# Patient Record
Sex: MALE | Race: WHITE | NOT HISPANIC OR LATINO | Employment: OTHER | URBAN - METROPOLITAN AREA
[De-identification: names, ages, dates, MRNs, and addresses within clinical notes are randomized per-mention and may not be internally consistent; named-entity substitution may affect disease eponyms.]

---

## 2017-01-03 ENCOUNTER — THERAPY VISIT (OUTPATIENT)
Dept: PHYSICAL THERAPY | Facility: CLINIC | Age: 77
End: 2017-01-03
Payer: MEDICARE

## 2017-01-03 DIAGNOSIS — M54.6 CHRONIC LEFT-SIDED THORACIC BACK PAIN: Primary | ICD-10-CM

## 2017-01-03 DIAGNOSIS — G89.29 CHRONIC LEFT-SIDED THORACIC BACK PAIN: Primary | ICD-10-CM

## 2017-01-03 PROCEDURE — 97112 NEUROMUSCULAR REEDUCATION: CPT | Mod: GP | Performed by: PHYSICAL THERAPIST

## 2017-01-03 PROCEDURE — G8980 MOBILITY D/C STATUS: HCPCS | Mod: GP | Performed by: PHYSICAL THERAPIST

## 2017-01-03 PROCEDURE — 97110 THERAPEUTIC EXERCISES: CPT | Mod: GP | Performed by: PHYSICAL THERAPIST

## 2017-01-03 PROCEDURE — G8979 MOBILITY GOAL STATUS: HCPCS | Mod: GP | Performed by: PHYSICAL THERAPIST

## 2017-01-03 NOTE — PROGRESS NOTES
Subjective:    HPI  Oswestry Score: 0 %                 Objective:    System    Physical Exam    General     ROS    Assessment/Plan:      DISCHARGE REPORT    Progress reporting period is from 11-15-16 to 1-3-17.       SUBJECTIVE  Subjective changes noted by patient:  Patient is pain free and very happy.       Current pain level is 0/10  .     Previous pain level was  4/10 Initial Pain level: 4/10.   Changes in function:  Yes (See Goal flowsheet attached for changes in current functional level)  Adverse reaction to treatment or activity: None    OBJECTIVE  Changes noted in objective findings:  The objective findings below are from DOS 1-3-17.  Thoracic ROM: full, painfree; improved sitting posture but does require occasional vc.        ASSESSMENT/PLAN  Updated problem list and treatment plan: Diagnosis 1:  Thoracic spine pain  Pain -  self management, education, directional preference exercise and home program  Decreased ROM/flexibility - manual therapy and therapeutic exercise  Decreased strength - therapeutic exercise and therapeutic activities  Decreased function - therapeutic activities  Impaired posture - neuro re-education  STG/LTGs have been met or progress has been made towards goals:  Yes (See Goal flow sheet completed today.)  Assessment of Progress: The patient has met all of their long term goals.  Self Management Plans:  Patient is independent in a home treatment program.  Patient is independent in self management of symptoms.  I have re-evaluated this patient and find that the nature, scope, duration and intensity of the therapy is appropriate for the medical condition of the patient.  Bryant continues to require the following intervention to meet STG and LTG's:  PT intervention is no longer required to meet STG/LTG.    Recommendations:  This patient is ready to be discharged from therapy and continue their home treatment program.    Please refer to the daily flowsheet for treatment today, total  treatment time and time spent performing 1:1 timed codes.

## 2017-01-09 ENCOUNTER — ALLIED HEALTH/NURSE VISIT (OUTPATIENT)
Dept: NURSING | Facility: CLINIC | Age: 77
End: 2017-01-09
Payer: COMMERCIAL

## 2017-01-09 DIAGNOSIS — E53.8 VITAMIN B12 DEFICIENCY (NON ANEMIC): Primary | ICD-10-CM

## 2017-01-09 PROCEDURE — 96372 THER/PROPH/DIAG INJ SC/IM: CPT

## 2017-01-09 PROCEDURE — 90471 IMMUNIZATION ADMIN: CPT

## 2017-02-10 ENCOUNTER — ALLIED HEALTH/NURSE VISIT (OUTPATIENT)
Dept: NURSING | Facility: CLINIC | Age: 77
End: 2017-02-10
Payer: COMMERCIAL

## 2017-02-10 DIAGNOSIS — E53.8 VITAMIN B12 DEFICIENCY (NON ANEMIC): Primary | ICD-10-CM

## 2017-02-10 PROCEDURE — 99207 ZZC NO CHARGE NURSE ONLY: CPT | Mod: 25

## 2017-02-20 DIAGNOSIS — Z79.4 TYPE 2 DIABETES MELLITUS WITH COMPLICATION, WITH LONG-TERM CURRENT USE OF INSULIN (H): Primary | ICD-10-CM

## 2017-02-20 DIAGNOSIS — E11.8 TYPE 2 DIABETES MELLITUS WITH COMPLICATION, WITH LONG-TERM CURRENT USE OF INSULIN (H): Primary | ICD-10-CM

## 2017-02-20 NOTE — TELEPHONE ENCOUNTER
Pending Prescriptions:                       Disp   Refills    insulin glargine (LANTUS) 100 UNIT/ML inj*15 mL  3            Sig: Inject 20 Units Subcutaneous At Bedtime          Last Written Prescription Date:    Last Fill Quantity: ,   # refills:   Last Office Visit with [facility], P or [facility] prescribing provider: 12/05/16  Future Office visit:    Next 5 appointments (look out 90 days)     Mar 13, 2017 10:30 AM CDT   [facility]quis Caldwell [facility]    [address] [facility] [address]   [phone]                   Routing refill request to provider for review/approval because:  Medication is reported/historical

## 2017-03-13 ENCOUNTER — OFFICE VISIT (OUTPATIENT)
Dept: FAMILY MEDICINE | Facility: CLINIC | Age: 77
End: 2017-03-13
Payer: COMMERCIAL

## 2017-03-13 VITALS
OXYGEN SATURATION: 97 % | WEIGHT: 274.6 LBS | TEMPERATURE: 97.4 F | BODY MASS INDEX: 37.19 KG/M2 | SYSTOLIC BLOOD PRESSURE: 119 MMHG | DIASTOLIC BLOOD PRESSURE: 71 MMHG | HEART RATE: 87 BPM | HEIGHT: 72 IN

## 2017-03-13 DIAGNOSIS — E11.9 TYPE 2 DIABETES MELLITUS WITHOUT COMPLICATION, WITH LONG-TERM CURRENT USE OF INSULIN (H): Primary | ICD-10-CM

## 2017-03-13 DIAGNOSIS — E53.8 VITAMIN B12 DEFICIENCY (NON ANEMIC): ICD-10-CM

## 2017-03-13 DIAGNOSIS — Z79.4 TYPE 2 DIABETES MELLITUS WITHOUT COMPLICATION, WITH LONG-TERM CURRENT USE OF INSULIN (H): Primary | ICD-10-CM

## 2017-03-13 LAB — HBA1C MFR BLD: 8.1 % (ref 4.3–6)

## 2017-03-13 PROCEDURE — 99213 OFFICE O/P EST LOW 20 MIN: CPT | Performed by: INTERNAL MEDICINE

## 2017-03-13 PROCEDURE — 36415 COLL VENOUS BLD VENIPUNCTURE: CPT | Performed by: INTERNAL MEDICINE

## 2017-03-13 PROCEDURE — 83036 HEMOGLOBIN GLYCOSYLATED A1C: CPT | Performed by: INTERNAL MEDICINE

## 2017-03-13 RX ORDER — AMOXICILLIN 500 MG/1
CAPSULE ORAL
Refills: 0 | COMMUNITY
Start: 2017-01-08 | End: 2019-03-19

## 2017-03-13 RX ORDER — OXYCODONE AND ACETAMINOPHEN 5; 325 MG/1; MG/1
TABLET ORAL
Refills: 0 | COMMUNITY
Start: 2016-06-21 | End: 2019-03-19

## 2017-03-13 RX ORDER — SIMVASTATIN 5 MG
TABLET ORAL
Refills: 4 | Status: ON HOLD | COMMUNITY
Start: 2016-09-10 | End: 2019-06-04

## 2017-03-13 RX ORDER — GLIPIZIDE 10 MG/1
TABLET ORAL
Refills: 2 | COMMUNITY
Start: 2016-08-18 | End: 2017-06-19

## 2017-03-13 NOTE — PROGRESS NOTES
SUBJECTIVE:                                                    Bryant Mckenzie is a 77 year old male who presents to clinic today for the following health issues:      Chief Complaint   Patient presents with     Follow Up For     Diabetes     Imm/Inj     B-12 Shot         Pleasant 77-year-old man returns in follow-up for his diabetes and vitamin B12 deficiency. We reviewed his home blood sugar log indicating that most of his a.m. fasting blood sugars are greater than 160. There are no low blood sugars noted. He has not had any symptoms of hypoglycemia. He admits that sometimes he does not take 12 units of Lantus at bedtime based on blood sugar readings in the 120s at bedtime. He was educated that he should keep his Lantus insulin dose constant. He does give himself with meals insulin based on pre-prandial blood sugar readings.    Problem list and histories reviewed & adjusted, as indicated.  Additional history: as documented    Patient Active Problem List   Diagnosis     Type 2 diabetes mellitus without complication (H)     Hyperlipidemia LDL goal <100     Atrophic gastritis     Vitamin B12 deficiency (non anemic)     Obstructive sleep apnea syndrome     Benign non-nodular prostatic hyperplasia with lower urinary tract symptoms     Aortic stenosis     Chronic left-sided thoracic back pain     Past Surgical History   Procedure Laterality Date     Orthopedic surgery       both knees replaced     Esophagoscopy, gastroscopy, duodenoscopy (egd), combined N/A 1/20/2015     Procedure: COMBINED ESOPHAGOSCOPY, GASTROSCOPY, DUODENOSCOPY (EGD), BIOPSY SINGLE OR MULTIPLE;  Surgeon: Allan Marroquin MD;  Location:  GI     Hernia repair       Hydrocelectomy scrotal         Social History   Substance Use Topics     Smoking status: Never Smoker     Smokeless tobacco: Never Used     Alcohol use 0.0 oz/week     0 Standard drinks or equivalent per week      Comment: 2 beers a month     Family History   Problem Relation Age of  Onset     Coronary Artery Disease Father 53         Current Outpatient Prescriptions   Medication Sig Dispense Refill     amoxicillin (AMOXIL) 500 MG capsule   0     glipiZIDE (GLUCOTROL) 10 MG tablet   2     oxyCODONE-acetaminophen (PERCOCET) 5-325 MG per tablet   0     simvastatin (ZOCOR) 5 MG tablet   4     insulin glargine (LANTUS) 100 UNIT/ML injection Inject 20 Units Subcutaneous At Bedtime 15 mL 3     ONE TOUCH ULTRA test strip   12     ONETOUCH DELICA LANCETS 33G MISC   12     LANTUS SOLOSTAR 100 UNIT/ML soln   3     insulin lispro (HUMALOG KWIKPEN) 100 UNIT/ML injection 10 units with breakfast  10 units with lunch  And 11 units with dinner 1 mL 11     atorvastatin (LIPITOR) 20 MG tablet Take 1 tablet (20 mg) by mouth daily 90 tablet 3     insulin pen needle 31G X 8 MM Use 4 pen needles daily or as directed for humalog and lantus 300 each 11     tamsulosin (FLOMAX) 0.4 MG 24 hr capsule Take 1 capsule (0.4 mg) by mouth daily 90 capsule 3     cyanocobalamin (VITAMIN B12) 1000 MCG/ML injection Inject 1 mL (1,000 mcg) into the muscle every 30 days 1 mL 11     Multiple Vitamins-Minerals (MULTIVITAL PO) Take 1 tablet by mouth daily       METFORMIN HCL PO Take 1,000 mg by mouth 2 times daily        Allergies   Allergen Reactions     No Known Allergies        Reviewed and updated as needed this visit by clinical staff  Tobacco  Allergies  Meds  Med Hx  Surg Hx  Fam Hx  Soc Hx      Reviewed and updated as needed this visit by Provider         ROS:  C: NEGATIVE for fever, chills, change in weight  E/M: NEGATIVE for ear, mouth and throat problems  R: NEGATIVE for significant cough or SOB  CV: NEGATIVE for chest pain, palpitations or peripheral edema    OBJECTIVE:                                                    /71 (BP Location: Left arm, Patient Position: Chair, Cuff Size: Adult Large)  Pulse 87  Temp 97.4  F (36.3  C) (Oral)  Ht 6' (1.829 m)  Wt 274 lb 9.6 oz (124.6 kg)  SpO2 97%  BMI 37.24 kg/m2  Body  mass index is 37.24 kg/(m^2).  Gen.: This is a well-appearing man in no acute distress.    Diagnostic Test Results:  Results for orders placed or performed in visit on 03/13/17   Hemoglobin A1c   Result Value Ref Range    Hemoglobin A1C 8.1 (H) 4.3 - 6.0 %        ASSESSMENT/PLAN:                                                            1. Type 2 diabetes mellitus without complication, with long-term current use of insulin (H)    - Hemoglobin A1c    2. Vitamin B12 deficiency (non anemic)  B12 shot today      FUTURE APPOINTMENTS:       - 3 months, lab appointment prior to office visit with A1c checked  Patient Instructions   Increase your Lantus insulin from 12 units at night to 14 units at night  If after 1-2 weeks your AM fasting sugars are still greater than 150, then further increase your Lantus insulin to 16 units at night  If after a further 1-2 weeks your AM fasting sugars are still greater than 150, then further increase your Lantus insulin to 18 units at night  Inform me if your sugars are measured less than 90 or you have symptoms of low sugar  I will notify you of the result of your A1c test and inform you when you need to follow up with me based on that result    Total time greater than 20 minutes majority spent counseling on insulin management and coordinating care  Marquis Caldwell MD  [facility]

## 2017-03-13 NOTE — LETTER
[address] [facility]  [address]  [place]  [address]  Tel: [phone]    March 14, 2017    Bryant Mckenzie  [address] [address]        Dear Mr. Mckenzie,    The following letter pertains to your most recent diagnostic tests:    -Your hemoglobin A1c which is a diabetes blood test that represents an average of you blood sugars over the last 3 months returned at 8.1.  This is ABOVE your goal of hemoglobin A1c less than 8.       Bottom line:  I think you should implement the changes in your insulin that we discussed last visit.  You should record your insulin doses and blood sugars in a log book like you have been doing and return to see me in 4-6 weeks to discuss your insulin doses.  We will need to recheck your hemoglobin A1c level in a 3 month interval.      If you have any further questions or problems, please contact our office.      Sincerely,    Marquis Caldwell MD/[facility]    Results for orders placed or performed in visit on 03/13/17   Hemoglobin A1c   Result Value Ref Range    Hemoglobin A1C 8.1 (H) 4.3 - 6.0 %           Enclosure: Lab Results

## 2017-03-13 NOTE — PATIENT INSTRUCTIONS
Increase your Lantus insulin from 12 units at night to 14 units at night  If after 1-2 weeks your AM fasting sugars are still greater than 150, then further increase your Lantus insulin to 16 units at night  If after a further 1-2 weeks your AM fasting sugars are still greater than 150, then further increase your Lantus insulin to 18 units at night  Inform me if your sugars are measured less than 90 or you have symptoms of low sugar  I will notify you of the result of your A1c test and inform you when you need to follow up with me based on that result

## 2017-03-13 NOTE — NURSING NOTE
Chief Complaint   Patient presents with     Follow Up For     Diabetes     Imm/Inj     B-12 Shot       Initial /71 (BP Location: Left arm, Patient Position: Chair, Cuff Size: Adult Large)  Pulse 87  Temp 97.4  F (36.3  C) (Oral)  Ht 6' (1.829 m)  Wt 274 lb 9.6 oz (124.6 kg)  SpO2 97%  BMI 37.24 kg/m2 Estimated body mass index is 37.24 kg/(m^2) as calculated from the following:    Height as of this encounter: 6' (1.829 m).    Weight as of this encounter: 274 lb 9.6 oz (124.6 kg).  Medication Reconciliation: complete     DARCY Dee MA March 13, 2017 10:46 AM

## 2017-03-13 NOTE — MR AVS SNAPSHOT
After Visit Summary   3/13/2017    Bryant Mckenzie    MRN: [id number]           Patient Information     Date Of Birth          1940        Visit Information        Provider Department      3/13/2017 10:30 AM Marquis Caldwell MD [facility]        Today's Diagnoses     Type 2 diabetes mellitus without complication, with long-term current use of insulin (H)    -  1    Vitamin B12 deficiency (non anemic)          Care Instructions    Increase your Lantus insulin from 12 units at night to 14 units at night  If after 1-2 weeks your AM fasting sugars are still greater than 150, then further increase your Lantus insulin to 16 units at night  If after a further 1-2 weeks your AM fasting sugars are still greater than 150, then further increase your Lantus insulin to 18 units at night  Inform me if your sugars are measured less than 90 or you have symptoms of low sugar  I will notify you of the result of your A1c test and inform you when you need to follow up with me based on that result        Follow-ups after your visit        Follow-up notes from your care team     Return in about 3 months (around 6/13/2017) for Routine Visit.      Who to contact     If you have questions or need follow up information about today's clinic visit or your schedule please contact [facility] directly at [phone].  Normal or non-critical lab and imaging results will be communicated to you by MyChart, letter or phone within 4 business days after the clinic has received the results. If you do not hear from us within 7 days, please contact the clinic through MyChart or phone. If you have a critical or abnormal lab result, we will notify you by phone as soon as possible.  Submit refill requests through [employer] or call your pharmacy and they will forward the refill request to us. Please allow 3 business days for your refill to be completed.          Additional Information About Your Visit       "  MyChart Information     [email or web address] lets you send messages to your doctor, view your test results, renew your prescriptions, schedule appointments and more. To sign up, go to [email or web address] . Click on \"Log in\" on the left side of the screen, which will take you to the Welcome page. Then click on \"Sign up Now\" on the right side of the page.     You will be asked to enter the access code listed below, as well as some personal information. Please follow the directions to create your username and password.     Your access code is: [email or web address]  Expires: 2017 11:02 AM     Your access code will  in 90 days. If you need help or a new code, please call your [facility] clinic or [phone].        Care EveryWhere ID     This is your [facility] EveryWhere ID. This could be used by other organizations to access your [facility] medical records  [id number]        Your Vitals Were     Pulse Temperature Height Pulse Oximetry BMI (Body Mass Index)       87 97.4  F (36.3  C) (Oral) 6' (1.829 m) 97% 37.24 kg/m2        Blood Pressure from Last 3 Encounters:   17 119/71   16 117/67   16 119/70    Weight from Last 3 Encounters:   17 274 lb 9.6 oz (124.6 kg)   16 269 lb (122 kg)   16 250 lb (113.4 kg)              We Performed the Following     Hemoglobin A1c        Primary Care Provider Office Phone # Fax #    Marquis Caldwell [phone]       [facility] [address] [place] [address]        Thank you!     Thank you for choosing [facility]  for your care. Our goal is always to provide you with excellent care. Hearing back from our patients is one way we can continue to improve our services. Please take a few minutes to complete the written survey that you may receive in the mail after your visit with us. Thank you!             Your Updated Medication List - Protect others around you: Learn how to safely use, store and throw away your medicines at " www.disposemymeds.org.          This list is accurate as of: 3/13/17 11:02 AM.  Always use your most recent med list.                   Brand Name Dispense Instructions for use    amoxicillin 500 MG capsule    AMOXIL         atorvastatin 20 MG tablet    LIPITOR    90 tablet    Take 1 tablet (20 mg) by mouth daily       cyanocobalamin 1000 MCG/ML injection    VITAMIN B12    1 mL    Inject 1 mL (1,000 mcg) into the muscle every 30 days       FLOMAX 0.4 MG capsule   Generic drug:  tamsulosin     90 capsule    Take 1 capsule (0.4 mg) by mouth daily       glipiZIDE 10 MG tablet    GLUCOTROL         HumaLOG KWIKpen 100 UNIT/ML injection   Generic drug:  insulin lispro     1 mL    10 units with breakfast 10 units with lunch And 11 units with dinner       insulin pen needle 31G X 8 MM     300 each    Use 4 pen needles daily or as directed for humalog and lantus       * LANTUS SOLOSTAR 100 UNIT/ML injection   Generic drug:  insulin glargine          * insulin glargine 100 UNIT/ML injection    LANTUS    15 mL    Inject 20 Units Subcutaneous At Bedtime       METFORMIN HCL PO      Take 1,000 mg by mouth 2 times daily       MULTIVITAL PO      Take 1 tablet by mouth daily       ONE TOUCH ULTRA test strip   Generic drug:  blood glucose monitoring          ONETOUCH DELICA LANCETS 33G Misc          oxyCODONE-acetaminophen 5-325 MG per tablet    PERCOCET         simvastatin 5 MG tablet    ZOCOR         * Notice:  This list has 2 medication(s) that are the same as other medications prescribed for you. Read the directions carefully, and ask your doctor or other care provider to review them with you.

## 2017-03-14 NOTE — PROGRESS NOTES
The following letter pertains to your most recent diagnostic tests:    -Your hemoglobin A1c which is a diabetes blood test that represents an average of you blood sugars over the last 3 months returned at 8.1.  This is ABOVE your goal of hemoglobin A1c less than 8.       Bottom line:  I think you should implement the changes in your insulin that we discussed last visit.  You should record your insulin doses and blood sugars in a log book like you have been doing and return to see me in 4-6 weeks to discuss your insulin doses.  We will need to recheck your hemoglobin A1c level in a 3 month interval.          Sincerely,    Dr. Caldwell

## 2017-04-05 DIAGNOSIS — N40.0 BENIGN NON-NODULAR PROSTATIC HYPERPLASIA WITHOUT LOWER URINARY TRACT SYMPTOMS: ICD-10-CM

## 2017-04-05 RX ORDER — TAMSULOSIN HYDROCHLORIDE 0.4 MG/1
0.4 CAPSULE ORAL DAILY
Qty: 90 CAPSULE | Refills: 3 | Status: SHIPPED | OUTPATIENT
Start: 2017-04-05 | End: 2018-03-31

## 2017-04-05 NOTE — TELEPHONE ENCOUNTER
Pending Prescriptions:                       Disp   Refills    tamsulosin (FLOMAX) 0.4 MG capsule        90 cap*3            Sig: Take 1 capsule (0.4 mg) by mouth daily          Last Written Prescription Date: historical  Last Fill Quantity: -, # refills: -    Last Office Visit with G, P or [facility] prescribing provider:  3-13-17 Paulette   Future Office Visit:    Next 5 appointments (look out 90 days)     Apr 10, 2017 11:00 AM CDT   Nurse Only with  NURSE   [facility]    [address]  [facility] [address]   [phone]            May 01, 2017  1:00 PM CDT   Office Visit with Marquis Caldwell MD   [facility]    [address] [facility] [address]   [phone]                    BP Readings from Last 3 Encounters:   03/13/17 119/71   12/05/16 117/67   11/12/16 119/70     RT Brittney (R)

## 2017-04-10 ENCOUNTER — ALLIED HEALTH/NURSE VISIT (OUTPATIENT)
Dept: NURSING | Facility: CLINIC | Age: 77
End: 2017-04-10
Payer: COMMERCIAL

## 2017-04-10 DIAGNOSIS — E53.8 VITAMIN B12 DEFICIENCY (NON ANEMIC): Primary | ICD-10-CM

## 2017-04-10 PROCEDURE — 99207 ZZC NO CHARGE NURSE ONLY: CPT | Mod: 25

## 2017-05-01 ENCOUNTER — OFFICE VISIT (OUTPATIENT)
Dept: FAMILY MEDICINE | Facility: CLINIC | Age: 77
End: 2017-05-01
Payer: COMMERCIAL

## 2017-05-01 VITALS
HEIGHT: 72 IN | OXYGEN SATURATION: 98 % | WEIGHT: 270 LBS | TEMPERATURE: 95.2 F | SYSTOLIC BLOOD PRESSURE: 110 MMHG | HEART RATE: 78 BPM | BODY MASS INDEX: 36.57 KG/M2 | DIASTOLIC BLOOD PRESSURE: 65 MMHG

## 2017-05-01 DIAGNOSIS — Z79.4 TYPE 2 DIABETES MELLITUS WITHOUT COMPLICATION, WITH LONG-TERM CURRENT USE OF INSULIN (H): Primary | ICD-10-CM

## 2017-05-01 DIAGNOSIS — E11.9 TYPE 2 DIABETES MELLITUS WITHOUT COMPLICATION, WITH LONG-TERM CURRENT USE OF INSULIN (H): Primary | ICD-10-CM

## 2017-05-01 PROCEDURE — 99213 OFFICE O/P EST LOW 20 MIN: CPT | Performed by: INTERNAL MEDICINE

## 2017-05-01 NOTE — PROGRESS NOTES
SUBJECTIVE:                                                    Bryant Mckenzie is a 77 year old male who presents to clinic today for the following health issues:      Diabetes Follow-up    Patient is checking blood sugars: three times daily.   Blood sugar testing frequency justification: Adjustment of medication(s)  Results are as follows:         am - 152         lunchtime - 393         bedtime - 266    Diabetic concerns: None and blood sugar frequently over 200     Symptoms of hypoglycemia (low blood sugar): none     Paresthesias (numbness or burning in feet) or sores: No     Date of last diabetic eye exam: November, 2016       He had no low blood sugar readings or symptoms of hypoglycemia  His AM fasting sugars are generally > 130, but he only increased his lantus to a maximum of 14 units   He does not check his sugars prior to lunch regularly, but when he does they are in the high 200's  His pre dinner sugars are often in 300-400's and he has been adjusting his with dinner insulin dose based on those readings as high as 16 units     Problem list and histories reviewed & adjusted, as indicated.  Additional history: as documented    Patient Active Problem List   Diagnosis     Type 2 diabetes mellitus without complication (H)     Hyperlipidemia LDL goal <100     Atrophic gastritis     Vitamin B12 deficiency (non anemic)     Obstructive sleep apnea syndrome     Benign non-nodular prostatic hyperplasia with lower urinary tract symptoms     Aortic stenosis     Chronic left-sided thoracic back pain     Past Surgical History:   Procedure Laterality Date     ESOPHAGOSCOPY, GASTROSCOPY, DUODENOSCOPY (EGD), COMBINED N/A 1/20/2015    Procedure: COMBINED ESOPHAGOSCOPY, GASTROSCOPY, DUODENOSCOPY (EGD), BIOPSY SINGLE OR MULTIPLE;  Surgeon: Allan Marroquin MD;  Location:  GI     HERNIA REPAIR       HYDROCELECTOMY SCROTAL       ORTHOPEDIC SURGERY      both knees replaced       Social History   Substance Use Topics      Smoking status: Never Smoker     Smokeless tobacco: Never Used     Alcohol use 0.0 oz/week     0 Standard drinks or equivalent per week      Comment: 2 beers a month     Family History   Problem Relation Age of Onset     Coronary Artery Disease Father 53         Current Outpatient Prescriptions   Medication Sig Dispense Refill     tamsulosin (FLOMAX) 0.4 MG capsule Take 1 capsule (0.4 mg) by mouth daily 90 capsule 3     metFORMIN (GLUCOPHAGE) 500 MG tablet Take 2 tablets (1,000 mg) by mouth 2 times daily (with meals) 360 tablet 3     amoxicillin (AMOXIL) 500 MG capsule   0     glipiZIDE (GLUCOTROL) 10 MG tablet   2     oxyCODONE-acetaminophen (PERCOCET) 5-325 MG per tablet   0     simvastatin (ZOCOR) 5 MG tablet   4     insulin glargine (LANTUS) 100 UNIT/ML injection Inject 20 Units Subcutaneous At Bedtime (Patient taking differently: Inject 14 Units Subcutaneous At Bedtime ) 15 mL 3     ONE TOUCH ULTRA test strip   12     ONETOUCH DELICA LANCETS 33G MISC   12     LANTUS SOLOSTAR 100 UNIT/ML soln   3     insulin lispro (HUMALOG KWIKPEN) 100 UNIT/ML injection 10 units with breakfast  10 units with lunch  And 11 units with dinner 1 mL 11     atorvastatin (LIPITOR) 20 MG tablet Take 1 tablet (20 mg) by mouth daily 90 tablet 3     insulin pen needle 31G X 8 MM Use 4 pen needles daily or as directed for humalog and lantus 300 each 11     cyanocobalamin (VITAMIN B12) 1000 MCG/ML injection Inject 1 mL (1,000 mcg) into the muscle every 30 days 1 mL 11     Multiple Vitamins-Minerals (MULTIVITAL PO) Take 1 tablet by mouth daily       Allergies   Allergen Reactions     No Known Allergies        ROS:      OBJECTIVE:                                                    /65 (BP Location: Right arm, Cuff Size: Adult Large)  Pulse 78  Temp 95.2  F (35.1  C) (Tympanic)  Ht 6' (1.829 m)  Wt 270 lb (122.5 kg)  SpO2 98%  BMI 36.62 kg/m2  Body mass index is 36.62 kg/(m^2).  GENERAL: healthy, alert and no distress  PSYCH:  mentation appears normal, affect normal/bright    Diagnostic Test Results:  none      ASSESSMENT/PLAN:                                                              ICD-10-CM    1. Type 2 diabetes mellitus without complication, with long-term current use of insulin (H) E11.9     Z79.4      We spent about 15 minutes in face to face contact discussing his insulin dosing and diet    Patient Instructions   Increase bedtime Lantus insulin to 16 units each night    Increase breakfast Humalog to 11 units with breakfast    Increase lunchtime Humalog to 15 units with lunch    Increase dinner time Humalog to 14 units with dinner     If you have sugar readings less than 90 then inform me by leaving a message with my nurse    Return after June 13th for an office visit with a hemoglobin A1c test    Keep up the good work of recording your blood sugars and insulin doses as you have been (good job!)        Marquis Caldwell MD  [facility]

## 2017-05-01 NOTE — NURSING NOTE
Chief Complaint   Patient presents with     Diabetes       Initial /65 (BP Location: Right arm, Cuff Size: Adult Large)  Pulse 78  Temp 95.2  F (35.1  C) (Tympanic)  Ht 6' (1.829 m)  Wt 270 lb (122.5 kg)  SpO2 98%  BMI 36.62 kg/m2 Estimated body mass index is 36.62 kg/(m^2) as calculated from the following:    Height as of this encounter: 6' (1.829 m).    Weight as of this encounter: 270 lb (122.5 kg).  Medication Reconciliation: complete     Kourtney Howell MA

## 2017-05-01 NOTE — PATIENT INSTRUCTIONS
Increase bedtime Lantus insulin to 16 units each night    Increase breakfast Humalog to 11 units with breakfast    Increase lunchtime Humalog to 15 units with lunch    Increase dinner time Humalog to 14 units with dinner     If you have sugar readings less than 90 then inform me by leaving a message with my nurse    Return after June 13th for an office visit with a hemoglobin A1c test    Keep up the good work of recording your blood sugars and insulin doses as you have been (good job!)

## 2017-05-01 NOTE — MR AVS SNAPSHOT
After Visit Summary   5/1/2017    Bryant Mckenzie    MRN: [id number]           Patient Information     Date Of Birth          1940        Visit Information        Provider Department      5/1/2017 1:00 PM Marquis Caldwell MD [facility]        Today's Diagnoses     Type 2 diabetes mellitus without complication, with long-term current use of insulin (H)    -  1      Care Instructions    Increase bedtime Lantus insulin to 16 units each night    Increase breakfast Humalog to 11 units with breakfast    Increase lunchtime Humalog to 15 units with lunch    Increase dinner time Humalog to 14 units with dinner     If you have sugar readings less than 90 then inform me by leaving a message with my nurse    Return after June 13th for an office visit with a hemoglobin A1c test    Keep up the good work of recording your blood sugars and insulin doses as you have been (good job!)          Follow-ups after your visit        Follow-up notes from your care team     Return in about 6 weeks (around 6/12/2017) for Routine Visit.      Your next 10 appointments already scheduled     May 15, 2017 11:00 AM CDT   Nurse Only with CS NURSE   [facility] ([facility]    [address]  [place] [address] [phone]              Who to contact     If you have questions or need follow up information about today's clinic visit or your schedule please contact [facility] directly at [phone].  Normal or non-critical lab and imaging results will be communicated to you by MyChart, letter or phone within 4 business days after the clinic has received the results. If you do not hear from us within 7 days, please contact the clinic through MyChart or phone. If you have a critical or abnormal lab result, we will notify you by phone as soon as possible.  Submit refill requests through [employer] or call your pharmacy and they will forward the refill request to us. Please allow 3  business days for your refill to be completed.          Additional Information About Your Visit        [employer] Information     [employer] gives you secure access to your electronic health record. If you see a primary care provider, you can also send messages to your care team and make appointments. If you have questions, please call your primary care clinic.  If you do not have a primary care provider, please call [phone] and they will assist you.        Care EveryWhere ID     This is your Care EveryWhere ID. This could be used by other organizations to access your [place] medical records  [id number]        Your Vitals Were     Pulse Temperature Height Pulse Oximetry BMI (Body Mass Index)       78 95.2  F (35.1  C) (Tympanic) 6' (1.829 m) 98% 36.62 kg/m2        Blood Pressure from Last 3 Encounters:   05/01/17 110/65   03/13/17 119/71   12/05/16 117/67    Weight from Last 3 Encounters:   05/01/17 270 lb (122.5 kg)   03/13/17 274 lb 9.6 oz (124.6 kg)   12/05/16 269 lb (122 kg)              Today, you had the following     No orders found for display         Today's Medication Changes          These changes are accurate as of: 5/1/17  1:38 PM.  If you have any questions, ask your nurse or doctor.               These medicines have changed or have updated prescriptions.        Dose/Directions    * LANTUS SOLOSTAR 100 UNIT/ML injection   This may have changed:  Another medication with the same name was changed. Make sure you understand how and when to take each.   Generic drug:  insulin glargine   Changed by:  Marquis Caldwell MD        Refills:  3       * insulin glargine 100 UNIT/ML injection   Commonly known as:  LANTUS   This may have changed:  how much to take   Used for:  Type 2 diabetes mellitus with complication, with long-term current use of insulin (H)   Changed by:  Marquis Caldwell MD        Dose:  20 Units   Inject 20 Units Subcutaneous At Bedtime   Quantity:  15 mL   Refills:  3       * Notice:  This  list has 2 medication(s) that are the same as other medications prescribed for you. Read the directions carefully, and ask your doctor or other care provider to review them with you.             Primary Care Provider Office Phone # Fax #    Marquis Caldwell [phone]       [facility] [address] [facility] [address]        Thank you!     Thank you for choosing [facility]  for your care. Our goal is always to provide you with excellent care. Hearing back from our patients is one way we can continue to improve our services. Please take a few minutes to complete the written survey that you may receive in the mail after your visit with us. Thank you!             Your Updated Medication List - Protect others around you: Learn how to safely use, store and throw away your medicines at www.disposemymeds.org.          This list is accurate as of: 5/1/17  1:38 PM.  Always use your most recent med list.                   Brand Name Dispense Instructions for use    amoxicillin 500 MG capsule    AMOXIL         atorvastatin 20 MG tablet    LIPITOR    90 tablet    Take 1 tablet (20 mg) by mouth daily       cyanocobalamin 1000 MCG/ML injection    VITAMIN B12    1 mL    Inject 1 mL (1,000 mcg) into the muscle every 30 days       glipiZIDE 10 MG tablet    GLUCOTROL         HumaLOG KWIKpen 100 UNIT/ML injection   Generic drug:  insulin lispro     1 mL    10 units with breakfast 10 units with lunch And 11 units with dinner       insulin pen needle 31G X 8 MM     300 each    Use 4 pen needles daily or as directed for humalog and lantus       * LANTUS SOLOSTAR 100 UNIT/ML injection   Generic drug:  insulin glargine          * insulin glargine 100 UNIT/ML injection    LANTUS    15 mL    Inject 20 Units Subcutaneous At Bedtime       metFORMIN 500 MG tablet    GLUCOPHAGE    360 tablet    Take 2 tablets (1,000 mg) by mouth 2 times daily (with meals)       MULTIVITAL PO      Take 1 tablet by mouth  daily       ONE TOUCH ULTRA test strip   Generic drug:  blood glucose monitoring          ONETOUCH DELICA LANCETS 33G Misc          oxyCODONE-acetaminophen 5-325 MG per tablet    PERCOCET         simvastatin 5 MG tablet    ZOCOR         tamsulosin 0.4 MG capsule    FLOMAX    90 capsule    Take 1 capsule (0.4 mg) by mouth daily       * Notice:  This list has 2 medication(s) that are the same as other medications prescribed for you. Read the directions carefully, and ask your doctor or other care provider to review them with you.

## 2017-05-13 DIAGNOSIS — E11.8 TYPE 2 DIABETES MELLITUS WITH COMPLICATION, WITH LONG-TERM CURRENT USE OF INSULIN (H): ICD-10-CM

## 2017-05-13 DIAGNOSIS — Z79.4 TYPE 2 DIABETES MELLITUS WITHOUT COMPLICATION, WITH LONG-TERM CURRENT USE OF INSULIN (H): ICD-10-CM

## 2017-05-13 DIAGNOSIS — Z79.4 TYPE 2 DIABETES MELLITUS WITH COMPLICATION, WITH LONG-TERM CURRENT USE OF INSULIN (H): ICD-10-CM

## 2017-05-13 DIAGNOSIS — E11.9 TYPE 2 DIABETES MELLITUS WITHOUT COMPLICATION, WITH LONG-TERM CURRENT USE OF INSULIN (H): ICD-10-CM

## 2017-05-15 ENCOUNTER — ALLIED HEALTH/NURSE VISIT (OUTPATIENT)
Dept: NURSING | Facility: CLINIC | Age: 77
End: 2017-05-15
Payer: COMMERCIAL

## 2017-05-15 DIAGNOSIS — E53.8 VITAMIN B12 DEFICIENCY (NON ANEMIC): Primary | ICD-10-CM

## 2017-05-15 PROCEDURE — 99207 ZZC NO CHARGE NURSE ONLY: CPT

## 2017-05-15 PROCEDURE — 96372 THER/PROPH/DIAG INJ SC/IM: CPT

## 2017-05-15 NOTE — TELEPHONE ENCOUNTER
HUMALOG KWIKPEN 100 UNIT/ML soln           Last Written Prescription Date: 12/5/2016  Last Fill Quantity: 1mL, # refills: 11  Last Office Visit with [facility], P or [facility] prescribing provider:  5/1/2017   Next 5 appointments (look out 90 days)     May 15, 2017 11:00 AM CDT   Nurse Only with CS NURSE   [facility]    [address]  [facility] [address]   [phone]            Jun 19, 2017 11:30 AM CDT   Office Visit with Marquis Caldwell MD   [facility]    [address] [facility] [address]   [phone]                   BP Readings from Last 3 Encounters:   05/01/17 110/65   03/13/17 119/71   12/05/16 117/67     Lab Results   Component Value Date    MICROL <5 12/05/2016     Lab Results   Component Value Date    UMALCR Unable to calculate due to low value 12/05/2016     Creatinine   Date Value Ref Range Status   12/05/2016 0.87 0.66 - 1.25 mg/dL Final   ]  GFR Estimate   Date Value Ref Range Status   12/05/2016 85 >60 mL/min/1.7m2 Final     Comment:     Non  GFR Calc   08/21/2006 76 >60 mL/min/1.7m2 Final   01/23/2006 >80 >60 mL/min/1.7m2 Final     GFR Estimate If Black   Date Value Ref Range Status   12/05/2016 >90   GFR Calc   >60 mL/min/1.7m2 Final   08/21/2006 >90 >60 mL/min/1.7m2 Final   01/23/2006 >80 >60 mL/min/1.7m2 Final     Lab Results   Component Value Date    CHOL 123 12/05/2016     Lab Results   Component Value Date    HDL 50 12/05/2016     Lab Results   Component Value Date    LDL 51 12/05/2016     Lab Results   Component Value Date    TRIG 108 12/05/2016     No results found for: CHOLHDLRATIO  No results found for: AST  No results found for: ALT  Lab Results   Component Value Date    A1C 8.1 03/13/2017    A1C 8.0 12/05/2016    A1C 7.2 06/27/2016    A1C 6.9 03/02/2016    A1C 6.6 08/28/2015     Potassium   Date Value Ref Range Status   08/22/2006 4.0 3.4 - 5.3 mmol/L Final

## 2017-05-15 NOTE — PROGRESS NOTES
The following medication was given:     MEDICATION: Vitamin B12  1000mcg  ROUTE: IM  SITE: Deltoid - Right  DOSE: 1mL  LOT #: [id number]  :  s0cket  EXPIRATION DATE:  9/2018  NDC#: 2392-7366-04       Per orders of Dr. Caldwell, injection of Vitamin B12 given by Vivian Wood. Patient instructed to remain in clinic for 20 minutes afterwards, and to report any adverse reaction to me immediately.

## 2017-05-15 NOTE — MR AVS SNAPSHOT
After Visit Summary   5/15/2017    Bryant Mckenzie    MRN: [id number]           Patient Information     Date Of Birth          1940        Visit Information        Provider Department      5/15/2017 11:00 AM CS NURSE [facility]        Today's Diagnoses     Vitamin B12 deficiency (non anemic)    -  1       Follow-ups after your visit        Your next 10 appointments already scheduled     Jun 19, 2017 11:30 AM CDT   Office Visit with Marquis Caldwell MD   [facility] ([facility]    [address] [facility] [address] [phone]           Bring a current list of meds and any records pertaining to this visit.  For Physicals, please bring immunization records and any forms needing to be filled out.  Please arrive 10 minutes early to complete paperwork.              Who to contact     If you have questions or need follow up information about today's clinic visit or your schedule please contact [facility] directly at [phone].  Normal or non-critical lab and imaging results will be communicated to you by [employer], letter or phone within 4 business days after the clinic has received the results. If you do not hear from us within 7 days, please contact the clinic through [employer] or phone. If you have a critical or abnormal lab result, we will notify you by phone as soon as possible.  Submit refill requests through [employer] or call your pharmacy and they will forward the refill request to us. Please allow 3 business days for your refill to be completed.          Additional Information About Your Visit        MyChart Information     [employer] gives you secure access to your electronic health record. If you see a primary care provider, you can also send messages to your care team and make appointments. If you have questions, please call your primary care clinic.  If you do not have a primary care provider, please call [phone] and they will  assist you.        Care EveryWhere ID     This is your Care EveryWhere ID. This could be used by other organizations to access your [place] medical records  [id number]         Blood Pressure from Last 3 Encounters:   05/01/17 110/65   03/13/17 119/71   12/05/16 117/67    Weight from Last 3 Encounters:   05/01/17 270 lb (122.5 kg)   03/13/17 274 lb 9.6 oz (124.6 kg)   12/05/16 269 lb (122 kg)              We Performed the Following     B12 - 1000 MCG          Today's Medication Changes          These changes are accurate as of: 5/15/17 11:07 AM.  If you have any questions, ask your nurse or doctor.               These medicines have changed or have updated prescriptions.        Dose/Directions    * LANTUS SOLOSTAR 100 UNIT/ML injection   This may have changed:  Another medication with the same name was changed. Make sure you understand how and when to take each.   Generic drug:  insulin glargine        Refills:  3       * insulin glargine 100 UNIT/ML injection   Commonly known as:  LANTUS   This may have changed:  how much to take   Used for:  Type 2 diabetes mellitus with complication, with long-term current use of insulin (H)        Dose:  20 Units   Inject 20 Units Subcutaneous At Bedtime   Quantity:  15 mL   Refills:  3       * Notice:  This list has 2 medication(s) that are the same as other medications prescribed for you. Read the directions carefully, and ask your doctor or other care provider to review them with you.             Primary Care Provider Office Phone # Fax #    Marquis Caldwell [phone]       [facility] [address] [facility] [address]        Thank you!     Thank you for choosing [facility]  for your care. Our goal is always to provide you with excellent care. Hearing back from our patients is one way we can continue to improve our services. Please take a few minutes to complete the written survey that you may receive in the mail after your visit  with us. Thank you!             Your Updated Medication List - Protect others around you: Learn how to safely use, store and throw away your medicines at www.disposemymeds.org.          This list is accurate as of: 5/15/17 11:07 AM.  Always use your most recent med list.                   Brand Name Dispense Instructions for use    amoxicillin 500 MG capsule    AMOXIL         atorvastatin 20 MG tablet    LIPITOR    90 tablet    Take 1 tablet (20 mg) by mouth daily       cyanocobalamin 1000 MCG/ML injection    VITAMIN B12    1 mL    Inject 1 mL (1,000 mcg) into the muscle every 30 days       glipiZIDE 10 MG tablet    GLUCOTROL         HumaLOG KWIKpen 100 UNIT/ML injection   Generic drug:  insulin lispro     1 mL    10 units with breakfast 10 units with lunch And 11 units with dinner       insulin pen needle 31G X 8 MM     300 each    Use 4 pen needles daily or as directed for humalog and lantus       * LANTUS SOLOSTAR 100 UNIT/ML injection   Generic drug:  insulin glargine          * insulin glargine 100 UNIT/ML injection    LANTUS    15 mL    Inject 20 Units Subcutaneous At Bedtime       metFORMIN 500 MG tablet    GLUCOPHAGE    360 tablet    Take 2 tablets (1,000 mg) by mouth 2 times daily (with meals)       MULTIVITAL PO      Take 1 tablet by mouth daily       ONE TOUCH ULTRA test strip   Generic drug:  blood glucose monitoring          ONETOUCH DELICA LANCETS 33G Misc          oxyCODONE-acetaminophen 5-325 MG per tablet    PERCOCET         simvastatin 5 MG tablet    ZOCOR         tamsulosin 0.4 MG capsule    FLOMAX    90 capsule    Take 1 capsule (0.4 mg) by mouth daily       * Notice:  This list has 2 medication(s) that are the same as other medications prescribed for you. Read the directions carefully, and ask your doctor or other care provider to review them with you.

## 2017-05-15 NOTE — TELEPHONE ENCOUNTER
5-1-17 OV notes:   Patient Instructions   Increase bedtime Lantus insulin to 16 units each night     Increase breakfast Humalog to 11 units with breakfast     Increase lunchtime Humalog to 15 units with lunch     Increase dinner time Humalog to 14 units with dinner      If you have sugar readings less than 90 then inform me by leaving a message with my nurse     Return after June 13th for an office visit with a hemoglobin A1c test         Updated patient's epic med list to reflect new dosing for Lantus and Humalog.    Prescription approved per [facility] Refill Protocol.    Jaz Anthony RN, BSN

## 2017-06-02 ENCOUNTER — TELEPHONE (OUTPATIENT)
Dept: FAMILY MEDICINE | Facility: CLINIC | Age: 77
End: 2017-06-02

## 2017-06-02 DIAGNOSIS — E11.8 TYPE 2 DIABETES MELLITUS WITH COMPLICATION, WITH LONG-TERM CURRENT USE OF INSULIN (H): ICD-10-CM

## 2017-06-02 DIAGNOSIS — Z79.4 TYPE 2 DIABETES MELLITUS WITH COMPLICATION, WITH LONG-TERM CURRENT USE OF INSULIN (H): ICD-10-CM

## 2017-06-02 DIAGNOSIS — Z79.4 TYPE 2 DIABETES MELLITUS WITH COMPLICATION, WITH LONG-TERM CURRENT USE OF INSULIN (H): Primary | ICD-10-CM

## 2017-06-02 DIAGNOSIS — E11.8 TYPE 2 DIABETES MELLITUS WITH COMPLICATION, WITH LONG-TERM CURRENT USE OF INSULIN (H): Primary | ICD-10-CM

## 2017-06-02 RX ORDER — LANCETS 33 GAUGE
1 EACH MISCELLANEOUS 3 TIMES DAILY
Qty: 300 EACH | Refills: 1 | Status: ON HOLD | OUTPATIENT
Start: 2017-06-02 | End: 2019-06-04

## 2017-06-02 RX ORDER — LANCETS 33 GAUGE
1 EACH MISCELLANEOUS 3 TIMES DAILY
Qty: 300 EACH | Refills: 1 | Status: SHIPPED | OUTPATIENT
Start: 2017-06-02 | End: 2017-06-02

## 2017-06-02 NOTE — TELEPHONE ENCOUNTER
"I talked with [facility] pharmacist, apparently the diagnosis code associated with the script did not appear at the [facility] pharmacy when the script was sent. I have scripts pending again for the testing supplies but this time they will specify the diagnosis and codes in the \"pharmacy notes\" as well.     Please sign these to the pharmacy again, thanks.    Lc Costa, [facility]   "

## 2017-06-02 NOTE — TELEPHONE ENCOUNTER
PCP: Please see pended lancets and test strips under .   Just like signing for a medication; just instead it is for diabetes supplies.     January House RN

## 2017-06-02 NOTE — TELEPHONE ENCOUNTER
Pending Prescriptions:                       Disp   Refills    ONETOUCH DELICA LANCETS 33G MISC          100 ea*11           Si lancet    ONE TOUCH ULTRA test strip                [address]11           Sig: Use to test three times daily          Last Written Prescription Date:  historical  Last Fill Quantity: -,   # refills: -  Last Office Visit with [facility], [facility] or [facility] prescribing provider: [address]  Future Office visit:    Next 5 appointments (look out 90 days)     2017 11:30 AM CDT   Office Visit with Marquis Caldwell MD   [facility]    [address] [facility] [address]   [phone]                   Routing refill request to provider for review/approval because:  Medication is reported/historical      BP Readings from Last 3 Encounters:   17 110/65   17 119/71   16 117/67     Lab Results   Component Value Date    MICROL <5 2016     Lab Results   Component Value Date    UMALCR Unable to calculate due to low value 2016     Creatinine   Date Value Ref Range Status   2016 0.87 0.66 - 1.25 mg/dL Final   ]  GFR Estimate   Date Value Ref Range Status   2016 85 >60 mL/min/1.7m2 Final     Comment:     Non  GFR Calc   2006 76 >60 mL/min/1.7m2 Final   2006 >80 >60 mL/min/1.7m2 Final     GFR Estimate If Black   Date Value Ref Range Status   2016 >90   GFR Calc   >60 mL/min/1.7m2 Final   2006 >90 >60 mL/min/1.7m2 Final   2006 >80 >60 mL/min/1.7m2 Final     Lab Results   Component Value Date    CHOL 123 2016     Lab Results   Component Value Date    HDL 50 2016     Lab Results   Component Value Date    LDL 51 2016     Lab Results   Component Value Date    TRIG 108 2016     No results found for: CHOLHDLRATIO  No results found for: AST  No results found for: ALT  Lab Results   Component Value Date    A1C 8.1 2017    A1C 8.0 2016     A1C 7.2 06/27/2016    A1C 6.9 03/02/2016    A1C 6.6 08/28/2015     Potassium   Date Value Ref Range Status   08/22/2006 4.0 3.4 - 5.3 mmol/L Final     Mishel Howe RT (R)

## 2017-06-12 DIAGNOSIS — Z79.4 TYPE 2 DIABETES MELLITUS WITHOUT COMPLICATION, WITH LONG-TERM CURRENT USE OF INSULIN (H): ICD-10-CM

## 2017-06-12 DIAGNOSIS — E11.9 TYPE 2 DIABETES MELLITUS WITHOUT COMPLICATION, WITH LONG-TERM CURRENT USE OF INSULIN (H): ICD-10-CM

## 2017-06-12 LAB — HBA1C MFR BLD: 6.7 % (ref 4.3–6)

## 2017-06-12 PROCEDURE — 36415 COLL VENOUS BLD VENIPUNCTURE: CPT | Performed by: INTERNAL MEDICINE

## 2017-06-12 PROCEDURE — 83036 HEMOGLOBIN GLYCOSYLATED A1C: CPT | Performed by: INTERNAL MEDICINE

## 2017-06-12 NOTE — LETTER
[address] [facility] [address]  [address]  [place]  [address]  Tel: [phone]    June 13, 2017    Bryant Spicer Mckenzie  [address] [address]        Dear Mr. Mckenzie,    The following letter pertains to your most recent diagnostic tests:    Congratulations!  Your hemoglobin A1c improved 8.1 to 6.7.  Excellent work!      Bottom Line:  Keep doing what you are doing.        Follow up:  We should recheck in 6 months time. Please schedule an office visit appointment for that purpose.      If you have any further questions or problems, please contact our office.      Sincerely,    Marquis Caldwell MD/lida     Results for orders placed or performed in visit on 06/12/17   **A1C FUTURE 3mo   Result Value Ref Range    Hemoglobin A1C 6.7 (H) 4.3 - 6.0 %               Enclosure: Lab Results

## 2017-06-13 NOTE — PROGRESS NOTES
The following letter pertains to your most recent diagnostic tests:    Congratulations!  Your hemoglobin A1c improved 8.1 to 6.7.  Excellent work!      Bottom Line:  Keep doing what you are doing.        Follow up:  We should recheck in 6 months time. Please schedule an office visit appointment for that purpose.        Sincerely,    Dr. Caldwell

## 2017-06-19 ENCOUNTER — OFFICE VISIT (OUTPATIENT)
Dept: FAMILY MEDICINE | Facility: CLINIC | Age: 77
End: 2017-06-19
Payer: COMMERCIAL

## 2017-06-19 VITALS
TEMPERATURE: 95.3 F | HEART RATE: 72 BPM | OXYGEN SATURATION: 98 % | SYSTOLIC BLOOD PRESSURE: 118 MMHG | WEIGHT: 267 LBS | BODY MASS INDEX: 36.16 KG/M2 | HEIGHT: 72 IN | DIASTOLIC BLOOD PRESSURE: 68 MMHG

## 2017-06-19 DIAGNOSIS — E78.5 HYPERLIPIDEMIA LDL GOAL <100: ICD-10-CM

## 2017-06-19 DIAGNOSIS — Z79.4 TYPE 2 DIABETES MELLITUS WITHOUT COMPLICATION, WITH LONG-TERM CURRENT USE OF INSULIN (H): ICD-10-CM

## 2017-06-19 DIAGNOSIS — E53.8 VITAMIN B12 DEFICIENCY (NON ANEMIC): Primary | ICD-10-CM

## 2017-06-19 DIAGNOSIS — E11.9 TYPE 2 DIABETES MELLITUS WITHOUT COMPLICATION, WITH LONG-TERM CURRENT USE OF INSULIN (H): ICD-10-CM

## 2017-06-19 PROCEDURE — 99207 C FOOT EXAM  NO CHARGE: CPT | Performed by: INTERNAL MEDICINE

## 2017-06-19 PROCEDURE — 99213 OFFICE O/P EST LOW 20 MIN: CPT | Performed by: INTERNAL MEDICINE

## 2017-06-19 ASSESSMENT — ANXIETY QUESTIONNAIRES
7. FEELING AFRAID AS IF SOMETHING AWFUL MIGHT HAPPEN: NOT AT ALL
3. WORRYING TOO MUCH ABOUT DIFFERENT THINGS: NOT AT ALL
2. NOT BEING ABLE TO STOP OR CONTROL WORRYING: NOT AT ALL
6. BECOMING EASILY ANNOYED OR IRRITABLE: NEARLY EVERY DAY
GAD7 TOTAL SCORE: 6
5. BEING SO RESTLESS THAT IT IS HARD TO SIT STILL: MORE THAN HALF THE DAYS
1. FEELING NERVOUS, ANXIOUS, OR ON EDGE: SEVERAL DAYS
IF YOU CHECKED OFF ANY PROBLEMS ON THIS QUESTIONNAIRE, HOW DIFFICULT HAVE THESE PROBLEMS MADE IT FOR YOU TO DO YOUR WORK, TAKE CARE OF THINGS AT HOME, OR GET ALONG WITH OTHER PEOPLE: SOMEWHAT DIFFICULT

## 2017-06-19 ASSESSMENT — PATIENT HEALTH QUESTIONNAIRE - PHQ9: 5. POOR APPETITE OR OVEREATING: NOT AT ALL

## 2017-06-19 NOTE — NURSING NOTE
The following medication was given:     MEDICATION: Vitamin B12  1,000 mcg/mL  ROUTE: IM  SITE: Deltoid - Left  DOSE: 1 mL - 1,000 mcg  LOT #: [id number]  :  [employer]  EXPIRATION DATE:  12/31/2018  NDC#: 3964-8130-71

## 2017-06-19 NOTE — PROGRESS NOTES
SUBJECTIVE:                                                    Bryant Mckenzie is a 77 year old male who presents to clinic today for the following health issues:    Diabetes Follow-up    Patient is checking blood sugars: three times daily.   Blood sugar testing frequency justification: Adjustment of medication(s)  Results are as follows:         80's - 349    Diabetic concerns: None and blood sugar frequently over 200     Symptoms of hypoglycemia (low blood sugar): shaky, dizzy, Symptoms occur if sugars < 80.     Paresthesias (numbness or burning in feet) or sores: No     Date of last diabetic eye exam: November, 2016       Amount of exercise or physical activity: 6-7 days/week for an average of greater than 60 minutes    Problems taking medications regularly: No    Medication side effects: none    Diet: regular (no restrictions)      This is a pleasant 77-year-old man is here for routine follow-up of his type 2 diabetes. His sugar control has improved significantly with the previously made insulin changes. He has had 2 episodes of low blood sugars in the 80s with mild symptoms that improved quickly with drinking juice. He admits that sometimes his meals are more irregular on weekends and this has led to the 2 low blood sugar readings.    Problem list and histories reviewed & adjusted, as indicated.  Additional history: as documented    Patient Active Problem List   Diagnosis     Type 2 diabetes mellitus without complication (H)     Hyperlipidemia LDL goal <100     Atrophic gastritis     Vitamin B12 deficiency (non anemic)     Obstructive sleep apnea syndrome     Benign non-nodular prostatic hyperplasia with lower urinary tract symptoms     Aortic stenosis     Chronic left-sided thoracic back pain     Past Surgical History:   Procedure Laterality Date     ESOPHAGOSCOPY, GASTROSCOPY, DUODENOSCOPY (EGD), COMBINED N/A 1/20/2015    Procedure: COMBINED ESOPHAGOSCOPY, GASTROSCOPY, DUODENOSCOPY (EGD), BIOPSY  SINGLE OR MULTIPLE;  Surgeon: Allan Marroquin MD;  Location:  GI     HERNIA REPAIR       HYDROCELECTOMY SCROTAL       ORTHOPEDIC SURGERY      both knees replaced       Social History   Substance Use Topics     Smoking status: Never Smoker     Smokeless tobacco: Never Used     Alcohol use 0.0 oz/week     0 Standard drinks or equivalent per week      Comment: 2 beers a month     Family History   Problem Relation Age of Onset     Coronary Artery Disease Father 53         Current Outpatient Prescriptions   Medication Sig Dispense Refill     insulin lispro (HUMALOG KWIKPEN) 100 UNIT/ML injection 11 units with breakfast,15 units at noon, and 14 units before evening meal 36 mL 3     ONETOUCH DELICA LANCETS 33G MISC 1 lancet by In Vitro route 3 times daily 300 each 1     ONE TOUCH ULTRA test strip Use to test three times daily 300 strip 1     [DISCONTINUED] insulin lispro (HUMALOG KWIKPEN) 100 UNIT/ML injection Inject 11 Units Subcutaneous every morning ,15 units at noon, and 14 units before evening meal 15 mL 0     insulin glargine (LANTUS) 100 UNIT/ML injection Inject 16 Units Subcutaneous At Bedtime 15 mL 3     tamsulosin (FLOMAX) 0.4 MG capsule Take 1 capsule (0.4 mg) by mouth daily 90 capsule 3     metFORMIN (GLUCOPHAGE) 500 MG tablet Take 2 tablets (1,000 mg) by mouth 2 times daily (with meals) 360 tablet 3     amoxicillin (AMOXIL) 500 MG capsule   0     oxyCODONE-acetaminophen (PERCOCET) 5-325 MG per tablet   0     simvastatin (ZOCOR) 5 MG tablet   4     atorvastatin (LIPITOR) 20 MG tablet Take 1 tablet (20 mg) by mouth daily 90 tablet 3     [DISCONTINUED] LANTUS SOLOSTAR 100 UNIT/ML soln   3     insulin pen needle 31G X 8 MM Use 4 pen needles daily or as directed for humalog and lantus 300 each 11     cyanocobalamin (VITAMIN B12) 1000 MCG/ML injection Inject 1 mL (1,000 mcg) into the muscle every 30 days 1 mL 11     Multiple Vitamins-Minerals (MULTIVITAL PO) Take 1 tablet by mouth daily       Allergies   Allergen  Reactions     No Known Allergies        Reviewed and updated as needed this visit by clinical staff       Reviewed and updated as needed this visit by Provider         ROS:  Constitutional, HEENT, cardiovascular, pulmonary, gi and gu systems are negative, except as otherwise noted.    OBJECTIVE:                                                    /68 (BP Location: Right arm, Cuff Size: Adult Large)  Pulse 72  Temp 95.3  F (35.2  C) (Tympanic)  Ht 6' (1.829 m)  Wt 267 lb (121.1 kg)  SpO2 98%  BMI 36.21 kg/m2  Body mass index is 36.21 kg/(m^2).  GENERAL: healthy, alert and no distress  EYES: Eyes grossly normal to inspection, PERRL and conjunctivae and sclerae normal  HENT: ear canals and TM's normal, nose and mouth without ulcers or lesions  NECK: no adenopathy, no asymmetry, masses, or scars and thyroid normal to palpation  RESP: lungs clear to auscultation - no rales, rhonchi or wheezes  CV: regular rate and rhythm, normal S1 S2, no S3 or S4, no murmur, click or rub, no peripheral edema and peripheral pulses strong  ABDOMEN: soft, nontender, no hepatosplenomegaly, no masses and bowel sounds normal  MS: no gross musculoskeletal defects noted, no edema  NEURO: Normal strength and tone, mentation intact and speech normal  PSYCH: mentation appears normal, affect normal/bright  Diabetic foot exam: normal DP and PT pulses, no trophic changes or ulcerative lesions and normal sensory exam    Diagnostic Test Results:  Results for orders placed or performed in visit on 06/12/17   **A1C FUTURE 3mo   Result Value Ref Range    Hemoglobin A1C 6.7 (H) 4.3 - 6.0 %        ASSESSMENT/PLAN:                                                            1. Type 2 diabetes mellitus without complication, with long-term current use of insulin (H)  Continue current insulin regimen, reduce insulin with meals when a smaller meal is planned for example, on weekends; recheck A1c in 6 months  - insulin lispro (HUMALOG KWIKPEN) 100  UNIT/ML injection; 11 units with breakfast,15 units at noon, and 14 units before evening meal  Dispense: 36 mL; Refill: 3  - **Basic metabolic panel FUTURE 6mo; Future  - **A1C FUTURE 6mo; Future  - **Albumin Random Urine Quant FUTURE 6mo; Future  - FOOT EXAM    2. Vitamin B12 deficiency (non anemic)  B12 shot today    3. Hyperlipidemia LDL goal <100    - **Lipid panel reflex to direct LDL FUTURE 6mo; Future    FUTURE APPOINTMENTS:       - Follow-up visit in 6 months with labs prior to visit    Marquis Caldwell MD  [facility]

## 2017-06-19 NOTE — MR AVS SNAPSHOT
After Visit Summary   6/19/2017    Bryant Mckenzie    MRN: [id number]           Patient Information     Date Of Birth          1940        Visit Information        Provider Department      6/19/2017 11:30 AM Marquis Caldwell MD [facility] [place]        Today's Diagnoses     Vitamin B12 deficiency (non anemic)    -  1    Type 2 diabetes mellitus without complication, with long-term current use of insulin (H)        Hyperlipidemia LDL goal <100           Follow-ups after your visit        Your next 10 appointments already scheduled     Jul 17, 2017 11:00 AM CDT   Nurse Only with CS NURSE   [place] [facility] ([facility])    [address]  [place] [address]   [phone]              Future tests that were ordered for you today     Open Future Orders        Priority Expected Expires Ordered    **Basic metabolic panel FUTURE 6mo Routine 12/16/2017 1/15/2018 6/19/2017    **A1C FUTURE 6mo Routine 12/16/2017 1/15/2018 6/19/2017    **Albumin Random Urine Quant FUTURE 6mo Routine 12/16/2017 1/15/2018 6/19/2017    **Lipid panel reflex to direct LDL FUTURE 6mo Routine 12/16/2017 1/15/2018 6/19/2017            Who to contact     If you have questions or need follow up information about today's clinic visit or your schedule please contact [facility] [place] directly at [phone].  Normal or non-critical lab and imaging results will be communicated to you by MyChart, letter or phone within 4 business days after the clinic has received the results. If you do not hear from us within 7 days, please contact the clinic through [employer] or phone. If you have a critical or abnormal lab result, we will notify you by phone as soon as possible.  Submit refill requests through [employer] or call your pharmacy and they will forward the refill request to us. Please allow 3 business days for your refill to be completed.          Additional Information About Your Visit        [employer]  Information     [employer] gives you secure access to your electronic health record. If you see a primary care provider, you can also send messages to your care team and make appointments. If you have questions, please call your primary care clinic.  If you do not have a primary care provider, please call [phone] and they will assist you.        Care EveryWhere ID     This is your Care EveryWhere ID. This could be used by other organizations to access your [facility] medical records  [id number]        Your Vitals Were     Pulse Temperature Height Pulse Oximetry BMI (Body Mass Index)       72 95.3  F (35.2  C) (Tympanic) 6' (1.829 m) 98% 36.21 kg/m2        Blood Pressure from Last 3 Encounters:   06/19/17 118/68   05/01/17 110/65   03/13/17 119/71    Weight from Last 3 Encounters:   06/19/17 267 lb (121.1 kg)   05/01/17 270 lb (122.5 kg)   03/13/17 274 lb 9.6 oz (124.6 kg)              We Performed the Following     FOOT EXAM          Today's Medication Changes          These changes are accurate as of: 6/19/17  1:46 PM.  If you have any questions, ask your nurse or doctor.               These medicines have changed or have updated prescriptions.        Dose/Directions    insulin glargine 100 UNIT/ML injection   Commonly known as:  LANTUS   This may have changed:  Another medication with the same name was removed. Continue taking this medication, and follow the directions you see here.   Used for:  Type 2 diabetes mellitus with complication, with long-term current use of insulin (H)   Changed by:  Marquis Caldwell MD        Dose:  16 Units   Inject 16 Units Subcutaneous At Bedtime   Quantity:  15 mL   Refills:  3       insulin lispro 100 UNIT/ML injection   Commonly known as:  HumaLOG KWIKpen   This may have changed:    - how much to take  - how to take this  - when to take this  - additional instructions   Used for:  Type 2 diabetes mellitus without complication, with long-term current use of insulin (H)   Changed  by:  Marquis Caldwell MD        11 units with breakfast,15 units at noon, and 14 units before evening meal   Quantity:  36 mL   Refills:  3         Stop taking these medicines if you haven't already. Please contact your care team if you have questions.     glipiZIDE 10 MG tablet   Commonly known as:  GLUCOTROL   Stopped by:  Marquis Caldwell MD                Where to get your medicines      These medications were sent to [id number] - [place], [place] - [address]  [address] [facility] [address]    Hours:  9Am-9Pm (M-F) 9Am-6Pm (S&S) Phone:  [phone]     insulin lispro 100 UNIT/ML injection                Primary Care Provider Office Phone # Fax #    Marquis Caldwell [phone]       [facility] [address] [facility] [address]        Thank you!     Thank you for choosing [facility]  for your care. Our goal is always to provide you with excellent care. Hearing back from our patients is one way we can continue to improve our services. Please take a few minutes to complete the written survey that you may receive in the mail after your visit with us. Thank you!             Your Updated Medication List - Protect others around you: Learn how to safely use, store and throw away your medicines at www.disposemymeds.org.          This list is accurate as of: 6/19/17  1:46 PM.  Always use your most recent med list.                   Brand Name Dispense Instructions for use    amoxicillin 500 MG capsule    AMOXIL         atorvastatin 20 MG tablet    LIPITOR    90 tablet    Take 1 tablet (20 mg) by mouth daily       cyanocobalamin 1000 MCG/ML injection    VITAMIN B12    1 mL    Inject 1 mL (1,000 mcg) into the muscle every 30 days       insulin glargine 100 UNIT/ML injection    LANTUS    15 mL    Inject 16 Units Subcutaneous At Bedtime       insulin lispro 100 UNIT/ML injection    HumaLOG KWIKpen    36 mL    11 units with breakfast,15 units at noon, and 14 units  before evening meal       insulin pen needle 31G X 8 MM     300 each    Use 4 pen needles daily or as directed for humalog and lantus       metFORMIN 500 MG tablet    GLUCOPHAGE    360 tablet    Take 2 tablets (1,000 mg) by mouth 2 times daily (with meals)       MULTIVITAL PO      Take 1 tablet by mouth daily       ONE TOUCH ULTRA test strip   Generic drug:  blood glucose monitoring     300 strip    Use to test three times daily       ONETOUCH DELICA LANCETS 33G Misc     300 each    1 lancet by In Vitro route 3 times daily       oxyCODONE-acetaminophen 5-325 MG per tablet    PERCOCET         simvastatin 5 MG tablet    ZOCOR         tamsulosin 0.4 MG capsule    FLOMAX    90 capsule    Take 1 capsule (0.4 mg) by mouth daily

## 2017-06-20 ASSESSMENT — ANXIETY QUESTIONNAIRES: GAD7 TOTAL SCORE: 6

## 2017-06-20 ASSESSMENT — PATIENT HEALTH QUESTIONNAIRE - PHQ9: SUM OF ALL RESPONSES TO PHQ QUESTIONS 1-9: 8

## 2017-07-17 ENCOUNTER — ALLIED HEALTH/NURSE VISIT (OUTPATIENT)
Dept: NURSING | Facility: CLINIC | Age: 77
End: 2017-07-17
Payer: COMMERCIAL

## 2017-07-17 DIAGNOSIS — E53.8 VITAMIN B12 DEFICIENCY (NON ANEMIC): Primary | ICD-10-CM

## 2017-07-17 PROCEDURE — 96372 THER/PROPH/DIAG INJ SC/IM: CPT

## 2017-07-17 NOTE — PROGRESS NOTES
No chief complaint on file.      Initial There were no vitals taken for this visit. Estimated body mass index is 36.21 kg/(m^2) as calculated from the following:    Height as of 6/19/17: 6' (1.829 m).    Weight as of 6/19/17: 267 lb (121.1 kg).  Medication Reconciliation: unable or not appropriate to perform   Prior to injection verified patient identity using patient's name and date of birth.    Per orders of Dr. Caldwell, injection of B12 given by Shu Thompson. Patient instructed to remain in clinic for 15 minutes afterwards, and to report any adverse reaction to me immediately.    Shu Thompson MA

## 2017-07-17 NOTE — MR AVS SNAPSHOT
After Visit Summary   7/17/2017    Bryant Mckenzie    MRN: [id number]           Patient Information     Date Of Birth          1940        Visit Information        Provider Department      7/17/2017 11:00 AM CS NURSE [facility]        Today's Diagnoses     Vitamin B12 deficiency (non anemic)    -  1       Follow-ups after your visit        Your next 10 appointments already scheduled     Aug 21, 2017 11:00 AM CDT   Nurse Only with CS NURSE   [facility] [place] ([facility])    [address]  [place] [address] [phone]              Who to contact     If you have questions or need follow up information about today's clinic visit or your schedule please contact [facility] directly at [phone].  Normal or non-critical lab and imaging results will be communicated to you by [employer], letter or phone within 4 business days after the clinic has received the results. If you do not hear from us within 7 days, please contact the clinic through [employer] or phone. If you have a critical or abnormal lab result, we will notify you by phone as soon as possible.  Submit refill requests through [employer] or call your pharmacy and they will forward the refill request to us. Please allow 3 business days for your refill to be completed.          Additional Information About Your Visit        MyChart Information     [employer] gives you secure access to your electronic health record. If you see a primary care provider, you can also send messages to your care team and make appointments. If you have questions, please call your primary care clinic.  If you do not have a primary care provider, please call [phone] and they will assist you.        Care EveryWhere ID     This is your Care EveryWhere ID. This could be used by other organizations to access your [facility] medical records  [id number]         Blood Pressure from Last 3 Encounters:   06/19/17 118/68   05/01/17  110/65   03/13/17 119/71    Weight from Last 3 Encounters:   06/19/17 267 lb (121.1 kg)   05/01/17 270 lb (122.5 kg)   03/13/17 274 lb 9.6 oz (124.6 kg)              We Performed the Following     B12 - 1000 MCG        Primary Care Provider Office Phone # Fax #    Marquis Caldwell [phone]       [facility] [address] [facility] [address]        Equal Access to Services     NELLY RASHID : Hadii aad ku hadasho Soomaali, waaxda luqadaha, qaybta kaalmada adeegyada, waxay idiin hayaan adeeg khsathya spears . So [facility] [phone].    ATENCIÓN: Si habla español, tiene a gonzalez disposición servicios gratuitos de asistencia lingüística. [facility] [phone].    We comply with applicable federal civil rights laws and [place] laws. We do not discriminate on the basis of race, color, national origin, age, disability sex, sexual orientation or gender identity.            Thank you!     Thank you for choosing [facility]  for your care. Our goal is always to provide you with excellent care. Hearing back from our patients is one way we can continue to improve our services. Please take a few minutes to complete the written survey that you may receive in the mail after your visit with us. Thank you!             Your Updated Medication List - Protect others around you: Learn how to safely use, store and throw away your medicines at www.disposemymeds.org.          This list is accurate as of: 7/17/17 11:03 AM.  Always use your most recent med list.                   Brand Name Dispense Instructions for use Diagnosis    amoxicillin 500 MG capsule    AMOXIL          atorvastatin 20 MG tablet    LIPITOR    90 tablet    Take 1 tablet (20 mg) by mouth daily    Hyperlipidemia LDL goal <100       cyanocobalamin 1000 MCG/ML injection    VITAMIN B12    1 mL    Inject 1 mL (1,000 mcg) into the muscle every 30 days    Vitamin B12 deficiency (non anemic)       insulin glargine 100 UNIT/ML injection     LANTUS    15 mL    Inject 16 Units Subcutaneous At Bedtime    Type 2 diabetes mellitus with complication, with long-term current use of insulin (H)       insulin lispro 100 UNIT/ML injection    HumaLOG KWIKpen    36 mL    11 units with breakfast,15 units at noon, and 14 units before evening meal    Type 2 diabetes mellitus without complication, with long-term current use of insulin (H)       insulin pen needle 31G X 8 MM     300 each    Use 4 pen needles daily or as directed for humalog and lantus    Type 2 diabetes mellitus without complication, with long-term current use of insulin (H)       metFORMIN 500 MG tablet    GLUCOPHAGE    360 tablet    Take 2 tablets (1,000 mg) by mouth 2 times daily (with meals)    Type 2 diabetes mellitus without complication, with long-term current use of insulin (H)       MULTIVITAL PO      Take 1 tablet by mouth daily        ONE TOUCH ULTRA test strip   Generic drug:  blood glucose monitoring     300 strip    Use to test three times daily    Type 2 diabetes mellitus with complication, with long-term current use of insulin (H)       ONETOUCH DELICA LANCETS 33G Misc     300 each    1 lancet by In Vitro route 3 times daily    Type 2 diabetes mellitus with complication, with long-term current use of insulin (H)       oxyCODONE-acetaminophen 5-325 MG per tablet    PERCOCET          simvastatin 5 MG tablet    ZOCOR          tamsulosin 0.4 MG capsule    FLOMAX    90 capsule    Take 1 capsule (0.4 mg) by mouth daily    Benign non-nodular prostatic hyperplasia without lower urinary tract symptoms

## 2017-08-08 ENCOUNTER — THERAPY VISIT (OUTPATIENT)
Dept: PHYSICAL THERAPY | Facility: CLINIC | Age: 77
End: 2017-08-08
Payer: MEDICARE

## 2017-08-08 DIAGNOSIS — M54.6 CHRONIC LEFT-SIDED THORACIC BACK PAIN: Primary | ICD-10-CM

## 2017-08-08 DIAGNOSIS — G89.29 CHRONIC LEFT-SIDED THORACIC BACK PAIN: Primary | ICD-10-CM

## 2017-08-08 PROCEDURE — G8979 MOBILITY GOAL STATUS: HCPCS | Mod: GP | Performed by: PHYSICAL THERAPIST

## 2017-08-08 PROCEDURE — 97530 THERAPEUTIC ACTIVITIES: CPT | Mod: GP | Performed by: PHYSICAL THERAPIST

## 2017-08-08 PROCEDURE — 97161 PT EVAL LOW COMPLEX 20 MIN: CPT | Mod: GP | Performed by: PHYSICAL THERAPIST

## 2017-08-08 PROCEDURE — G8978 MOBILITY CURRENT STATUS: HCPCS | Mod: GP | Performed by: PHYSICAL THERAPIST

## 2017-08-08 PROCEDURE — 97110 THERAPEUTIC EXERCISES: CPT | Mod: GP | Performed by: PHYSICAL THERAPIST

## 2017-08-08 NOTE — MR AVS SNAPSHOT
After Visit Summary   8/8/2017    Bryant Mckenzie    MRN: [id number]           Patient Information     Date Of Birth          1940        Visit Information        Provider Department      8/8/2017 2:10 PM Ifeoma Mast, PT [facility]        Today's Diagnoses     Chronic left-sided thoracic back pain    -  1       Follow-ups after your visit        Your next 10 appointments already scheduled     Aug 21, 2017 11:00 AM CDT   Nurse Only with CS NURSE   [facility]    [address] [place] [address] [phone]            Aug 24, 2017  1:30 PM CDT   [facility] with VALERIE Daniel PT ([facility]  )    [address] [facility]  [address]  [facility] [address]   [phone]              Who to contact     If you have questions or need follow up information about today's clinic visit or your schedule please contact [facility] directly at [phone].  Normal or non-critical lab and imaging results will be communicated to you by [employer], letter or phone within 4 business days after the clinic has received the results. If you do not hear from us within 7 days, please contact the clinic through [employer] or phone. If you have a critical or abnormal lab result, we will notify you by phone as soon as possible.  Submit refill requests through [employer] or call your pharmacy and they will forward the refill request to us. Please allow 3 business days for your refill to be completed.          Additional Information About Your Visit        [employer] Information     [employer] gives you secure access to your electronic health record. If you see a primary care provider, you can also send messages to your care team and make appointments. If you have questions, please call your primary care clinic.  If you do not have a primary care provider, please call [phone] and they will assist you.        Care EveryWhere ID     This is your Care  EveryWhere ID. This could be used by other organizations to access your [place] medical records  [id number]         Blood Pressure from Last 3 Encounters:   06/19/17 118/68   05/01/17 110/65   03/13/17 119/71    Weight from Last 3 Encounters:   06/19/17 121.1 kg (267 lb)   05/01/17 122.5 kg (270 lb)   03/13/17 124.6 kg (274 lb 9.6 oz)              We Performed the Following     HC PT EVAL, LOW COMPLEXITY     [facility] CERT REPORT     [facility] INITIAL EVAL REPORT     THERAPEUTIC ACTIVITIES     THERAPEUTIC EXERCISES        Primary Care Provider Office Phone # Fax #    Marquis Caldwell [phone]       [facility] [address] [place] [address]        Equal Access to Services     BRAEDEN RASHID : Hadii aad ku hadasho Soomaali, waaxda luqadaha, qaybta kaalmada adeegyada, waxay ciarain hayaan justin spears . So [facility] [phone].    ATENCIÓN: Si habla español, tiene a gonzalez disposición servicios gratuitos de asistencia lingüística. Llame al [phone].    We comply with applicable federal civil rights laws and [place] laws. We do not discriminate on the basis of race, color, national origin, age, disability sex, sexual orientation or gender identity.            Thank you!     [facility] you for [facility]  [facility]. Our goal is always to provide you with excellent care. Hearing back from our patients is one way we can continue to improve our services. Please take a few minutes to complete the written survey that you may receive in the mail after your visit with us. Thank you!             Your Updated Medication List - Protect others around you: Learn how to safely use, store and throw away your medicines at www.disposemymeds.org.          This list is accurate as of: 8/8/17  3:15 PM.  Always use your most recent med list.                   Brand Name Dispense Instructions for use Diagnosis    amoxicillin 500 MG capsule    AMOXIL          atorvastatin 20 MG tablet    LIPITOR     90 tablet    Take 1 tablet (20 mg) by mouth daily    Hyperlipidemia LDL goal <100       cyanocobalamin 1000 MCG/ML injection    VITAMIN B12    1 mL    Inject 1 mL (1,000 mcg) into the muscle every 30 days    Vitamin B12 deficiency (non anemic)       insulin glargine 100 UNIT/ML injection    LANTUS    15 mL    Inject 16 Units Subcutaneous At Bedtime    Type 2 diabetes mellitus with complication, with long-term current use of insulin (H)       insulin lispro 100 UNIT/ML injection    HumaLOG KWIKpen    36 mL    11 units with breakfast,15 units at noon, and 14 units before evening meal    Type 2 diabetes mellitus without complication, with long-term current use of insulin (H)       insulin pen needle 31G X 8 MM     300 each    Use 4 pen needles daily or as directed for humalog and lantus    Type 2 diabetes mellitus without complication, with long-term current use of insulin (H)       metFORMIN 500 MG tablet    GLUCOPHAGE    360 tablet    Take 2 tablets (1,000 mg) by mouth 2 times daily (with meals)    Type 2 diabetes mellitus without complication, with long-term current use of insulin (H)       MULTIVITAL PO      Take 1 tablet by mouth daily        ONE TOUCH ULTRA test strip   Generic drug:  blood glucose monitoring     300 strip    Use to test three times daily    Type 2 diabetes mellitus with complication, with long-term current use of insulin (H)       ONETOUCH DELICA LANCETS 33G Misc     300 each    1 lancet by In Vitro route 3 times daily    Type 2 diabetes mellitus with complication, with long-term current use of insulin (H)       oxyCODONE-acetaminophen 5-325 MG per tablet    PERCOCET          simvastatin 5 MG tablet    ZOCOR          tamsulosin 0.4 MG capsule    FLOMAX    90 capsule    Take 1 capsule (0.4 mg) by mouth daily    Benign non-nodular prostatic hyperplasia without lower urinary tract symptoms

## 2017-08-08 NOTE — LETTER
"DEPARTMENT OF HEALTH AND HUMAN SERVICES  CENTERS FOR MEDICARE & MEDICAID SERVICES    PLAN/UPDATED PLAN OF PROGRESS FOR OUTPATIENT REHABILITATION  PATIENTS NAME:  Bryant Mckenzie   : 1940  PROVIDER NUMBER:    [id number]  [id number]  PROVIDER NAME: [facility]  MEDICAL RECORD NUMBER: [id number]   START OF CARE DATE:  SOC Date: 17   TYPE:  PT  PRIMARY/TREATMENT DIAGNOSIS: (Pertinent Medical Diagnosis)  Chronic left-sided thoracic back pain  VISITS FROM START OF CARE:  Rxs Used: 1     [facility] Initial Evaluation -- Thoracic  Evaluation Date: 2017  Bryant Mckenzie is a 77 year old male with a thoracic condition.   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 Symptoms (improving/unchanging/worsening):  worsening.  Symptoms commenced as a result of: no apparent reason   Condition occurred in the following environment: n/a   Symptoms at onset: same pain as previous, left sided/thoracic, below L shoulder blade  Constant symptoms: L shoulder blade  Intermittent symptoms: none  Symptoms are made worse with the following: Always Lying and Always When still   Symptoms are made better with the following: Time of day - Always as the day progresses and Always On the move  Disturbed sleep (yes/no):  yes    Pillows:  1  Sleeping postures(prone/sup/side R/L): back/sides  Previous episodes (0/1-5/6-10/11+):  1  Year of first episode:   Previous history: episodic similar pain   Previous treatments: PT with good results  Specific Questions: "   Cough/Sneeze/Deep Breath (pos/neg):  neg  Gait (normal/abnormal):  normal  Medications (nil/NSAIDS/analg/steroids/anticoag/other):  Other - diabetic meds  Medical allergies:  none  General health (excellent/good/fair/poor):  good  Pertinent medical history:  Diabetes and Overweight  Imaging (NA/Xray/MRI):  None recent  Recent or major surgery (yes/no):  no  Night pain (yes/no):  Yes, has to get out of bed due to pain, lies on floor which is helpful  Accidents (yes/no):  no  Unexplained weight loss (yes/no):  no  Barriers at home:  no  Other red flags:  no  PATIENTS NAME:  Bryant Mckenzie   : 1940    EXAMINATION  Posture:   Sitting (good/fair/poor): fair/poor   Standing (good/fair/poor): fair  Protruded head (yes/no): yes  Kyphosis (red/acc/normal): acc  Correction of posture (better/worse/no effect): no effect  Other observations:  none    Neurological:  Motor deficit:  n/a    Reflexes:  n/a  Sensory deficit:  n/a    Dural signs:  n/a    Movement Loss:   Kong Mod Min Nil Pain   Flexion    x    Extension  x      Rotation R   x     Rotation L  x x  P concordant pain   Other          Cervical Differential Testing:not tested  Rep Pro    Rep Ret    Rep Ret Ext    Rep SB - R    Rep SB - L    Rep Rot - R    Rep Rot - L    Rep Flex      Test Movements:   During: produces, abolishes, increases, decreases, no effect, centralizing, peripheralizing  After: better, worse, no better, no worse, no effect, centralized, peripheralized  Pretest symptoms sitting: L thoracic   Symptoms During Symptoms After ROM increased ROM decreased No Effect   FLEX        Rep FLEX        EXT No Effect    No Effect         Rep EXT No Effect    Better      x   Pretest symptoms lying:     Symptoms During Symptoms After ROM increased ROM decreased No Effect   EIL (prone)        Rep EIL (prone)        EIL (supine)        Rep EIL (supine)                    PATIENTS NAME:  Mckenzie Bryant   : 1940    Pretest symptoms sitting:  L  thoracic   Symptoms During Symptoms After ROM increased ROM decreased No Effect   ROT - R        Rep ROT - R        ROT - L Increases    No Worse         Rep ROT - L Increases    Better    x     Other          Static Tests:  Flexion:       Rotation R:    Extension (prone/supine):   Rotation L:       Other Tests:     Provisional Classification: Derangement    Principle of Management:  Education:  Posture, need to produce pain during HEP/dosing of exercise    Equipment provided:  Has lumbar roll at home  Mechanical therapy (Y/N):  Y    Extension principle:     Flexion principle:    Lateral principle:  Rep L Rot with over pressure    Other:     ASSESSMENT/PLAN:  Patient is a 77 year old male with thoracic complaints.    Patient has the following significant findings with corresponding treatment plan.                Diagnosis 1:  Left sided thoracic pain  Pain -  manual therapy, self management, education, directional preference exercise and home program  Decreased ROM/flexibility - manual therapy and therapeutic exercise  Decreased function - therapeutic activities  Impaired posture - neuro re-education    Therapy Evaluation Codes:   1) History comprised of:   Personal factors that impact the plan of care:      None.    Comorbidity factors that impact the plan of care are:      Diabetes and Overweight.     Medications impacting care: diabetic meds.  2) Examination of Body Systems comprised of:   Body structures and functions that impact the plan of care:      Thoracic Spine.   Activity limitations that impact the plan of care are:      Sitting and Sleeping.  3) Clinical presentation characteristics are:   Evolving/Changing.  4) Decision-Making    Low complexity using standardized patient assessment instrument and/or measureable assessment of functional outcome.      PATIENTS NAME:  Bryant Mckenzie   : 1940    Cumulative Therapy Evaluation is: Low complexity.  Previous and current functional limitations:  (See  "Goal Flow Sheet for this information)    Short term and Long term goals: (See Goal Flow Sheet for this information)   Communication ability:  Patient appears to be able to clearly communicate and understand verbal and written communication and follow directions correctly.  Treatment Explanation - The following has been discussed with the patient:   RX ordered/plan of care  Anticipated outcomes  Possible risks and side effects  This patient would benefit from PT intervention to resume normal activities.   Rehab potential is good.  Frequency:  2 X a month, once daily  Duration:  for 2 months  Discharge Plan:  Achieve all LTG.  Independent in home treatment program.  Reach maximal therapeutic benefit.          Caregiver Signature/Credentials _____________________________ Date ________       Treating Provider: Ifeoma Mast PT   I have reviewed and certified the need for these services and plan of treatment while under my care.        PHYSICIAN'S SIGNATURE:   _____________________________________  Date___________                               Poly Aragon    Certification period:  Beginning of Cert date period: 08/08/17 to  End of Cert period date: 11/06/17     Functional Level Progress Report: Please see attached \"Goal Flow sheet for Functional level.\"    ____X____ Continue Services or       ________ DC Services                Service dates: From  SOC Date: 08/08/17 date to present                         "

## 2017-08-08 NOTE — PROGRESS NOTES
"[facility] Initial Evaluation -- Thoracic    Evaluation Date: August 8, 2017  Bryant Mckenzie is a 77 year old male with a thoracic condition.   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 Symptoms (improving/unchanging/worsening):  worsening.  Symptoms commenced as a result of: no apparent reason   Condition occurred in the following environment: n/a     Symptoms at onset: same pain as previous, left sided/thoracic, below L shoulder blade  Constant symptoms: L shoulder blade  Intermittent symptoms: none    Symptoms are made worse with the following: Always Lying and Always When still   Symptoms are made better with the following: Time of day - Always as the day progresses and Always On the move    Disturbed sleep (yes/no):  yes    Pillows:  1  Sleeping postures(prone/sup/side R/L): back/sides    Previous episodes (0/1-5/6-10/11+):  1  Year of first episode: 2016    Previous history: episodic similar pain   Previous treatments: PT with good results    Specific Questions:   Cough/Sneeze/Deep Breath (pos/neg):  neg  Gait (normal/abnormal):  normal  Medications (nil/NSAIDS/analg/steroids/anticoag/other):  Other - diabetic meds  Medical allergies:  none  General health (excellent/good/fair/poor):  good  Pertinent medical history:  Diabetes and Overweight  Imaging (NA/Xray/MRI):  None recent  Recent or major surgery (yes/no):  no  Night pain (yes/no):  Yes, has to get out of bed due to pain, lies on floor which is helpful  Accidents (yes/no):  no  Unexplained " weight loss (yes/no):  no  Barriers at home:  no  Other red flags:  no    EXAMINATION    Posture:   Sitting (good/fair/poor): fair/poor   Standing (good/fair/poor): fair  Protruded head (yes/no): yes  Kyphosis (red/acc/normal): acc    Correction of posture (better/worse/no effect): no effect  Other observations:  none    Neurological:    Motor deficit:  n/a    Reflexes:  n/a  Sensory deficit:  n/a    Dural signs:  n/a    Movement Loss:   Kong Mod Min Nil Pain   Flexion    x    Extension  x      Rotation R   x     Rotation L  x x  P concordant pain   Other          Cervical Differential Testing:not tested  Rep Pro    Rep Ret    Rep Ret Ext    Rep SB - R    Rep SB - L    Rep Rot - R    Rep Rot - L    Rep Flex      Test Movements:   During: produces, abolishes, increases, decreases, no effect, centralizing, peripheralizing  After: better, worse, no better, no worse, no effect, centralized, peripheralized    Pretest symptoms sitting: L thoracic   Symptoms During Symptoms After ROM increased ROM decreased No Effect   FLEX        Rep FLEX        EXT No Effect    No Effect         Rep EXT No Effect    Better      x   Pretest symptoms lying:     Symptoms During Symptoms After ROM increased ROM decreased No Effect   EIL (prone)        Rep EIL (prone)        EIL (supine)        Rep EIL (supine)        Pretest symptoms sitting:  L thoracic   Symptoms During Symptoms After ROM increased ROM decreased No Effect   ROT - R        Rep ROT - R        ROT - L Increases    No Worse         Rep ROT - L Increases    Better    x     Other          Static Tests:  Flexion:       Rotation R:    Extension (prone/supine):   Rotation L:       Other Tests:     Provisional Classification: Derangement    Principle of Management:  Education:  Posture, need to produce pain during HEP/dosing of exercise    Equipment provided:  Has lumbar roll at home  Mechanical therapy (Y/N):  Y    Extension principle:     Flexion principle:    Lateral principle:   Rep L Rot with over pressure    Other:     ASSESSMENT/PLAN:    Patient is a 77 year old male with thoracic complaints.    Patient has the following significant findings with corresponding treatment plan.                Diagnosis 1:  Left sided thoracic pain  Pain -  manual therapy, self management, education, directional preference exercise and home program  Decreased ROM/flexibility - manual therapy and therapeutic exercise  Decreased function - therapeutic activities  Impaired posture - neuro re-education    Therapy Evaluation Codes:   1) History comprised of:   Personal factors that impact the plan of care:      None.    Comorbidity factors that impact the plan of care are:      Diabetes and Overweight.     Medications impacting care: diabetic meds.  2) Examination of Body Systems comprised of:   Body structures and functions that impact the plan of care:      Thoracic Spine.   Activity limitations that impact the plan of care are:      Sitting and Sleeping.  3) Clinical presentation characteristics are:   Evolving/Changing.  4) Decision-Making    Low complexity using standardized patient assessment instrument and/or measureable assessment of functional outcome.  Cumulative Therapy Evaluation is: Low complexity.    Previous and current functional limitations:  (See Goal Flow Sheet for this information)    Short term and Long term goals: (See Goal Flow Sheet for this information)     Communication ability:  Patient appears to be able to clearly communicate and understand verbal and written communication and follow directions correctly.  Treatment Explanation - The following has been discussed with the patient:   RX ordered/plan of care  Anticipated outcomes  Possible risks and side effects  This patient would benefit from PT intervention to resume normal activities.   Rehab potential is good.    Frequency:  2 X a month, once daily  Duration:  for 2 months  Discharge Plan:  Achieve all LTG.  Independent in home  treatment program.  Reach maximal therapeutic benefit.    Please refer to the daily flowsheet for treatment today, total treatment time and time spent performing 1:1 timed codes.

## 2017-08-21 ENCOUNTER — ALLIED HEALTH/NURSE VISIT (OUTPATIENT)
Dept: NURSING | Facility: CLINIC | Age: 77
End: 2017-08-21
Payer: COMMERCIAL

## 2017-08-21 DIAGNOSIS — E53.8 VITAMIN B12 DEFICIENCY (NON ANEMIC): Primary | ICD-10-CM

## 2017-08-21 PROCEDURE — 96372 THER/PROPH/DIAG INJ SC/IM: CPT

## 2017-08-21 PROCEDURE — 99207 ZZC NO CHARGE NURSE ONLY: CPT

## 2017-08-24 ENCOUNTER — THERAPY VISIT (OUTPATIENT)
Dept: PHYSICAL THERAPY | Facility: CLINIC | Age: 77
End: 2017-08-24
Payer: MEDICARE

## 2017-08-24 DIAGNOSIS — G89.29 CHRONIC LEFT-SIDED THORACIC BACK PAIN: Primary | ICD-10-CM

## 2017-08-24 DIAGNOSIS — M54.6 CHRONIC LEFT-SIDED THORACIC BACK PAIN: Primary | ICD-10-CM

## 2017-08-24 PROCEDURE — 97112 NEUROMUSCULAR REEDUCATION: CPT | Mod: GP | Performed by: PHYSICAL THERAPIST

## 2017-08-24 PROCEDURE — 97110 THERAPEUTIC EXERCISES: CPT | Mod: GP | Performed by: PHYSICAL THERAPIST

## 2017-08-24 PROCEDURE — 97530 THERAPEUTIC ACTIVITIES: CPT | Mod: GP | Performed by: PHYSICAL THERAPIST

## 2017-08-24 NOTE — MR AVS SNAPSHOT
After Visit Summary   8/24/2017    Bryant Mckenzie    MRN: [id number]           Patient Information     Date Of Birth          1940        Visit Information        Provider Department      8/24/2017 1:30 PM Ifeoma Mast, PT [facility]        Today's Diagnoses     Chronic left-sided thoracic back pain    -  1       Follow-ups after your visit        Your next 10 appointments already scheduled     Sep 18, 2017 11:00 AM CDT   Nurse Only with CS NURSE   [facility]    [address]  [place] [address] [phone]              Who to contact     If you have questions or need follow up information about today's clinic visit or your schedule please contact [facility] directly at [phone].  Normal or non-critical lab and imaging results will be communicated to you by Siving [employer] Kvaleberghart, letter or phone within 4 business days after the clinic has received the results. If you do not hear from us within 7 days, please contact the clinic through [employer] or phone. If you have a critical or abnormal lab result, we will notify you by phone as soon as possible.  Submit refill requests through [employer]X or call your pharmacy and they will forward the refill request to us. Please allow 3 business days for your refill to be completed.          Additional Information About Your Visit        MyChart Information     [employer] gives you secure access to your electronic health record. If you see a primary care provider, you can also send messages to your care team and make appointments. If you have questions, please call your primary care clinic.  If you do not have a primary care provider, please call [phone] and they will assist you.        Care EveryWhere ID     This is your Care EveryWhere ID. This could be used by other organizations to access your [facility] medical records  [id number]         Blood Pressure from Last 3 Encounters:   06/19/17 118/68    05/01/17 110/65   03/13/17 119/71    Weight from Last 3 Encounters:   06/19/17 121.1 kg (267 lb)   05/01/17 122.5 kg (270 lb)   03/13/17 124.6 kg (274 lb 9.6 oz)              We Performed the Following     NEUROMUSCULAR RE-EDUCATION     THERAPEUTIC ACTIVITIES     THERAPEUTIC EXERCISES        Primary Care Provider Office Phone # Fax #    Marquis BEY MD Paulette [phone]       [place] [address] [facility] [address]  [facility] [address]        Equal Access to Services     [facility]: Hadii aad ku hadasho Soomaali, waaxda luqadaha, qaybta kaalmada adeegyada, waxay linda hayrody spears . So [facility] [phone].    ATENCIÓN: Si habla español, tiene a gonzalez disposición servicios gratuitos de asistencia lingüística. [facility] [phone].    We comply with applicable federal civil rights laws and [place] laws. We do not discriminate on the basis of race, color, national origin, age, disability sex, sexual orientation or gender identity.            Thank you!     Thank you for choosing [facility]  for your care. Our goal is always to provide you with excellent care. Hearing back from our patients is one way we can continue to improve our services. Please take a few minutes to complete the written survey that you may receive in the mail after your visit with us. Thank you!             Your Updated Medication List - Protect others around you: Learn how to safely use, store and throw away your medicines at www.disposemymeds.org.          This list is accurate as of: 8/24/17  1:52 PM.  Always use your most recent med list.                   Brand Name Dispense Instructions for use Diagnosis    amoxicillin 500 MG capsule    AMOXIL          atorvastatin 20 MG tablet    LIPITOR    90 tablet    Take 1 tablet (20 mg) by mouth daily    Hyperlipidemia LDL goal <100       cyanocobalamin 1000 MCG/ML injection    VITAMIN B12    1 mL    Inject 1 mL (1,000 mcg) into the muscle every 30 days     Vitamin B12 deficiency (non anemic)       insulin glargine 100 UNIT/ML injection    LANTUS    15 mL    Inject 16 Units Subcutaneous At Bedtime    Type 2 diabetes mellitus with complication, with long-term current use of insulin (H)       insulin lispro 100 UNIT/ML injection    HumaLOG KWIKpen    36 mL    11 units with breakfast,15 units at noon, and 14 units before evening meal    Type 2 diabetes mellitus without complication, with long-term current use of insulin (H)       insulin pen needle 31G X 8 MM     300 each    Use 4 pen needles daily or as directed for humalog and lantus    Type 2 diabetes mellitus without complication, with long-term current use of insulin (H)       metFORMIN 500 MG tablet    GLUCOPHAGE    360 tablet    Take 2 tablets (1,000 mg) by mouth 2 times daily (with meals)    Type 2 diabetes mellitus without complication, with long-term current use of insulin (H)       MULTIVITAL PO      Take 1 tablet by mouth daily        ONE TOUCH ULTRA test strip   Generic drug:  blood glucose monitoring     300 strip    Use to test three times daily    Type 2 diabetes mellitus with complication, with long-term current use of insulin (H)       ONETOUCH DELICA LANCETS 33G Misc     300 each    1 lancet by In Vitro route 3 times daily    Type 2 diabetes mellitus with complication, with long-term current use of insulin (H)       oxyCODONE-acetaminophen 5-325 MG per tablet    PERCOCET          simvastatin 5 MG tablet    ZOCOR          tamsulosin 0.4 MG capsule    FLOMAX    90 capsule    Take 1 capsule (0.4 mg) by mouth daily    Benign non-nodular prostatic hyperplasia without lower urinary tract symptoms

## 2017-08-24 NOTE — PROGRESS NOTES
Subjective:    HPI                    Objective:    System    Physical Exam    General     ROS    Assessment/Plan:      DISCHARGE REPORT    Progress reporting period is from 8-8-17 to 8-24-17.       SUBJECTIVE  Subjective changes noted by patient:  .  Subjective: Overall feels he is 87.5 % better.  Perforing HEP regularly.  Was painfree     Current pain level is 2/10 Current Pain level: 2/10.     Previous pain level was  6/10 Initial Pain level: 6/10.   Changes in function:  Yes (See Goal flowsheet attached for changes in current functional level)  Adverse reaction to treatment or activity: None    OBJECTIVE  Changes noted in objective findings:  The objective findings below are from DOS 8-24-17.  Objective: Thx ROM: L Rot=min loss, erp, R rot=min loss, erp, Ext=mod loss, erp     ASSESSMENT/PLAN  Updated problem list and treatment plan: Diagnosis 1:  Thoracic spine pain  Pain -  manual therapy, self management, education, directional preference exercise and home program  Decreased ROM/flexibility - manual therapy and therapeutic exercise  Decreased function - therapeutic activities  Impaired posture - neuro re-education  STG/LTGs have been met or progress has been made towards goals:  Yes (See Goal flow sheet completed today.)  Assessment of Progress: Patient is meeting short term goals and is progressing towards long term goals.  Self Management Plans:  Patient is independent in a home treatment program.  Patient is independent in self management of symptoms.  I have re-evaluated this patient and find that the nature, scope, duration and intensity of the therapy is appropriate for the medical condition of the patient.  Bryant continues to require the following intervention to meet STG and LTG's:  PT intervention is no longer required to meet STG/LTG.    Recommendations:  This patient is ready to be discharged from therapy and continue their home treatment program.    Please refer to the daily flowsheet for  treatment today, total treatment time and time spent performing 1:1 timed codes.

## 2017-09-18 ENCOUNTER — TELEPHONE (OUTPATIENT)
Dept: FAMILY MEDICINE | Facility: CLINIC | Age: 77
End: 2017-09-18

## 2017-09-18 ENCOUNTER — ALLIED HEALTH/NURSE VISIT (OUTPATIENT)
Dept: NURSING | Facility: CLINIC | Age: 77
End: 2017-09-18
Payer: COMMERCIAL

## 2017-09-18 DIAGNOSIS — E53.8 VITAMIN B12 DEFICIENCY (NON ANEMIC): Primary | ICD-10-CM

## 2017-09-18 DIAGNOSIS — Z23 NEED FOR PROPHYLACTIC VACCINATION AND INOCULATION AGAINST INFLUENZA: ICD-10-CM

## 2017-09-18 PROCEDURE — 90662 IIV NO PRSV INCREASED AG IM: CPT

## 2017-09-18 PROCEDURE — 96372 THER/PROPH/DIAG INJ SC/IM: CPT

## 2017-09-18 PROCEDURE — 99207 ZZC NO CHARGE NURSE ONLY: CPT

## 2017-09-18 PROCEDURE — G0008 ADMIN INFLUENZA VIRUS VAC: HCPCS

## 2017-09-18 RX ORDER — CYANOCOBALAMIN 1000 UG/ML
1 INJECTION, SOLUTION INTRAMUSCULAR; SUBCUTANEOUS
Qty: 1 ML | Refills: 11 | OUTPATIENT
Start: 2017-09-18 | End: 2018-09-18

## 2017-09-18 NOTE — TELEPHONE ENCOUNTER
Patient is coming in for a B12 injection if you would like to continue please place a new order so we can continue to give the patient his injections.    DUNCAN Howell

## 2017-09-18 NOTE — MR AVS SNAPSHOT
After Visit Summary   9/18/2017    Bryant Mckenzie    MRN: [id number]           Patient Information     Date Of Birth          1940        Visit Information        Provider Department      9/18/2017 11:00 AM CS NURSE [facility] [place]        Today's Diagnoses     Vitamin B12 deficiency (non anemic)    -  1    Need for prophylactic vaccination and inoculation against influenza           Follow-ups after your visit        Your next 10 appointments already scheduled     Oct 16, 2017 11:00 AM CDT   Nurse Only with CS NURSE   [facility] [place] ([facility])    [address]  [place] [address] [phone]              Who to contact     If you have questions or need follow up information about today's clinic visit or your schedule please contact [facility] directly at [phone].  Normal or non-critical lab and imaging results will be communicated to you by GradeBeamhart, letter or phone within 4 business days after the clinic has received the results. If you do not hear from us within 7 days, please contact the clinic through GradeBeamhart or phone. If you have a critical or abnormal lab result, we will notify you by phone as soon as possible.  Submit refill requests through IntelliGeneScan or call your pharmacy and they will forward the refill request to us. Please allow 3 business days for your refill to be completed.          Additional Information About Your Visit        MyChart Information     [employer] gives you secure access to your electronic health record. If you see a primary care provider, you can also send messages to your care team and make appointments. If you have questions, please call your primary care clinic.  If you do not have a primary care provider, please call [phone] and they will assist you.        Care EveryWhere ID     This is your Care EveryWhere ID. This could be used by other organizations to access your [facility]  records  [id number]         Blood Pressure from Last 3 Encounters:   06/19/17 118/68   05/01/17 110/65   03/13/17 119/71    Weight from Last 3 Encounters:   06/19/17 267 lb (121.1 kg)   05/01/17 270 lb (122.5 kg)   03/13/17 274 lb 9.6 oz (124.6 kg)              We Performed the Following     ADMIN INFLUENZA (For MEDICARE Patients ONLY) []     B12 - 1000 MCG     FLU VACCINE, INCREASED ANTIGEN, PRESV FREE, AGE 65+ [[address]]          Today's Medication Changes          These changes are accurate as of: 9/18/17 11:24 AM.  If you have any questions, ask your nurse or doctor.               Start taking these medicines.        Dose/Directions    cyanocobalamin 1000 MCG/ML injection   Commonly known as:  VITAMIN B12   Used for:  Vitamin B12 deficiency (non anemic)        Dose:  1 mL   Inject 1 mL (1,000 mcg) into the muscle every 30 days   Quantity:  1 mL   Refills:  11            Where to get your medicines      Some of these will need a paper prescription and others can be bought over the counter.  Ask your nurse if you have questions.     You don't need a prescription for these medications     cyanocobalamin 1000 MCG/ML injection                Primary Care Provider Office Phone # Fax #    Marquis [place]dwell [phone]       [address] [address]        Equal Access to Services     [facility]: Hadii lorena styleso Soarian, waaxda luqadaha, qaybta kaalmada joshua, waxmeenakshi linda spears . So [facility] [phone].    ATENCIÓN: Si habla español, tiene a gonzalez disposición servicios gratuitos de asistencia lingüística. Deanna al [phone].    We comply with applicable federal civil rights laws and [place] laws. We do not discriminate on the basis of race, color, national origin, age, disability sex, sexual orientation or gender identity.            Thank you!     Thank you for choosing [facility]  for your care. Our goal is  always to provide you with excellent care. Hearing back from our patients is one way we can continue to improve our services. Please take a few minutes to complete the written survey that you may receive in the mail after your visit with us. Thank you!             Your Updated Medication List - Protect others around you: Learn how to safely use, store and throw away your medicines at www.disposemymeds.org.          This list is accurate as of: 9/18/17 11:24 AM.  Always use your most recent med list.                   Brand Name Dispense Instructions for use Diagnosis    amoxicillin 500 MG capsule    AMOXIL          atorvastatin 20 MG tablet    LIPITOR    90 tablet    Take 1 tablet (20 mg) by mouth daily    Hyperlipidemia LDL goal <100       cyanocobalamin 1000 MCG/ML injection    VITAMIN B12    1 mL    Inject 1 mL (1,000 mcg) into the muscle every 30 days    Vitamin B12 deficiency (non anemic)       insulin glargine 100 UNIT/ML injection    LANTUS    15 mL    Inject 16 Units Subcutaneous At Bedtime    Type 2 diabetes mellitus with complication, with long-term current use of insulin (H)       insulin lispro 100 UNIT/ML injection    HumaLOG KWIKpen    36 mL    11 units with breakfast,15 units at noon, and 14 units before evening meal    Type 2 diabetes mellitus without complication, with long-term current use of insulin (H)       insulin pen needle 31G X 8 MM     300 each    Use 4 pen needles daily or as directed for humalog and lantus    Type 2 diabetes mellitus without complication, with long-term current use of insulin (H)       metFORMIN 500 MG tablet    GLUCOPHAGE    360 tablet    Take 2 tablets (1,000 mg) by mouth 2 times daily (with meals)    Type 2 diabetes mellitus without complication, with long-term current use of insulin (H)       MULTIVITAL PO      Take 1 tablet by mouth daily        ONE TOUCH ULTRA test strip   Generic drug:  blood glucose monitoring     300 strip    Use to test three times daily    Type  2 diabetes mellitus with complication, with long-term current use of insulin (H)       ONETOUCH DELICA LANCETS 33G Misc     300 each    1 lancet by In Vitro route 3 times daily    Type 2 diabetes mellitus with complication, with long-term current use of insulin (H)       oxyCODONE-acetaminophen 5-325 MG per tablet    PERCOCET          simvastatin 5 MG tablet    ZOCOR          tamsulosin 0.4 MG capsule    FLOMAX    90 capsule    Take 1 capsule (0.4 mg) by mouth daily    Benign non-nodular prostatic hyperplasia without lower urinary tract symptoms

## 2017-09-18 NOTE — PROGRESS NOTES
Injectable Influenza Immunization Documentation    1.  Is the person to be vaccinated sick today? no    2. Does the person to be vaccinated have an allergy to a component   of the vaccine? no    3. Has the person to be vaccinated ever had a serious reaction   to influenza vaccine in the past? no    4. Has the person to be vaccinated ever had Guillain-Barré syndrome? no    Form completed by Patient       The following medication was given:     MEDICATION: Vitamin B12  1000mcg  ROUTE: IM  SITE: Deltoid - Right  DOSE: 1mL  LOT #: [id number]  :  [email or web address]  EXPIRATION DATE:  2/2019  NDC#: 3571-0587-59

## 2018-03-03 DIAGNOSIS — Z79.4 TYPE 2 DIABETES MELLITUS WITHOUT COMPLICATION, WITH LONG-TERM CURRENT USE OF INSULIN (H): ICD-10-CM

## 2018-03-03 DIAGNOSIS — E11.9 TYPE 2 DIABETES MELLITUS WITHOUT COMPLICATION, WITH LONG-TERM CURRENT USE OF INSULIN (H): ICD-10-CM

## 2018-03-03 NOTE — TELEPHONE ENCOUNTER
"Last Written Prescription Date:  3/15/17  Last Fill Quantity: 360 tablet,  # refills: 3   Last office visit: 6/19/2017 with prescribing provider:  Paulette   Future Office Visit:      Requested Prescriptions   Pending Prescriptions Disp Refills     metFORMIN (GLUCOPHAGE) 500 MG tablet [Pharmacy Med Name: MetFORMIN HCl Oral Tablet 500 MG] 360 tablet 2     Sig: TAKE 2 TABLETS (1,000 MG) BY MOUTH TWICE A DAY WITH MEALS    Biguanide Agents Failed    3/3/2018 10:02 AM       Failed - Blood pressure less than 140/90 in past 6 months    BP Readings from Last 3 Encounters:   06/19/17 118/68   05/01/17 110/65   03/13/17 119/71                Failed - Patient has documented LDL within the past 12 mos.    Recent Labs   Lab Test  12/05/16   1200   LDL  51            Failed - Patient has had a Microalbumin in the past 12 mos.    Recent Labs   Lab Test  12/05/16   1201   MICROL  <5   UMALCR  Unable to calculate due to low value            Failed - Patient has documented A1c within the specified period of time.    Recent Labs   Lab Test  06/12/17   1541   A1C  6.7*            Failed - Recent (6 mo) or future (30 days) visit within the authorizing provider's specialty    Patient had office visit in the last 6 months or has a visit in the next 30 days with authorizing provider.  See \"Patient Info\" tab in inbasket, or \"Choose Columns\" in Meds & Orders section of the refill encounter.           Passed - Patient is age 10 or older       Passed - Patient's CR is NOT>1.4 OR Patient's EGFR is NOT<45 within past 12 mos.    Recent Labs   Lab Test  12/05/16   1200   GFRESTIMATED  85   GFRESTBLACK  >90   GFR Calc         Recent Labs   Lab Test  12/05/16   1200   CR  0.87            Passed - Patient does NOT have a diagnosis of CHF.          "

## 2018-03-05 NOTE — TELEPHONE ENCOUNTER
Prescription approved per [facility] Refill Protocol.  Sujey Lacey RN

## 2018-03-09 ENCOUNTER — THERAPY VISIT (OUTPATIENT)
Dept: PHYSICAL THERAPY | Facility: CLINIC | Age: 78
End: 2018-03-09
Payer: MEDICARE

## 2018-03-09 DIAGNOSIS — M54.6 PAIN IN THORACIC SPINE: Primary | ICD-10-CM

## 2018-03-09 PROCEDURE — 97110 THERAPEUTIC EXERCISES: CPT | Mod: GP | Performed by: PHYSICAL THERAPIST

## 2018-03-09 PROCEDURE — G8981 BODY POS CURRENT STATUS: HCPCS | Mod: GP | Performed by: PHYSICAL THERAPIST

## 2018-03-09 PROCEDURE — 97161 PT EVAL LOW COMPLEX 20 MIN: CPT | Mod: GP | Performed by: PHYSICAL THERAPIST

## 2018-03-09 PROCEDURE — 97112 NEUROMUSCULAR REEDUCATION: CPT | Mod: GP | Performed by: PHYSICAL THERAPIST

## 2018-03-09 PROCEDURE — G8982 BODY POS GOAL STATUS: HCPCS | Mod: GP | Performed by: PHYSICAL THERAPIST

## 2018-03-09 NOTE — MR AVS SNAPSHOT
After Visit Summary   3/9/2018    Bryant Mckenzie    MRN: [id number]           Patient Information     Date Of Birth          1940        Visit Information        Provider Department      3/9/2018 7:40 AM Ifeoma Mast, PT [facility]        Today's Diagnoses     Pain in thoracic spine    -  1       Follow-ups after your visit        Your next 10 appointments already scheduled     Mar 15, 2018 12:50 PM CDT   [facility] ([facility]  )    [address] [address] [address]   [phone]              Who to contact     If you have questions or need follow up information about today's clinic visit or your schedule please contact [facility] directly at [phone].  Normal or non-critical lab and imaging results will be communicated to you by Em[employer] Ver[employer] Engineeringhart, letter or phone within 4 business days after the clinic has received the results. If you do not hear from us within 7 days, please contact the clinic through [employer] or phone. If you have a critical or abnormal lab result, we will notify you by phone as soon as possible.  Submit refill requests through [employer] or call your pharmacy and they will forward the refill request to us. Please allow 3 business days for your refill to be completed.          Additional Information About Your Visit        MyChart Information     [employer] gives you secure access to your electronic health record. If you see a primary care provider, you can also send messages to your care team and make appointments. If you have questions, please call your primary care clinic.  If you do not have a primary care provider, please call [phone] and they will assist you.        Care EveryWhere ID     This is your Care EveryWhere ID. This could be used by other organizations to access your [facility] medical records  [id number]         Blood Pressure from Last 3 Encounters:   06/19/17 118/68    05/01/17 110/65   03/13/17 119/71    Weight from Last 3 Encounters:   06/19/17 121.1 kg (267 lb)   05/01/17 122.5 kg (270 lb)   03/13/17 124.6 kg (274 lb 9.6 oz)              We Performed the Following     HC PT EVAL, LOW COMPLEXITY     [facility] CERT REPORT     [facility] INITIAL EVAL REPORT     NEUROMUSCULAR RE-EDUCATION     THERAPEUTIC EXERCISES        Primary Care Provider Office Phone # Fax #    Marquis Caldwell [phone]       [facility] [address] [facility] [address]  [place] [address]        Equal Access to Services     [facility]: Hadii aad ku hadasho Soomaali, waaxda luqadaha, qaybta kaalmada adeegyada, waxay ciarain hayaan adelennie spears . So [facility] [phone].    ATENCIÓN: Si habla español, tiene a gonzalez disposición servicios gratuitos de asistencia lingüística. Llame al [phone].    We comply with applicable federal civil rights laws and [place] laws. We do not discriminate on the basis of race, color, national origin, age, disability, sex, sexual orientation, or gender identity.            Thank you!     Thank you for choosing [facility]  for your care. Our goal is always to provide you with excellent care. Hearing back from our patients is one way we can continue to improve our services. Please take a few minutes to complete the written survey that you may receive in the mail after your visit with us. Thank you!             Your Updated Medication List - Protect others around you: Learn how to safely use, store and throw away your medicines at www.disposemymeds.org.          This list is accurate as of 3/9/18 10:19 AM.  Always use your most recent med list.                   Brand Name Dispense Instructions for use Diagnosis    amoxicillin 500 MG capsule    AMOXIL          atorvastatin 20 MG tablet    LIPITOR    90 tablet    Take 1 tablet (20 mg) by mouth daily    Hyperlipidemia LDL goal <100       cyanocobalamin 1000 MCG/ML injection    VITAMIN B12    1 mL    Inject 1  mL (1,000 mcg) into the muscle every 30 days    Vitamin B12 deficiency (non anemic)       insulin glargine 100 UNIT/ML injection    LANTUS    15 mL    Inject 16 Units Subcutaneous At Bedtime    Type 2 diabetes mellitus with complication, with long-term current use of insulin (H)       insulin lispro 100 UNIT/ML injection    HumaLOG KWIKpen    36 mL    11 units with breakfast,15 units at noon, and 14 units before evening meal    Type 2 diabetes mellitus without complication, with long-term current use of insulin (H)       insulin pen needle 31G X 8 MM     300 each    Use 4 pen needles daily or as directed for humalog and lantus    Type 2 diabetes mellitus without complication, with long-term current use of insulin (H)       metFORMIN 500 MG tablet    GLUCOPHAGE    360 tablet    TAKE 2 TABLETS (1,000 MG) BY MOUTH TWICE A DAY WITH MEALS    Type 2 diabetes mellitus without complication, with long-term current use of insulin (H)       MULTIVITAL PO      Take 1 tablet by mouth daily        ONETOUCH DELICA LANCETS 33G Misc     300 each    1 lancet by In Vitro route 3 times daily    Type 2 diabetes mellitus with complication, with long-term current use of insulin (H)       ONETOUCH ULTRA test strip   Generic drug:  blood glucose monitoring     300 strip    Use to test three times daily    Type 2 diabetes mellitus with complication, with long-term current use of insulin (H)       oxyCODONE-acetaminophen 5-325 MG per tablet    PERCOCET          simvastatin 5 MG tablet    ZOCOR          tamsulosin 0.4 MG capsule    FLOMAX    90 capsule    Take 1 capsule (0.4 mg) by mouth daily    Benign non-nodular prostatic hyperplasia without lower urinary tract symptoms

## 2018-03-09 NOTE — PROGRESS NOTES
"[employer] [facility] [employer] [facility] Initial Evaluation -- Thoracic    Evaluation Date: March 9, 2018  Bryant Mckenzie is a 78 year old male with a thoracic condition.   Referral: Dr. Merlin Brown  Work mechanical stresses:   Employment status:  Part time  Leisure mechanical stresses: general exercise  Functional disability from present episode: see flow sheet  VAS score (0-10): 7/10  Patient goals/expectations:  \"to get rid of the pain\"    HISTORY:    Present symptoms: L lower thoracic  Pain quality (sharp/shooting/stabbing/aching/burning/cramping):  Sharp, stabbing, aching  Paresthesia (yes/no):  no    Present since (onset date): March 2, 2018. Symptoms (improving/unchanging/worsening):  unchanging.  Symptoms commenced as a result of: shoveling   Condition occurred in the following environment: hoe     Symptoms at onset: L thoracic  Constant symptoms:   Intermittent symptoms: L thoracic    Symptoms are made worse with the following: Sometimes Sitting, Sometimes Lying, Always When still and lifting   Symptoms are made better with the following: Sometimes Lying and Sometimes On the move    Disturbed sleep (yes/no):  yes    Pillows:  1  Sleeping postures(prone/sup/side R/L): sides, back    Previous episodes (0/1-5/6-10/11+):  2  Year of first episode:     Previous history: episodic L thoracic  Previous treatments: PT good results    Specific Questions:   Cough/Sneeze/Deep Breath (pos/neg):  pos  Gait (normal/abnormal):  normal  Medications (nil/NSAIDS/analg/steroids/anticoag/other):  Other - Metformin  Medical allergies:  none  General health (excellent/good/fair/poor):  good  Pertinent medical history:  Diabetes, Overweight and Sleep disorder/Apnea  Imaging (NA/Xray/MRI):  none  Recent or major surgery (yes/no):  no  Night pain (yes/no):  Yes, mechanical  Accidents (yes/no):  no  Unexplained weight loss (yes/no):  no  Barriers at home:  none  Other red flags:  none    EXAMINATION    Posture:   Sitting " (good/fair/poor): fair to poor   Standing (good/fair/poor): fair  Protruded head (yes/no): yes  Kyphosis (red/acc/normal): acc    Correction of posture (better/worse/no effect): better  Other observations:      Neurological:    Motor deficit:      Reflexes:    Sensory deficit:      Dural signs:      Movement Loss:   Kong Mod Min Nil Pain   Flexion    x    Extension  x   decr pain   Rotation R  x   pdm   Rotation L  x   pdm   Other          Cervical Differential Testing:  Rep Pro    Rep Ret    Rep Ret Ext    Rep SB - R    Rep SB - L    Rep Rot - R    Rep Rot - L    Rep Flex      Test Movements:   During: produces, abolishes, increases, decreases, no effect, centralizing, peripheralizing  After: better, worse, no better, no worse, no effect, centralized, peripheralized    Pretest symptoms sitting: L thoracic   Symptoms During Symptoms After ROM increased ROM decreased No Effect   FLEX        Rep FLEX        EXT Decreases    No Better         Rep EXT Decreases    No Better      x   Pretest symptoms lying:     Symptoms During Symptoms After ROM increased ROM decreased No Effect   EIL (prone)        Rep EIL (prone)        EIL (supine)        Rep EIL (supine)        Pretest symptoms sitting:  L thoracic   Symptoms During Symptoms After ROM increased ROM decreased No Effect   ROT - R        Rep ROT - R        ROT - L Increases    No Worse         Rep ROT - L Increases    No Worse    x     Other          Static Tests:  Flexion:       Rotation R:    Extension (prone/supine):    Rotation L:       Other Tests:     Provisional Classification: Derangement    Principle of Management:  Education:  Posture, DP, therapeutic dose    Equipment provided:  Has lumbar roll  Mechanical therapy (Y/N):  Y    Extension principle:     Flexion principle:    Lateral principle:  Rep L Rot x 10/2 hrs    Other:     ASSESSMENT/PLAN:    Patient is a 78 year old male with thoracic complaints.    Patient has the following significant findings with  corresponding treatment plan.                Diagnosis 1:  Thoracic spine pain  Pain -  manual therapy, self management, education, directional preference exercise and home program  Decreased ROM/flexibility - manual therapy and therapeutic exercise  Decreased joint mobility - manual therapy and therapeutic exercise  Decreased function - therapeutic activities  Impaired posture - neuro re-education    Therapy Evaluation Codes:   1) History comprised of:   Personal factors that impact the plan of care:      None.    Comorbidity factors that impact the plan of care are:      Diabetes, Overweight and Sleep disorder/apnea.     Medications impacting care: None.  2) Examination of Body Systems comprised of:   Body structures and functions that impact the plan of care:      Thoracic Spine.   Activity limitations that impact the plan of care are:      Driving, Lifting, Sitting, Working and Sleeping.  3) Clinical presentation characteristics are:   Stable/Uncomplicated.  4) Decision-Making    Low complexity using standardized patient assessment instrument and/or measureable assessment of functional outcome.  Cumulative Therapy Evaluation is: Low complexity.    Previous and current functional limitations:  (See Goal Flow Sheet for this information)    Short term and Long term goals: (See Goal Flow Sheet for this information)     Communication ability:  Patient appears to be able to clearly communicate and understand verbal and written communication and follow directions correctly.  Treatment Explanation - The following has been discussed with the patient:   RX ordered/plan of care  Anticipated outcomes  Possible risks and side effects  This patient would benefit from PT intervention to resume normal activities.   Rehab potential is good.    Frequency:  1 X week, once daily  Duration:  for 4 weeks  Discharge Plan:  Achieve all LTG.  Independent in home treatment program.  Reach maximal therapeutic benefit.    Please refer to  the daily flowsheet for treatment today, total treatment time and time spent performing 1:1 timed codes.

## 2018-03-09 NOTE — LETTER
"DEPARTMENT OF HEALTH AND HUMAN SERVICES  CENTERS FOR MEDICARE & MEDICAID SERVICES    PLAN/UPDATED PLAN OF PROGRESS FOR OUTPATIENT REHABILITATION    PATIENTS NAME:  Bryant Mckenzie   : 1940  PROVIDER NUMBER:    [id number]  [id number]  PROVIDER NAME: [facility]  MEDICAL RECORD NUMBER: [id number]   START OF CARE DATE:  SOC Date: 18   TYPE:  PT    PRIMARY/TREATMENT DIAGNOSIS: (Pertinent Medical Diagnosis)  Pain in thoracic spine    VISITS FROM START OF CARE:  Rxs Used: 1     [facility] Initial Evaluation -- Thoracic    Evaluation Date: 2018  Bryant Mckenzie is a 78 year old male with a thoracic condition.   Referral: Dr. Merlin Brown  Work mechanical stresses:   Employment status:  Part time  Leisure mechanical stresses: general exercise  Functional disability from present episode: see flow sheet  VAS score (0-10): 7/10  Patient goals/expectations:  \"to get rid of the pain\"  HISTORY:  Present symptoms: L lower thoracic  Pain quality (sharp/shooting/stabbing/aching/burning/cramping):  Sharp, stabbing, aching  Paresthesia (yes/no):  no  Present since (onset date): 2018. Symptoms (improving/unchanging/worsening):  unchanging.  Symptoms commenced as a result of: shoveling   Condition occurred in the following environment: hoe   Symptoms at onset: L thoracic  Constant symptoms:   Intermittent symptoms: L thoracic  Symptoms are made worse with the following: Sometimes Sitting, Sometimes Lying, Always When still and lifting   Symptoms are made better with the following: Sometimes Lying and Sometimes On the move  Disturbed sleep (yes/no):  yes    Pillows:  1  Sleeping postures(prone/sup/side R/L): sides, back  Previous episodes (0/1-5/6-10/11+):  2  Year of first episode:   Previous history: episodic L thoracic  Previous treatments: PT good results  Specific Questions:   Cough/Sneeze/Deep Breath (pos/neg):  pos  Gait (normal/abnormal):  " normal  Medications (nil/NSAIDS/analg/steroids/anticoag/other):  Other - Metformin  Medical allergies:  none  General health (excellent/good/fair/poor):  good  Pertinent medical history:  Diabetes, Overweight and Sleep disorder/Apnea  Imaging (NA/Xray/MRI):  none  Recent or major surgery (yes/no):  no  PATIENTS NAME:  Bryant Mckenzie   : 1940    Night pain (yes/no):  Yes, mechanical  Accidents (yes/no):  no  Unexplained weight loss (yes/no):  no  Barriers at home:  none  Other red flags:  none  EXAMINATION  Posture:   Sitting (good/fair/poor): fair to poor   Standing (good/fair/poor): fair  Protruded head (yes/no): yes  Kyphosis (red/acc/normal): acc  Correction of posture (better/worse/no effect): better  Other observations:    Neurological:  Motor deficit:      Reflexes:    Sensory deficit:      Dural signs:    Movement Loss:   Kong Mod Min Nil Pain   Flexion    x    Extension  x   decr pain   Rotation R  x   pdm   Rotation L  x   pdm   Other        Cervical Differential Testing:  Rep Pro    Rep Ret    Rep Ret Ext    Rep SB - R    Rep SB - L    Rep Rot - R    Rep Rot - L    Rep Flex    Test Movements:   During: produces, abolishes, increases, decreases, no effect, centralizing, peripheralizing  After: better, worse, no better, no worse, no effect, centralized, peripheralized  Pretest symptoms sitting: L thoracic   Symptoms During Symptoms After ROM increased ROM decreased No Effect   FLEX        Rep FLEX        EXT Decreases    No Better         Rep EXT Decreases    No Better      x   Pretest symptoms lying:     Symptoms During Symptoms After ROM increased ROM decreased No Effect   EIL (prone)        Rep EIL (prone)        EIL (supine)        Rep EIL (supine)                      PATIENTS NAME:  Bryant Mckenzie   : 1940    Pretest symptoms sitting:  L thoracic   Symptoms During Symptoms After ROM increased ROM decreased No Effect   ROT - R        Rep ROT - R        ROT - L Increases    No Worse          Rep ROT - L Increases    No Worse    x     Other          Static Tests:  Flexion:       Rotation R:    Extension (prone/supine):    Rotation L:       Other Tests:     Provisional Classification: Derangement    Principle of Management:  Education:  Posture, DP, therapeutic dose    Equipment provided:  Has lumbar roll  Mechanical therapy (Y/N):  Y    Extension principle:     Flexion principle:    Lateral principle:  Rep L Rot x 10/2 hrs    Other:     ASSESSMENT/PLAN:    Patient is a 78 year old male with thoracic complaints.    Patient has the following significant findings with corresponding treatment plan.                Diagnosis 1:  Thoracic spine pain  Pain -  manual therapy, self management, education, directional preference exercise and home program  Decreased ROM/flexibility - manual therapy and therapeutic exercise  Decreased joint mobility - manual therapy and therapeutic exercise  Decreased function - therapeutic activities  Impaired posture - neuro re-education    Therapy Evaluation Codes:   1) History comprised of:   Personal factors that impact the plan of care:      None.    Comorbidity factors that impact the plan of care are:      Diabetes, Overweight and Sleep disorder/apnea.     Medications impacting care: None.  2) Examination of Body Systems comprised of:   Body structures and functions that impact the plan of care:      Thoracic Spine.   Activity limitations that impact the plan of care are:      Driving, Lifting, Sitting, Working and Sleeping.  3) Clinical presentation characteristics are:   Stable/Uncomplicated.  4) Decision-Making    Low complexity using standardized patient assessment instrument and/or measureable assessment of functional outcome.  Cumulative Therapy Evaluation is: Low complexity.  PATIENTS NAME:  Bryant Mckenzie   : 1940    Previous and current functional limitations:  (See Goal Flow Sheet for this information)    Short term and Long term goals: (See Goal Flow  "Sheet for this information)     Communication ability:  Patient appears to be able to clearly communicate and understand verbal and written communication and follow directions correctly.  Treatment Explanation - The following has been discussed with the patient:   RX ordered/plan of care  Anticipated outcomes  Possible risks and side effects  This patient would benefit from PT intervention to resume normal activities.   Rehab potential is good.    Frequency:  1 X week, once daily  Duration:  for 4 weeks  Discharge Plan:  Achieve all LTG.  Independent in home treatment program.  Reach maximal therapeutic benefit.       Caregiver Signature/Credentials _____________________________ Date ________      Treating Provider: Ifeoma Mast, PT, Cert, MDT    I have reviewed and certified the need for these services and plan of treatment while under my care.        PHYSICIAN'S SIGNATURE:   _____________________________________  Date___________     Poly Aragon DO    Certification period:  Beginning of Cert date period: 03/09/18 to  End of Cert period date: 06/07/18     Functional Level Progress Report: Please see attached \"Goal Flow sheet for Functional level.\"    ____X____ Continue Services or       ________ DC Services                Service dates: From  SOC Date: 03/09/18 date to present                         "

## 2018-03-12 ENCOUNTER — THERAPY VISIT (OUTPATIENT)
Dept: PHYSICAL THERAPY | Facility: CLINIC | Age: 78
End: 2018-03-12
Payer: MEDICARE

## 2018-03-12 DIAGNOSIS — M54.6 PAIN IN THORACIC SPINE: Primary | ICD-10-CM

## 2018-03-12 PROCEDURE — 97140 MANUAL THERAPY 1/> REGIONS: CPT | Mod: GP | Performed by: PHYSICAL THERAPIST

## 2018-03-12 PROCEDURE — 97110 THERAPEUTIC EXERCISES: CPT | Mod: GP | Performed by: PHYSICAL THERAPIST

## 2018-03-12 PROCEDURE — 97530 THERAPEUTIC ACTIVITIES: CPT | Mod: GP | Performed by: PHYSICAL THERAPIST

## 2018-03-15 ENCOUNTER — THERAPY VISIT (OUTPATIENT)
Dept: PHYSICAL THERAPY | Facility: CLINIC | Age: 78
End: 2018-03-15
Payer: MEDICARE

## 2018-03-15 DIAGNOSIS — M54.6 PAIN IN THORACIC SPINE: Primary | ICD-10-CM

## 2018-03-15 PROCEDURE — 97140 MANUAL THERAPY 1/> REGIONS: CPT | Mod: GP | Performed by: PHYSICAL THERAPIST

## 2018-03-15 PROCEDURE — 97110 THERAPEUTIC EXERCISES: CPT | Mod: GP | Performed by: PHYSICAL THERAPIST

## 2018-03-26 ENCOUNTER — THERAPY VISIT (OUTPATIENT)
Dept: PHYSICAL THERAPY | Facility: CLINIC | Age: 78
End: 2018-03-26
Payer: MEDICARE

## 2018-03-26 DIAGNOSIS — M54.6 PAIN IN THORACIC SPINE: ICD-10-CM

## 2018-03-26 PROCEDURE — 97530 THERAPEUTIC ACTIVITIES: CPT | Mod: GP | Performed by: PHYSICAL THERAPIST

## 2018-03-26 PROCEDURE — 97140 MANUAL THERAPY 1/> REGIONS: CPT | Mod: GP | Performed by: PHYSICAL THERAPIST

## 2018-03-26 PROCEDURE — 97110 THERAPEUTIC EXERCISES: CPT | Mod: GP | Performed by: PHYSICAL THERAPIST

## 2018-03-26 NOTE — PROGRESS NOTES
Subjective:  HPI                    Objective:  System    Physical Exam    General     ROS    Assessment/Plan:    PROGRESS  REPORT    Progress reporting period is from 3-9-18 to 3-26-18.       SUBJECTIVE  Subjective changes noted by patient:  .  Subjective: Reports he has had a return of pain Thursday without provocation.  Tried exercises without relief; notes he has been able to sleep well last pm, pain returns upon movement/activity.    Current pain level is 6/10 Current Pain level: 6/10.     Previous pain level was  7/10 Initial Pain level: 7/10.   Changes in function:  None  Adverse reaction to treatment or activity: None    OBJECTIVE  Changes noted in objective findings:  The objective findings below are from DOS 3-26-18.  Objective: L Rot=min to mod loss pdm, Ext=min to mod loss, pdm, poor sitting posture.  Tenderness to palpation L side thoracic.      ASSESSMENT/PLAN    Initially Marshall responded to PT as he had previously (a year ago for same condition); however now his pain is unchanged with exercises and returns to moderate levels with activity; not responsive to mechanical treatment.       Updated problem list and treatment plan: Diagnosis 1:  Thoracic spine pain  Pain -  manual therapy, self management, education, directional preference exercise and home program  Decreased ROM/flexibility - manual therapy and therapeutic exercise  Decreased function - therapeutic activities  Impaired posture - neuro re-education  STG/LTGs have been met or progress has been made towards goals:  None  Assessment of Progress: The patient's condition is unchanged.  Self Management Plans:  Patient is independent in a home treatment program.  Patient is independent in self management of symptoms.  I have re-evaluated this patient and find that the nature, scope, duration and intensity of the therapy is appropriate for the medical condition of the patient.  Bryant continues to require the following intervention to meet STG and  LTG's:  PT intervention is no longer required to meet STG/LTG.    Recommendations:  This patient would benefit from further evaluation.    Please refer to the daily flowsheet for treatment today, total treatment time and time spent performing 1:1 timed codes.

## 2018-03-26 NOTE — MR AVS SNAPSHOT
After Visit Summary   3/26/2018    Bryant Mckenzie    MRN: [id number]           Patient Information     Date Of Birth          1940        Visit Information        Provider Department      3/26/2018 8:35 AM Ifeoma Mast, PT [facility]        Today's Diagnoses     Pain in thoracic spine           Follow-ups after your visit        Who to contact     If you have questions or need follow up information about today's clinic visit or your schedule please contact [facility] directly at [phone].  Normal or non-critical lab and imaging results will be communicated to you by Meituhart, letter or phone within 4 business days after the clinic has received the results. If you do not hear from us within 7 days, please contact the clinic through Jibet or phone. If you have a critical or abnormal lab result, we will notify you by phone as soon as possible.  Submit refill requests through [employer] or call your pharmacy and they will forward the refill request to us. Please allow 3 business days for your refill to be completed.          Additional Information About Your Visit        MyChart Information     [employer] gives you secure access to your electronic health record. If you see a primary care provider, you can also send messages to your care team and make appointments. If you have questions, please call your primary care clinic.  If you do not have a primary care provider, please call [phone] and they will assist you.        Care EveryWhere ID     This is your Care EveryWhere ID. This could be used by other organizations to access your [facility] medical records  [id number]         Blood Pressure from Last 3 Encounters:   06/19/17 118/68   05/01/17 110/65   03/13/17 119/71    Weight from Last 3 Encounters:   06/19/17 121.1 kg (267 lb)   05/01/17 122.5 kg (270 lb)   03/13/17 124.6 kg (274 lb 9.6 oz)              We Performed the Following     [facility] PROGRESS NOTES REPORT      MANUAL THER TECH,1+REGIONS,EA 15 MIN     THERAPEUTIC ACTIVITIES     THERAPEUTIC EXERCISES        Primary Care Provider Office Phone # Fax #    Marquis Caldwell [phone]       [facility] [address] [facility] [address]  [facility] [address]        Equal Access to Services     [facility] GAY : Hadii aad ku hadasho Soomaali, waaxda luqadaha, qaybta kaalmada adeegyada, waxay idiin hayaan adeeg kharash lajuhin . So [facility] [phone].    ATENCIÓN: Si habla español, tiene a gonzalez disposición servicios gratuitos de asistencia lingüística. [facility] [phone].    We comply with applicable federal civil rights laws and [place] laws. We do not discriminate on the basis of race, color, national origin, age, disability, sex, sexual orientation, or gender identity.            Thank you!     Thank you for choosing [facility]  for your care. Our goal is always to provide you with excellent care. Hearing back from our patients is one way we can continue to improve our services. Please take a few minutes to complete the written survey that you may receive in the mail after your visit with us. Thank you!             Your Updated Medication List - Protect others around you: Learn how to safely use, store and throw away your medicines at www.disposemymeds.org.          This list is accurate as of 3/26/18  9:05 AM.  Always use your most recent med list.                   Brand Name Dispense Instructions for use Diagnosis    amoxicillin 500 MG capsule    AMOXIL          atorvastatin 20 MG tablet    LIPITOR    90 tablet    Take 1 tablet (20 mg) by mouth daily    Hyperlipidemia LDL goal <100       cyanocobalamin 1000 MCG/ML injection    VITAMIN B12    1 mL    Inject 1 mL (1,000 mcg) into the muscle every 30 days    Vitamin B12 deficiency (non anemic)       insulin glargine 100 UNIT/ML injection    LANTUS    15 mL    Inject 16 Units Subcutaneous At Bedtime    Type 2 diabetes mellitus with complication, with  long-term current use of insulin (H)       insulin lispro 100 UNIT/ML injection    HumaLOG KWIKpen    36 mL    11 units with breakfast,15 units at noon, and 14 units before evening meal    Type 2 diabetes mellitus without complication, with long-term current use of insulin (H)       insulin pen needle 31G X 8 MM     300 each    Use 4 pen needles daily or as directed for humalog and lantus    Type 2 diabetes mellitus without complication, with long-term current use of insulin (H)       metFORMIN 500 MG tablet    GLUCOPHAGE    360 tablet    TAKE 2 TABLETS (1,000 MG) BY MOUTH TWICE A DAY WITH MEALS    Type 2 diabetes mellitus without complication, with long-term current use of insulin (H)       MULTIVITAL PO      Take 1 tablet by mouth daily        ONETOUCH DELICA LANCETS 33G Misc     300 each    1 lancet by In Vitro route 3 times daily    Type 2 diabetes mellitus with complication, with long-term current use of insulin (H)       ONETOUCH ULTRA test strip   Generic drug:  blood glucose monitoring     300 strip    Use to test three times daily    Type 2 diabetes mellitus with complication, with long-term current use of insulin (H)       oxyCODONE-acetaminophen 5-325 MG per tablet    PERCOCET          simvastatin 5 MG tablet    ZOCOR          tamsulosin 0.4 MG capsule    FLOMAX    90 capsule    Take 1 capsule (0.4 mg) by mouth daily    Benign non-nodular prostatic hyperplasia without lower urinary tract symptoms

## 2019-03-19 ENCOUNTER — TELEPHONE (OUTPATIENT)
Dept: SURGERY | Facility: CLINIC | Age: 79
End: 2019-03-19

## 2019-03-19 ENCOUNTER — OFFICE VISIT (OUTPATIENT)
Dept: SURGERY | Facility: CLINIC | Age: 79
End: 2019-03-19
Payer: MEDICARE

## 2019-03-19 VITALS
RESPIRATION RATE: 16 BRPM | WEIGHT: 240 LBS | SYSTOLIC BLOOD PRESSURE: 122 MMHG | HEIGHT: 72 IN | HEART RATE: 83 BPM | DIASTOLIC BLOOD PRESSURE: 64 MMHG | BODY MASS INDEX: 32.51 KG/M2 | OXYGEN SATURATION: 95 %

## 2019-03-19 DIAGNOSIS — R22.2 LUMP OF SKIN OF BACK: ICD-10-CM

## 2019-03-19 PROCEDURE — 99203 OFFICE O/P NEW LOW 30 MIN: CPT | Performed by: SURGERY

## 2019-03-19 ASSESSMENT — MIFFLIN-ST. JEOR: SCORE: 1851.63

## 2019-03-19 NOTE — NURSING NOTE
Location: back upper left    Pain: No    Growing size: Yes    Had it previously removed: No    First noticed it: 2 year(s) ago

## 2019-03-19 NOTE — TELEPHONE ENCOUNTER
Type of surgery: Excision upper back cyst  Location of surgery: [facility]  Date and time of surgery: 4/4/19 at 10:55am  Surgeon: Dr. Rene Murray  Pre-Op Appt Date: Patient to schedule  Post-Op Appt Date: Patient to schedule   Packet sent out: Yes  Pre-cert/Authorization completed:  Not Applicable  Date: 3/19/19

## 2019-03-25 PROBLEM — R22.2 LUMP OF SKIN OF BACK: Status: ACTIVE | Noted: 2019-03-25

## 2019-03-25 NOTE — PROGRESS NOTES
Surgery Consultation, [facility], SUSAN Murray MD    Bryant Mckenzie MRN# [id number]   YOB: 1940 Age: 79 year old     PCP:  Brown, Merlin Emery [phone]    Chief Complaint: Back lump    Pt was seen in consultation from Brown, Merlin Emery.    History of Present Illness:  Bryant Mckenzie is a 79 year old male who presented with a mildly tender lump on his back.  This has been present for years but has recently gotten larger.  It occasionally has discharge but has not drained for several weeks.  No skin changes or fevers.  Patient has no personal history of previous lipomas or cysts.  No history of skin cancer.  Patient is here to discuss removal of this mass.    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  PSH:  Bryant Mckenzie  has a past surgical history that includes orthopedic surgery; Esophagoscopy, gastroscopy, duodenoscopy (EGD), combined (N/A, 1/20/2015); hernia repair; and Hydrocelectomy scrotal.    Home medications and allergies reviewed.    Social History:  Bryant Mckenzie  reports that  has never smoked. he has never used smokeless tobacco. He reports that he drinks alcohol. He reports that he does not use drugs.  Family History:  Bryant Mckenzie family history includes Cerebrovascular Disease in his father; Coronary Artery Disease (age of onset: 53) in his father; Diabetes in his brother and mother.    ROS:  The 10 point Review of Systems is negative other than noted in the HPI.  No fevers or chills.    Physical Exam:  Blood pressure 122/64,  pulse 83, resp. rate 16, height 1.829 m (6'), weight 108.9 kg (240 lb), SpO2 95 %.  240 lbs 0 oz  Healthy gentleman in no distress.  Patient has a pleasant affect and communicates well.   Pupils equal round and reactive to light.   No cervical lymphadenopathy or thyromegaly.   Lung fields clear, breathing comfortably.   Heart normal sinus rhythm.  No murmurs rubs or gallops.  Abdomen soft, nontender, nondistended.  Skin warm, dry.  3 cm palpable mass on the upper back.  Appears most consistent with a cyst.  Mildly tender.    All new lab and imaging data was reviewed.      Assessment/plan: Pleasant healthy 79-year-old gentleman with what appears to be a sebaceous cyst on the skin of the back.  Given the location of this lesion and challenges removing these under local anesthesia, I have recommended surgical excision in the operating room.  This could be done with a small amount of sedation and local.  This also prevents cyst rupture and increased infection risk.  I discussed the nature of the procedure and the fact that these lumps are almost always benign.  We will get this scheduled at his convenience.  Surgical co-morbities include hyperlipidemia, diabetes, hypertension.    Isaias Murary M.D.  [facility]  [phone]    Please route or send letter to:  Primary Care Provider (PCP) and Referring Provider

## 2019-04-17 ENCOUNTER — ANESTHESIA EVENT (OUTPATIENT)
Dept: SURGERY | Facility: CLINIC | Age: 79
End: 2019-04-17
Payer: MEDICARE

## 2019-04-17 ENCOUNTER — HOSPITAL ENCOUNTER (OUTPATIENT)
Facility: CLINIC | Age: 79
Discharge: HOME | End: 2019-04-17
Attending: SURGERY | Admitting: SURGERY
Payer: MEDICARE

## 2019-04-17 ENCOUNTER — ANESTHESIA (OUTPATIENT)
Dept: SURGERY | Facility: CLINIC | Age: 79
End: 2019-04-17
Payer: MEDICARE

## 2019-04-17 ENCOUNTER — APPOINTMENT (OUTPATIENT)
Dept: SURGERY | Facility: PHYSICIAN GROUP | Age: 79
End: 2019-04-17
Payer: MEDICARE

## 2019-04-17 VITALS
HEART RATE: 68 BPM | BODY MASS INDEX: 32.79 KG/M2 | TEMPERATURE: 97.1 F | RESPIRATION RATE: 16 BRPM | OXYGEN SATURATION: 96 % | SYSTOLIC BLOOD PRESSURE: 111 MMHG | DIASTOLIC BLOOD PRESSURE: 62 MMHG | HEIGHT: 72 IN | WEIGHT: 242.06 LBS

## 2019-04-17 DIAGNOSIS — G89.18 ACUTE POST-OPERATIVE PAIN: Primary | ICD-10-CM

## 2019-04-17 DIAGNOSIS — R22.2 LUMP OF SKIN OF BACK: ICD-10-CM

## 2019-04-17 LAB — GLUCOSE BLDC GLUCOMTR-MCNC: 140 MG/DL (ref 70–99)

## 2019-04-17 PROCEDURE — 82962 GLUCOSE BLOOD TEST: CPT

## 2019-04-17 PROCEDURE — 36000050 ZZH SURGERY LEVEL 2 1ST 30 MIN: Performed by: SURGERY

## 2019-04-17 PROCEDURE — 37000008 ZZH ANESTHESIA TECHNICAL FEE, 1ST 30 MIN: Performed by: SURGERY

## 2019-04-17 PROCEDURE — 25800030 ZZH RX IP 258 OP 636: Performed by: NURSE ANESTHETIST, CERTIFIED REGISTERED

## 2019-04-17 PROCEDURE — 37000009 ZZH ANESTHESIA TECHNICAL FEE, EACH ADDTL 15 MIN: Performed by: SURGERY

## 2019-04-17 PROCEDURE — 36000052 ZZH SURGERY LEVEL 2 EA 15 ADDTL MIN: Performed by: SURGERY

## 2019-04-17 PROCEDURE — 71000027 ZZH RECOVERY PHASE 2 EACH 15 MINS: Performed by: SURGERY

## 2019-04-17 PROCEDURE — 25000128 H RX IP 250 OP 636: Performed by: SURGERY

## 2019-04-17 PROCEDURE — 88304 TISSUE EXAM BY PATHOLOGIST: CPT | Performed by: SURGERY

## 2019-04-17 PROCEDURE — 25000128 H RX IP 250 OP 636: Performed by: NURSE ANESTHETIST, CERTIFIED REGISTERED

## 2019-04-17 PROCEDURE — 40000170 ZZH STATISTIC PRE-PROCEDURE ASSESSMENT II: Performed by: SURGERY

## 2019-04-17 PROCEDURE — 71000012 ZZH RECOVERY PHASE 1 LEVEL 1 FIRST HR: Performed by: SURGERY

## 2019-04-17 PROCEDURE — 27210794 ZZH OR GENERAL SUPPLY STERILE: Performed by: SURGERY

## 2019-04-17 PROCEDURE — 11404 EXC TR-EXT B9+MARG 3.1-4 CM: CPT | Performed by: SURGERY

## 2019-04-17 PROCEDURE — 25000125 ZZHC RX 250: Performed by: NURSE ANESTHETIST, CERTIFIED REGISTERED

## 2019-04-17 PROCEDURE — 25000125 ZZHC RX 250: Performed by: SURGERY

## 2019-04-17 RX ORDER — BUPIVACAINE HYDROCHLORIDE AND EPINEPHRINE 5; 5 MG/ML; UG/ML
INJECTION, SOLUTION EPIDURAL; INTRACAUDAL; PERINEURAL
Status: DISCONTINUED
Start: 2019-04-17 | End: 2019-04-17 | Stop reason: HOSPADM

## 2019-04-17 RX ORDER — PROPOFOL 10 MG/ML
INJECTION, EMULSION INTRAVENOUS PRN
Status: DISCONTINUED | OUTPATIENT
Start: 2019-04-17 | End: 2019-04-17

## 2019-04-17 RX ORDER — FENTANYL CITRATE 50 UG/ML
25-50 INJECTION, SOLUTION INTRAMUSCULAR; INTRAVENOUS
Status: CANCELLED | OUTPATIENT
Start: 2019-04-17

## 2019-04-17 RX ORDER — LIDOCAINE HYDROCHLORIDE 20 MG/ML
INJECTION, SOLUTION INFILTRATION; PERINEURAL PRN
Status: DISCONTINUED | OUTPATIENT
Start: 2019-04-17 | End: 2019-04-17

## 2019-04-17 RX ORDER — ONDANSETRON 4 MG/1
4 TABLET, ORALLY DISINTEGRATING ORAL EVERY 30 MIN PRN
Status: CANCELLED | OUTPATIENT
Start: 2019-04-17

## 2019-04-17 RX ORDER — FENTANYL CITRATE 50 UG/ML
INJECTION, SOLUTION INTRAMUSCULAR; INTRAVENOUS PRN
Status: DISCONTINUED | OUTPATIENT
Start: 2019-04-17 | End: 2019-04-17

## 2019-04-17 RX ORDER — LIDOCAINE HYDROCHLORIDE 10 MG/ML
INJECTION, SOLUTION INFILTRATION; PERINEURAL
Status: DISCONTINUED
Start: 2019-04-17 | End: 2019-04-17 | Stop reason: HOSPADM

## 2019-04-17 RX ORDER — OXYCODONE HYDROCHLORIDE 5 MG/1
5 TABLET ORAL EVERY 4 HOURS PRN
Status: CANCELLED | OUTPATIENT
Start: 2019-04-17

## 2019-04-17 RX ORDER — CEFAZOLIN SODIUM 1 G/3ML
1 INJECTION, POWDER, FOR SOLUTION INTRAMUSCULAR; INTRAVENOUS SEE ADMIN INSTRUCTIONS
Status: DISCONTINUED | OUTPATIENT
Start: 2019-04-17 | End: 2019-04-17 | Stop reason: HOSPADM

## 2019-04-17 RX ORDER — MAGNESIUM HYDROXIDE 1200 MG/15ML
LIQUID ORAL PRN
Status: DISCONTINUED | OUTPATIENT
Start: 2019-04-17 | End: 2019-04-17 | Stop reason: HOSPADM

## 2019-04-17 RX ORDER — NALOXONE HYDROCHLORIDE 0.4 MG/ML
.1-.4 INJECTION, SOLUTION INTRAMUSCULAR; INTRAVENOUS; SUBCUTANEOUS
Status: CANCELLED | OUTPATIENT
Start: 2019-04-17 | End: 2019-04-18

## 2019-04-17 RX ORDER — HYDROCODONE BITARTRATE AND ACETAMINOPHEN 5; 325 MG/1; MG/1
1 TABLET ORAL
Status: CANCELLED | OUTPATIENT
Start: 2019-04-17

## 2019-04-17 RX ORDER — ONDANSETRON 2 MG/ML
4 INJECTION INTRAMUSCULAR; INTRAVENOUS EVERY 30 MIN PRN
Status: CANCELLED | OUTPATIENT
Start: 2019-04-17

## 2019-04-17 RX ORDER — SODIUM CHLORIDE, SODIUM LACTATE, POTASSIUM CHLORIDE, CALCIUM CHLORIDE 600; 310; 30; 20 MG/100ML; MG/100ML; MG/100ML; MG/100ML
INJECTION, SOLUTION INTRAVENOUS CONTINUOUS
Status: CANCELLED | OUTPATIENT
Start: 2019-04-17

## 2019-04-17 RX ORDER — MEPERIDINE HYDROCHLORIDE 25 MG/ML
12.5 INJECTION INTRAMUSCULAR; INTRAVENOUS; SUBCUTANEOUS
Status: CANCELLED | OUTPATIENT
Start: 2019-04-17

## 2019-04-17 RX ORDER — CEFAZOLIN SODIUM 2 G/100ML
2 INJECTION, SOLUTION INTRAVENOUS
Status: COMPLETED | OUTPATIENT
Start: 2019-04-17 | End: 2019-04-17

## 2019-04-17 RX ORDER — HYDROMORPHONE HYDROCHLORIDE 1 MG/ML
.3-.5 INJECTION, SOLUTION INTRAMUSCULAR; INTRAVENOUS; SUBCUTANEOUS EVERY 10 MIN PRN
Status: CANCELLED | OUTPATIENT
Start: 2019-04-17

## 2019-04-17 RX ORDER — SODIUM CHLORIDE, SODIUM LACTATE, POTASSIUM CHLORIDE, CALCIUM CHLORIDE 600; 310; 30; 20 MG/100ML; MG/100ML; MG/100ML; MG/100ML
INJECTION, SOLUTION INTRAVENOUS CONTINUOUS PRN
Status: DISCONTINUED | OUTPATIENT
Start: 2019-04-17 | End: 2019-04-17

## 2019-04-17 RX ORDER — HYDROCODONE BITARTRATE AND ACETAMINOPHEN 5; 325 MG/1; MG/1
1-2 TABLET ORAL EVERY 4 HOURS PRN
Qty: 6 TABLET | Refills: 0 | Status: ON HOLD | OUTPATIENT
Start: 2019-04-17 | End: 2019-06-04

## 2019-04-17 RX ADMIN — MIDAZOLAM 2 MG: 1 INJECTION INTRAMUSCULAR; INTRAVENOUS at 07:30

## 2019-04-17 RX ADMIN — DEXMEDETOMIDINE HYDROCHLORIDE 4 MCG: 100 INJECTION, SOLUTION INTRAVENOUS at 07:30

## 2019-04-17 RX ADMIN — CEFAZOLIN SODIUM 2 G: 2 INJECTION, SOLUTION INTRAVENOUS at 07:29

## 2019-04-17 RX ADMIN — SODIUM CHLORIDE, POTASSIUM CHLORIDE, SODIUM LACTATE AND CALCIUM CHLORIDE: 600; 310; 30; 20 INJECTION, SOLUTION INTRAVENOUS at 07:27

## 2019-04-17 RX ADMIN — PROPOFOL 40 MG: 10 INJECTION, EMULSION INTRAVENOUS at 07:39

## 2019-04-17 RX ADMIN — FENTANYL CITRATE 50 MCG: 50 INJECTION, SOLUTION INTRAMUSCULAR; INTRAVENOUS at 07:39

## 2019-04-17 RX ADMIN — LIDOCAINE HYDROCHLORIDE 40 MG: 20 INJECTION, SOLUTION INFILTRATION; PERINEURAL at 07:39

## 2019-04-17 RX ADMIN — DEXMEDETOMIDINE HYDROCHLORIDE 4 MCG: 100 INJECTION, SOLUTION INTRAVENOUS at 07:28

## 2019-04-17 RX ADMIN — DEXMEDETOMIDINE HYDROCHLORIDE 4 MCG: 100 INJECTION, SOLUTION INTRAVENOUS at 07:32

## 2019-04-17 ASSESSMENT — MIFFLIN-ST. JEOR: SCORE: 1860.99

## 2019-04-17 NOTE — ANESTHESIA POSTPROCEDURE EVALUATION
Patient: Bryant Mckenzie    Procedure(s):  EXCISE CYST UPPER BACK    Diagnosis:UPPER BACK CYST  Diagnosis Additional Information: No value filed.    Anesthesia Type:  MAC    Note:  Anesthesia Post Evaluation    Patient location during evaluation: PACU  Patient participation: Able to fully participate in evaluation  Level of consciousness: awake and alert  Pain management: adequate  Airway patency: patent  Cardiovascular status: acceptable and hemodynamically stable  Respiratory status: acceptable  Hydration status: euvolemic  PONV: none     Anesthetic complications: None          Last vitals:  Vitals:    04/17/19 0830 04/17/19 0840 04/17/19 0910   BP: 100/58 107/62 111/62   Pulse: 65 68    Resp: 10 10 16   Temp: 36.2  C (97.1  F)     SpO2: 96% 97% 96%         Electronically Signed By: Bhupinder Whiting MD  April 17, 2019  10:35 AM

## 2019-04-17 NOTE — ANESTHESIA PREPROCEDURE EVALUATION
Anesthesia Pre-Procedure Evaluation    Patient: Bryant Mckenzie   MRN: [id number] : 1940          Preoperative Diagnosis: UPPER BACK CYST    Procedure(s):  EXCISE CYST UPPER BACK    Past Medical History:   Diagnosis Date     Atrophic gastritis 2016     Atrophic gastritis 2016     Benign non-nodular prostatic hyperplasia with lower urinary tract symptoms 2016     History of diabetes mellitus      Hyperlipidemia LDL goal <100 2016     Obstructive sleep apnea syndrome 2016     Type 2 diabetes mellitus without complication (H) 2016     Vitamin B12 deficiency (non anemic) 2016     Past Surgical History:   Procedure Laterality Date     ESOPHAGOSCOPY, GASTROSCOPY, DUODENOSCOPY (EGD), COMBINED N/A 2015    Procedure: COMBINED ESOPHAGOSCOPY, GASTROSCOPY, DUODENOSCOPY (EGD), BIOPSY SINGLE OR MULTIPLE;  Surgeon: Allan Marroquin MD;  Location:  GI     HERNIA REPAIR       HYDROCELECTOMY SCROTAL       ORTHOPEDIC SURGERY      both knees replaced       Anesthesia Evaluation     .             ROS/MED HX    ENT/Pulmonary:     (+)sleep apnea, , . .    Neurologic:       Cardiovascular:     (+) Dyslipidemia, ----. : . . . :. valvular problems/murmurs type: AS mild, echo 2016, asymptomatic:.       METS/Exercise Tolerance:     Hematologic:         Musculoskeletal:         GI/Hepatic:     (+) Other GI/Hepatic (gastritis)       Renal/Genitourinary:         Endo:     (+) type II DM Using insulin .      Psychiatric:         Infectious Disease:         Malignancy:         Other:                          Physical Exam      Airway   Mallampati: II  TM distance: >3 FB  Neck ROM: full    Dental     Cardiovascular   Rhythm and rate: regular      Pulmonary    breath sounds clear to auscultation            Lab Results   Component Value Date    WBC 6.5 2016    HGB 12.3 (L) 2016    HCT 38.0 (L) 2016     2016     2006    POTASSIUM 4.0 2006    CHLORIDE 101  08/22/2006    CO2 29 08/22/2006    BUN 14 08/21/2006    CR 0.87 12/05/2016     08/21/2006    FATMATA 9.2 08/21/2006    PTT 34 08/21/2006    INR 0.98 08/21/2006    TSH 3.25 12/05/2016       Preop Vitals  BP Readings from Last 3 Encounters:   04/17/19 121/68   03/19/19 122/64   06/19/17 118/68    Pulse Readings from Last 3 Encounters:   03/19/19 83   06/19/17 72   05/01/17 78      Resp Readings from Last 3 Encounters:   04/17/19 16   03/19/19 16   12/05/16 22    SpO2 Readings from Last 3 Encounters:   04/17/19 97%   03/19/19 95%   06/19/17 98%      Temp Readings from Last 1 Encounters:   04/17/19 35.5  C (95.9  F) (Oral)    Ht Readings from Last 1 Encounters:   04/17/19 1.829 m (6')      Wt Readings from Last 1 Encounters:   04/17/19 109.8 kg (242 lb 1 oz)    Estimated body mass index is 32.83 kg/m  as calculated from the following:    Height as of this encounter: 1.829 m (6').    Weight as of this encounter: 109.8 kg (242 lb 1 oz).       Anesthesia Plan      History & Physical Review  History and physical reviewed and following examination; no interval change.    ASA Status:  2 .    NPO Status:  > 8 hours    Plan for MAC Reason for MAC:  Deep or markedly invasive procedure (G8)         Postoperative Care  Postoperative pain management:  Oral pain medications and IV analgesics.      Consents  Anesthetic plan, risks, benefits and alternatives discussed with:  Patient..                 Bhupinder Whiting MD

## 2019-04-17 NOTE — ANESTHESIA CARE TRANSFER NOTE
Patient: Bryant Mckenzie    Procedure(s):  EXCISE CYST UPPER BACK    Diagnosis: UPPER BACK CYST  Diagnosis Additional Information: No value filed.    Anesthesia Type:   MAC     Note:  Anesthesia Care Transfer Notewriter    Vitals: (Last set prior to Anesthesia Care Transfer)    CRNA VITALS  4/17/2019 0737 - 4/17/2019 0814      4/17/2019             NIBP:  104/67    Pulse:  81    NIBP Mean:  79    Ht Rate:  81    SpO2:  97 %    Resp Rate (set):  10                Electronically Signed By: JENNIFER Wilson CRNA  April 17, 2019  8:14 AM

## 2019-04-17 NOTE — ANESTHESIA CARE TRANSFER NOTE
Patient: Bryant Mckenzie    Procedure(s):  EXCISE CYST UPPER BACK    Diagnosis: UPPER BACK CYST  Diagnosis Additional Information: No value filed.    Anesthesia Type:   MAC     Note:  Airway :Room Air  Patient transferred to:PACU  Comments: 809:  To par awake.Handoff Report: Identifed the Patient, Identified the Reponsible Provider, Reviewed the pertinent medical history, Discussed the surgical course, Reviewed Intra-OP anesthesia mangement and issues during anesthesia, Set expectations for post-procedure period and Allowed opportunity for questions and acknowledgement of understanding      Vitals: (Last set prior to Anesthesia Care Transfer)    CRNA VITALS  4/17/2019 0737 - 4/17/2019 0809      4/17/2019             NIBP:  104/67    Pulse:  81    NIBP Mean:  79    Ht Rate:  81    SpO2:  97 %    Resp Rate (set):  10                Electronically Signed By: JENNIFER Wilson CRNA  April 17, 2019  8:09 AM

## 2019-04-17 NOTE — DISCHARGE INSTRUCTIONS
Same Day Surgery Discharge Instructions for  Sedation and General Anesthesia       It's not unusual to feel dizzy, light-headed or faint for up to 24 hours after surgery or while taking pain medication.  If you have these symptoms: sit for a few minutes before standing and have someone assist you when you get up to walk or use the bathroom.      You should rest and relax for the next 24 hours. We recommend you make arrangements to have an adult stay with you for at least 24 hours after your discharge.  Avoid hazardous and strenuous activity.      DO NOT DRIVE any vehicle or operate mechanical equipment for 24 hours following the end of your surgery.  Even though you may feel normal, your reactions may be affected by the medication you have received.      Do not drink alcoholic beverages for 24 hours following surgery.       Slowly progress to your regular diet as you feel able. It's not unusual to feel nauseated and/or vomit after receiving anesthesia.  If you develop these symptoms, drink clear liquids (apple juice, ginger ale, broth, 7-up, etc. ) until you feel better.  If your nausea and vomiting persists for 24 hours, please notify your surgeon.        All narcotic pain medications, along with inactivity and anesthesia, can cause constipation. Drinking plenty of liquids and increasing fiber intake will help.      For any questions of a medical nature, call your surgeon.      Do not make important decisions for 24 hours.      If you had general anesthesia, you may have a sore throat for a couple of days related to the breathing tube used during surgery.  You may use Cepacol lozenges to help with this discomfort.  If it worsens or if you develop a fever, contact your surgeon.       If you feel your pain is not well managed with the pain medications prescribed by your surgeon, please contact your surgeon's office to let them know so they can address your concerns.     [facility]  Discharge Instructions: Post-Operative Excision of Mass/Lesion    ACTIVITY    Take frequent, short walks and increase your activity gradually.      Avoid strenuous physical activity or heavy lifting greater than 15-20 lbs. for 1-2 weeks.  You may climb stairs.    You may drive without restrictions when you are not using any prescription pain medication and feel comfortable in a car.    You may return to work/school when you are comfortable without any prescription pain medication.    WOUND CARE    You may remove your outer dressing or Band-Aids and shower 48 hours after the surgery.  Pat your incisions dry and leave them open to air.  Re-apply dressing (Band-Aids or gauze/tape) as needed for comfort or drainage.    You may have steri-strips (looks like white tape) or Dermabond (looks like glue) on your incisions.  You may peel off the steri-strips 2 weeks after your surgery if they have not peeled off on their own.  If you have Dermabond, it will peel up and fall off on its own.    Do not soak your incisions in a tub or pool for 2 weeks.     Do not apply any lotions, creams, or ointments to your incision(s).    A ridge under your incision(s) is normal and will gradually resolve.    DIET    Start with liquids, then gradually resume your regular diet as tolerated.     Drink plenty of liquids to stay hydrated.    PAIN    Expect some tenderness and discomfort at the incision site(s).  Use the prescribed pain medication at your discretion.  Expect gradual resolution of your pain over several days.    You may take ibuprofen with food (unless you have been told not to) instead of or in addition to your prescribed pain medication.  If you are taking Norco or Percocet, do not take any additional acetaminophen/APAP/Tylenol.    Do not drink alcohol or drive while you are taking pain medications.    You may apply ice to your incisions in 20 minute intervals as needed for the next 48 hours.  After that time,  consider switching to heat if you prefer.    EXPECTATIONS    Pain medications can cause constipation.  Limit use when possible.  Take over the counter stool softener/stimulant, such as Colace or Senna, 1-2 times a day with plenty of water.  You may take a mild over the counter laxative, such as Miralax or a suppository, as needed.      You may discontinue these medications once you are having regular bowel movements and/or are no longer taking your narcotic pain medication.    FOLLOW UP    Our office will contact you approximately 2 weeks to check on your progress and answer any questions you may have.  If you are doing well, you will not need to return for a follow up appointment.  If any concerns are identified over the phone, we will help you make an appointment to see a provider.     If you have not received a phone call, have any questions or concerns, or would like to be seen, please call us at [phone] and ask to speak with our nurse.  We are located at [address] [place] [address].    CALL OUR OFFICE -[phone] IF YOU HAVE:     Chills or fever above 101 F.    Increased redness, warmth, or drainage at your incisions.    Significant bleeding.    Pain not relieved by your pain medication or rest.    Increasing pain after the first 48 hours.    Any other concerns or questions.    Revised January 2018    **If you have concerns or questions about your procedure,    please contact Dr Murray at  [phone]**

## 2019-04-17 NOTE — OP NOTE
General Surgery Operative Note    PREOPERATIVE DIAGNOSIS:  UPPER BACK CYST    POSTOPERATIVE DIAGNOSIS:  back cyst    PROCEDURE:   Procedure(s):  EXCISE CYST UPPER BACK    ANESTHESIA:  MAC and local    PREOPERATIVE MEDICATIONS:  Ancef    SURGEON:  Rene Murray MD    ASSISTANT:  Jimmie Vaz MD.  Assistant was required owing to challenging exposure and need for retraction.    INDICATIONS:  Previously infected back cyst.    PROCEDURE:  The patient was taken to the operating suite and was given sedation.  He was placed prone and the back was prepped.  Surgeon initiated timeout was acknowledged.  Area around the cyst was anesthetized with local.  A skin ellipse was made with a scalpel.  We dissected around the mass and removed it.  Mass was consistent with cyst.  Deep tissues were reapproximated with 3-0 Vicryl.  The skin edges were reapproximated with 4-0 Vicryl and Steri-Strips.  The patient was uneventfully awakened and taken to the PACU in stable condition.  At the conclusion of the case, all lap and needle counts were correct.      ESTIMATED BLOOD LOSS:  2 mL    INTRAOPERATIVE FINDINGS:  Simple cyst    Rene Murray MD

## 2019-04-17 NOTE — BRIEF OP NOTE
[facility]    Brief Operative Note    Pre-operative diagnosis: UPPER BACK CYST  Post-operative diagnosis back cyst  Procedure: Procedure(s):  EXCISE CYST UPPER BACK  Surgeon: Surgeon(s) and Role:     * Rene Murray MD - Primary      * Jimmie Vaz MD- assisting    Anesthesia: Monitor Anesthesia Care   Estimated blood loss: Less than 10 ml  Drains: None  Specimens:   ID Type Source Tests Collected by Time Destination   A : Left upper back cyst Tissue Back SURGICAL PATHOLOGY EXAM Rene Murray MD 4/17/2019  7:53 AM      Findings:   3 cm back cyst, consistent with likely sebaceous cyst.  Complications: None.  Implants:  * No implants in log *

## 2019-04-18 ENCOUNTER — TELEPHONE (OUTPATIENT)
Dept: SURGERY | Facility: CLINIC | Age: 79
End: 2019-04-18

## 2019-04-18 LAB — COPATH REPORT: NORMAL

## 2019-04-18 NOTE — TELEPHONE ENCOUNTER
Called patient to inform him that pathology came back indicating epidermal inclusion cyst. Benign.    No need for further follow up.    He verbalized understanding and reports that he never felt any type of discomfort after the procedure.    He will call PRN.    Odette Phillips RN

## 2019-04-30 ENCOUNTER — TELEPHONE (OUTPATIENT)
Dept: SURGERY | Facility: CLINIC | Age: 79
End: 2019-04-30

## 2019-05-06 ENCOUNTER — TRANSFERRED RECORDS (OUTPATIENT)
Dept: HEALTH INFORMATION MANAGEMENT | Facility: CLINIC | Age: 79
End: 2019-05-06

## 2019-05-10 ENCOUNTER — TRANSFERRED RECORDS (OUTPATIENT)
Dept: HEALTH INFORMATION MANAGEMENT | Facility: CLINIC | Age: 79
End: 2019-05-10

## 2019-05-21 ENCOUNTER — TRANSFERRED RECORDS (OUTPATIENT)
Dept: HEALTH INFORMATION MANAGEMENT | Facility: CLINIC | Age: 79
End: 2019-05-21

## 2019-05-28 ENCOUNTER — MEDICAL CORRESPONDENCE (OUTPATIENT)
Dept: HEALTH INFORMATION MANAGEMENT | Facility: CLINIC | Age: 79
End: 2019-05-28

## 2019-05-29 ENCOUNTER — CARE COORDINATION (OUTPATIENT)
Dept: CARDIOLOGY | Facility: CLINIC | Age: 79
End: 2019-05-29

## 2019-05-29 ENCOUNTER — HOSPITAL ENCOUNTER (OUTPATIENT)
Dept: CARDIOLOGY | Facility: CLINIC | Age: 79
Discharge: HOME | End: 2019-05-29
Attending: INTERNAL MEDICINE | Admitting: INTERNAL MEDICINE
Payer: MEDICARE

## 2019-05-29 VITALS — HEART RATE: 65 BPM | SYSTOLIC BLOOD PRESSURE: 112 MMHG | DIASTOLIC BLOOD PRESSURE: 74 MMHG

## 2019-05-29 DIAGNOSIS — I25.5 ISCHEMIC CARDIOMYOPATHY: ICD-10-CM

## 2019-05-29 DIAGNOSIS — I50.9 CHF (CONGESTIVE HEART FAILURE) (H): ICD-10-CM

## 2019-05-29 DIAGNOSIS — R06.00 DYSPNEA: ICD-10-CM

## 2019-05-29 LAB
CREAT BLD-MCNC: 1 MG/DL (ref 0.66–1.25)
GFR SERPL CREATININE-BSD FRML MDRD: 72 ML/MIN/{1.73_M2}

## 2019-05-29 PROCEDURE — 75571 CT HRT W/O DYE W/CA TEST: CPT | Mod: 26 | Performed by: INTERNAL MEDICINE

## 2019-05-29 PROCEDURE — 75571 CT HRT W/O DYE W/CA TEST: CPT

## 2019-05-29 PROCEDURE — 25000132 ZZH RX MED GY IP 250 OP 250 PS 637: Performed by: INTERNAL MEDICINE

## 2019-05-29 PROCEDURE — 82565 ASSAY OF CREATININE: CPT

## 2019-05-29 RX ORDER — METHYLPREDNISOLONE SODIUM SUCCINATE 125 MG/2ML
125 INJECTION, POWDER, LYOPHILIZED, FOR SOLUTION INTRAMUSCULAR; INTRAVENOUS
Status: DISCONTINUED | OUTPATIENT
Start: 2019-05-29 | End: 2019-05-30 | Stop reason: HOSPADM

## 2019-05-29 RX ORDER — DIPHENHYDRAMINE HYDROCHLORIDE 50 MG/ML
25-50 INJECTION INTRAMUSCULAR; INTRAVENOUS
Status: DISCONTINUED | OUTPATIENT
Start: 2019-05-29 | End: 2019-05-30 | Stop reason: HOSPADM

## 2019-05-29 RX ORDER — DIPHENHYDRAMINE HCL 25 MG
25 CAPSULE ORAL
Status: DISCONTINUED | OUTPATIENT
Start: 2019-05-29 | End: 2019-05-30 | Stop reason: HOSPADM

## 2019-05-29 RX ORDER — ONDANSETRON 2 MG/ML
4 INJECTION INTRAMUSCULAR; INTRAVENOUS
Status: DISCONTINUED | OUTPATIENT
Start: 2019-05-29 | End: 2019-05-30 | Stop reason: HOSPADM

## 2019-05-29 RX ORDER — ACYCLOVIR 200 MG/1
0-1 CAPSULE ORAL
Status: DISCONTINUED | OUTPATIENT
Start: 2019-05-29 | End: 2019-05-30 | Stop reason: HOSPADM

## 2019-05-29 RX ORDER — METOPROLOL TARTRATE 1 MG/ML
5-15 INJECTION, SOLUTION INTRAVENOUS
Status: DISCONTINUED | OUTPATIENT
Start: 2019-05-29 | End: 2019-05-30 | Stop reason: HOSPADM

## 2019-05-29 RX ORDER — METOPROLOL TARTRATE 50 MG
50-100 TABLET ORAL
Status: COMPLETED | OUTPATIENT
Start: 2019-05-29 | End: 2019-05-29

## 2019-05-29 RX ORDER — NITROGLYCERIN 0.4 MG/1
0.4 TABLET SUBLINGUAL
Status: DISCONTINUED | OUTPATIENT
Start: 2019-05-29 | End: 2019-05-30 | Stop reason: HOSPADM

## 2019-05-29 RX ADMIN — METOPROLOL TARTRATE 50 MG: 50 TABLET ORAL at 13:16

## 2019-05-29 NOTE — PROGRESS NOTES
Calcium score elevated - ordered by Primary MD office Dr Merlin Brown.  Recent hospitalization for surgery in [facility].  Called to Dr Colon's nurse team - unable to reach their offices - asked medical records to fax over results.  Sana Cornelius RN 05/29/19 3:57 PM      Primary Physician: Brown, Merlin Emery Primary Address: [address]   Primary Phone: [phone] Primary Fax: [phone]     _____________________    Excerpt of results read by Dr Kolby Poole:    CT Calcium Screening   Order: [id number]   Status:  Final result   Visible to patient:  No (Not Released) Dx:  CHF (congestive heart failure) (H); I...   Details     Reading Physician Reading Date Result Priority   Kolby Durand MD 5/29/2019       Narrative     Procedure: CT CALCIUM SCREENING     Examination Date: 5/29/2019 2:45 PM      Clinical Information: Dyspnea; CHF (congestive heart failure) (H);  Ischemic cardiomyopathy     Ordering Provider: Dr. Merlin Brown    PROCEDURE: High-resolution, ECG synchronized multi-slice computed  tomography was performed without incident. Coronary calcification was  analyzed using GlycoVaxyn calcium scoring software. Scan protocol was  optimized to minimize radiation exposure. The total radiation exposure  was calculated to be 45 DLP and 0.63 mSv.    FINDINGS:    Overall quality of the study: Poor. The study was meant to be a CT  coronary angiogram. However there is such severe calcification of the  coronary arteries that luminal assessment would be impossible.. In  symptomatic patients with such a high calcium score invasive coronary  angiography could be considered.    CORONARY ARTERY CALCIUM SCORES:     Left main coronary artery: 187.30  Left anterior descending coronary artery: 794.30  Circumflex coronary artery: 377.87  Right coronary artery: 1751.2    TOTAL CALCIUM SCORE: 3110.7    The total Agatston calcium score is 3110.7 placing the patient in  the  93rd percentile when compared to age and gender matched control group.    The ascending aorta at the level of the right pulmonary artery is  mildly dilated measuring 3.83 x 3.80 cm    Please review Radiology report for incidental noncardiac findings that  will follow separately

## 2019-05-29 NOTE — TELEPHONE ENCOUNTER
We just inform the PMD of high score and that we could not to the CT angio. That is all on the report. I also let it be known on the report that symptomatic patients with high calcium scores may need a cath. I would make sure that that we talk to a warm body about this result tomorrow and document that we have tried to contact them. Thx

## 2019-05-30 PROBLEM — R06.02 SOB (SHORTNESS OF BREATH): Status: ACTIVE | Noted: 2019-05-30

## 2019-05-30 PROBLEM — R93.1 ABNORMAL CARDIAC CT ANGIOGRAPHY: Status: ACTIVE | Noted: 2019-05-30

## 2019-05-30 NOTE — PROGRESS NOTES
"Kolby Durand MD  You 5/29/2019 (5:06 PM)         We just inform the PMD of high score and that we could not to the CT angio. That is all on the report. I also let it be known on the report that symptomatic patients with high calcium scores may need a cath. I would make sure that that we talk to a warm body about this result tomorrow and document that we have tried to contact them. Thx            --------------------------    Called to DR Merlin Brown office @ [phone] and asked to speak with nurse team - Spoke with Raegan ESPOSITO and informed results were faxed last PM and of DR Hema Poole comments. Raegan ESPOSITO agrees to pass on message to DR Colon - provided nurse team number to call back if questions or if test not received and switchboard number to set up cardiology consult if needed.    Call back from Raegan ESPOSITO - DR Colon would like to move ahead with Cardiology consult and consideration for Left heart catheterization; they were in contact with patient and state he is agreeable to cardiology consult. 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 DR Colon will be seeing again in the clinic on Saturday. Reviewed with Operations and approval obtained for DOD slot with Dr Phillips on Monday 6/3/2019. Raegan ESPOSITO agrees to send records to Team 3 nurses for chart prep.    Called to patient and wife Leatha - spoke with wife she states patient has not had chest pain but has shortness of breath and \"fluid build up in his lungs\". Wife states DR Colon's office has been in contact with them and updated them on the test results- they state agreeement to Cardiac consult. Transferred to scheduling to arrange with Dr Phillips on Monday 6/3/2019.   Sana Cornelius RN 05/30/19 10:17 AM                 "

## 2019-06-03 ENCOUNTER — OFFICE VISIT (OUTPATIENT)
Dept: CARDIOLOGY | Facility: CLINIC | Age: 79
End: 2019-06-03
Payer: MEDICARE

## 2019-06-03 ENCOUNTER — TELEPHONE (OUTPATIENT)
Dept: CARDIOLOGY | Facility: CLINIC | Age: 79
End: 2019-06-03

## 2019-06-03 VITALS
SYSTOLIC BLOOD PRESSURE: 109 MMHG | DIASTOLIC BLOOD PRESSURE: 68 MMHG | WEIGHT: 244 LBS | HEART RATE: 80 BPM | BODY MASS INDEX: 33.09 KG/M2

## 2019-06-03 DIAGNOSIS — R06.02 SOB (SHORTNESS OF BREATH): ICD-10-CM

## 2019-06-03 DIAGNOSIS — E78.5 HYPERLIPIDEMIA LDL GOAL <100: ICD-10-CM

## 2019-06-03 DIAGNOSIS — R93.1 ABNORMAL CARDIAC CT ANGIOGRAPHY: Primary | ICD-10-CM

## 2019-06-03 PROCEDURE — 99205 OFFICE O/P NEW HI 60 MIN: CPT | Performed by: INTERNAL MEDICINE

## 2019-06-03 RX ORDER — SODIUM CHLORIDE 9 MG/ML
INJECTION, SOLUTION INTRAVENOUS CONTINUOUS
Status: CANCELLED | OUTPATIENT
Start: 2019-06-03

## 2019-06-03 RX ORDER — LIDOCAINE 40 MG/G
CREAM TOPICAL
Status: CANCELLED | OUTPATIENT
Start: 2019-06-03

## 2019-06-03 RX ORDER — POTASSIUM CHLORIDE 1500 MG/1
20 TABLET, EXTENDED RELEASE ORAL
Status: CANCELLED | OUTPATIENT
Start: 2019-06-03

## 2019-06-03 RX ORDER — ATORVASTATIN CALCIUM 40 MG/1
40 TABLET, FILM COATED ORAL DAILY
Qty: 90 TABLET | Refills: 3 | Status: SHIPPED | OUTPATIENT
Start: 2019-06-03

## 2019-06-03 NOTE — TELEPHONE ENCOUNTER
Spoke to patient to review coronary angiogram scheduled 6/4/19 to arrive at 0900 for a  1100 procedure. Instructed patient to not eat or drink after midnight and take AM medications to include ASA 81 mg. Instructed to hold metformin 6/4/19 and for two days following the procedure until BMP is drawn and is safe to resume. Verified patient does NOT have a known contrast allergy. Verified wife, Leatha will drive patient home and be available 24 hours post procedure. Answered all questions and patient verbalized understanding. Orders placed in EPIC.

## 2019-06-03 NOTE — TELEPHONE ENCOUNTER
Patient and wife Leatha had a question about the exam today by Dr. Phillips. They wanted to know if he heard anything abnormal when he listened to his neck and heart?  Reviewed with Dr. Phillips the carotid arteries negative for Bruit.  Heart sounds slight murmur not worrisome has a hx of aortic stenosis.  Patient verbalized understanding and agreed to plan of care.

## 2019-06-03 NOTE — LETTER
6/3/2019    Merlin Emery Brown, MD  [facility] [address]  [facility] [address]    RE: Bryant Mckenzie       Dear Colleague,    I had the pleasure of seeing Bryant Mckenzie in the [facility].    HPI and Plan:   Today I had the pleasure of seeing Bryant Mckenzie at [facility] in [place]. He is a pleasant 79 year old patient with diabetes but no significant cardiac history presents to the clinic with his wife and son for initial consultation.    The patient was seen in the primary care clinic and reported exertional dyspnea and feeling of fluid buildup in the chest.  Stress echocardiogram was ordered to further work this up.  On the stress echocardiogram the patient only exercised for 4-1/2 minutes reaching the second stage of modified Torey protocol and stopped due to shortness of breath and fatigue.  The resting part of the echocardiogram showed severe LV dysfunction with an ejection fraction of 25 to 30% with no improvement during stress.  The EKG portion of the stress test was negative for ischemia but the Duke treadmill score was  -4 (high risk for cardiovascular event).    He was also noted to have mild aortic stenosis on the latest portion of the test.    He was then scheduled to undergo transthoracic echocardiogram which showed moderate LV dilation and severely reduced LV systolic function with an ejection fraction of 30 to 35%.  There is also severe global hypokinesis of the left ventricle without segmental wall motion normalities and mild aortic stenosis with an aortic valve area of 1.8 cm  and mean gradient of 17 mmHg.  Pulmonary pressure was estimated at 67 mmHg.  There was a restrictive mitral inflow pattern consistent with elevated LV filling pressure.  He was then scheduled to undergo CT coronary angiogram which could not be completed due to significantly elevated coronary calcium of over 3000.  The calcium scoring  was : Left main coronary artery: 187.30, Left anterior descending coronary artery: 794.30, Circumflex coronary artery: 377.87, Right coronary artery: 1751.2.  He was then referred to see me for further evaluation.    Assessment and plan    1.  Cardiomyopathy with ejection fraction of 30%   the reason for cardiomyopathy is unclear at this time although I am highly suspicious that this is Ischemic in nature.  I will further work this up by performing coronary angiogram.  I have discussed the risks and benefits of the patient's and explained the procedure in great detail to him.  I told him that the risk of the procedures  include but are not restricted to contrast-induced nephropathy, bleeding, hematoma, infection, stroke and death.  The patient consents to undergo coronary angiogram.  He only has mild edema and is currently taking Lasix.  He has lost 9 pounds and feels back to his baseline.  I will continue him on the current dose of Lasix.  I will also uptitrate Lipitor to 40 mg today.  He is not on an ACE inhibitor and I will start him on it after coronary angiogram.  He is also not on a beta-blocker and his blood pressure is on the lower side. I will wait for the results of coronary angiogram and start him on a very low dose after that if Bp allows.  He is on baby aspirin and I will continue that for now.  I am suspecting severe three-vessel coronary artery disease for which he would most likely require bypass surgery.  I have briefly discussed this possibility with him.    2.  Mild aortic stenosis  Does not need any further work-up at this time.  Will monitor with regular echocardiogram.    3.  Diabetes  Well controlled per patient    Thank you for allowing me to participate in the care of Bryant Mckenzie.     Mejia Phillips MD  Cardiology      Orders Placed This Encounter   Medications     atorvastatin (LIPITOR) 40 MG tablet     Sig: Take 1 tablet (40 mg) by mouth daily     Dispense:  90 tablet     Refill:   3       Medications Discontinued During This Encounter   Medication Reason     atorvastatin (LIPITOR) 20 MG tablet Reorder       Encounter Diagnoses   Name Primary?     Abnormal cardiac CT angiography Yes     SOB (shortness of breath)      Hyperlipidemia LDL goal <100        CURRENT MEDICATIONS:  Current Outpatient Medications   Medication Sig Dispense Refill     ASPIRIN 81 PO        atorvastatin (LIPITOR) 40 MG tablet Take 1 tablet (40 mg) by mouth daily 90 tablet 3     HYDROcodone-acetaminophen (NORCO) 5-325 MG tablet Take 1-2 tablets by mouth every 4 hours as needed for moderate to severe pain 6 tablet 0     insulin glargine (LANTUS) 100 UNIT/ML injection Inject 16 Units Subcutaneous At Bedtime 15 mL 3     insulin lispro (HUMALOG KWIKPEN) 100 UNIT/ML injection 11 units with breakfast,15 units at noon, and 14 units before evening meal 36 mL 3     insulin pen needle 31G X 8 MM Use 4 pen needles daily or as directed for humalog and lantus 300 each 11     metFORMIN (GLUCOPHAGE) 500 MG tablet TAKE 2 TABLETS (1,000 MG) BY MOUTH TWICE A DAY WITH MEALS 360 tablet 0     Multiple Vitamins-Minerals (MULTIVITAL PO) Take 1 tablet by mouth daily       ONE TOUCH ULTRA test strip Use to test three times daily 300 strip 1     ONETOUCH DELICA LANCETS 33G MISC 1 lancet by In Vitro route 3 times daily 300 each 1     simvastatin (ZOCOR) 5 MG tablet   4       ALLERGIES     Allergies   Allergen Reactions     No Known Allergies        PAST MEDICAL HISTORY:  Past Medical History:   Diagnosis Date     Atrophic gastritis 9/8/2016     Atrophic gastritis 9/8/2016     Benign non-nodular prostatic hyperplasia with lower urinary tract symptoms 9/8/2016     History of diabetes mellitus      Hyperlipidemia LDL goal <100 9/8/2016     Obstructive sleep apnea syndrome 9/8/2016     SOB (shortness of breath) 5/30/2019     Type 2 diabetes mellitus without complication (H) 9/6/2016     Vitamin B12 deficiency (non anemic) 9/8/2016       PAST SURGICAL  HISTORY:  Past Surgical History:   Procedure Laterality Date     ESOPHAGOSCOPY, GASTROSCOPY, DUODENOSCOPY (EGD), COMBINED N/A 1/20/2015    Procedure: COMBINED ESOPHAGOSCOPY, GASTROSCOPY, DUODENOSCOPY (EGD), BIOPSY SINGLE OR MULTIPLE;  Surgeon: Allan Marroquin MD;  Location:  GI     EXCISE LESION TRUNK N/A 4/17/2019    Procedure: EXCISE CYST UPPER BACK;  Surgeon: Rene Murray MD;  Location: [facility] OR     HERNIA REPAIR       HYDROCELECTOMY SCROTAL       ORTHOPEDIC SURGERY      both knees replaced       FAMILY HISTORY:  Family History   Problem Relation Age of Onset     Coronary Artery Disease Father 53     Cerebrovascular Disease Father      Diabetes Mother      Diabetes Brother        SOCIAL HISTORY:  Social History     Socioeconomic History     Marital status:      Spouse name: None     Number of children: None     Years of education: None     Highest education level: None   Occupational History     None   Social Needs     Financial resource strain: None     Food insecurity:     Worry: None     Inability: None     Transportation needs:     Medical: None     Non-medical: None   Tobacco Use     Smoking status: Never Smoker     Smokeless tobacco: Never Used   Substance and Sexual Activity     Alcohol use: Yes     Alcohol/week: 0.0 oz     Comment: 2 beers a month     Drug use: No     Sexual activity: Never   Lifestyle     Physical activity:     Days per week: None     Minutes per session: None     Stress: None   Relationships     Social connections:     Talks on phone: None     Gets together: None     Attends Bahai service: None     Active member of club or organization: None     Attends meetings of clubs or organizations: None     Relationship status: None     Intimate partner violence:     Fear of current or ex partner: None     Emotionally abused: None     Physically abused: None     Forced sexual activity: None   Other Topics Concern     Parent/sibling w/ CABG, MI or angioplasty before 65F 55M?  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; walks dog for exercise        Review of Systems:  Skin:  Negative       Eyes:  Positive for glasses    ENT:  Negative      Respiratory:  Positive for dyspnea on exertion stairs    Cardiovascular:    edema;Positive for    Gastroenterology: Negative      Genitourinary:  Negative      Musculoskeletal:  Negative      Neurologic:  Negative      Psychiatric:  Negative      Heme/Lymph/Imm:  Negative      Endocrine:  Negative        Physical Exam:  Vitals: /68   Pulse 80   Wt 110.7 kg (244 lb)   BMI 33.09 kg/m     Constitutional: awake, alert, no distress  Skin: Warm and dry to touch  Head: Normocephalic, atraumatic  Eyes: Conjunctivae and lids unremarkable, sclera white  ENT: No pallor or cyanosis  Neck: Carotid pulses are full and equal bilaterally.  Respiratory: Normal breath sounds, clear to auscultation, no use of sensory muscles, no wheezing or crepts  Cardiac: Regular rate and rhythm, S1-S2 normal.   2/6 Systolic murmur at rusb.  Pulses full and equal bilaterally in all 4 extremities.   trace b/l edema.   Abdomen: soft and nontender.  Extremities and musculoskeletal: No gross motor deficit  Neurological.  Affect normal  Psych: Alert and oriented x3    Recent Lab Results:  LIPID RESULTS:  Lab Results   Component Value Date    CHOL 123 12/05/2016    HDL 50 12/05/2016    LDL 51 12/05/2016    TRIG 108 12/05/2016       LIVER ENZYME RESULTS:  No results found for: AST, ALT    CBC RESULTS:  Lab Results   Component Value Date    WBC 6.5 12/05/2016    RBC 4.72 12/05/2016    HGB 12.3 (L) 12/05/2016    HCT 38.0 (L) 12/05/2016    MCV 81 12/05/2016    MCH 26.1 (L) 12/05/2016    MCHC 32.4 12/05/2016    RDW 15.1 (H) 12/05/2016     12/05/2016       BMP RESULTS:  Lab Results   Component Value Date     08/22/2006    POTASSIUM 4.0 08/22/2006    CHLORIDE 101 08/22/2006    CO2 29 08/22/2006    ANIONGAP 6 08/22/2006     08/21/2006    BUN  14 08/21/2006    CR 0.87 12/05/2016    GFRESTIMATED 72 05/29/2019    GFRESTBLACK 88 05/29/2019    FATMATA 9.2 08/21/2006        A1C RESULTS:  Lab Results   Component Value Date    A1C 6.7 (H) 06/12/2017       INR RESULTS:  Lab Results   Component Value Date    INR 0.98 08/21/2006    INR 0.98 01/23/2006           All medical records were reviewed in detail and discussed with the patient. Greater than 45 mins were spent with the patient, 50% of this time was spent on counseling and coordination of care.  After visit summary was printed and given to the patient.      Thank you for allowing me to participate in the care of your patient.    Sincerely,     Mejia Phillips MD     [facility]

## 2019-06-03 NOTE — LETTER
6/3/2019    Merlin Emery Brown, MD  [facility] [address]  [facility] [address]    RE: Bryant Mckenzie       Dear Colleague,    I had the pleasure of seeing Bryant Mckenzie in the [facility].    HPI and Plan:   Today I had the pleasure of seeing Bryant Mckenzie at [facility] in [place]. He is a pleasant 79 year old patient with diabetes but no significant cardiac history presents to the clinic with his wife and son for initial consultation.    The patient was seen in the primary care clinic and reported exertional dyspnea and feeling of fluid buildup in the chest.  Stress echocardiogram was ordered to further work this up.  On the stress echocardiogram the patient only exercised for 4-1/2 minutes reaching the second stage of modified Torey protocol and stopped due to shortness of breath and fatigue.  The resting part of the echocardiogram showed severe LV dysfunction with an ejection fraction of 25 to 30% with no improvement during stress.  The EKG portion of the stress test was negative for ischemia but the Duke treadmill score was  -4 (high risk for cardiovascular event).    He was also noted to have mild aortic stenosis on the latest portion of the test.    He was then scheduled to undergo transthoracic echocardiogram which showed moderate LV dilation and severely reduced LV systolic function with an ejection fraction of 30 to 35%.  There is also severe global hypokinesis of the left ventricle without segmental wall motion normalities and mild aortic stenosis with an aortic valve area of 1.8 cm  and mean gradient of 17 mmHg.  Pulmonary pressure was estimated at 67 mmHg.  There was a restrictive mitral inflow pattern consistent with elevated LV filling pressure.  He was then scheduled to undergo CT coronary angiogram which could not be completed due to significantly elevated coronary calcium of over 3000.  The calcium scoring  was : Left main coronary artery: 187.30, Left anterior descending coronary artery: 794.30, Circumflex coronary artery: 377.87, Right coronary artery: 1751.2.  He was then referred to see me for further evaluation.    Assessment and plan    1.  Cardiomyopathy with ejection fraction of 30%   the reason for cardiomyopathy is unclear at this time although I am highly suspicious that this is Ischemic in nature.  I will further work this up by performing coronary angiogram.  I have discussed the risks and benefits of the patient's and explained the procedure in great detail to him.  I told him that the risk of the procedures  include but are not restricted to contrast-induced nephropathy, bleeding, hematoma, infection, stroke and death.  The patient consents to undergo coronary angiogram.  He only has mild edema and is currently taking Lasix.  He has lost 9 pounds and feels back to his baseline.  I will continue him on the current dose of Lasix.  I will also uptitrate Lipitor to 40 mg today.  He is not on an ACE inhibitor and I will start him on it after coronary angiogram.  He is also not on a beta-blocker and his blood pressure is on the lower side. I will wait for the results of coronary angiogram and start him on a very low dose after that if Bp allows.  He is on baby aspirin and I will continue that for now.  I am suspecting severe three-vessel coronary artery disease for which he would most likely require bypass surgery.  I have briefly discussed this possibility with him.    2.  Mild aortic stenosis  Does not need any further work-up at this time.  Will monitor with regular echocardiogram.    3.  Diabetes  Well controlled per patient    Thank you for allowing me to participate in the care of Bryant Mckenzie.     Mejia Phillips MD  Cardiology      Orders Placed This Encounter   Medications     atorvastatin (LIPITOR) 40 MG tablet     Sig: Take 1 tablet (40 mg) by mouth daily     Dispense:  90 tablet     Refill:   3       Medications Discontinued During This Encounter   Medication Reason     atorvastatin (LIPITOR) 20 MG tablet Reorder       Encounter Diagnoses   Name Primary?     Abnormal cardiac CT angiography Yes     SOB (shortness of breath)      Hyperlipidemia LDL goal <100        CURRENT MEDICATIONS:  Current Outpatient Medications   Medication Sig Dispense Refill     ASPIRIN 81 PO        atorvastatin (LIPITOR) 40 MG tablet Take 1 tablet (40 mg) by mouth daily 90 tablet 3     HYDROcodone-acetaminophen (NORCO) 5-325 MG tablet Take 1-2 tablets by mouth every 4 hours as needed for moderate to severe pain 6 tablet 0     insulin glargine (LANTUS) 100 UNIT/ML injection Inject 16 Units Subcutaneous At Bedtime 15 mL 3     insulin lispro (HUMALOG KWIKPEN) 100 UNIT/ML injection 11 units with breakfast,15 units at noon, and 14 units before evening meal 36 mL 3     insulin pen needle 31G X 8 MM Use 4 pen needles daily or as directed for humalog and lantus 300 each 11     metFORMIN (GLUCOPHAGE) 500 MG tablet TAKE 2 TABLETS (1,000 MG) BY MOUTH TWICE A DAY WITH MEALS 360 tablet 0     Multiple Vitamins-Minerals (MULTIVITAL PO) Take 1 tablet by mouth daily       ONE TOUCH ULTRA test strip Use to test three times daily 300 strip 1     ONETOUCH DELICA LANCETS 33G MISC 1 lancet by In Vitro route 3 times daily 300 each 1     simvastatin (ZOCOR) 5 MG tablet   4       ALLERGIES     Allergies   Allergen Reactions     No Known Allergies        PAST MEDICAL HISTORY:  Past Medical History:   Diagnosis Date     Atrophic gastritis 9/8/2016     Atrophic gastritis 9/8/2016     Benign non-nodular prostatic hyperplasia with lower urinary tract symptoms 9/8/2016     History of diabetes mellitus      Hyperlipidemia LDL goal <100 9/8/2016     Obstructive sleep apnea syndrome 9/8/2016     SOB (shortness of breath) 5/30/2019     Type 2 diabetes mellitus without complication (H) 9/6/2016     Vitamin B12 deficiency (non anemic) 9/8/2016       PAST SURGICAL  HISTORY:  Past Surgical History:   Procedure Laterality Date     ESOPHAGOSCOPY, GASTROSCOPY, DUODENOSCOPY (EGD), COMBINED N/A 1/20/2015    Procedure: COMBINED ESOPHAGOSCOPY, GASTROSCOPY, DUODENOSCOPY (EGD), BIOPSY SINGLE OR MULTIPLE;  Surgeon: Allan Marroquin MD;  Location:  GI     EXCISE LESION TRUNK N/A 4/17/2019    Procedure: EXCISE CYST UPPER BACK;  Surgeon: Rene Murray MD;  Location: [facility] OR     HERNIA REPAIR       HYDROCELECTOMY SCROTAL       ORTHOPEDIC SURGERY      both knees replaced       FAMILY HISTORY:  Family History   Problem Relation Age of Onset     Coronary Artery Disease Father 53     Cerebrovascular Disease Father      Diabetes Mother      Diabetes Brother        SOCIAL HISTORY:  Social History     Socioeconomic History     Marital status:      Spouse name: None     Number of children: None     Years of education: None     Highest education level: None   Occupational History     None   Social Needs     Financial resource strain: None     Food insecurity:     Worry: None     Inability: None     Transportation needs:     Medical: None     Non-medical: None   Tobacco Use     Smoking status: Never Smoker     Smokeless tobacco: Never Used   Substance and Sexual Activity     Alcohol use: Yes     Alcohol/week: 0.0 oz     Comment: 2 beers a month     Drug use: No     Sexual activity: Never   Lifestyle     Physical activity:     Days per week: None     Minutes per session: None     Stress: None   Relationships     Social connections:     Talks on phone: None     Gets together: None     Attends Amish service: None     Active member of club or organization: None     Attends meetings of clubs or organizations: None     Relationship status: None     Intimate partner violence:     Fear of current or ex partner: None     Emotionally abused: None     Physically abused: None     Forced sexual activity: None   Other Topics Concern     Parent/sibling w/ CABG, MI or angioplasty before 65F 55M?  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; walks dog for exercise        Review of Systems:  Skin:  Negative       Eyes:  Positive for glasses    ENT:  Negative      Respiratory:  Positive for dyspnea on exertion stairs    Cardiovascular:    edema;Positive for    Gastroenterology: Negative      Genitourinary:  Negative      Musculoskeletal:  Negative      Neurologic:  Negative      Psychiatric:  Negative      Heme/Lymph/Imm:  Negative      Endocrine:  Negative        Physical Exam:  Vitals: /68   Pulse 80   Wt 110.7 kg (244 lb)   BMI 33.09 kg/m     Constitutional: awake, alert, no distress  Skin: Warm and dry to touch  Head: Normocephalic, atraumatic  Eyes: Conjunctivae and lids unremarkable, sclera white  ENT: No pallor or cyanosis  Neck: Carotid pulses are full and equal bilaterally.  Respiratory: Normal breath sounds, clear to auscultation, no use of sensory muscles, no wheezing or crepts  Cardiac: Regular rate and rhythm, S1-S2 normal.   2/6 Systolic murmur at rusb.  Pulses full and equal bilaterally in all 4 extremities.   trace b/l edema.   Abdomen: soft and nontender.  Extremities and musculoskeletal: No gross motor deficit  Neurological.  Affect normal  Psych: Alert and oriented x3    Recent Lab Results:  LIPID RESULTS:  Lab Results   Component Value Date    CHOL 123 12/05/2016    HDL 50 12/05/2016    LDL 51 12/05/2016    TRIG 108 12/05/2016       LIVER ENZYME RESULTS:  No results found for: AST, ALT    CBC RESULTS:  Lab Results   Component Value Date    WBC 6.5 12/05/2016    RBC 4.72 12/05/2016    HGB 12.3 (L) 12/05/2016    HCT 38.0 (L) 12/05/2016    MCV 81 12/05/2016    MCH 26.1 (L) 12/05/2016    MCHC 32.4 12/05/2016    RDW 15.1 (H) 12/05/2016     12/05/2016       BMP RESULTS:  Lab Results   Component Value Date     08/22/2006    POTASSIUM 4.0 08/22/2006    CHLORIDE 101 08/22/2006    CO2 29 08/22/2006    ANIONGAP 6 08/22/2006     08/21/2006    BUN  14 08/21/2006    CR 0.87 12/05/2016    GFRESTIMATED 72 05/29/2019    GFRESTBLACK 88 05/29/2019    FATMATA 9.2 08/21/2006        A1C RESULTS:  Lab Results   Component Value Date    A1C 6.7 (H) 06/12/2017       INR RESULTS:  Lab Results   Component Value Date    INR 0.98 08/21/2006    INR 0.98 01/23/2006       CC  No referring provider defined for this encounter.    All medical records were reviewed in detail and discussed with the patient. Greater than 45 mins were spent with the patient, 50% of this time was spent on counseling and coordination of care.  After visit summary was printed and given to the patient.      Thank you for allowing me to participate in the care of your patient.      Sincerely,     Mejia Phillips MD     [facility]    cc:   No referring provider defined for this encounter.

## 2019-06-04 ENCOUNTER — HOSPITAL ENCOUNTER (OUTPATIENT)
Facility: CLINIC | Age: 79
Discharge: HOME | End: 2019-06-04
Attending: INTERNAL MEDICINE | Admitting: INTERNAL MEDICINE
Payer: MEDICARE

## 2019-06-04 ENCOUNTER — SURGERY (OUTPATIENT)
Age: 79
End: 2019-06-04
Payer: MEDICARE

## 2019-06-04 VITALS
SYSTOLIC BLOOD PRESSURE: 109 MMHG | WEIGHT: 238.8 LBS | TEMPERATURE: 97.5 F | HEIGHT: 72 IN | RESPIRATION RATE: 16 BRPM | OXYGEN SATURATION: 99 % | HEART RATE: 74 BPM | BODY MASS INDEX: 32.34 KG/M2 | DIASTOLIC BLOOD PRESSURE: 62 MMHG

## 2019-06-04 DIAGNOSIS — R93.1 ABNORMAL CARDIAC CT ANGIOGRAPHY: ICD-10-CM

## 2019-06-04 DIAGNOSIS — R06.02 SOB (SHORTNESS OF BREATH): ICD-10-CM

## 2019-06-04 DIAGNOSIS — Z79.4 TYPE 2 DIABETES MELLITUS WITHOUT COMPLICATION, WITH LONG-TERM CURRENT USE OF INSULIN (H): Primary | ICD-10-CM

## 2019-06-04 DIAGNOSIS — E11.9 TYPE 2 DIABETES MELLITUS WITHOUT COMPLICATION, WITH LONG-TERM CURRENT USE OF INSULIN (H): Primary | ICD-10-CM

## 2019-06-04 PROBLEM — Z98.890 STATUS POST CORONARY ANGIOGRAM: Status: ACTIVE | Noted: 2019-06-04

## 2019-06-04 LAB
ANION GAP SERPL CALCULATED.3IONS-SCNC: 6 MMOL/L (ref 3–14)
APTT PPP: 40 SEC (ref 22–37)
BUN SERPL-MCNC: 13 MG/DL (ref 7–30)
CALCIUM SERPL-MCNC: 9.1 MG/DL (ref 8.5–10.1)
CHLORIDE SERPL-SCNC: 105 MMOL/L (ref 94–109)
CO2 SERPL-SCNC: 26 MMOL/L (ref 20–32)
CREAT SERPL-MCNC: 1 MG/DL (ref 0.66–1.25)
ERYTHROCYTE [DISTWIDTH] IN BLOOD BY AUTOMATED COUNT: 16.8 % (ref 10–15)
GFR SERPL CREATININE-BSD FRML MDRD: 72 ML/MIN/{1.73_M2}
GLUCOSE SERPL-MCNC: 110 MG/DL (ref 70–99)
HCT VFR BLD AUTO: 39.5 % (ref 40–53)
HGB BLD-MCNC: 12.5 G/DL (ref 13.3–17.7)
INR PPP: 1.15 (ref 0.86–1.14)
KCT BLD-ACNC: 164 SEC (ref 75–150)
MCH RBC QN AUTO: 25.3 PG (ref 26.5–33)
MCHC RBC AUTO-ENTMCNC: 31.6 G/DL (ref 31.5–36.5)
MCV RBC AUTO: 80 FL (ref 78–100)
PLATELET # BLD AUTO: 271 10E9/L (ref 150–450)
POTASSIUM SERPL-SCNC: 4.3 MMOL/L (ref 3.4–5.3)
RBC # BLD AUTO: 4.94 10E12/L (ref 4.4–5.9)
SODIUM SERPL-SCNC: 137 MMOL/L (ref 133–144)
WBC # BLD AUTO: 5.3 10E9/L (ref 4–11)

## 2019-06-04 PROCEDURE — 93454 CORONARY ARTERY ANGIO S&I: CPT | Mod: 26 | Performed by: INTERNAL MEDICINE

## 2019-06-04 PROCEDURE — C1769 GUIDE WIRE: HCPCS | Performed by: INTERNAL MEDICINE

## 2019-06-04 PROCEDURE — 40000065 ZZH STATISTIC EKG NON-CHARGEABLE

## 2019-06-04 PROCEDURE — 85730 THROMBOPLASTIN TIME PARTIAL: CPT | Performed by: INTERNAL MEDICINE

## 2019-06-04 PROCEDURE — 93571 IV DOP VEL&/PRESS C FLO 1ST: CPT | Performed by: INTERNAL MEDICINE

## 2019-06-04 PROCEDURE — 36415 COLL VENOUS BLD VENIPUNCTURE: CPT

## 2019-06-04 PROCEDURE — 85347 COAGULATION TIME ACTIVATED: CPT

## 2019-06-04 PROCEDURE — 85027 COMPLETE CBC AUTOMATED: CPT | Performed by: INTERNAL MEDICINE

## 2019-06-04 PROCEDURE — 93571 IV DOP VEL&/PRESS C FLO 1ST: CPT | Mod: 26 | Performed by: INTERNAL MEDICINE

## 2019-06-04 PROCEDURE — 93010 ELECTROCARDIOGRAM REPORT: CPT | Performed by: INTERNAL MEDICINE

## 2019-06-04 PROCEDURE — 99152 MOD SED SAME PHYS/QHP 5/>YRS: CPT | Performed by: INTERNAL MEDICINE

## 2019-06-04 PROCEDURE — 25800030 ZZH RX IP 258 OP 636: Performed by: INTERNAL MEDICINE

## 2019-06-04 PROCEDURE — A9270 NON-COVERED ITEM OR SERVICE: HCPCS | Mod: GY | Performed by: INTERNAL MEDICINE

## 2019-06-04 PROCEDURE — 40000852 ZZH STATISTIC HEART CATH LAB OR EP LAB

## 2019-06-04 PROCEDURE — 85610 PROTHROMBIN TIME: CPT | Performed by: INTERNAL MEDICINE

## 2019-06-04 PROCEDURE — 27210794 ZZH OR GENERAL SUPPLY STERILE: Performed by: INTERNAL MEDICINE

## 2019-06-04 PROCEDURE — 80048 BASIC METABOLIC PNL TOTAL CA: CPT | Performed by: INTERNAL MEDICINE

## 2019-06-04 PROCEDURE — 25000128 H RX IP 250 OP 636: Performed by: INTERNAL MEDICINE

## 2019-06-04 PROCEDURE — 25000132 ZZH RX MED GY IP 250 OP 250 PS 637: Mod: GY | Performed by: INTERNAL MEDICINE

## 2019-06-04 PROCEDURE — 93005 ELECTROCARDIOGRAM TRACING: CPT

## 2019-06-04 PROCEDURE — 40000235 ZZH STATISTIC TELEMETRY

## 2019-06-04 PROCEDURE — 93454 CORONARY ARTERY ANGIO S&I: CPT | Performed by: INTERNAL MEDICINE

## 2019-06-04 RX ORDER — TAMSULOSIN HYDROCHLORIDE 0.4 MG/1
0.4 CAPSULE ORAL DAILY
COMMUNITY

## 2019-06-04 RX ORDER — POTASSIUM CHLORIDE 750 MG/1
10 CAPSULE, EXTENDED RELEASE ORAL DAILY
COMMUNITY
End: 2019-12-04

## 2019-06-04 RX ORDER — LIDOCAINE 40 MG/G
CREAM TOPICAL
Status: DISCONTINUED | OUTPATIENT
Start: 2019-06-04 | End: 2019-06-04 | Stop reason: HOSPADM

## 2019-06-04 RX ORDER — ACETAMINOPHEN 325 MG/1
650 TABLET ORAL EVERY 4 HOURS PRN
Status: DISCONTINUED | OUTPATIENT
Start: 2019-06-04 | End: 2019-06-04 | Stop reason: HOSPADM

## 2019-06-04 RX ORDER — FENTANYL CITRATE 50 UG/ML
25-50 INJECTION, SOLUTION INTRAMUSCULAR; INTRAVENOUS
Status: DISCONTINUED | OUTPATIENT
Start: 2019-06-04 | End: 2019-06-04 | Stop reason: HOSPADM

## 2019-06-04 RX ORDER — ATROPINE SULFATE 0.1 MG/ML
0.5 INJECTION INTRAVENOUS EVERY 5 MIN PRN
Status: DISCONTINUED | OUTPATIENT
Start: 2019-06-04 | End: 2019-06-04 | Stop reason: HOSPADM

## 2019-06-04 RX ORDER — FUROSEMIDE 20 MG
20 TABLET ORAL DAILY
COMMUNITY
End: 2019-10-10 | Stop reason: DRUGHIGH

## 2019-06-04 RX ORDER — NALOXONE HYDROCHLORIDE 0.4 MG/ML
.1-.4 INJECTION, SOLUTION INTRAMUSCULAR; INTRAVENOUS; SUBCUTANEOUS
Status: DISCONTINUED | OUTPATIENT
Start: 2019-06-04 | End: 2019-06-04 | Stop reason: HOSPADM

## 2019-06-04 RX ORDER — POTASSIUM CHLORIDE 1500 MG/1
20 TABLET, EXTENDED RELEASE ORAL
Status: DISCONTINUED | OUTPATIENT
Start: 2019-06-04 | End: 2019-06-04 | Stop reason: HOSPADM

## 2019-06-04 RX ORDER — IOPAMIDOL 755 MG/ML
INJECTION, SOLUTION INTRAVASCULAR
Status: DISCONTINUED | OUTPATIENT
Start: 2019-06-04 | End: 2019-06-04 | Stop reason: HOSPADM

## 2019-06-04 RX ORDER — FENTANYL CITRATE 50 UG/ML
INJECTION, SOLUTION INTRAMUSCULAR; INTRAVENOUS
Status: DISCONTINUED | OUTPATIENT
Start: 2019-06-04 | End: 2019-06-04 | Stop reason: HOSPADM

## 2019-06-04 RX ORDER — SODIUM CHLORIDE 9 MG/ML
INJECTION, SOLUTION INTRAVENOUS CONTINUOUS
Status: DISCONTINUED | OUTPATIENT
Start: 2019-06-04 | End: 2019-06-04 | Stop reason: HOSPADM

## 2019-06-04 RX ORDER — NALOXONE HYDROCHLORIDE 0.4 MG/ML
.2-.4 INJECTION, SOLUTION INTRAMUSCULAR; INTRAVENOUS; SUBCUTANEOUS
Status: DISCONTINUED | OUTPATIENT
Start: 2019-06-04 | End: 2019-06-04 | Stop reason: HOSPADM

## 2019-06-04 RX ORDER — HYDROCODONE BITARTRATE AND ACETAMINOPHEN 5; 325 MG/1; MG/1
1-2 TABLET ORAL EVERY 4 HOURS PRN
Status: DISCONTINUED | OUTPATIENT
Start: 2019-06-04 | End: 2019-06-04 | Stop reason: HOSPADM

## 2019-06-04 RX ORDER — HEPARIN SODIUM 1000 [USP'U]/ML
INJECTION, SOLUTION INTRAVENOUS; SUBCUTANEOUS
Status: DISCONTINUED | OUTPATIENT
Start: 2019-06-04 | End: 2019-06-04 | Stop reason: HOSPADM

## 2019-06-04 RX ORDER — LISINOPRIL 5 MG/1
5 TABLET ORAL DAILY
COMMUNITY
End: 2019-10-10

## 2019-06-04 RX ORDER — FLUMAZENIL 0.1 MG/ML
0.2 INJECTION, SOLUTION INTRAVENOUS
Status: DISCONTINUED | OUTPATIENT
Start: 2019-06-04 | End: 2019-06-04 | Stop reason: HOSPADM

## 2019-06-04 RX ADMIN — SODIUM CHLORIDE: 9 INJECTION, SOLUTION INTRAVENOUS at 10:21

## 2019-06-04 RX ADMIN — HEPARIN SODIUM 4000 UNITS: 1000 INJECTION, SOLUTION INTRAVENOUS; SUBCUTANEOUS at 10:40

## 2019-06-04 RX ADMIN — MIDAZOLAM 1 MG: 1 INJECTION INTRAMUSCULAR; INTRAVENOUS at 10:33

## 2019-06-04 RX ADMIN — IOPAMIDOL 50 ML: 755 INJECTION, SOLUTION INTRAVENOUS at 10:49

## 2019-06-04 RX ADMIN — ASPIRIN 324 MG: 325 TABLET, DELAYED RELEASE ORAL at 10:35

## 2019-06-04 RX ADMIN — FENTANYL CITRATE 50 MCG: 50 INJECTION, SOLUTION INTRAMUSCULAR; INTRAVENOUS at 10:33

## 2019-06-04 RX ADMIN — MIDAZOLAM 1 MG: 1 INJECTION INTRAMUSCULAR; INTRAVENOUS at 10:31

## 2019-06-04 RX ADMIN — FENTANYL CITRATE 50 MCG: 50 INJECTION, SOLUTION INTRAMUSCULAR; INTRAVENOUS at 10:30

## 2019-06-04 ASSESSMENT — MIFFLIN-ST. JEOR: SCORE: 1846.19

## 2019-06-04 NOTE — PROGRESS NOTES
HPI and Plan:   Today I had the pleasure of seeing Bryant Mckenzie at [facility] in [place]. He is a pleasant 79 year old patient with diabetes but no significant cardiac history presents to the clinic with his wife and son for initial consultation.    The patient was seen in the primary care clinic and reported exertional dyspnea and feeling of fluid buildup in the chest.  Stress echocardiogram was ordered to further work this up.  On the stress echocardiogram the patient only exercised for 4-1/2 minutes reaching the second stage of modified Torey protocol and stopped due to shortness of breath and fatigue.  The resting part of the echocardiogram showed severe LV dysfunction with an ejection fraction of 25 to 30% with no improvement during stress.  The EKG portion of the stress test was negative for ischemia but the Duke treadmill score was  -4 (high risk for cardiovascular event).    He was also noted to have mild aortic stenosis on the latest portion of the test.    He was then scheduled to undergo transthoracic echocardiogram which showed moderate LV dilation and severely reduced LV systolic function with an ejection fraction of 30 to 35%.  There is also severe global hypokinesis of the left ventricle without segmental wall motion normalities and mild aortic stenosis with an aortic valve area of 1.8 cm  and mean gradient of 17 mmHg.  Pulmonary pressure was estimated at 67 mmHg.  There was a restrictive mitral inflow pattern consistent with elevated LV filling pressure.  He was then scheduled to undergo CT coronary angiogram which could not be completed due to significantly elevated coronary calcium of over 3000.  The calcium scoring was : Left main coronary artery: 187.30, Left anterior descending coronary artery: 794.30, Circumflex coronary artery: 377.87, Right coronary artery: 1751.2.  He was then referred to see me for further evaluation.    Assessment and plan    1.   Cardiomyopathy with ejection fraction of 30%   the reason for cardiomyopathy is unclear at this time although I am highly suspicious that this is Ischemic in nature.  I will further work this up by performing coronary angiogram.  I have discussed the risks and benefits of the patient's and explained the procedure in great detail to him.  I told him that the risk of the procedures  include but are not restricted to contrast-induced nephropathy, bleeding, hematoma, infection, stroke and death.  The patient consents to undergo coronary angiogram.  He only has mild edema and is currently taking Lasix.  He has lost 9 pounds and feels back to his baseline.  I will continue him on the current dose of Lasix.  I will also uptitrate Lipitor to 40 mg today.  He is not on an ACE inhibitor and I will start him on it after coronary angiogram.  He is also not on a beta-blocker and his blood pressure is on the lower side. I will wait for the results of coronary angiogram and start him on a very low dose after that if Bp allows.  He is on baby aspirin and I will continue that for now.  I am suspecting severe three-vessel coronary artery disease for which he would most likely require bypass surgery.  I have briefly discussed this possibility with him.    2.  Mild aortic stenosis  Does not need any further work-up at this time.  Will monitor with regular echocardiogram.    3.  Diabetes  Well controlled per patient    Thank you for allowing me to participate in the care of Brynat Mckenzie.     Mejia Phillips MD  Cardiology      Orders Placed This Encounter   Medications     atorvastatin (LIPITOR) 40 MG tablet     Sig: Take 1 tablet (40 mg) by mouth daily     Dispense:  90 tablet     Refill:  3       Medications Discontinued During This Encounter   Medication Reason     atorvastatin (LIPITOR) 20 MG tablet Reorder       Encounter Diagnoses   Name Primary?     Abnormal cardiac CT angiography Yes     SOB (shortness of breath)       Hyperlipidemia LDL goal <100        CURRENT MEDICATIONS:  Current Outpatient Medications   Medication Sig Dispense Refill     ASPIRIN 81 PO        atorvastatin (LIPITOR) 40 MG tablet Take 1 tablet (40 mg) by mouth daily 90 tablet 3     HYDROcodone-acetaminophen (NORCO) 5-325 MG tablet Take 1-2 tablets by mouth every 4 hours as needed for moderate to severe pain 6 tablet 0     insulin glargine (LANTUS) 100 UNIT/ML injection Inject 16 Units Subcutaneous At Bedtime 15 mL 3     insulin lispro (HUMALOG KWIKPEN) 100 UNIT/ML injection 11 units with breakfast,15 units at noon, and 14 units before evening meal 36 mL 3     insulin pen needle 31G X 8 MM Use 4 pen needles daily or as directed for humalog and lantus 300 each 11     metFORMIN (GLUCOPHAGE) 500 MG tablet TAKE 2 TABLETS (1,000 MG) BY MOUTH TWICE A DAY WITH MEALS 360 tablet 0     Multiple Vitamins-Minerals (MULTIVITAL PO) Take 1 tablet by mouth daily       ONE TOUCH ULTRA test strip Use to test three times daily 300 strip 1     ONETOUCH DELICA LANCETS 33G MISC 1 lancet by In Vitro route 3 times daily 300 each 1     simvastatin (ZOCOR) 5 MG tablet   4       ALLERGIES     Allergies   Allergen Reactions     No Known Allergies        PAST MEDICAL HISTORY:  Past Medical History:   Diagnosis Date     Atrophic gastritis 9/8/2016     Atrophic gastritis 9/8/2016     Benign non-nodular prostatic hyperplasia with lower urinary tract symptoms 9/8/2016     History of diabetes mellitus      Hyperlipidemia LDL goal <100 9/8/2016     Obstructive sleep apnea syndrome 9/8/2016     SOB (shortness of breath) 5/30/2019     Type 2 diabetes mellitus without complication (H) 9/6/2016     Vitamin B12 deficiency (non anemic) 9/8/2016       PAST SURGICAL HISTORY:  Past Surgical History:   Procedure Laterality Date     ESOPHAGOSCOPY, GASTROSCOPY, DUODENOSCOPY (EGD), COMBINED N/A 1/20/2015    Procedure: COMBINED ESOPHAGOSCOPY, GASTROSCOPY, DUODENOSCOPY (EGD), BIOPSY SINGLE OR MULTIPLE;  Surgeon:  Allan Marroquin MD;  Location:  GI     EXCISE LESION TRUNK N/A 4/17/2019    Procedure: EXCISE CYST UPPER BACK;  Surgeon: Rene Murray MD;  Location:  OR     HERNIA REPAIR       HYDROCELECTOMY SCROTAL       ORTHOPEDIC SURGERY      both knees replaced       FAMILY HISTORY:  Family History   Problem Relation Age of Onset     Coronary Artery Disease Father 53     Cerebrovascular Disease Father      Diabetes Mother      Diabetes Brother        SOCIAL HISTORY:  Social History     Socioeconomic History     Marital status:      Spouse name: None     Number of children: None     Years of education: None     Highest education level: None   Occupational History     None   Social Needs     Financial resource strain: None     Food insecurity:     Worry: None     Inability: None     Transportation needs:     Medical: None     Non-medical: None   Tobacco Use     Smoking status: Never Smoker     Smokeless tobacco: Never Used   Substance and Sexual Activity     Alcohol use: Yes     Alcohol/week: 0.0 oz     Comment: 2 beers a month     Drug use: No     Sexual activity: Never   Lifestyle     Physical activity:     Days per week: None     Minutes per session: None     Stress: None   Relationships     Social connections:     Talks on phone: None     Gets together: None     Attends Mandaeism service: None     Active member of club or organization: None     Attends meetings of clubs or organizations: None     Relationship status: None     Intimate partner violence:     Fear of current or ex partner: None     Emotionally abused: None     Physically abused: None     Forced sexual activity: None   Other Topics Concern     Parent/sibling w/ CABG, MI or angioplasty before 65F 55M? 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; walks dog for exercise        Review of Systems:  Skin:  Negative       Eyes:  Positive for glasses    ENT:  Negative      Respiratory:  Positive for dyspnea on exertion  stairs    Cardiovascular:    edema;Positive for    Gastroenterology: Negative      Genitourinary:  Negative      Musculoskeletal:  Negative      Neurologic:  Negative      Psychiatric:  Negative      Heme/Lymph/Imm:  Negative      Endocrine:  Negative        Physical Exam:  Vitals: /68   Pulse 80   Wt 110.7 kg (244 lb)   BMI 33.09 kg/m    Constitutional: awake, alert, no distress  Skin: Warm and dry to touch  Head: Normocephalic, atraumatic  Eyes: Conjunctivae and lids unremarkable, sclera white  ENT: No pallor or cyanosis  Neck: Carotid pulses are full and equal bilaterally.  Respiratory: Normal breath sounds, clear to auscultation, no use of sensory muscles, no wheezing or crepts  Cardiac: Regular rate and rhythm, S1-S2 normal.  2/6 Systolic murmur at rusb.  Pulses full and equal bilaterally in all 4 extremities.  trace b/l edema.   Abdomen: soft and nontender.  Extremities and musculoskeletal: No gross motor deficit  Neurological.  Affect normal  Psych: Alert and oriented x3    Recent Lab Results:  LIPID RESULTS:  Lab Results   Component Value Date    CHOL 123 12/05/2016    HDL 50 12/05/2016    LDL 51 12/05/2016    TRIG 108 12/05/2016       LIVER ENZYME RESULTS:  No results found for: AST, ALT    CBC RESULTS:  Lab Results   Component Value Date    WBC 6.5 12/05/2016    RBC 4.72 12/05/2016    HGB 12.3 (L) 12/05/2016    HCT 38.0 (L) 12/05/2016    MCV 81 12/05/2016    MCH 26.1 (L) 12/05/2016    MCHC 32.4 12/05/2016    RDW 15.1 (H) 12/05/2016     12/05/2016       BMP RESULTS:  Lab Results   Component Value Date     08/22/2006    POTASSIUM 4.0 08/22/2006    CHLORIDE 101 08/22/2006    CO2 29 08/22/2006    ANIONGAP 6 08/22/2006     08/21/2006    BUN 14 08/21/2006    CR 0.87 12/05/2016    GFRESTIMATED 72 05/29/2019    GFRESTBLACK 88 05/29/2019    FATMATA 9.2 08/21/2006        A1C RESULTS:  Lab Results   Component Value Date    A1C 6.7 (H) 06/12/2017       INR RESULTS:  Lab Results   Component  Value Date    INR 0.98 08/21/2006    INR 0.98 01/23/2006       CC  No referring provider defined for this encounter.    All medical records were reviewed in detail and discussed with the patient. Greater than 45 mins were spent with the patient, 50% of this time was spent on counseling and coordination of care.  After visit summary was printed and given to the patient.

## 2019-06-04 NOTE — DISCHARGE INSTRUCTIONS
Cardiac Angiogram Discharge Instructions - Femoral    After you go home:      Have an adult stay with you until tomorrow.    Drink extra fluids for 2 days.    You may resume your normal diet.    No smoking       For 24 hours - due to the sedation you received:    Relax and take it easy.    Do NOT make any important or legal decisions.    Do NOT drive or operate machines at home or at work.    Do NOT drink alcohol.    Care of Groin Puncture Site:      For the first 24 hrs - check the puncture site every 1-2 hours while awake.    For 2 days, when you cough, sneeze, laugh or move your bowels, hold your hand over the puncture site and press firmly.    Remove the bandaid after 24 hours. If there is minor oozing, apply another bandaid and remove it after 12 hours.    It is normal to have a small bruise or pea size lump at the site.    You may shower tomorrow. Do NOT take a bath, or use a hot tub or pool for at least 3 days. Do NOT scrub the site. Do not use lotion or powder near the puncture site.    Activity:            For 2 days:    No stooping or squatting    Do NOT do any heavy activity such as exercise, lifting, or straining.     No housework, yard work or any activity that make you sweat    Do NOT lift more than 10 pounds    Bleeding:      If you start bleeding from the site in your groin, lie down flat and press firmly on/above the site for 10 minutes.     Once bleeding stops, lay flat for 2 hours.     Call [facility] as soon as you can.       Call 911 right away if you have heavy bleeding or bleeding that does not stop.      Medicines:      If you are on Metformin (Glucophage), do not restart it until you have blood tests (within 2 to 3 days after discharge).  After you have your blood drawn, you may restart the Metformin.     Take your medications, including blood thinners, unless your provider tells you not to.      If you have stopped any medicines, check with your provider about when to restart  them.    Follow Up Appointments:      Follow up with [facility] Nurse Practitioner at [facility] of patient preference in 7-10 days.    Call the clinic if:      You have increased pain or a large or growing hard lump around the site.    The site is red, swollen, hot or tender.    Blood or fluid is draining from the site.    You have chills or a fever greater than 101 F (38 C).    Your leg feels numb, cool or changes color.    You have hives, a rash or unusual itching.    New pain in the back or belly that you cannot control with Tylenol.    Any questions or concerns.          [facility] [place]:    [phone] [facility] (7 days a week)

## 2019-06-04 NOTE — PROGRESS NOTES
Care Suites Admission Nursing Note    Reason for admission: Coronary Heart Cath  CS arrival time: 0935  Accompanied by: wife  Name/phone of DC : Leatha   Cell [phone]  Medications held: no   Consent signed: yes  Abnormal assessment/labs: no  If abnormal, provider notified: n/a  Education/questions answered: Discussed pre and post angio expectations - questions answered.   Plan: Coronary Angio with Dr. Bazan today  ASA 81 mg taken last evening and this AM   Dr. Bazan requests ASA 325mg pre cath.

## 2019-06-04 NOTE — PROGRESS NOTES
1100 Report received from Calos Thorne RN.  1105 Pt returned from Cath Lab. A/O. Gauze drsg CDI to right groin puncture site. No oozing noted, but soft hematoma remains. Area soft & sl puffy. Pt denies pain. Pt instructed on activity restrictions while on bedrest. Verbal understanding received from pt.  1115 Pt's family at bedside. Detailed update given.   1200 Report given to Calos Thorne RN.

## 2019-06-04 NOTE — PROGRESS NOTES
Care Suites Discharge Nursing Note    Education/questions answered: AVS reviewed and no further questions. BMP Lab appointment for Friday   Patient DC location: home  Accompanied by: wife  CS discharge time: 1450

## 2019-06-04 NOTE — PROGRESS NOTES
Care Suites Post-Procedure Note    Procedure: Heart Cath - no intervention   CS arrival time: 1200  Accompanied by: self- cath lab  Concerns/abnormal assessment after procedure: no  Plan: Post procedure checks as ordered - VS and site checks-  right groin CDI   1300 -AVS reviewed with patient and family - signed.  Right groin site assessed and showed wife what site currently looks like.

## 2019-06-06 ENCOUNTER — TELEPHONE (OUTPATIENT)
Dept: CARDIOLOGY | Facility: CLINIC | Age: 79
End: 2019-06-06

## 2019-06-06 LAB — INTERPRETATION ECG - MUSE: NORMAL

## 2019-06-07 ENCOUNTER — TELEPHONE (OUTPATIENT)
Dept: CARDIOLOGY | Facility: CLINIC | Age: 79
End: 2019-06-07

## 2019-06-07 DIAGNOSIS — Z79.4 TYPE 2 DIABETES MELLITUS WITHOUT COMPLICATION, WITH LONG-TERM CURRENT USE OF INSULIN (H): ICD-10-CM

## 2019-06-07 DIAGNOSIS — Z98.890 STATUS POST CORONARY ANGIOGRAM: Primary | ICD-10-CM

## 2019-06-07 DIAGNOSIS — E11.9 TYPE 2 DIABETES MELLITUS WITHOUT COMPLICATION, WITH LONG-TERM CURRENT USE OF INSULIN (H): ICD-10-CM

## 2019-06-07 LAB
ANION GAP SERPL CALCULATED.3IONS-SCNC: 9.6 MMOL/L (ref 6–17)
BUN SERPL-MCNC: 16 MG/DL (ref 7–30)
CALCIUM SERPL-MCNC: 9.4 MG/DL (ref 8.5–10.5)
CHLORIDE SERPL-SCNC: 101 MMOL/L (ref 98–107)
CO2 SERPL-SCNC: 27 MMOL/L (ref 23–29)
CREAT SERPL-MCNC: 1.26 MG/DL (ref 0.7–1.3)
GFR SERPL CREATININE-BSD FRML MDRD: 56 ML/MIN/{1.73_M2}
GLUCOSE SERPL-MCNC: 186 MG/DL (ref 70–105)
POTASSIUM SERPL-SCNC: 4.6 MMOL/L (ref 3.5–5.1)
SODIUM SERPL-SCNC: 133 MMOL/L (ref 136–145)

## 2019-06-07 PROCEDURE — 80048 BASIC METABOLIC PNL TOTAL CA: CPT | Performed by: INTERNAL MEDICINE

## 2019-06-07 PROCEDURE — 36415 COLL VENOUS BLD VENIPUNCTURE: CPT | Performed by: INTERNAL MEDICINE

## 2019-06-07 NOTE — TELEPHONE ENCOUNTER
Called to ask BMP results and if it's okay to resume metformin following angiogram. Reviewed BMP with Dr. Yuan, sodium 133. Ordered repeat BMP before OV with JACKIE 6/12/19. Per Dr. Yuan, OK to resume metformin. Called patient and scheduled BMP for 6/11/19. Patient verbalized understanding and agreed to plan of care.     Letitia Walsh RN Care Coordinator

## 2019-06-12 ENCOUNTER — OFFICE VISIT (OUTPATIENT)
Dept: CARDIOLOGY | Facility: CLINIC | Age: 79
End: 2019-06-12
Payer: MEDICARE

## 2019-06-12 VITALS
BODY MASS INDEX: 32.91 KG/M2 | HEART RATE: 88 BPM | SYSTOLIC BLOOD PRESSURE: 112 MMHG | HEIGHT: 72 IN | WEIGHT: 243 LBS | DIASTOLIC BLOOD PRESSURE: 69 MMHG

## 2019-06-12 DIAGNOSIS — Z98.890 STATUS POST CORONARY ANGIOGRAM: ICD-10-CM

## 2019-06-12 DIAGNOSIS — R93.1 ABNORMAL CARDIAC CT ANGIOGRAPHY: ICD-10-CM

## 2019-06-12 DIAGNOSIS — R06.02 SOB (SHORTNESS OF BREATH): ICD-10-CM

## 2019-06-12 DIAGNOSIS — I25.5 ISCHEMIC CARDIOMYOPATHY: Primary | ICD-10-CM

## 2019-06-12 DIAGNOSIS — E78.5 HYPERLIPIDEMIA LDL GOAL <100: ICD-10-CM

## 2019-06-12 PROCEDURE — 99214 OFFICE O/P EST MOD 30 MIN: CPT | Performed by: PHYSICIAN ASSISTANT

## 2019-06-12 RX ORDER — CARVEDILOL 3.12 MG/1
3.12 TABLET ORAL 2 TIMES DAILY WITH MEALS
Qty: 60 TABLET | Refills: 3 | Status: SHIPPED | OUTPATIENT
Start: 2019-06-12 | End: 2019-08-12

## 2019-06-12 RX ORDER — PNV NO.95/FERROUS FUM/FOLIC AC 28MG-0.8MG
1 TABLET ORAL DAILY
COMMUNITY
End: 2019-10-10

## 2019-06-12 ASSESSMENT — MIFFLIN-ST. JEOR: SCORE: 1865.24

## 2019-06-12 NOTE — PROGRESS NOTES
Cardiology Progress Note    Date of Service: 06/13/2019  Patient seen today in follow up of: post angiogram  Primary cardiologist: Dr. Phillips    HPI:  Bryant Mckenzie is a very pleasant 79 year old male with a history of diabetes mellitus type 2, hyperlipidemia, mild aortic stenosis, obstructive sleep apnea, recently diagnosed cardiomyopathy and coronary artery disease who is here today for follow-up after recent coronary angiogram.    He was recently evaluated by his primary care physician for complaints of exertional dyspnea.  A stress echocardiogram was ordered which showed severe LV dysfunction at rest with an ejection fraction of 25 to 30% with no improvement during stress.  He was only able to exercise 4-1/2 minutes on a Torey protocol.  He was also noted to have mild aortic stenosis.  A dedicated transthoracic echocardiogram was ordered which showed moderate LV dilation and severely reduced LV systolic function with an LVEF of 30 to 35%.  There is severe global hypokinesis of the LV without segmental wall motion abnormalities.  Mild aortic stenosis was noted with an aortic valve area of 1.8 cm  and a mean gradient of 17 mmHg.  A CT coronary angiogram was subsequently ordered but this was not completed as his coronary calcium score was over 3000.      He was thus referred to cardiology clinic and was seen in consultation by Dr. Solano on 6/3/19.  He referred him for coronary angiography.  This was done on 6/4/2019.  He was noted to have a 10% proximal LAD stenosis.  The mid to distal circumflex was 100% stenosed.  He had a proximal to mid RCA lesion which was 50% stenosed.  Aggressive medical management was recommended with possible consideration for intervention to the  of the left circumflex.    He is back today for follow-up after his angiogram with his wife, Leatha, and son. He tells me overall he continues to feel fairly well. His exertional dyspnea is better since starting on lasix but is still  present somewhat. He endorses occasional PND. He has no chest discomfort, lightheadedness, dizziness, orthopnea or leg swelling. His femoral access site has healed well.     ASSESSMENT/PLAN:  1.  Cardiomyopathy with chronic systolic congestive heart failure. With an LVEF of 30 - 35% by echocardiogram.   2.  Coronary artery disease.  3.  Mild aortic stenosis.  4.  Obstructive sleep apnea.  5.  DM type II.    Marshall is here today for follow up after his recent coronary angiogram. He was recently found to have a new cardiomyopathy with an LVEF of 30 - 35%. He recently underwent coronary angiography which showed a  of the circumflex. He had a 50% proximal to mid RCA lesion and mild proximal LAD stenosis. We reviewed these results today. He has not had any chest discomfort. He has appropriately been started on aspirin and high intensity statin therapy for his coronary artery disease.     Given his LV dysfunction, he was also started on lisinopril. He has not been initiated on beta blocker therapy which I recommended today. I have prescribed carvedilol 3.125 mg twice daily. We discussed the side effects of beta blockers, and he will call with any issues. I will have him return to clinic in 2 - 3 weeks to see how he is tolerating his medications. We can consider further increasing his beta blocker and ACE inhibitor as his blood pressure allows.    His exertional dyspnea has significantly improved since starting diuretic therapy. I made no changes to his diuretic dose today. He'll continue on lasix 20 mg daily.     We did discuss a referral to cardiac rehab which he is agreeable to. A referral was placed.     I did discuss with Dr. Phillips who would like to proceed with cardiac MRI to assess for other possible etiologies of his cardiomyopathy. I will discuss this with Marshall when he returns in a few weeks. Pending his course, intervention to the circumflex  could be considered.     Orders this Visit:  Orders Placed This  Encounter   Procedures     CARDIAC REHAB REFERRAL     Follow-Up with Cardiac Advanced Practice Provider     Follow-Up with Cardiologist     Orders Placed This Encounter   Medications     Ferrous Sulfate (IRON) 325 (65 Fe) MG tablet     Sig: Take 1 tablet by mouth daily     carvedilol (COREG) 3.125 MG tablet     Sig: Take 1 tablet (3.125 mg) by mouth 2 times daily (with meals)     Dispense:  60 tablet     Refill:  3     There are no discontinued medications.    CURRENT MEDICATIONS:  Current Outpatient Medications   Medication Sig Dispense Refill     ASPIRIN 81 PO        atorvastatin (LIPITOR) 40 MG tablet Take 1 tablet (40 mg) by mouth daily 90 tablet 3     carvedilol (COREG) 3.125 MG tablet Take 1 tablet (3.125 mg) by mouth 2 times daily (with meals) 60 tablet 3     Ferrous Sulfate (IRON) 325 (65 Fe) MG tablet Take 1 tablet by mouth daily       furosemide (LASIX) 20 MG tablet Take 20 mg by mouth daily       insulin glargine (LANTUS) 100 UNIT/ML injection Inject 16 Units Subcutaneous At Bedtime 6/3 Took Lantus 8 Units at bedtime 15 mL 3     insulin lispro (HUMALOG KWIKPEN) 100 UNIT/ML injection 11 units with breakfast,15 units at noon, and 14 units before evening meal 36 mL 3     lisinopril (PRINIVIL/ZESTRIL) 5 MG tablet Take 5 mg by mouth daily       metFORMIN (GLUCOPHAGE) 500 MG tablet TAKE 2 TABLETS (1,000 MG) BY MOUTH TWICE A DAY WITH MEALS 360 tablet 0     Multiple Vitamins-Minerals (MULTIVITAL PO) Take 1 tablet by mouth daily       potassium chloride ER (MICRO-K) 10 MEQ CR capsule Take 10 mEq by mouth daily        tamsulosin (FLOMAX) 0.4 MG capsule Take 0.4 mg by mouth daily       ALLERGIES  Allergies   Allergen Reactions     No Known Allergies      PAST MEDICAL HISTORY:  Past Medical History:   Diagnosis Date     Atrophic gastritis 9/8/2016     Atrophic gastritis 9/8/2016     Benign non-nodular prostatic hyperplasia with lower urinary tract symptoms 9/8/2016     History of diabetes mellitus      Hyperlipidemia  LDL goal <100 9/8/2016     Obstructive sleep apnea syndrome 9/8/2016     SOB (shortness of breath) 5/30/2019     Type 2 diabetes mellitus without complication (H) 9/6/2016     Vitamin B12 deficiency (non anemic) 9/8/2016     PAST SURGICAL HISTORY:  Past Surgical History:   Procedure Laterality Date     CV HEART CATHETERIZATION WITH POSSIBLE INTERVENTION N/A 6/4/2019    Procedure: Heart Catheterization with Possible Intervention;  Surgeon: Alex Bazan MD;  Location:  HEART CARDIAC CATH LAB     ESOPHAGOSCOPY, GASTROSCOPY, DUODENOSCOPY (EGD), COMBINED N/A 1/20/2015    Procedure: COMBINED ESOPHAGOSCOPY, GASTROSCOPY, DUODENOSCOPY (EGD), BIOPSY SINGLE OR MULTIPLE;  Surgeon: Allan Marroquin MD;  Location:  GI     EXCISE LESION TRUNK N/A 4/17/2019    Procedure: EXCISE CYST UPPER BACK;  Surgeon: Rene Murray MD;  Location:  OR     HERNIA REPAIR       HYDROCELECTOMY SCROTAL       ORTHOPEDIC SURGERY      both knees replaced     FAMILY HISTORY:  Family History   Problem Relation Age of Onset     Coronary Artery Disease Father 53     Cerebrovascular Disease Father      Diabetes Mother      Diabetes Brother      SOCIAL HISTORY:  Social History     Socioeconomic History     Marital status:      Spouse name: None     Number of children: None     Years of education: None     Highest education level: None   Occupational History     None   Social Needs     Financial resource strain: None     Food insecurity:     Worry: None     Inability: None     Transportation needs:     Medical: None     Non-medical: None   Tobacco Use     Smoking status: Never Smoker     Smokeless tobacco: Never Used   Substance and Sexual Activity     Alcohol use: Yes     Alcohol/week: 0.0 oz     Comment: 2 beers a month     Drug use: No     Sexual activity: Never   Lifestyle     Physical activity:     Days per week: None     Minutes per session: None     Stress: None   Relationships     Social connections:     Talks on phone:  None     Gets together: None     Attends Yazdanism service: None     Active member of club or organization: None     Attends meetings of clubs or organizations: None     Relationship status: None     Intimate partner violence:     Fear of current or ex partner: None     Emotionally abused: None     Physically abused: None     Forced sexual activity: None   Other Topics Concern     Parent/sibling w/ CABG, MI or angioplasty before 65F 55M? 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; walks dog for exercise      Review of Systems:  Skin:  Negative     Eyes:  Positive for glasses  ENT:  Negative    Respiratory:  Positive for shortness of breath  Cardiovascular:    Positive for;dizziness;lightheadedness;edema  Gastroenterology: Negative    Genitourinary:  Negative    Musculoskeletal:  Positive for back pain  Neurologic:  Negative    Psychiatric:  Negative    Heme/Lymph/Imm:  Negative    Endocrine:  Positive for diabetes     Physical Exam:  Vitals: /69   Pulse 88   Ht 1.829 m (6')   Wt 110.2 kg (243 lb)   BMI 32.96 kg/m     Wt Readings from Last 4 Encounters:   06/12/19 110.2 kg (243 lb)   06/04/19 108.3 kg (238 lb 12.8 oz)   06/03/19 110.7 kg (244 lb)   04/17/19 109.8 kg (242 lb 1 oz)     GEN: well nourished, in no acute distress.  HEENT:  Pupils equal, round. Sclerae nonicteric.   C/V:  Regular rate and rhythm, II/VI systolic murmur at the upper sternal border.  RESP: Respirations are unlabored. Clear to auscultation bilaterally without wheezing, rales, or rhonchi.  GI: Abdomen soft, nontender.  EXTREM: trace lower extremity edema.  NEURO: Alert and oriented, cooperative.  SKIN: Warm and dry.     Recent Lab Results:  LIPID RESULTS:  Lab Results   Component Value Date    CHOL 123 12/05/2016    HDL 50 12/05/2016    LDL 51 12/05/2016    TRIG 108 12/05/2016     LIVER ENZYME RESULTS:  No results found for: AST, ALT  CBC RESULTS:  Lab Results   Component Value Date    WBC 5.3 06/04/2019     RBC 4.94 06/04/2019    HGB 12.5 (L) 06/04/2019    HCT 39.5 (L) 06/04/2019    MCV 80 06/04/2019    MCH 25.3 (L) 06/04/2019    MCHC 31.6 06/04/2019    RDW 16.8 (H) 06/04/2019     06/04/2019     BMP RESULTS:  Lab Results   Component Value Date     (L) 06/07/2019    POTASSIUM 4.6 06/07/2019    CHLORIDE 101 06/07/2019    CO2 27 06/07/2019    ANIONGAP 9.6 06/07/2019     (H) 06/07/2019    BUN 16 06/07/2019    CR 1.26 06/07/2019    GFRESTIMATED 56 (L) 06/07/2019    GFRESTBLACK 67 06/07/2019    FATMATA 9.4 06/07/2019      A1C RESULTS:  Lab Results   Component Value Date    A1C 6.7 (H) 06/12/2017     INR RESULTS:  Lab Results   Component Value Date    INR 1.15 (H) 06/04/2019    INR 0.98 08/21/2006       New/Pertinent imaging results since last visit:  Coronary angiography 6/4/19:    Ost LAD to Prox LAD lesion is 10% stenosed.    Mid Cx to Dist Cx lesion is 100% stenosed.    Prox RCA to Mid RCA lesion is 50% stenosed.    Consider  to LCX    Aggressive medical management    Stella Elliott PA-C  [facility]

## 2019-06-12 NOTE — LETTER
6/12/2019    Merlin Emery Brown, MD  [facility] [address]  [facility] [id number]    RE: Bryant Mckenzie       Dear Colleague,    I had the pleasure of seeing Bryant Mckenzie in the [facility].      Cardiology Progress Note    Date of Service: 06/13/2019  Patient seen today in follow up of: post angiogram  Primary cardiologist: Dr. Phillips    HPI:  Bryant Mckenzie is a very pleasant 79 year old male with a history of diabetes mellitus type 2, hyperlipidemia, mild aortic stenosis, obstructive sleep apnea, recently diagnosed cardiomyopathy and coronary artery disease who is here today for follow-up after recent coronary angiogram.    He was recently evaluated by his primary care physician for complaints of exertional dyspnea.  A stress echocardiogram was ordered which showed severe LV dysfunction at rest with an ejection fraction of 25 to 30% with no improvement during stress.  He was only able to exercise 4-1/2 minutes on a Torey protocol.  He was also noted to have mild aortic stenosis.  A dedicated transthoracic echocardiogram was ordered which showed moderate LV dilation and severely reduced LV systolic function with an LVEF of 30 to 35%.  There is severe global hypokinesis of the LV without segmental wall motion abnormalities.  Mild aortic stenosis was noted with an aortic valve area of 1.8 cm  and a mean gradient of 17 mmHg.  A CT coronary angiogram was subsequently ordered but this was not completed as his coronary calcium score was over 3000.      He was thus referred to cardiology clinic and was seen in consultation by Dr. Solano on 6/3/19.  He referred him for coronary angiography.  This was done on 6/4/2019.  He was noted to have a 10% proximal LAD stenosis.  The mid to distal circumflex was 100% stenosed.  He had a proximal to mid RCA lesion which was 50% stenosed.  Aggressive medical management was recommended with possible consideration for  intervention to the  of the left circumflex.    He is back today for follow-up after his angiogram with his wife, Leatha, and son. He tells me overall he continues to feel fairly well. His exertional dyspnea is better since starting on lasix but is still present somewhat. He endorses occasional PND. He has no chest discomfort, lightheadedness, dizziness, orthopnea or leg swelling. His femoral access site has healed well.     ASSESSMENT/PLAN:  1.  Cardiomyopathy with chronic systolic congestive heart failure. With an LVEF of 30 - 35% by echocardiogram.   2.  Coronary artery disease.  3.  Mild aortic stenosis.  4.  Obstructive sleep apnea.  5.  DM type II.    Marshall is here today for follow up after his recent coronary angiogram. He was recently found to have a new cardiomyopathy with an LVEF of 30 - 35%. He recently underwent coronary angiography which showed a  of the circumflex. He had a 50% proximal to mid RCA lesion and mild proximal LAD stenosis. We reviewed these results today. He has not had any chest discomfort. He has appropriately been started on aspirin and high intensity statin therapy for his coronary artery disease.     Given his LV dysfunction, he was also started on lisinopril. He has not been initiated on beta blocker therapy which I recommended today. I have prescribed carvedilol 3.125 mg twice daily. We discussed the side effects of beta blockers, and he will call with any issues. I will have him return to clinic in 2 - 3 weeks to see how he is tolerating his medications. We can consider further increasing his beta blocker and ACE inhibitor as his blood pressure allows.    His exertional dyspnea has significantly improved since starting diuretic therapy. I made no changes to his diuretic dose today. He'll continue on lasix 20 mg daily.     We did discuss a referral to cardiac rehab which he is agreeable to. A referral was placed.     I did discuss with Dr. Phillips who would like to proceed with  cardiac MRI to assess for other possible etiologies of his cardiomyopathy. I will discuss this with Marshall when he returns in a few weeks. Pending his course, intervention to the circumflex  could be considered.     Orders this Visit:  Orders Placed This Encounter   Procedures     CARDIAC REHAB REFERRAL     Follow-Up with Cardiac Advanced Practice Provider     Follow-Up with Cardiologist     Orders Placed This Encounter   Medications     Ferrous Sulfate (IRON) 325 (65 Fe) MG tablet     Sig: Take 1 tablet by mouth daily     carvedilol (COREG) 3.125 MG tablet     Sig: Take 1 tablet (3.125 mg) by mouth 2 times daily (with meals)     Dispense:  60 tablet     Refill:  3     There are no discontinued medications.    CURRENT MEDICATIONS:  Current Outpatient Medications   Medication Sig Dispense Refill     ASPIRIN 81 PO        atorvastatin (LIPITOR) 40 MG tablet Take 1 tablet (40 mg) by mouth daily 90 tablet 3     carvedilol (COREG) 3.125 MG tablet Take 1 tablet (3.125 mg) by mouth 2 times daily (with meals) 60 tablet 3     Ferrous Sulfate (IRON) 325 (65 Fe) MG tablet Take 1 tablet by mouth daily       furosemide (LASIX) 20 MG tablet Take 20 mg by mouth daily       insulin glargine (LANTUS) 100 UNIT/ML injection Inject 16 Units Subcutaneous At Bedtime 6/3 Took Lantus 8 Units at bedtime 15 mL 3     insulin lispro (HUMALOG KWIKPEN) 100 UNIT/ML injection 11 units with breakfast,15 units at noon, and 14 units before evening meal 36 mL 3     lisinopril (PRINIVIL/ZESTRIL) 5 MG tablet Take 5 mg by mouth daily       metFORMIN (GLUCOPHAGE) 500 MG tablet TAKE 2 TABLETS (1,000 MG) BY MOUTH TWICE A DAY WITH MEALS 360 tablet 0     Multiple Vitamins-Minerals (MULTIVITAL PO) Take 1 tablet by mouth daily       potassium chloride ER (MICRO-K) 10 MEQ CR capsule Take 10 mEq by mouth daily        tamsulosin (FLOMAX) 0.4 MG capsule Take 0.4 mg by mouth daily       ALLERGIES  Allergies   Allergen Reactions     No Known Allergies      PAST  MEDICAL HISTORY:  Past Medical History:   Diagnosis Date     Atrophic gastritis 9/8/2016     Atrophic gastritis 9/8/2016     Benign non-nodular prostatic hyperplasia with lower urinary tract symptoms 9/8/2016     History of diabetes mellitus      Hyperlipidemia LDL goal <100 9/8/2016     Obstructive sleep apnea syndrome 9/8/2016     SOB (shortness of breath) 5/30/2019     Type 2 diabetes mellitus without complication (H) 9/6/2016     Vitamin B12 deficiency (non anemic) 9/8/2016     PAST SURGICAL HISTORY:  Past Surgical History:   Procedure Laterality Date     CV HEART CATHETERIZATION WITH POSSIBLE INTERVENTION N/A 6/4/2019    Procedure: Heart Catheterization with Possible Intervention;  Surgeon: Alex Bazan MD;  Location:  HEART CARDIAC CATH LAB     ESOPHAGOSCOPY, GASTROSCOPY, DUODENOSCOPY (EGD), COMBINED N/A 1/20/2015    Procedure: COMBINED ESOPHAGOSCOPY, GASTROSCOPY, DUODENOSCOPY (EGD), BIOPSY SINGLE OR MULTIPLE;  Surgeon: Allan Marroquin MD;  Location:  GI     EXCISE LESION TRUNK N/A 4/17/2019    Procedure: EXCISE CYST UPPER BACK;  Surgeon: Rene Murray MD;  Location:  OR     HERNIA REPAIR       HYDROCELECTOMY SCROTAL       ORTHOPEDIC SURGERY      both knees replaced     FAMILY HISTORY:  Family History   Problem Relation Age of Onset     Coronary Artery Disease Father 53     Cerebrovascular Disease Father      Diabetes Mother      Diabetes Brother      SOCIAL HISTORY:  Social History     Socioeconomic History     Marital status:      Spouse name: None     Number of children: None     Years of education: None     Highest education level: None   Occupational History     None   Social Needs     Financial resource strain: None     Food insecurity:     Worry: None     Inability: None     Transportation needs:     Medical: None     Non-medical: None   Tobacco Use     Smoking status: Never Smoker     Smokeless tobacco: Never Used   Substance and Sexual Activity     Alcohol use: Yes      Alcohol/week: 0.0 oz     Comment: 2 beers a month     Drug use: No     Sexual activity: Never   Lifestyle     Physical activity:     Days per week: None     Minutes per session: None     Stress: None   Relationships     Social connections:     Talks on phone: None     Gets together: None     Attends Sabianism service: None     Active member of club or organization: None     Attends meetings of clubs or organizations: None     Relationship status: None     Intimate partner violence:     Fear of current or ex partner: None     Emotionally abused: None     Physically abused: None     Forced sexual activity: None   Other Topics Concern     Parent/sibling w/ CABG, MI or angioplasty before 65F 55M? 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; walks dog for exercise      Review of Systems:  Skin:  Negative     Eyes:  Positive for glasses  ENT:  Negative    Respiratory:  Positive for shortness of breath  Cardiovascular:    Positive for;dizziness;lightheadedness;edema  Gastroenterology: Negative    Genitourinary:  Negative    Musculoskeletal:  Positive for back pain  Neurologic:  Negative    Psychiatric:  Negative    Heme/Lymph/Imm:  Negative    Endocrine:  Positive for diabetes     Physical Exam:  Vitals: /69   Pulse 88   Ht 1.829 m (6')   Wt 110.2 kg (243 lb)   BMI 32.96 kg/m      Wt Readings from Last 4 Encounters:   06/12/19 110.2 kg (243 lb)   06/04/19 108.3 kg (238 lb 12.8 oz)   06/03/19 110.7 kg (244 lb)   04/17/19 109.8 kg (242 lb 1 oz)     GEN: well nourished, in no acute distress.  HEENT:  Pupils equal, round. Sclerae nonicteric.   C/V:  Regular rate and rhythm, II/VI systolic murmur at the upper sternal border.  RESP: Respirations are unlabored. Clear to auscultation bilaterally without wheezing, rales, or rhonchi.  GI: Abdomen soft, nontender.  EXTREM: trace lower extremity edema.  NEURO: Alert and oriented, cooperative.  SKIN: Warm and dry.     Recent Lab Results:  LIPID  RESULTS:  Lab Results   Component Value Date    CHOL 123 12/05/2016    HDL 50 12/05/2016    LDL 51 12/05/2016    TRIG 108 12/05/2016     LIVER ENZYME RESULTS:  No results found for: AST, ALT  CBC RESULTS:  Lab Results   Component Value Date    WBC 5.3 06/04/2019    RBC 4.94 06/04/2019    HGB 12.5 (L) 06/04/2019    HCT 39.5 (L) 06/04/2019    MCV 80 06/04/2019    MCH 25.3 (L) 06/04/2019    MCHC 31.6 06/04/2019    RDW 16.8 (H) 06/04/2019     06/04/2019     BMP RESULTS:  Lab Results   Component Value Date     (L) 06/07/2019    POTASSIUM 4.6 06/07/2019    CHLORIDE 101 06/07/2019    CO2 27 06/07/2019    ANIONGAP 9.6 06/07/2019     (H) 06/07/2019    BUN 16 06/07/2019    CR 1.26 06/07/2019    GFRESTIMATED 56 (L) 06/07/2019    GFRESTBLACK 67 06/07/2019    FATMATA 9.4 06/07/2019      A1C RESULTS:  Lab Results   Component Value Date    A1C 6.7 (H) 06/12/2017     INR RESULTS:  Lab Results   Component Value Date    INR 1.15 (H) 06/04/2019    INR 0.98 08/21/2006       New/Pertinent imaging results since last visit:  Coronary angiography 6/4/19:    Ost LAD to Prox LAD lesion is 10% stenosed.    Mid Cx to Dist Cx lesion is 100% stenosed.    Prox RCA to Mid RCA lesion is 50% stenosed.    Consider  to LCX    Aggressive medical management        Thank you for allowing me to participate in the care of your patient.    Sincerely,     Stella Elliott PA-C     [facility]

## 2019-06-12 NOTE — LETTER
6/12/2019    Merlin Emery Brown, MD  [facility] [address]  [facility] [id number]    RE: Bryant Mckenzie       Dear Colleague,    I had the pleasure of seeing Bryant Mckenzie in the [facility].      Cardiology Progress Note    Date of Service: 06/13/2019  Patient seen today in follow up of: post angiogram  Primary cardiologist: Dr. Phillips    HPI:  Bryant Mckenzie is a very pleasant 79 year old male with a history of diabetes mellitus type 2, hyperlipidemia, mild aortic stenosis, obstructive sleep apnea, recently diagnosed cardiomyopathy and coronary artery disease who is here today for follow-up after recent coronary angiogram.    He was recently evaluated by his primary care physician for complaints of exertional dyspnea.  A stress echocardiogram was ordered which showed severe LV dysfunction at rest with an ejection fraction of 25 to 30% with no improvement during stress.  He was only able to exercise 4-1/2 minutes on a Torey protocol.  He was also noted to have mild aortic stenosis.  A dedicated transthoracic echocardiogram was ordered which showed moderate LV dilation and severely reduced LV systolic function with an LVEF of 30 to 35%.  There is severe global hypokinesis of the LV without segmental wall motion abnormalities.  Mild aortic stenosis was noted with an aortic valve area of 1.8 cm  and a mean gradient of 17 mmHg.  A CT coronary angiogram was subsequently ordered but this was not completed as his coronary calcium score was over 3000.      He was thus referred to cardiology clinic and was seen in consultation by Dr. Solano on 6/3/19.  He referred him for coronary angiography.  This was done on 6/4/2019.  He was noted to have a 10% proximal LAD stenosis.  The mid to distal circumflex was 100% stenosed.  He had a proximal to mid RCA lesion which was 50% stenosed.  Aggressive medical management was recommended with possible consideration for  intervention to the  of the left circumflex.    He is back today for follow-up after his angiogram with his wife, Leatha, and son. He tells me overall he continues to feel fairly well. His exertional dyspnea is better since starting on lasix but is still present somewhat. He endorses occasional PND. He has no chest discomfort, lightheadedness, dizziness, orthopnea or leg swelling. His femoral access site has healed well.     ASSESSMENT/PLAN:  1.  Cardiomyopathy with chronic systolic congestive heart failure. With an LVEF of 30 - 35% by echocardiogram.   2.  Coronary artery disease.  3.  Mild aortic stenosis.  4.  Obstructive sleep apnea.  5.  DM type II.    Marshall is here today for follow up after his recent coronary angiogram. He was recently found to have a new cardiomyopathy with an LVEF of 30 - 35%. He recently underwent coronary angiography which showed a  of the circumflex. He had a 50% proximal to mid RCA lesion and mild proximal LAD stenosis. We reviewed these results today. He has not had any chest discomfort. He has appropriately been started on aspirin and high intensity statin therapy for his coronary artery disease.     Given his LV dysfunction, he was also started on lisinopril. He has not been initiated on beta blocker therapy which I recommended today. I have prescribed carvedilol 3.125 mg twice daily. We discussed the side effects of beta blockers, and he will call with any issues. I will have him return to clinic in 2 - 3 weeks to see how he is tolerating his medications. We can consider further increasing his beta blocker and ACE inhibitor as his blood pressure allows.    His exertional dyspnea has significantly improved since starting diuretic therapy. I made no changes to his diuretic dose today. He'll continue on lasix 20 mg daily.     We did discuss a referral to cardiac rehab which he is agreeable to. A referral was placed.     I did discuss with Dr. Phillips who would like to proceed with  cardiac MRI to assess for other possible etiologies of his cardiomyopathy. I will discuss this with Marshall when he returns in a few weeks. Pending his course, intervention to the circumflex  could be considered.     Orders this Visit:  Orders Placed This Encounter   Procedures     CARDIAC REHAB REFERRAL     Follow-Up with Cardiac Advanced Practice Provider     Follow-Up with Cardiologist     Orders Placed This Encounter   Medications     Ferrous Sulfate (IRON) 325 (65 Fe) MG tablet     Sig: Take 1 tablet by mouth daily     carvedilol (COREG) 3.125 MG tablet     Sig: Take 1 tablet (3.125 mg) by mouth 2 times daily (with meals)     Dispense:  60 tablet     Refill:  3     There are no discontinued medications.    CURRENT MEDICATIONS:  Current Outpatient Medications   Medication Sig Dispense Refill     ASPIRIN 81 PO        atorvastatin (LIPITOR) 40 MG tablet Take 1 tablet (40 mg) by mouth daily 90 tablet 3     carvedilol (COREG) 3.125 MG tablet Take 1 tablet (3.125 mg) by mouth 2 times daily (with meals) 60 tablet 3     Ferrous Sulfate (IRON) 325 (65 Fe) MG tablet Take 1 tablet by mouth daily       furosemide (LASIX) 20 MG tablet Take 20 mg by mouth daily       insulin glargine (LANTUS) 100 UNIT/ML injection Inject 16 Units Subcutaneous At Bedtime 6/3 Took Lantus 8 Units at bedtime 15 mL 3     insulin lispro (HUMALOG KWIKPEN) 100 UNIT/ML injection 11 units with breakfast,15 units at noon, and 14 units before evening meal 36 mL 3     lisinopril (PRINIVIL/ZESTRIL) 5 MG tablet Take 5 mg by mouth daily       metFORMIN (GLUCOPHAGE) 500 MG tablet TAKE 2 TABLETS (1,000 MG) BY MOUTH TWICE A DAY WITH MEALS 360 tablet 0     Multiple Vitamins-Minerals (MULTIVITAL PO) Take 1 tablet by mouth daily       potassium chloride ER (MICRO-K) 10 MEQ CR capsule Take 10 mEq by mouth daily        tamsulosin (FLOMAX) 0.4 MG capsule Take 0.4 mg by mouth daily       ALLERGIES  Allergies   Allergen Reactions     No Known Allergies      PAST  MEDICAL HISTORY:  Past Medical History:   Diagnosis Date     Atrophic gastritis 9/8/2016     Atrophic gastritis 9/8/2016     Benign non-nodular prostatic hyperplasia with lower urinary tract symptoms 9/8/2016     History of diabetes mellitus      Hyperlipidemia LDL goal <100 9/8/2016     Obstructive sleep apnea syndrome 9/8/2016     SOB (shortness of breath) 5/30/2019     Type 2 diabetes mellitus without complication (H) 9/6/2016     Vitamin B12 deficiency (non anemic) 9/8/2016     PAST SURGICAL HISTORY:  Past Surgical History:   Procedure Laterality Date     CV HEART CATHETERIZATION WITH POSSIBLE INTERVENTION N/A 6/4/2019    Procedure: Heart Catheterization with Possible Intervention;  Surgeon: Alex Bazan MD;  Location:  HEART CARDIAC CATH LAB     ESOPHAGOSCOPY, GASTROSCOPY, DUODENOSCOPY (EGD), COMBINED N/A 1/20/2015    Procedure: COMBINED ESOPHAGOSCOPY, GASTROSCOPY, DUODENOSCOPY (EGD), BIOPSY SINGLE OR MULTIPLE;  Surgeon: Allan Marroquin MD;  Location:  GI     EXCISE LESION TRUNK N/A 4/17/2019    Procedure: EXCISE CYST UPPER BACK;  Surgeon: Rene Murray MD;  Location:  OR     HERNIA REPAIR       HYDROCELECTOMY SCROTAL       ORTHOPEDIC SURGERY      both knees replaced     FAMILY HISTORY:  Family History   Problem Relation Age of Onset     Coronary Artery Disease Father 53     Cerebrovascular Disease Father      Diabetes Mother      Diabetes Brother      SOCIAL HISTORY:  Social History     Socioeconomic History     Marital status:      Spouse name: None     Number of children: None     Years of education: None     Highest education level: None   Occupational History     None   Social Needs     Financial resource strain: None     Food insecurity:     Worry: None     Inability: None     Transportation needs:     Medical: None     Non-medical: None   Tobacco Use     Smoking status: Never Smoker     Smokeless tobacco: Never Used   Substance and Sexual Activity     Alcohol use: Yes      Alcohol/week: 0.0 oz     Comment: 2 beers a month     Drug use: No     Sexual activity: Never   Lifestyle     Physical activity:     Days per week: None     Minutes per session: None     Stress: None   Relationships     Social connections:     Talks on phone: None     Gets together: None     Attends [employer]ism service: None     Active member of club or organization: None     Attends meetings of clubs or organizations: None     Relationship status: None     Intimate partner violence:     Fear of current or ex partner: None     Emotionally abused: None     Physically abused: None     Forced sexual activity: None   Other Topics Concern     Parent/sibling w/ CABG, MI or angioplasty before 65F 55M? 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; walks dog for exercise      Review of Systems:  Skin:  Negative     Eyes:  Positive for glasses  ENT:  Negative    Respiratory:  Positive for shortness of breath  Cardiovascular:    Positive for;dizziness;lightheadedness;edema  Gastroenterology: Negative    Genitourinary:  Negative    Musculoskeletal:  Positive for back pain  Neurologic:  Negative    Psychiatric:  Negative    Heme/Lymph/Imm:  Negative    Endocrine:  Positive for diabetes     Physical Exam:  Vitals: /69   Pulse 88   Ht 1.829 m (6')   Wt 110.2 kg (243 lb)   BMI 32.96 kg/m      Wt Readings from Last 4 Encounters:   06/12/19 110.2 kg (243 lb)   06/04/19 108.3 kg (238 lb 12.8 oz)   06/03/19 110.7 kg (244 lb)   04/17/19 109.8 kg (242 lb 1 oz)     GEN: well nourished, in no acute distress.  HEENT:  Pupils equal, round. Sclerae nonicteric.   C/V:  Regular rate and rhythm, II/VI systolic murmur at the upper sternal border.  RESP: Respirations are unlabored. Clear to auscultation bilaterally without wheezing, rales, or rhonchi.  GI: Abdomen soft, nontender.  EXTREM: trace lower extremity edema.  NEURO: Alert and oriented, cooperative.  SKIN: Warm and dry.     Recent Lab Results:  LIPID  RESULTS:  Lab Results   Component Value Date    CHOL 123 12/05/2016    HDL 50 12/05/2016    LDL 51 12/05/2016    TRIG 108 12/05/2016     LIVER ENZYME RESULTS:  No results found for: AST, ALT  CBC RESULTS:  Lab Results   Component Value Date    WBC 5.3 06/04/2019    RBC 4.94 06/04/2019    HGB 12.5 (L) 06/04/2019    HCT 39.5 (L) 06/04/2019    MCV 80 06/04/2019    MCH 25.3 (L) 06/04/2019    MCHC 31.6 06/04/2019    RDW 16.8 (H) 06/04/2019     06/04/2019     BMP RESULTS:  Lab Results   Component Value Date     (L) 06/07/2019    POTASSIUM 4.6 06/07/2019    CHLORIDE 101 06/07/2019    CO2 27 06/07/2019    ANIONGAP 9.6 06/07/2019     (H) 06/07/2019    BUN 16 06/07/2019    CR 1.26 06/07/2019    GFRESTIMATED 56 (L) 06/07/2019    GFRESTBLACK 67 06/07/2019    FATMATA 9.4 06/07/2019      A1C RESULTS:  Lab Results   Component Value Date    A1C 6.7 (H) 06/12/2017     INR RESULTS:  Lab Results   Component Value Date    INR 1.15 (H) 06/04/2019    INR 0.98 08/21/2006       New/Pertinent imaging results since last visit:  Coronary angiography 6/4/19:    Ost LAD to Prox LAD lesion is 10% stenosed.    Mid Cx to Dist Cx lesion is 100% stenosed.    Prox RCA to Mid RCA lesion is 50% stenosed.    Consider  to LCX    Aggressive medical management    Stella Elliott PA-C  [facility]      Thank you for allowing me to participate in the care of your patient.      Sincerely,     Stella Elliott PA-C     [facility]    cc:   No referring provider defined for this encounter.

## 2019-06-12 NOTE — PATIENT INSTRUCTIONS
Thank you for your [facility] visit today. Your provider has recommended the following:    Medication Changes:  START carvedilol 3.125 mg twice daily.  Recommendations:  Please call us with worsening shortness of breath, leg swelling, dizziness, lightheadedness or fatigue.  Follow-up:  See Sujey MCCORMICK for cardiology follow up in three weeks or so. Schedule an appointment with Dr. Solano in 2 - 3 months.    Reminder:  Please bring in all current medications, over the counter supplements and vitamin bottles to your next appointment.            [facility]

## 2019-06-17 DIAGNOSIS — I25.5 ISCHEMIC CARDIOMYOPATHY: Primary | ICD-10-CM

## 2019-07-03 ENCOUNTER — OFFICE VISIT (OUTPATIENT)
Dept: CARDIOLOGY | Facility: CLINIC | Age: 79
End: 2019-07-03
Attending: PHYSICIAN ASSISTANT
Payer: MEDICARE

## 2019-07-03 VITALS
WEIGHT: 239.3 LBS | BODY MASS INDEX: 32.41 KG/M2 | HEIGHT: 72 IN | HEART RATE: 56 BPM | DIASTOLIC BLOOD PRESSURE: 60 MMHG | SYSTOLIC BLOOD PRESSURE: 104 MMHG

## 2019-07-03 DIAGNOSIS — I25.5 ISCHEMIC CARDIOMYOPATHY: ICD-10-CM

## 2019-07-03 DIAGNOSIS — I25.5 ISCHEMIC CARDIOMYOPATHY: Primary | ICD-10-CM

## 2019-07-03 LAB
ANION GAP SERPL CALCULATED.3IONS-SCNC: 10.4 MMOL/L (ref 6–17)
BUN SERPL-MCNC: 12 MG/DL (ref 7–30)
CALCIUM SERPL-MCNC: 9.9 MG/DL (ref 8.5–10.5)
CHLORIDE SERPL-SCNC: 100 MMOL/L (ref 98–107)
CO2 SERPL-SCNC: 29 MMOL/L (ref 23–29)
CREAT SERPL-MCNC: 1.06 MG/DL (ref 0.7–1.3)
GFR SERPL CREATININE-BSD FRML MDRD: 68 ML/MIN/{1.73_M2}
GLUCOSE SERPL-MCNC: 205 MG/DL (ref 70–105)
POTASSIUM SERPL-SCNC: 4.4 MMOL/L (ref 3.5–5.1)
SODIUM SERPL-SCNC: 135 MMOL/L (ref 136–145)

## 2019-07-03 PROCEDURE — 36415 COLL VENOUS BLD VENIPUNCTURE: CPT | Performed by: INTERNAL MEDICINE

## 2019-07-03 PROCEDURE — 80048 BASIC METABOLIC PNL TOTAL CA: CPT | Performed by: INTERNAL MEDICINE

## 2019-07-03 PROCEDURE — 99214 OFFICE O/P EST MOD 30 MIN: CPT | Performed by: PHYSICIAN ASSISTANT

## 2019-07-03 ASSESSMENT — MIFFLIN-ST. JEOR: SCORE: 1848.46

## 2019-07-03 NOTE — PROGRESS NOTES
Cardiology Progress Note    Date of Service: 07/03/2019  Patient seen today in follow up of: cardiomyopathy  Primary cardiologist: Dr. Phillips    HPI:  Bryant Mckenzie is a very pleasant 79 year old male with a history of diabetes mellitus type 2, hyperlipidemia, mild aortic stenosis, obstructive sleep apnea, recently diagnosed cardiomyopathy and coronary artery disease who is here today for routine cardiology follow up.     He recently presented with complaints of exertional dyspnea. A stress echocardiogram was ordered which showed severe LV dysfunction at rest with an ejection fraction of 25 to 30% with no improvement during stress.  He was only able to exercise 4-1/2 minutes on a Torey protocol.  He was also noted to have mild aortic stenosis.  A dedicated transthoracic echocardiogram was ordered which showed moderate LV dilation and severely reduced LV systolic function with an LVEF of 30 to 35%.  There is severe global hypokinesis of the LV without segmental wall motion abnormalities.  Mild aortic stenosis was noted with an aortic valve area of 1.8 cm  and a mean gradient of 17 mmHg.  A CT coronary angiogram was subsequently ordered but this was not completed as his coronary calcium score was over 3000.       He was thus referred to cardiology clinic and was seen in consultation by Dr. Solano on 6/3/19.  He referred him for coronary angiography.  This was done on 6/4/2019.  He was noted to have a 10% proximal LAD stenosis.  The mid to distal circumflex was 100% stenosed.  He had a proximal to mid RCA lesion which was 50% stenosed.  Aggressive medical management was recommended with possible consideration for intervention to the  of the left circumflex.    I met him on 6/12/19 after his angiogram and started low dose carvedilol. He is back today for follow up. Overall, he tells me he is feeling fairly well. He has no exertional dyspnea, orthopnea, PND, chest pain, dizziness or lightheadedness. He has  tolerated the addition of carvedilol without issues.    He was recently found to be to iron deficient and recently had a colonoscopy and EGD done.  He is scheduled to undergo capsule endoscopy as well.    ASSESSMENT/PLAN:  1.  Cardiomyopathy with chronic systolic congestive heart failure. With an LVEF of 30 - 35% by echocardiogram.  Is been started on medical therapy with lisinopril 5 mg daily and carvedilol 3.125 mg daily.  We will continue these doses for now.  I did discuss with Dr. Phillips who would like to proceed with a cardiac MRI for further evaluation of his cardiomyopathy.  I discussed this with him and his wife today and they are agreeable to proceed.  We will schedule this within the next few weeks.  He has been maintained on Lasix 20 mg daily and appears euvolemic today on exam.  I would like to check basic metabolic panel today for reevaluation of his renal function.  I will be in touch with the results of this and any changes if necessary after our visit today.  I will have him follow-up once the results of his cardiac MRI are available.  Depending on his blood pressure, we could consider increasing his lisinopril or possibly adding spironolactone.  He is scheduled to enrolled in cardiac rehab soon which I'm hopeful will be beneficial for him.  2.  Coronary artery disease.  With a  of the mid to distal circumflex and moderate proximal to mid RCA disease.  He will continue on aspirin and statin.  He has no complaints of chest discomfort.  3.  Mild aortic stenosis.  4.  Obstructive sleep apnea.  5.  DM type II.    Orders this Visit:  Orders Placed This Encounter   Procedures     MRI Cardiac w/contrast     Basic metabolic panel     No orders of the defined types were placed in this encounter.    There are no discontinued medications.    CURRENT MEDICATIONS:  Current Outpatient Medications   Medication Sig Dispense Refill     ASPIRIN 81 PO Take by mouth daily        atorvastatin (LIPITOR) 40 MG tablet  Take 1 tablet (40 mg) by mouth daily 90 tablet 3     carvedilol (COREG) 3.125 MG tablet Take 1 tablet (3.125 mg) by mouth 2 times daily (with meals) 60 tablet 3     Ferrous Sulfate (IRON) 325 (65 Fe) MG tablet Take 1 tablet by mouth daily       furosemide (LASIX) 20 MG tablet Take 20 mg by mouth daily       insulin glargine (LANTUS) 100 UNIT/ML injection Inject 16 Units Subcutaneous At Bedtime 6/3 Took Lantus 8 Units at bedtime 15 mL 3     insulin lispro (HUMALOG KWIKPEN) 100 UNIT/ML injection 11 units with breakfast,15 units at noon, and 14 units before evening meal 36 mL 3     lisinopril (PRINIVIL/ZESTRIL) 5 MG tablet Take 5 mg by mouth daily       metFORMIN (GLUCOPHAGE) 500 MG tablet TAKE 2 TABLETS (1,000 MG) BY MOUTH TWICE A DAY WITH MEALS 360 tablet 0     Multiple Vitamins-Minerals (MULTIVITAL PO) Take 1 tablet by mouth daily       potassium chloride ER (MICRO-K) 10 MEQ CR capsule Take 10 mEq by mouth daily        tamsulosin (FLOMAX) 0.4 MG capsule Take 0.4 mg by mouth daily       ALLERGIES  Allergies   Allergen Reactions     No Known Allergies      PAST MEDICAL HISTORY:  Past Medical History:   Diagnosis Date     Atrophic gastritis 9/8/2016     Atrophic gastritis 9/8/2016     Benign non-nodular prostatic hyperplasia with lower urinary tract symptoms 9/8/2016     History of diabetes mellitus      Hyperlipidemia LDL goal <100 9/8/2016     Obstructive sleep apnea syndrome 9/8/2016     SOB (shortness of breath) 5/30/2019     Type 2 diabetes mellitus without complication (H) 9/6/2016     Vitamin B12 deficiency (non anemic) 9/8/2016     PAST SURGICAL HISTORY:  Past Surgical History:   Procedure Laterality Date     CV HEART CATHETERIZATION WITH POSSIBLE INTERVENTION N/A 6/4/2019    Procedure: Heart Catheterization with Possible Intervention;  Surgeon: Alex Bazan MD;  Location:  HEART CARDIAC CATH LAB     ESOPHAGOSCOPY, GASTROSCOPY, DUODENOSCOPY (EGD), COMBINED N/A 1/20/2015    Procedure: COMBINED  ESOPHAGOSCOPY, GASTROSCOPY, DUODENOSCOPY (EGD), BIOPSY SINGLE OR MULTIPLE;  Surgeon: Allan Marroquin MD;  Location:  GI     EXCISE LESION TRUNK N/A 4/17/2019    Procedure: EXCISE CYST UPPER BACK;  Surgeon: Rene Murray MD;  Location:  OR     HERNIA REPAIR       HYDROCELECTOMY SCROTAL       ORTHOPEDIC SURGERY      both knees replaced     FAMILY HISTORY:  Family History   Problem Relation Age of Onset     Coronary Artery Disease Father 53     Cerebrovascular Disease Father      Diabetes Mother      Diabetes Brother      SOCIAL HISTORY:  Social History     Socioeconomic History     Marital status:      Spouse name: None     Number of children: None     Years of education: None     Highest education level: None   Occupational History     None   Social Needs     Financial resource strain: None     Food insecurity:     Worry: None     Inability: None     Transportation needs:     Medical: None     Non-medical: None   Tobacco Use     Smoking status: Never Smoker     Smokeless tobacco: Never Used   Substance and Sexual Activity     Alcohol use: Yes     Alcohol/week: 0.0 oz     Comment: 2 beers a month     Drug use: No     Sexual activity: Never   Lifestyle     Physical activity:     Days per week: None     Minutes per session: None     Stress: None   Relationships     Social connections:     Talks on phone: None     Gets together: None     Attends Zoroastrianism service: None     Active member of club or organization: None     Attends meetings of clubs or organizations: None     Relationship status: None     Intimate partner violence:     Fear of current or ex partner: None     Emotionally abused: None     Physically abused: None     Forced sexual activity: None   Other Topics Concern     Parent/sibling w/ CABG, MI or angioplasty before 65F 55M? 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; walks dog for exercise      Review of Systems:  Skin:  Negative     Eyes:  Positive for  glasses  ENT:  Negative    Respiratory:  Negative    Cardiovascular:  Negative;chest pain;palpitations;lightheadedness;dizziness;syncope or near-syncope;cyanosis Positive for;fatigue;edema  Gastroenterology: Negative    Genitourinary:  Negative    Musculoskeletal:  Negative    Neurologic:  Negative    Psychiatric:  Negative    Heme/Lymph/Imm:  Negative    Endocrine:  Positive for diabetes     Physical Exam:  Vitals: /60 (BP Location: Right arm, Cuff Size: Adult Large)   Pulse 56   Ht 1.829 m (6')   Wt 108.5 kg (239 lb 4.8 oz)   BMI 32.45 kg/m     Wt Readings from Last 4 Encounters:   07/03/19 108.5 kg (239 lb 4.8 oz)   06/12/19 110.2 kg (243 lb)   06/04/19 108.3 kg (238 lb 12.8 oz)   06/03/19 110.7 kg (244 lb)     GEN: well nourished, in no acute distress.  HEENT:  Pupils equal, round. Sclerae nonicteric.   C/V:  Regular rate and rhythm, no murmur, rub or gallop.    RESP: Respirations are unlabored. Clear to auscultation bilaterally without wheezing, rales, or rhonchi.  GI: Abdomen soft, nontender.  EXTREM: no LE edema.  NEURO: Alert and oriented, cooperative.  SKIN: Warm and dry.     Recent Lab Results:  LIPID RESULTS:  Lab Results   Component Value Date    CHOL 123 12/05/2016    HDL 50 12/05/2016    LDL 51 12/05/2016    TRIG 108 12/05/2016     LIVER ENZYME RESULTS:  No results found for: AST, ALT  CBC RESULTS:  Lab Results   Component Value Date    WBC 5.3 06/04/2019    RBC 4.94 06/04/2019    HGB 12.5 (L) 06/04/2019    HCT 39.5 (L) 06/04/2019    MCV 80 06/04/2019    MCH 25.3 (L) 06/04/2019    MCHC 31.6 06/04/2019    RDW 16.8 (H) 06/04/2019     06/04/2019     BMP RESULTS:  Lab Results   Component Value Date     (L) 06/07/2019    POTASSIUM 4.6 06/07/2019    CHLORIDE 101 06/07/2019    CO2 27 06/07/2019    ANIONGAP 9.6 06/07/2019     (H) 06/07/2019    BUN 16 06/07/2019    CR 1.26 06/07/2019    GFRESTIMATED 56 (L) 06/07/2019    GFRESTBLACK 67 06/07/2019    FATMATA 9.4 06/07/2019      A1C  RESULTS:  Lab Results   Component Value Date    A1C 6.7 (H) 06/12/2017     INR RESULTS:  Lab Results   Component Value Date    INR 1.15 (H) 06/04/2019    INR 0.98 08/21/2006       New/Pertinent imaging results since last visit:  none    Stella Elliott PA-C  [facility]

## 2019-07-03 NOTE — LETTER
7/3/2019    Merlin Emery Brown, MD  [facility] [address]  [facility] [address]    RE: Bryant Mckenzie       Dear Colleague,    I had the pleasure of seeing Bryant Mckenzie in the [facility].      Cardiology Progress Note    Date of Service: 07/03/2019  Patient seen today in follow up of: cardiomyopathy  Primary cardiologist: Dr. Phillips    HPI:  Bryatn Mckenzie is a very pleasant 79 year old male with a history of diabetes mellitus type 2, hyperlipidemia, mild aortic stenosis, obstructive sleep apnea, recently diagnosed cardiomyopathy and coronary artery disease who is here today for routine cardiology follow up.     He recently presented with complaints of exertional dyspnea. A stress echocardiogram was ordered which showed severe LV dysfunction at rest with an ejection fraction of 25 to 30% with no improvement during stress.  He was only able to exercise 4-1/2 minutes on a Torey protocol.  He was also noted to have mild aortic stenosis.  A dedicated transthoracic echocardiogram was ordered which showed moderate LV dilation and severely reduced LV systolic function with an LVEF of 30 to 35%.  There is severe global hypokinesis of the LV without segmental wall motion abnormalities.  Mild aortic stenosis was noted with an aortic valve area of 1.8 cm  and a mean gradient of 17 mmHg.  A CT coronary angiogram was subsequently ordered but this was not completed as his coronary calcium score was over 3000.       He was thus referred to cardiology clinic and was seen in consultation by Dr. Solano on 6/3/19.  He referred him for coronary angiography.  This was done on 6/4/2019.  He was noted to have a 10% proximal LAD stenosis.  The mid to distal circumflex was 100% stenosed.  He had a proximal to mid RCA lesion which was 50% stenosed.  Aggressive medical management was recommended with possible consideration for intervention to the  of the left  circumflex.    I met him on 6/12/19 after his angiogram and started low dose carvedilol. He is back today for follow up. Overall, he tells me he is feeling fairly well. He has no exertional dyspnea, orthopnea, PND, chest pain, dizziness or lightheadedness. He has tolerated the addition of carvedilol without issues.    He was recently found to be to iron deficient and recently had a colonoscopy and EGD done.  He is scheduled to undergo capsule endoscopy as well.    ASSESSMENT/PLAN:  1.  Cardiomyopathy with chronic systolic congestive heart failure. With an LVEF of 30 - 35% by echocardiogram.  Is been started on medical therapy with lisinopril 5 mg daily and carvedilol 3.125 mg daily.  We will continue these doses for now.  I did discuss with Dr. Phillips who would like to proceed with a cardiac MRI for further evaluation of his cardiomyopathy.  I discussed this with him and his wife today and they are agreeable to proceed.  We will schedule this within the next few weeks.  He has been maintained on Lasix 20 mg daily and appears euvolemic today on exam.  I would like to check basic metabolic panel today for reevaluation of his renal function.  I will be in touch with the results of this and any changes if necessary after our visit today.  I will have him follow-up once the results of his cardiac MRI are available.  Depending on his blood pressure, we could consider increasing his lisinopril or possibly adding spironolactone.  He is scheduled to enrolled in cardiac rehab soon which I'm hopeful will be beneficial for him.  2.  Coronary artery disease.  With a  of the mid to distal circumflex and moderate proximal to mid RCA disease.  He will continue on aspirin and statin.  He has no complaints of chest discomfort.  3.  Mild aortic stenosis.  4.  Obstructive sleep apnea.  5.  DM type II.    Orders this Visit:  Orders Placed This Encounter   Procedures     MRI Cardiac w/contrast     Basic metabolic panel     No orders  of the defined types were placed in this encounter.    There are no discontinued medications.    CURRENT MEDICATIONS:  Current Outpatient Medications   Medication Sig Dispense Refill     ASPIRIN 81 PO Take by mouth daily        atorvastatin (LIPITOR) 40 MG tablet Take 1 tablet (40 mg) by mouth daily 90 tablet 3     carvedilol (COREG) 3.125 MG tablet Take 1 tablet (3.125 mg) by mouth 2 times daily (with meals) 60 tablet 3     Ferrous Sulfate (IRON) 325 (65 Fe) MG tablet Take 1 tablet by mouth daily       furosemide (LASIX) 20 MG tablet Take 20 mg by mouth daily       insulin glargine (LANTUS) 100 UNIT/ML injection Inject 16 Units Subcutaneous At Bedtime 6/3 Took Lantus 8 Units at bedtime 15 mL 3     insulin lispro (HUMALOG KWIKPEN) 100 UNIT/ML injection 11 units with breakfast,15 units at noon, and 14 units before evening meal 36 mL 3     lisinopril (PRINIVIL/ZESTRIL) 5 MG tablet Take 5 mg by mouth daily       metFORMIN (GLUCOPHAGE) 500 MG tablet TAKE 2 TABLETS (1,000 MG) BY MOUTH TWICE A DAY WITH MEALS 360 tablet 0     Multiple Vitamins-Minerals (MULTIVITAL PO) Take 1 tablet by mouth daily       potassium chloride ER (MICRO-K) 10 MEQ CR capsule Take 10 mEq by mouth daily        tamsulosin (FLOMAX) 0.4 MG capsule Take 0.4 mg by mouth daily       ALLERGIES  Allergies   Allergen Reactions     No Known Allergies      PAST MEDICAL HISTORY:  Past Medical History:   Diagnosis Date     Atrophic gastritis 9/8/2016     Atrophic gastritis 9/8/2016     Benign non-nodular prostatic hyperplasia with lower urinary tract symptoms 9/8/2016     History of diabetes mellitus      Hyperlipidemia LDL goal <100 9/8/2016     Obstructive sleep apnea syndrome 9/8/2016     SOB (shortness of breath) 5/30/2019     Type 2 diabetes mellitus without complication (H) 9/6/2016     Vitamin B12 deficiency (non anemic) 9/8/2016     PAST SURGICAL HISTORY:  Past Surgical History:   Procedure Laterality Date     CV HEART CATHETERIZATION WITH POSSIBLE  INTERVENTION N/A 6/4/2019    Procedure: Heart Catheterization with Possible Intervention;  Surgeon: Alex Bazan MD;  Location:  HEART CARDIAC CATH LAB     ESOPHAGOSCOPY, GASTROSCOPY, DUODENOSCOPY (EGD), COMBINED N/A 1/20/2015    Procedure: COMBINED ESOPHAGOSCOPY, GASTROSCOPY, DUODENOSCOPY (EGD), BIOPSY SINGLE OR MULTIPLE;  Surgeon: Allan Marroquin MD;  Location:  GI     EXCISE LESION TRUNK N/A 4/17/2019    Procedure: EXCISE CYST UPPER BACK;  Surgeon: Rene Murray MD;  Location:  OR     HERNIA REPAIR       HYDROCELECTOMY SCROTAL       ORTHOPEDIC SURGERY      both knees replaced     FAMILY HISTORY:  Family History   Problem Relation Age of Onset     Coronary Artery Disease Father 53     Cerebrovascular Disease Father      Diabetes Mother      Diabetes Brother      SOCIAL HISTORY:  Social History     Socioeconomic History     Marital status:      Spouse name: None     Number of children: None     Years of education: None     Highest education level: None   Occupational History     None   Social Needs     Financial resource strain: None     Food insecurity:     Worry: None     Inability: None     Transportation needs:     Medical: None     Non-medical: None   Tobacco Use     Smoking status: Never Smoker     Smokeless tobacco: Never Used   Substance and Sexual Activity     Alcohol use: Yes     Alcohol/week: 0.0 oz     Comment: 2 beers a month     Drug use: No     Sexual activity: Never   Lifestyle     Physical activity:     Days per week: None     Minutes per session: None     Stress: None   Relationships     Social connections:     Talks on phone: None     Gets together: None     Attends Spiritism service: None     Active member of club or organization: None     Attends meetings of clubs or organizations: None     Relationship status: None     Intimate partner violence:     Fear of current or ex partner: None     Emotionally abused: None     Physically abused: None     Forced sexual  activity: None   Other Topics Concern     Parent/sibling w/ CABG, MI or angioplasty before 65F 55M? 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; walks dog for exercise      Review of Systems:  Skin:  Negative     Eyes:  Positive for glasses  ENT:  Negative    Respiratory:  Negative    Cardiovascular:  Negative;chest pain;palpitations;lightheadedness;dizziness;syncope or near-syncope;cyanosis Positive for;fatigue;edema  Gastroenterology: Negative    Genitourinary:  Negative    Musculoskeletal:  Negative    Neurologic:  Negative    Psychiatric:  Negative    Heme/Lymph/Imm:  Negative    Endocrine:  Positive for diabetes     Physical Exam:  Vitals: /60 (BP Location: Right arm, Cuff Size: Adult Large)   Pulse 56   Ht 1.829 m (6')   Wt 108.5 kg (239 lb 4.8 oz)   BMI 32.45 kg/m      Wt Readings from Last 4 Encounters:   07/03/19 108.5 kg (239 lb 4.8 oz)   06/12/19 110.2 kg (243 lb)   06/04/19 108.3 kg (238 lb 12.8 oz)   06/03/19 110.7 kg (244 lb)     GEN: well nourished, in no acute distress.  HEENT:  Pupils equal, round. Sclerae nonicteric.   C/V:  Regular rate and rhythm, no murmur, rub or gallop.    RESP: Respirations are unlabored. Clear to auscultation bilaterally without wheezing, rales, or rhonchi.  GI: Abdomen soft, nontender.  EXTREM: no LE edema.  NEURO: Alert and oriented, cooperative.  SKIN: Warm and dry.     Recent Lab Results:  LIPID RESULTS:  Lab Results   Component Value Date    CHOL 123 12/05/2016    HDL 50 12/05/2016    LDL 51 12/05/2016    TRIG 108 12/05/2016     LIVER ENZYME RESULTS:  No results found for: AST, ALT  CBC RESULTS:  Lab Results   Component Value Date    WBC 5.3 06/04/2019    RBC 4.94 06/04/2019    HGB 12.5 (L) 06/04/2019    HCT 39.5 (L) 06/04/2019    MCV 80 06/04/2019    MCH 25.3 (L) 06/04/2019    MCHC 31.6 06/04/2019    RDW 16.8 (H) 06/04/2019     06/04/2019     BMP RESULTS:  Lab Results   Component Value Date     (L) 06/07/2019     POTASSIUM 4.6 06/07/2019    CHLORIDE 101 06/07/2019    CO2 27 06/07/2019    ANIONGAP 9.6 06/07/2019     (H) 06/07/2019    BUN 16 06/07/2019    CR 1.26 06/07/2019    GFRESTIMATED 56 (L) 06/07/2019    GFRESTBLACK 67 06/07/2019    FATMATA 9.4 06/07/2019      A1C RESULTS:  Lab Results   Component Value Date    A1C 6.7 (H) 06/12/2017     INR RESULTS:  Lab Results   Component Value Date    INR 1.15 (H) 06/04/2019    INR 0.98 08/21/2006       New/Pertinent imaging results since last visit:  none    Stella Elliott PA-C  [facility]      Thank you for allowing me to participate in the care of your patient.      Sincerely,     Stella Elliott PA-C     [facility]    cc:   Merlin Emery Brown, MD  [facility]  [address]  [place] [address]

## 2019-07-03 NOTE — LETTER
7/3/2019    Merlin Emery Brown, MD  [facility] [address]  [facility] [address]    RE: Bryant Mckenzie       Dear Colleague,    I had the pleasure of seeing Bryant Mckenzie in the [facility].      Cardiology Progress Note    Date of Service: 07/03/2019  Patient seen today in follow up of: cardiomyopathy  Primary cardiologist: Dr. Phillips    HPI:  Bryant Mckenzie is a very pleasant 79 year old male with a history of diabetes mellitus type 2, hyperlipidemia, mild aortic stenosis, obstructive sleep apnea, recently diagnosed cardiomyopathy and coronary artery disease who is here today for routine cardiology follow up.     He recently presented with complaints of exertional dyspnea. A stress echocardiogram was ordered which showed severe LV dysfunction at rest with an ejection fraction of 25 to 30% with no improvement during stress.  He was only able to exercise 4-1/2 minutes on a Torey protocol.  He was also noted to have mild aortic stenosis.  A dedicated transthoracic echocardiogram was ordered which showed moderate LV dilation and severely reduced LV systolic function with an LVEF of 30 to 35%.  There is severe global hypokinesis of the LV without segmental wall motion abnormalities.  Mild aortic stenosis was noted with an aortic valve area of 1.8 cm  and a mean gradient of 17 mmHg.  A CT coronary angiogram was subsequently ordered but this was not completed as his coronary calcium score was over 3000.       He was thus referred to cardiology clinic and was seen in consultation by Dr. Solano on 6/3/19.  He referred him for coronary angiography.  This was done on 6/4/2019.  He was noted to have a 10% proximal LAD stenosis.  The mid to distal circumflex was 100% stenosed.  He had a proximal to mid RCA lesion which was 50% stenosed.  Aggressive medical management was recommended with possible consideration for intervention to the  of the left  circumflex.    I met him on 6/12/19 after his angiogram and started low dose carvedilol. He is back today for follow up. Overall, he tells me he is feeling fairly well. He has no exertional dyspnea, orthopnea, PND, chest pain, dizziness or lightheadedness. He has tolerated the addition of carvedilol without issues.    He was recently found to be to iron deficient and recently had a colonoscopy and EGD done.  He is scheduled to undergo capsule endoscopy as well.    ASSESSMENT/PLAN:  1.  Cardiomyopathy with chronic systolic congestive heart failure. With an LVEF of 30 - 35% by echocardiogram.  Is been started on medical therapy with lisinopril 5 mg daily and carvedilol 3.125 mg daily.  We will continue these doses for now.  I did discuss with Dr. Phillips who would like to proceed with a cardiac MRI for further evaluation of his cardiomyopathy.  I discussed this with him and his wife today and they are agreeable to proceed.  We will schedule this within the next few weeks.  He has been maintained on Lasix 20 mg daily and appears euvolemic today on exam.  I would like to check basic metabolic panel today for reevaluation of his renal function.  I will be in touch with the results of this and any changes if necessary after our visit today.  I will have him follow-up once the results of his cardiac MRI are available.  Depending on his blood pressure, we could consider increasing his lisinopril or possibly adding spironolactone.  He is scheduled to enrolled in cardiac rehab soon which I'm hopeful will be beneficial for him.  2.  Coronary artery disease.  With a  of the mid to distal circumflex and moderate proximal to mid RCA disease.  He will continue on aspirin and statin.  He has no complaints of chest discomfort.  3.  Mild aortic stenosis.  4.  Obstructive sleep apnea.  5.  DM type II.    Orders this Visit:  Orders Placed This Encounter   Procedures     MRI Cardiac w/contrast     Basic metabolic panel     No orders  of the defined types were placed in this encounter.    There are no discontinued medications.    CURRENT MEDICATIONS:  Current Outpatient Medications   Medication Sig Dispense Refill     ASPIRIN 81 PO Take by mouth daily        atorvastatin (LIPITOR) 40 MG tablet Take 1 tablet (40 mg) by mouth daily 90 tablet 3     carvedilol (COREG) 3.125 MG tablet Take 1 tablet (3.125 mg) by mouth 2 times daily (with meals) 60 tablet 3     Ferrous Sulfate (IRON) 325 (65 Fe) MG tablet Take 1 tablet by mouth daily       furosemide (LASIX) 20 MG tablet Take 20 mg by mouth daily       insulin glargine (LANTUS) 100 UNIT/ML injection Inject 16 Units Subcutaneous At Bedtime 6/3 Took Lantus 8 Units at bedtime 15 mL 3     insulin lispro (HUMALOG KWIKPEN) 100 UNIT/ML injection 11 units with breakfast,15 units at noon, and 14 units before evening meal 36 mL 3     lisinopril (PRINIVIL/ZESTRIL) 5 MG tablet Take 5 mg by mouth daily       metFORMIN (GLUCOPHAGE) 500 MG tablet TAKE 2 TABLETS (1,000 MG) BY MOUTH TWICE A DAY WITH MEALS 360 tablet 0     Multiple Vitamins-Minerals (MULTIVITAL PO) Take 1 tablet by mouth daily       potassium chloride ER (MICRO-K) 10 MEQ CR capsule Take 10 mEq by mouth daily        tamsulosin (FLOMAX) 0.4 MG capsule Take 0.4 mg by mouth daily       ALLERGIES  Allergies   Allergen Reactions     No Known Allergies      PAST MEDICAL HISTORY:  Past Medical History:   Diagnosis Date     Atrophic gastritis 9/8/2016     Atrophic gastritis 9/8/2016     Benign non-nodular prostatic hyperplasia with lower urinary tract symptoms 9/8/2016     History of diabetes mellitus      Hyperlipidemia LDL goal <100 9/8/2016     Obstructive sleep apnea syndrome 9/8/2016     SOB (shortness of breath) 5/30/2019     Type 2 diabetes mellitus without complication (H) 9/6/2016     Vitamin B12 deficiency (non anemic) 9/8/2016     PAST SURGICAL HISTORY:  Past Surgical History:   Procedure Laterality Date     CV HEART CATHETERIZATION WITH POSSIBLE  INTERVENTION N/A 6/4/2019    Procedure: Heart Catheterization with Possible Intervention;  Surgeon: Alex Bazan MD;  Location:  HEART CARDIAC CATH LAB     ESOPHAGOSCOPY, GASTROSCOPY, DUODENOSCOPY (EGD), COMBINED N/A 1/20/2015    Procedure: COMBINED ESOPHAGOSCOPY, GASTROSCOPY, DUODENOSCOPY (EGD), BIOPSY SINGLE OR MULTIPLE;  Surgeon: Allan Marroquin MD;  Location:  GI     EXCISE LESION TRUNK N/A 4/17/2019    Procedure: EXCISE CYST UPPER BACK;  Surgeon: Rene Murray MD;  Location:  OR     HERNIA REPAIR       HYDROCELECTOMY SCROTAL       ORTHOPEDIC SURGERY      both knees replaced     FAMILY HISTORY:  Family History   Problem Relation Age of Onset     Coronary Artery Disease Father 53     Cerebrovascular Disease Father      Diabetes Mother      Diabetes Brother      SOCIAL HISTORY:  Social History     Socioeconomic History     Marital status:      Spouse name: None     Number of children: None     Years of education: None     Highest education level: None   Occupational History     None   Social Needs     Financial resource strain: None     Food insecurity:     Worry: None     Inability: None     Transportation needs:     Medical: None     Non-medical: None   Tobacco Use     Smoking status: Never Smoker     Smokeless tobacco: Never Used   Substance and Sexual Activity     Alcohol use: Yes     Alcohol/week: 0.0 oz     Comment: 2 beers a month     Drug use: No     Sexual activity: Never   Lifestyle     Physical activity:     Days per week: None     Minutes per session: None     Stress: None   Relationships     Social connections:     Talks on phone: None     Gets together: None     Attends Yazdanism service: None     Active member of club or organization: None     Attends meetings of clubs or organizations: None     Relationship status: None     Intimate partner violence:     Fear of current or ex partner: None     Emotionally abused: None     Physically abused: None     Forced sexual  activity: None   Other Topics Concern     Parent/sibling w/ CABG, MI or angioplasty before 65F 55M? 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; walks dog for exercise      Review of Systems:  Skin:  Negative     Eyes:  Positive for glasses  ENT:  Negative    Respiratory:  Negative    Cardiovascular:  Negative;chest pain;palpitations;lightheadedness;dizziness;syncope or near-syncope;cyanosis Positive for;fatigue;edema  Gastroenterology: Negative    Genitourinary:  Negative    Musculoskeletal:  Negative    Neurologic:  Negative    Psychiatric:  Negative    Heme/Lymph/Imm:  Negative    Endocrine:  Positive for diabetes     Physical Exam:  Vitals: /60 (BP Location: Right arm, Cuff Size: Adult Large)   Pulse 56   Ht 1.829 m (6')   Wt 108.5 kg (239 lb 4.8 oz)   BMI 32.45 kg/m      Wt Readings from Last 4 Encounters:   07/03/19 108.5 kg (239 lb 4.8 oz)   06/12/19 110.2 kg (243 lb)   06/04/19 108.3 kg (238 lb 12.8 oz)   06/03/19 110.7 kg (244 lb)     GEN: well nourished, in no acute distress.  HEENT:  Pupils equal, round. Sclerae nonicteric.   C/V:  Regular rate and rhythm, no murmur, rub or gallop.    RESP: Respirations are unlabored. Clear to auscultation bilaterally without wheezing, rales, or rhonchi.  GI: Abdomen soft, nontender.  EXTREM: no LE edema.  NEURO: Alert and oriented, cooperative.  SKIN: Warm and dry.     Recent Lab Results:  LIPID RESULTS:  Lab Results   Component Value Date    CHOL 123 12/05/2016    HDL 50 12/05/2016    LDL 51 12/05/2016    TRIG 108 12/05/2016     LIVER ENZYME RESULTS:  No results found for: AST, ALT  CBC RESULTS:  Lab Results   Component Value Date    WBC 5.3 06/04/2019    RBC 4.94 06/04/2019    HGB 12.5 (L) 06/04/2019    HCT 39.5 (L) 06/04/2019    MCV 80 06/04/2019    MCH 25.3 (L) 06/04/2019    MCHC 31.6 06/04/2019    RDW 16.8 (H) 06/04/2019     06/04/2019     BMP RESULTS:  Lab Results   Component Value Date     (L) 06/07/2019     POTASSIUM 4.6 06/07/2019    CHLORIDE 101 06/07/2019    CO2 27 06/07/2019    ANIONGAP 9.6 06/07/2019     (H) 06/07/2019    BUN 16 06/07/2019    CR 1.26 06/07/2019    GFRESTIMATED 56 (L) 06/07/2019    GFRESTBLACK 67 06/07/2019    FATMATA 9.4 06/07/2019      A1C RESULTS:  Lab Results   Component Value Date    A1C 6.7 (H) 06/12/2017     INR RESULTS:  Lab Results   Component Value Date    INR 1.15 (H) 06/04/2019    INR 0.98 08/21/2006       New/Pertinent imaging results since last visit:  none    Thank you for allowing me to participate in the care of your patient.    Sincerely,     Stella Elliott PA-C     [facility]

## 2019-07-03 NOTE — PATIENT INSTRUCTIONS
Thank you for your [facility] visit today. Your provider has recommended the following:    Medication Changes:  No medication changes for now.  Recommendations:  We'll schedule a cardiac MRI for further evaluation of your heart.  Labs today to recheck your kidney function.  Follow-up:  We'll let you know what your MRI results show and arrange follow up from there.    Reminder:  Please bring in all current medications, over the counter supplements and vitamin bottles to your next appointment.            [facility]

## 2019-07-05 ENCOUNTER — HOSPITAL ENCOUNTER (OUTPATIENT)
Dept: CARDIOLOGY | Facility: CLINIC | Age: 79
Discharge: HOME | End: 2019-07-05
Attending: PHYSICIAN ASSISTANT | Admitting: PHYSICIAN ASSISTANT
Payer: MEDICARE

## 2019-07-05 VITALS — HEART RATE: 68 BPM | DIASTOLIC BLOOD PRESSURE: 70 MMHG | SYSTOLIC BLOOD PRESSURE: 109 MMHG

## 2019-07-05 DIAGNOSIS — I25.5 ISCHEMIC CARDIOMYOPATHY: ICD-10-CM

## 2019-07-05 PROCEDURE — 25500064 ZZH RX 255 OP 636: Performed by: PHYSICIAN ASSISTANT

## 2019-07-05 PROCEDURE — A9585 GADOBUTROL INJECTION: HCPCS | Performed by: PHYSICIAN ASSISTANT

## 2019-07-05 PROCEDURE — 75561 CARDIAC MRI FOR MORPH W/DYE: CPT

## 2019-07-05 PROCEDURE — 75561 CARDIAC MRI FOR MORPH W/DYE: CPT | Mod: 26 | Performed by: INTERNAL MEDICINE

## 2019-07-05 RX ORDER — GADOBUTROL 604.72 MG/ML
15 INJECTION INTRAVENOUS ONCE
Status: COMPLETED | OUTPATIENT
Start: 2019-07-05 | End: 2019-07-05

## 2019-07-05 RX ADMIN — GADOBUTROL 14 ML: 604.72 INJECTION INTRAVENOUS at 12:49

## 2019-07-15 ENCOUNTER — TELEPHONE (OUTPATIENT)
Dept: CARDIOLOGY | Facility: CLINIC | Age: 79
End: 2019-07-15

## 2019-07-15 NOTE — TELEPHONE ENCOUNTER
"Patient had cardiac MRI 7/5/19. Interpretation below:    \"Severely dilated left ventricle with severely reduced systolic function as described above.  Mild to moderate right ventricular dysfunction.  Late gadolinium enhancement is consistent with a prior  small infarction in the circumflex distribution.   Given the small amount of late enhancement and global nature of the patient's left ventricular dysfunction,  the cardiomyopathy is likely of mixed etiology.\"     Per Sujey Elliott PA-C:   \"Called and reviewed results with patient. Will discuss with interventionalist about possibly proceeding with coronary angiogram and intervention to the circumflex  and will follow up with Marshall next week.\"    Patient scheduled for cardiac rehab 7/16/19. Called to ask whether he should still go in light of MRI results. Will forward to Sujey for review and advisement.  Letitia Walsh, RN Care Coordinator    Addendum: Per Sujey, OK to go if he's feeling up to it or he can wait if he would like. Spoke with wife, states she will inform Marshall.     "

## 2019-07-16 ENCOUNTER — HOSPITAL ENCOUNTER (OUTPATIENT)
Dept: CARDIAC REHAB | Facility: CLINIC | Age: 79
End: 2019-07-16
Attending: PHYSICIAN ASSISTANT
Payer: MEDICARE

## 2019-07-16 DIAGNOSIS — I25.5 ISCHEMIC CARDIOMYOPATHY: ICD-10-CM

## 2019-07-16 PROCEDURE — 40000116 ZZH STATISTIC OP CR VISIT

## 2019-07-16 PROCEDURE — 93798 PHYS/QHP OP CAR RHAB W/ECG: CPT

## 2019-07-16 PROCEDURE — 93797 PHYS/QHP OP CAR RHAB WO ECG: CPT | Mod: XU

## 2019-07-16 PROCEDURE — 40000575 ZZH STATISTIC OP CARDIAC VISIT #2

## 2019-07-19 ENCOUNTER — HOSPITAL ENCOUNTER (OUTPATIENT)
Dept: CARDIAC REHAB | Facility: CLINIC | Age: 79
End: 2019-07-19
Attending: INTERNAL MEDICINE
Payer: MEDICARE

## 2019-07-19 PROCEDURE — 93798 PHYS/QHP OP CAR RHAB W/ECG: CPT | Performed by: OCCUPATIONAL THERAPIST

## 2019-07-19 PROCEDURE — 40000116 ZZH STATISTIC OP CR VISIT: Performed by: OCCUPATIONAL THERAPIST

## 2019-07-24 ENCOUNTER — HOSPITAL ENCOUNTER (OUTPATIENT)
Dept: CARDIAC REHAB | Facility: CLINIC | Age: 79
End: 2019-07-24
Attending: INTERNAL MEDICINE
Payer: MEDICARE

## 2019-07-24 PROCEDURE — 93798 PHYS/QHP OP CAR RHAB W/ECG: CPT | Performed by: OCCUPATIONAL THERAPIST

## 2019-07-24 PROCEDURE — 40000116 ZZH STATISTIC OP CR VISIT: Performed by: OCCUPATIONAL THERAPIST

## 2019-07-31 ENCOUNTER — HOSPITAL ENCOUNTER (OUTPATIENT)
Dept: CARDIAC REHAB | Facility: CLINIC | Age: 79
End: 2019-07-31
Attending: INTERNAL MEDICINE
Payer: MEDICARE

## 2019-07-31 PROCEDURE — 40000116 ZZH STATISTIC OP CR VISIT

## 2019-07-31 PROCEDURE — 93798 PHYS/QHP OP CAR RHAB W/ECG: CPT

## 2019-08-02 ENCOUNTER — HOSPITAL ENCOUNTER (OUTPATIENT)
Dept: CARDIAC REHAB | Facility: CLINIC | Age: 79
End: 2019-08-02
Attending: INTERNAL MEDICINE
Payer: MEDICARE

## 2019-08-02 PROCEDURE — 93798 PHYS/QHP OP CAR RHAB W/ECG: CPT | Performed by: OCCUPATIONAL THERAPIST

## 2019-08-02 PROCEDURE — 40000116 ZZH STATISTIC OP CR VISIT: Performed by: OCCUPATIONAL THERAPIST

## 2019-08-09 ENCOUNTER — HOSPITAL ENCOUNTER (OUTPATIENT)
Dept: CARDIAC REHAB | Facility: CLINIC | Age: 79
End: 2019-08-09
Attending: INTERNAL MEDICINE
Payer: MEDICARE

## 2019-08-09 PROCEDURE — 40000116 ZZH STATISTIC OP CR VISIT

## 2019-08-09 PROCEDURE — 93798 PHYS/QHP OP CAR RHAB W/ECG: CPT

## 2019-08-12 ENCOUNTER — OFFICE VISIT (OUTPATIENT)
Dept: CARDIOLOGY | Facility: CLINIC | Age: 79
End: 2019-08-12
Payer: MEDICARE

## 2019-08-12 VITALS
WEIGHT: 240 LBS | HEART RATE: 65 BPM | DIASTOLIC BLOOD PRESSURE: 64 MMHG | BODY MASS INDEX: 32.51 KG/M2 | HEIGHT: 72 IN | SYSTOLIC BLOOD PRESSURE: 105 MMHG

## 2019-08-12 DIAGNOSIS — I50.22 CHRONIC SYSTOLIC CONGESTIVE HEART FAILURE (H): ICD-10-CM

## 2019-08-12 DIAGNOSIS — I25.82 CORONARY ARTERY CHRONIC TOTAL OCCLUSION: Primary | ICD-10-CM

## 2019-08-12 DIAGNOSIS — I25.5 ISCHEMIC CARDIOMYOPATHY: ICD-10-CM

## 2019-08-12 DIAGNOSIS — I42.9 CARDIOMYOPATHY, IDIOPATHIC (H): ICD-10-CM

## 2019-08-12 PROCEDURE — 99214 OFFICE O/P EST MOD 30 MIN: CPT | Performed by: INTERNAL MEDICINE

## 2019-08-12 RX ORDER — CARVEDILOL 6.25 MG/1
6.25 TABLET ORAL 2 TIMES DAILY WITH MEALS
Qty: 63 TABLET | Refills: 3 | Status: SHIPPED | OUTPATIENT
Start: 2019-08-12 | End: 2019-09-26

## 2019-08-12 ASSESSMENT — MIFFLIN-ST. JEOR: SCORE: 1851.63

## 2019-08-12 NOTE — LETTER
8/12/2019    Merlin Emery Brown, MD  [facility] [address]  [facility] [address]    RE: Bryant Spicer Jonah       Dear Colleague,    I had the pleasure of seeing Bryant Mckenzie in the [facility].    HPI and Plan:   This 79-year-old gentleman was recently found to have a mixed ischemic and nonischemic dilated cardiomyopathy with acute systolic congestive heart failure.  Angiography demonstrated also circumflex .  He is referred for further evaluation with these issues.      He presented with worsening heart failure with dyspnea on exertion and other heart failure symptoms.  No real chest discomfort.  He has been on treatment now for a little over 2 months.  With diuretics he states he lost 15 pounds with a marked improvement in his dyspnea on exertion but he still has limiting dyspnea on exertion and fatigue.  So far blood pressure is limited his titration of cardiomyopathy medications somewhat.  He does not recall chest discomfort frankly or antonieta ischemic symptoms other than the dyspnea on exertion.    He is currently without orthopnea, PND, edema, orthostasis.  He is watching his sodium and weighing himself daily.  Weight today is unchanged from 5 weeks ago.      I personally reviewed his coronary angiogram.  This showed minimal LAD disease with some left to left collaterals to the circumflex, a chronically occluded proximal circumflex with possible micro-revascularization of the , moderate RCA disease but the IFR was not significant in the RCA.  There are also right to left collaterals.      Exam today detailed below.  In summary:  Blood pressure control 105/64.  Pulse 65 and regular.  Lungs-clear  Cardiac-no JVD or HJR.  There is a soft systolic ejection murmur  Extremities-no edema     I had a long conversation with him and his wife and his son.  I showed him the angiogram.  I discussed the issues with with the significance of his .  An MRI was  reviewed with him showing that there was a small nontransmural circumflex MI but mostly viable circumflex flex territory as well as viable LAD and RCA territories.  There is a dilated cardiomyopathy with severe decreased LV function.  Therefore he has primarily a nonischemic cardiomyopathy and it is difficult to know the relative contribution to this and to his heart failure control from the .  I discussed the indications risks and alternatives of  PCI.  Angiographically I think he likely has a highly sick likely successful  PCI of 90 to 95% and low risk of this likely using an antegrade approach but did discuss sometimes the possibilities of a retrograde approach and the higher risks with that.  However I do not think his anatomy is a good candidate for retrograde approach.    I think in the long run he may benefit both symptomatically and in terms of his LV function from PCI all this is hard to be definitive about.  I think he should undergo further titration of his cardiomyopathy medications in the meantime.  I would like to titrate his carvedilol to 6.25 mg daily for a month assuming his blood pressure tolerates this, and cautioned him how to watch for orthostasis.  He has follow-up in a month and if he is doing well I think he could then be titrated to 9.375 twice daily.  I would like to see how he is doing with this but again I think in the long run we should strongly consider attempts at  PCI.  I will tentatively have him placed on the schedule for several months from now as we are full right now until then.  They are comfortable with all of this after our discussion about the significance and pathophysiology of 's.      Pressure/plan    1-mixed cardiomyopathy.  Titrate carvedilol to 6.25 twice daily as above.  Keep follow-up appoint with Dr. Phillips in 1 month and consider further titration then.    2-tentatively place him on the schedule for  in a couple of months with an echo around that  time and a clinic visit prior to that with Sujey Elliott to assess his progress and course.    3-chronic systolic heart failure.  Improved symptomatically.  No evidence of volume overload today.  Plans as above.      No orders of the defined types were placed in this encounter.      Orders Placed This Encounter   Medications     carvedilol (COREG) 6.25 MG tablet     Sig: Take 1 tablet (6.25 mg) by mouth 2 times daily (with meals)     Dispense:  63 tablet     Refill:  3       Medications Discontinued During This Encounter   Medication Reason     carvedilol (COREG) 3.125 MG tablet          Encounter Diagnoses   Name Primary?     Ischemic cardiomyopathy      Chronic systolic congestive heart failure (H)      Cardiomyopathy, idiopathic (H)      Coronary artery chronic total occlusion Yes       CURRENT MEDICATIONS:  Current Outpatient Medications   Medication Sig Dispense Refill     ASPIRIN 81 PO Take by mouth daily        atorvastatin (LIPITOR) 40 MG tablet Take 1 tablet (40 mg) by mouth daily 90 tablet 3     carvedilol (COREG) 6.25 MG tablet Take 1 tablet (6.25 mg) by mouth 2 times daily (with meals) 63 tablet 3     furosemide (LASIX) 20 MG tablet Take 20 mg by mouth daily       insulin glargine (LANTUS) 100 UNIT/ML injection Inject 16 Units Subcutaneous At Bedtime 6/3 Took Lantus 8 Units at bedtime 15 mL 3     insulin lispro (HUMALOG KWIKPEN) 100 UNIT/ML injection 11 units with breakfast,15 units at noon, and 14 units before evening meal 36 mL 3     lisinopril (PRINIVIL/ZESTRIL) 5 MG tablet Take 5 mg by mouth daily       metFORMIN (GLUCOPHAGE) 500 MG tablet TAKE 2 TABLETS (1,000 MG) BY MOUTH TWICE A DAY WITH MEALS 360 tablet 0     Multiple Vitamins-Minerals (MULTIVITAL PO) Take 1 tablet by mouth daily       potassium chloride ER (MICRO-K) 10 MEQ CR capsule Take 10 mEq by mouth daily        tamsulosin (FLOMAX) 0.4 MG capsule Take 0.4 mg by mouth daily       Ferrous Sulfate (IRON) 325 (65 Fe) MG tablet Take 1 tablet by  mouth daily         ALLERGIES     Allergies   Allergen Reactions     No Known Allergies        PAST MEDICAL HISTORY:  Past Medical History:   Diagnosis Date     Atrophic gastritis 9/8/2016     Atrophic gastritis 9/8/2016     Benign non-nodular prostatic hyperplasia with lower urinary tract symptoms 9/8/2016     History of diabetes mellitus      Hyperlipidemia LDL goal <100 9/8/2016     Obstructive sleep apnea syndrome 9/8/2016     SOB (shortness of breath) 5/30/2019     Type 2 diabetes mellitus without complication (H) 9/6/2016     Vitamin B12 deficiency (non anemic) 9/8/2016       PAST SURGICAL HISTORY:  Past Surgical History:   Procedure Laterality Date     CV HEART CATHETERIZATION WITH POSSIBLE INTERVENTION N/A 6/4/2019    Procedure: Heart Catheterization with Possible Intervention;  Surgeon: Alex Bazan MD;  Location:  HEART CARDIAC CATH LAB     ESOPHAGOSCOPY, GASTROSCOPY, DUODENOSCOPY (EGD), COMBINED N/A 1/20/2015    Procedure: COMBINED ESOPHAGOSCOPY, GASTROSCOPY, DUODENOSCOPY (EGD), BIOPSY SINGLE OR MULTIPLE;  Surgeon: Allan Marroquin MD;  Location:  GI     EXCISE LESION TRUNK N/A 4/17/2019    Procedure: EXCISE CYST UPPER BACK;  Surgeon: Rene Murray MD;  Location:  OR     HERNIA REPAIR       HYDROCELECTOMY SCROTAL       ORTHOPEDIC SURGERY      both knees replaced       FAMILY HISTORY:  Family History   Problem Relation Age of Onset     Coronary Artery Disease Father 53     Cerebrovascular Disease Father      Diabetes Mother      Diabetes Brother        SOCIAL HISTORY:  Social History     Socioeconomic History     Marital status:      Spouse name: None     Number of children: None     Years of education: None     Highest education level: None   Occupational History     None   Social Needs     Financial resource strain: None     Food insecurity:     Worry: None     Inability: None     Transportation needs:     Medical: None     Non-medical: None   Tobacco Use     Smoking  status: Never Smoker     Smokeless tobacco: Never Used   Substance and Sexual Activity     Alcohol use: Not Currently     Alcohol/week: 0.0 oz     Drug use: No     Sexual activity: Never   Lifestyle     Physical activity:     Days per week: None     Minutes per session: None     Stress: None   Relationships     Social connections:     Talks on phone: None     Gets together: None     Attends Taoist service: None     Active member of club or organization: None     Attends meetings of clubs or organizations: None     Relationship status: None     Intimate partner violence:     Fear of current or ex partner: None     Emotionally abused: None     Physically abused: None     Forced sexual activity: None   Other Topics Concern     Parent/sibling w/ CABG, MI or angioplasty before 65F 55M? 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; walks dog for exercise        Review of Systems:  Skin:  Negative       Eyes:  Positive for glasses    ENT:  Negative      Respiratory:  Positive for dyspnea on exertion     Cardiovascular:    edema;Positive for;fatigue    Gastroenterology: Negative      Genitourinary:  Negative      Musculoskeletal:  Negative      Neurologic:  Negative      Psychiatric:  Negative      Heme/Lymph/Imm:  Negative      Endocrine:  Positive for diabetes      Physical Exam:  Vitals: /64   Pulse 65   Ht 1.829 m (6')   Wt 108.9 kg (240 lb)   BMI 32.55 kg/m       Constitutional:  cooperative, alert and oriented, well developed, well nourished, in no acute distress        Skin:  warm and dry to the touch, no apparent skin lesions or masses noted          Head:  normocephalic, no masses or lesions        Eyes:  pupils equal and round;sclera white;EOMS intact        Lymph:      ENT:  no pallor or cyanosis        Neck:  JVP normal        Respiratory:  normal breath sounds, clear to auscultation, normal A-P diameter, normal symmetry, normal respiratory excursion, no use of accessory  muscles         Cardiac: regular rhythm;normal S1 and S2;no S3 or S4   distant heart sounds     systolic ejection murmur;RUSB;LLSB;grade 2      pulses full and equal                                        GI:  abdomen soft;non-tender;no HSM        Extremities and Muscular Skeletal:  no deformities, clubbing, cyanosis, erythema observed;no edema              Neurological:  no gross motor deficits        Psych:  affect appropriate, oriented to time, person and place        CC  No referring provider defined for this encounter.                Thank you for allowing me to participate in the care of your patient.      Sincerely,     Timmy Rivera MD     [facility]

## 2019-08-12 NOTE — PROGRESS NOTES
HPI and Plan:   This 79-year-old gentleman was recently found to have a mixed ischemic and nonischemic dilated cardiomyopathy with acute systolic congestive heart failure.  Angiography demonstrated also circumflex .  He is referred for further evaluation with these issues.      He presented with worsening heart failure with dyspnea on exertion and other heart failure symptoms.  No real chest discomfort.  He has been on treatment now for a little over 2 months.  With diuretics he states he lost 15 pounds with a marked improvement in his dyspnea on exertion but he still has limiting dyspnea on exertion and fatigue.  So far blood pressure is limited his titration of cardiomyopathy medications somewhat.  He does not recall chest discomfort frankly or antonieta ischemic symptoms other than the dyspnea on exertion.    He is currently without orthopnea, PND, edema, orthostasis.  He is watching his sodium and weighing himself daily.  Weight today is unchanged from 5 weeks ago.      I personally reviewed his coronary angiogram.  This showed minimal LAD disease with some left to left collaterals to the circumflex, a chronically occluded proximal circumflex with possible micro-revascularization of the , moderate RCA disease but the IFR was not significant in the RCA.  There are also right to left collaterals.      Exam today detailed below.  In summary:  Blood pressure control 105/64.  Pulse 65 and regular.  Lungs-clear  Cardiac-no JVD or HJR.  There is a soft systolic ejection murmur  Extremities-no edema     I had a long conversation with him and his wife and his son.  I showed him the angiogram.  I discussed the issues with with the significance of his .  An MRI was reviewed with him showing that there was a small nontransmural circumflex MI but mostly viable circumflex flex territory as well as viable LAD and RCA territories.  There is a dilated cardiomyopathy with severe decreased LV function.  Therefore he has  primarily a nonischemic cardiomyopathy and it is difficult to know the relative contribution to this and to his heart failure control from the .  I discussed the indications risks and alternatives of  PCI.  Angiographically I think he likely has a highly sick likely successful  PCI of 90 to 95% and low risk of this likely using an antegrade approach but did discuss sometimes the possibilities of a retrograde approach and the higher risks with that.  However I do not think his anatomy is a good candidate for retrograde approach.    I think in the long run he may benefit both symptomatically and in terms of his LV function from PCI all this is hard to be definitive about.  I think he should undergo further titration of his cardiomyopathy medications in the meantime.  I would like to titrate his carvedilol to 6.25 mg daily for a month assuming his blood pressure tolerates this, and cautioned him how to watch for orthostasis.  He has follow-up in a month and if he is doing well I think he could then be titrated to 9.375 twice daily.  I would like to see how he is doing with this but again I think in the long run we should strongly consider attempts at  PCI.  I will tentatively have him placed on the schedule for several months from now as we are full right now until then.  They are comfortable with all of this after our discussion about the significance and pathophysiology of 's.      Pressure/plan    1-mixed cardiomyopathy.  Titrate carvedilol to 6.25 twice daily as above.  Keep follow-up appoint with Dr. Phillips in 1 month and consider further titration then.    2-tentatively place him on the schedule for  in a couple of months with an echo around that time and a clinic visit prior to that with Sujey Elliott to assess his progress and course.    3-chronic systolic heart failure.  Improved symptomatically.  No evidence of volume overload today.  Plans as above.      No orders of the defined types were  placed in this encounter.      Orders Placed This Encounter   Medications     carvedilol (COREG) 6.25 MG tablet     Sig: Take 1 tablet (6.25 mg) by mouth 2 times daily (with meals)     Dispense:  63 tablet     Refill:  3       Medications Discontinued During This Encounter   Medication Reason     carvedilol (COREG) 3.125 MG tablet          Encounter Diagnoses   Name Primary?     Ischemic cardiomyopathy      Chronic systolic congestive heart failure (H)      Cardiomyopathy, idiopathic (H)      Coronary artery chronic total occlusion Yes       CURRENT MEDICATIONS:  Current Outpatient Medications   Medication Sig Dispense Refill     ASPIRIN 81 PO Take by mouth daily        atorvastatin (LIPITOR) 40 MG tablet Take 1 tablet (40 mg) by mouth daily 90 tablet 3     carvedilol (COREG) 6.25 MG tablet Take 1 tablet (6.25 mg) by mouth 2 times daily (with meals) 63 tablet 3     furosemide (LASIX) 20 MG tablet Take 20 mg by mouth daily       insulin glargine (LANTUS) 100 UNIT/ML injection Inject 16 Units Subcutaneous At Bedtime 6/3 Took Lantus 8 Units at bedtime 15 mL 3     insulin lispro (HUMALOG KWIKPEN) 100 UNIT/ML injection 11 units with breakfast,15 units at noon, and 14 units before evening meal 36 mL 3     lisinopril (PRINIVIL/ZESTRIL) 5 MG tablet Take 5 mg by mouth daily       metFORMIN (GLUCOPHAGE) 500 MG tablet TAKE 2 TABLETS (1,000 MG) BY MOUTH TWICE A DAY WITH MEALS 360 tablet 0     Multiple Vitamins-Minerals (MULTIVITAL PO) Take 1 tablet by mouth daily       potassium chloride ER (MICRO-K) 10 MEQ CR capsule Take 10 mEq by mouth daily        tamsulosin (FLOMAX) 0.4 MG capsule Take 0.4 mg by mouth daily       Ferrous Sulfate (IRON) 325 (65 Fe) MG tablet Take 1 tablet by mouth daily         ALLERGIES     Allergies   Allergen Reactions     No Known Allergies        PAST MEDICAL HISTORY:  Past Medical History:   Diagnosis Date     Atrophic gastritis 9/8/2016     Atrophic gastritis 9/8/2016     Benign non-nodular prostatic  hyperplasia with lower urinary tract symptoms 9/8/2016     History of diabetes mellitus      Hyperlipidemia LDL goal <100 9/8/2016     Obstructive sleep apnea syndrome 9/8/2016     SOB (shortness of breath) 5/30/2019     Type 2 diabetes mellitus without complication (H) 9/6/2016     Vitamin B12 deficiency (non anemic) 9/8/2016       PAST SURGICAL HISTORY:  Past Surgical History:   Procedure Laterality Date     CV HEART CATHETERIZATION WITH POSSIBLE INTERVENTION N/A 6/4/2019    Procedure: Heart Catheterization with Possible Intervention;  Surgeon: Alex Bazna MD;  Location:  HEART CARDIAC CATH LAB     ESOPHAGOSCOPY, GASTROSCOPY, DUODENOSCOPY (EGD), COMBINED N/A 1/20/2015    Procedure: COMBINED ESOPHAGOSCOPY, GASTROSCOPY, DUODENOSCOPY (EGD), BIOPSY SINGLE OR MULTIPLE;  Surgeon: Allan Marroquin MD;  Location:  GI     EXCISE LESION TRUNK N/A 4/17/2019    Procedure: EXCISE CYST UPPER BACK;  Surgeon: Rene Murray MD;  Location:  OR     HERNIA REPAIR       HYDROCELECTOMY SCROTAL       ORTHOPEDIC SURGERY      both knees replaced       FAMILY HISTORY:  Family History   Problem Relation Age of Onset     Coronary Artery Disease Father 53     Cerebrovascular Disease Father      Diabetes Mother      Diabetes Brother        SOCIAL HISTORY:  Social History     Socioeconomic History     Marital status:      Spouse name: None     Number of children: None     Years of education: None     Highest education level: None   Occupational History     None   Social Needs     Financial resource strain: None     Food insecurity:     Worry: None     Inability: None     Transportation needs:     Medical: None     Non-medical: None   Tobacco Use     Smoking status: Never Smoker     Smokeless tobacco: Never Used   Substance and Sexual Activity     Alcohol use: Not Currently     Alcohol/week: 0.0 oz     Drug use: No     Sexual activity: Never   Lifestyle     Physical activity:     Days per week: None     Minutes  per session: None     Stress: None   Relationships     Social connections:     Talks on phone: None     Gets together: None     Attends Nondenominational service: None     Active member of club or organization: None     Attends meetings of clubs or organizations: None     Relationship status: None     Intimate partner violence:     Fear of current or ex partner: None     Emotionally abused: None     Physically abused: None     Forced sexual activity: None   Other Topics Concern     Parent/sibling w/ CABG, MI or angioplasty before 65F 55M? 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; walks dog for exercise        Review of Systems:  Skin:  Negative       Eyes:  Positive for glasses    ENT:  Negative      Respiratory:  Positive for dyspnea on exertion     Cardiovascular:    edema;Positive for;fatigue    Gastroenterology: Negative      Genitourinary:  Negative      Musculoskeletal:  Negative      Neurologic:  Negative      Psychiatric:  Negative      Heme/Lymph/Imm:  Negative      Endocrine:  Positive for diabetes      Physical Exam:  Vitals: /64   Pulse 65   Ht 1.829 m (6')   Wt 108.9 kg (240 lb)   BMI 32.55 kg/m      Constitutional:  cooperative, alert and oriented, well developed, well nourished, in no acute distress        Skin:  warm and dry to the touch, no apparent skin lesions or masses noted          Head:  normocephalic, no masses or lesions        Eyes:  pupils equal and round;sclera white;EOMS intact        Lymph:      ENT:  no pallor or cyanosis        Neck:  JVP normal        Respiratory:  normal breath sounds, clear to auscultation, normal A-P diameter, normal symmetry, normal respiratory excursion, no use of accessory muscles         Cardiac: regular rhythm;normal S1 and S2;no S3 or S4   distant heart sounds     systolic ejection murmur;RUSB;LLSB;grade 2      pulses full and equal                                        GI:  abdomen soft;non-tender;no HSM        Extremities  and Muscular Skeletal:  no deformities, clubbing, cyanosis, erythema observed;no edema              Neurological:  no gross motor deficits        Psych:  affect appropriate, oriented to time, person and place        CC  No referring provider defined for this encounter.

## 2019-08-13 DIAGNOSIS — I25.82 CORONARY ARTERY CHRONIC TOTAL OCCLUSION: Primary | ICD-10-CM

## 2019-08-13 DIAGNOSIS — I42.9 CARDIOMYOPATHY, IDIOPATHIC (H): ICD-10-CM

## 2019-08-16 ENCOUNTER — HOSPITAL ENCOUNTER (OUTPATIENT)
Dept: CARDIAC REHAB | Facility: CLINIC | Age: 79
End: 2019-08-16
Attending: INTERNAL MEDICINE
Payer: MEDICARE

## 2019-08-16 PROCEDURE — 40000116 ZZH STATISTIC OP CR VISIT

## 2019-08-16 PROCEDURE — 93798 PHYS/QHP OP CAR RHAB W/ECG: CPT

## 2019-08-19 ENCOUNTER — HOSPITAL ENCOUNTER (OUTPATIENT)
Dept: CARDIAC REHAB | Facility: CLINIC | Age: 79
End: 2019-08-19
Attending: INTERNAL MEDICINE
Payer: MEDICARE

## 2019-08-19 PROCEDURE — 40000116 ZZH STATISTIC OP CR VISIT

## 2019-08-19 PROCEDURE — 93798 PHYS/QHP OP CAR RHAB W/ECG: CPT

## 2019-08-21 ENCOUNTER — HOSPITAL ENCOUNTER (OUTPATIENT)
Dept: CARDIAC REHAB | Facility: CLINIC | Age: 79
End: 2019-08-21
Attending: INTERNAL MEDICINE
Payer: MEDICARE

## 2019-08-21 PROCEDURE — 93798 PHYS/QHP OP CAR RHAB W/ECG: CPT | Performed by: REHABILITATION PRACTITIONER

## 2019-08-21 PROCEDURE — 40000116 ZZH STATISTIC OP CR VISIT: Performed by: REHABILITATION PRACTITIONER

## 2019-08-23 ENCOUNTER — HOSPITAL ENCOUNTER (OUTPATIENT)
Dept: CARDIAC REHAB | Facility: CLINIC | Age: 79
End: 2019-08-23
Attending: INTERNAL MEDICINE
Payer: MEDICARE

## 2019-08-23 PROCEDURE — 93798 PHYS/QHP OP CAR RHAB W/ECG: CPT

## 2019-08-23 PROCEDURE — 93797 PHYS/QHP OP CAR RHAB WO ECG: CPT

## 2019-08-23 PROCEDURE — 40000116 ZZH STATISTIC OP CR VISIT

## 2019-08-23 PROCEDURE — 40000575 ZZH STATISTIC OP CARDIAC VISIT #2

## 2019-08-26 ENCOUNTER — HOSPITAL ENCOUNTER (OUTPATIENT)
Dept: CARDIAC REHAB | Facility: CLINIC | Age: 79
End: 2019-08-26
Attending: INTERNAL MEDICINE
Payer: MEDICARE

## 2019-08-26 PROCEDURE — 40000116 ZZH STATISTIC OP CR VISIT

## 2019-08-26 PROCEDURE — 93798 PHYS/QHP OP CAR RHAB W/ECG: CPT

## 2019-08-28 ENCOUNTER — HOSPITAL ENCOUNTER (OUTPATIENT)
Dept: CARDIAC REHAB | Facility: CLINIC | Age: 79
End: 2019-08-28
Attending: INTERNAL MEDICINE
Payer: MEDICARE

## 2019-08-28 PROCEDURE — 40000116 ZZH STATISTIC OP CR VISIT

## 2019-08-28 PROCEDURE — 93798 PHYS/QHP OP CAR RHAB W/ECG: CPT

## 2019-08-30 ENCOUNTER — HOSPITAL ENCOUNTER (OUTPATIENT)
Dept: CARDIAC REHAB | Facility: CLINIC | Age: 79
End: 2019-08-30
Attending: INTERNAL MEDICINE
Payer: MEDICARE

## 2019-08-30 PROCEDURE — 93798 PHYS/QHP OP CAR RHAB W/ECG: CPT

## 2019-08-30 PROCEDURE — 40000116 ZZH STATISTIC OP CR VISIT

## 2019-09-04 ENCOUNTER — HOSPITAL ENCOUNTER (OUTPATIENT)
Dept: CARDIAC REHAB | Facility: CLINIC | Age: 79
End: 2019-09-04
Attending: INTERNAL MEDICINE
Payer: MEDICARE

## 2019-09-04 PROCEDURE — 93798 PHYS/QHP OP CAR RHAB W/ECG: CPT | Performed by: REHABILITATION PRACTITIONER

## 2019-09-04 PROCEDURE — 40000116 ZZH STATISTIC OP CR VISIT: Performed by: REHABILITATION PRACTITIONER

## 2019-09-06 ENCOUNTER — HOSPITAL ENCOUNTER (OUTPATIENT)
Dept: CARDIAC REHAB | Facility: CLINIC | Age: 79
End: 2019-09-06
Attending: INTERNAL MEDICINE
Payer: MEDICARE

## 2019-09-06 PROCEDURE — 93798 PHYS/QHP OP CAR RHAB W/ECG: CPT

## 2019-09-06 PROCEDURE — 40000116 ZZH STATISTIC OP CR VISIT

## 2019-09-09 ENCOUNTER — HOSPITAL ENCOUNTER (OUTPATIENT)
Dept: CARDIAC REHAB | Facility: CLINIC | Age: 79
End: 2019-09-09
Attending: INTERNAL MEDICINE
Payer: MEDICARE

## 2019-09-09 PROCEDURE — 40000116 ZZH STATISTIC OP CR VISIT

## 2019-09-09 PROCEDURE — 93798 PHYS/QHP OP CAR RHAB W/ECG: CPT

## 2019-09-11 ENCOUNTER — HOSPITAL ENCOUNTER (OUTPATIENT)
Dept: CARDIAC REHAB | Facility: CLINIC | Age: 79
End: 2019-09-11
Attending: INTERNAL MEDICINE
Payer: MEDICARE

## 2019-09-11 PROCEDURE — 93798 PHYS/QHP OP CAR RHAB W/ECG: CPT

## 2019-09-11 PROCEDURE — 40000116 ZZH STATISTIC OP CR VISIT

## 2019-09-13 ENCOUNTER — HOSPITAL ENCOUNTER (OUTPATIENT)
Dept: CARDIAC REHAB | Facility: CLINIC | Age: 79
End: 2019-09-13
Attending: INTERNAL MEDICINE
Payer: MEDICARE

## 2019-09-13 PROCEDURE — 93798 PHYS/QHP OP CAR RHAB W/ECG: CPT

## 2019-09-13 PROCEDURE — 40000116 ZZH STATISTIC OP CR VISIT

## 2019-09-16 ENCOUNTER — HOSPITAL ENCOUNTER (OUTPATIENT)
Dept: CARDIAC REHAB | Facility: CLINIC | Age: 79
End: 2019-09-16
Attending: INTERNAL MEDICINE
Payer: MEDICARE

## 2019-09-16 PROCEDURE — 93798 PHYS/QHP OP CAR RHAB W/ECG: CPT

## 2019-09-16 PROCEDURE — 40000116 ZZH STATISTIC OP CR VISIT

## 2019-09-18 ENCOUNTER — HOSPITAL ENCOUNTER (OUTPATIENT)
Dept: CARDIAC REHAB | Facility: CLINIC | Age: 79
End: 2019-09-18
Attending: INTERNAL MEDICINE
Payer: MEDICARE

## 2019-09-18 PROCEDURE — 40000116 ZZH STATISTIC OP CR VISIT: Performed by: REHABILITATION PRACTITIONER

## 2019-09-18 PROCEDURE — 93798 PHYS/QHP OP CAR RHAB W/ECG: CPT | Performed by: REHABILITATION PRACTITIONER

## 2019-09-20 ENCOUNTER — HOSPITAL ENCOUNTER (OUTPATIENT)
Dept: CARDIAC REHAB | Facility: CLINIC | Age: 79
End: 2019-09-20
Attending: INTERNAL MEDICINE
Payer: MEDICARE

## 2019-09-20 PROCEDURE — 93798 PHYS/QHP OP CAR RHAB W/ECG: CPT | Performed by: REHABILITATION PRACTITIONER

## 2019-09-20 PROCEDURE — 40000116 ZZH STATISTIC OP CR VISIT: Performed by: REHABILITATION PRACTITIONER

## 2019-09-23 ENCOUNTER — HOSPITAL ENCOUNTER (OUTPATIENT)
Dept: CARDIAC REHAB | Facility: CLINIC | Age: 79
End: 2019-09-23
Attending: PHYSICIAN ASSISTANT
Payer: MEDICARE

## 2019-09-23 PROCEDURE — 40000116 ZZH STATISTIC OP CR VISIT

## 2019-09-23 PROCEDURE — 93798 PHYS/QHP OP CAR RHAB W/ECG: CPT

## 2019-09-25 ENCOUNTER — HOSPITAL ENCOUNTER (OUTPATIENT)
Dept: CARDIAC REHAB | Facility: CLINIC | Age: 79
End: 2019-09-25
Attending: PHYSICIAN ASSISTANT
Payer: MEDICARE

## 2019-09-25 PROCEDURE — 40000116 ZZH STATISTIC OP CR VISIT

## 2019-09-25 PROCEDURE — 93798 PHYS/QHP OP CAR RHAB W/ECG: CPT

## 2019-09-26 ENCOUNTER — OFFICE VISIT (OUTPATIENT)
Dept: CARDIOLOGY | Facility: CLINIC | Age: 79
End: 2019-09-26
Attending: PHYSICIAN ASSISTANT
Payer: MEDICARE

## 2019-09-26 VITALS
HEART RATE: 62 BPM | BODY MASS INDEX: 32.64 KG/M2 | HEIGHT: 72 IN | SYSTOLIC BLOOD PRESSURE: 136 MMHG | WEIGHT: 241 LBS | DIASTOLIC BLOOD PRESSURE: 68 MMHG

## 2019-09-26 DIAGNOSIS — I25.5 ISCHEMIC CARDIOMYOPATHY: ICD-10-CM

## 2019-09-26 PROCEDURE — 99214 OFFICE O/P EST MOD 30 MIN: CPT | Performed by: INTERNAL MEDICINE

## 2019-09-26 RX ORDER — CARVEDILOL 6.25 MG/1
6.25 TABLET ORAL 2 TIMES DAILY WITH MEALS
Qty: 180 TABLET | Refills: 3 | Status: SHIPPED | OUTPATIENT
Start: 2019-09-26 | End: 2019-10-09

## 2019-09-26 ASSESSMENT — MIFFLIN-ST. JEOR: SCORE: 1856.17

## 2019-09-26 NOTE — PROGRESS NOTES
HPI and Plan:   Today I had the pleasure of seeing Bryant Mckenzie at [facility] in [place]. He is a pleasant 79 year old patient with diabetes whom I initially saw a couple of months ago for dyspnea on exertion.  The work-up at that time revealed severe left ventricular dysfunction with an EF of 30%.  As part of the work-up he underwent coronary angiogram which showed  of the proximal circumflex and moderate disease in the RCA which was negative by FFR.  A cardiac MRI showed severe LV dilation with ejection fraction of 21% and mild to moderately depressed RV function.  There was scar in the inferolateral wall consistent with LCx infarct but most of the tissue was viable.  He was then seen by Dr. Rivera and given good viability in the circumflex distribution he recommended PCI of the circumflex .  The patient is scheduled for the procedure on 10/24/2019.  In the interim, we have started him on heart failure medications up titration of which have been somewhat limited due to soft blood pressure.  He is currently on 5 mg of lisinopril and 6.25 mg of Coreg twice daily.  He is taking 40 mg of Lasix once a day and reports that his lower extremity edema has significant he is also scheduled to see Stella with a repeat transthoracic echocardiogram.  He also has mild aortic stenosis with a mean gradient of 17 and aortic valve area of 1.8 cm .  Today he presents to the clinic with his sons and his wife.  He reports that he has been doing well and has noticed improvement in his functional capacity and lower extremity edema.  He still has mild edema and has increased the dose of Lasix to 40 mg a week ago.  He is taking part in cardiac rehabilitation and is able to exerciseon the treadmill for 15 to 20 minutes followed by 15 to 20-minute workout on the recumbent bike without any difficulty.      Assessment and plan    1.  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  He is on appropriate cardiac medications up titration of which have  been somewhat restricted by his soft blood pressure.  He takes his blood pressure at home daily and reports it to be in 100s/50s.  Even after working out in cardiac rehabilitation maximum blood pressure he achieves is 130/60.  Today in the clinic his blood pressure is 138/68 which the patient says is very unusual.  Due to this I will only uptitrate his Coreg to 9.75 mg p.o. twice daily.  Ideally I would like him to be on spironolactone and Entresto but due to his soft blood pressure my choices are limited.  I will repeat a transthoracic echocardiogram 6 months after  PCI to reassess EF.  His last LDL was 51 mg/dL in 2016.  I will repeat it before next visit.  He is on 40 mg of Lipitor which I will continue.    2.  Mild aortic stenosis  Does not need any further work-up at this time.  Will monitor with regular echocardiogram.    3.  Diabetes  Well controlled per patient    Thank you for allowing me to participate in the care of Bryant Mckenzie.     Mejia Phillips MD  Cardiology      Orders Placed This Encounter   Medications     carvedilol (COREG) 6.25 MG tablet     Sig: Take 1 tablet (6.25 mg) by mouth 2 times daily (with meals)     Dispense:  180 tablet     Refill:  3     Take one and half pill daily.       Medications Discontinued During This Encounter   Medication Reason     carvedilol (COREG) 6.25 MG tablet        Encounter Diagnosis   Name Primary?     Ischemic cardiomyopathy        CURRENT MEDICATIONS:  Current Outpatient Medications   Medication Sig Dispense Refill     ASPIRIN 81 PO Take by mouth daily        atorvastatin (LIPITOR) 40 MG tablet Take 1 tablet (40 mg) by mouth daily 90 tablet 3     carvedilol (COREG) 6.25 MG tablet Take 1 tablet (6.25 mg) by mouth 2 times daily  (with meals) 180 tablet 3     furosemide (LASIX) 20 MG tablet Take 20 mg by mouth daily       insulin glargine (LANTUS) 100 UNIT/ML injection Inject 16 Units Subcutaneous At Bedtime 6/3 Took Lantus 8 Units at bedtime 15 mL 3     insulin lispro (HUMALOG KWIKPEN) 100 UNIT/ML injection 11 units with breakfast,15 units at noon, and 14 units before evening meal 36 mL 3     lisinopril (PRINIVIL/ZESTRIL) 5 MG tablet Take 5 mg by mouth daily       metFORMIN (GLUCOPHAGE) 500 MG tablet TAKE 2 TABLETS (1,000 MG) BY MOUTH TWICE A DAY WITH MEALS 360 tablet 0     Multiple Vitamins-Minerals (MULTIVITAL PO) Take 1 tablet by mouth daily       potassium chloride ER (MICRO-K) 10 MEQ CR capsule Take 10 mEq by mouth daily        tamsulosin (FLOMAX) 0.4 MG capsule Take 0.4 mg by mouth daily       Ferrous Sulfate (IRON) 325 (65 Fe) MG tablet Take 1 tablet by mouth daily         ALLERGIES     Allergies   Allergen Reactions     No Known Allergies        PAST MEDICAL HISTORY:  Past Medical History:   Diagnosis Date     Atrophic gastritis 9/8/2016     Atrophic gastritis 9/8/2016     Benign non-nodular prostatic hyperplasia with lower urinary tract symptoms 9/8/2016     History of diabetes mellitus      Hyperlipidemia LDL goal <100 9/8/2016     Obstructive sleep apnea syndrome 9/8/2016     SOB (shortness of breath) 5/30/2019     Type 2 diabetes mellitus without complication (H) 9/6/2016     Vitamin B12 deficiency (non anemic) 9/8/2016       PAST SURGICAL HISTORY:  Past Surgical History:   Procedure Laterality Date     CV HEART CATHETERIZATION WITH POSSIBLE INTERVENTION N/A 6/4/2019    Procedure: Heart Catheterization with Possible Intervention;  Surgeon: Alex Bazan MD;  Location:  HEART CARDIAC CATH LAB     ESOPHAGOSCOPY, GASTROSCOPY, DUODENOSCOPY (EGD), COMBINED N/A 1/20/2015    Procedure: COMBINED ESOPHAGOSCOPY, GASTROSCOPY, DUODENOSCOPY (EGD), BIOPSY SINGLE OR MULTIPLE;  Surgeon: Allan Marroquin MD;  Location:  GI     EXCISE  LESION TRUNK N/A 4/17/2019    Procedure: EXCISE CYST UPPER BACK;  Surgeon: Rene Murray MD;  Location: [facility] OR     HERNIA REPAIR       HYDROCELECTOMY SCROTAL       ORTHOPEDIC SURGERY      both knees replaced       FAMILY HISTORY:  Family History   Problem Relation Age of Onset     Coronary Artery Disease Father 53     Cerebrovascular Disease Father      Diabetes Mother      Diabetes Brother        SOCIAL HISTORY:  Social History     Socioeconomic History     Marital status:      Spouse name: None     Number of children: None     Years of education: None     Highest education level: None   Occupational History     None   Social Needs     Financial resource strain: None     Food insecurity:     Worry: None     Inability: None     Transportation needs:     Medical: None     Non-medical: None   Tobacco Use     Smoking status: Never Smoker     Smokeless tobacco: Never Used   Substance and Sexual Activity     Alcohol use: Not Currently     Alcohol/week: 0.0 standard drinks     Drug use: No     Sexual activity: Never   Lifestyle     Physical activity:     Days per week: None     Minutes per session: None     Stress: None   Relationships     Social connections:     Talks on phone: None     Gets together: None     Attends [employer] service: None     Active member of club or organization: None     Attends meetings of clubs or organizations: None     Relationship status: None     Intimate partner violence:     Fear of current or ex partner: None     Emotionally abused: None     Physically abused: None     Forced sexual activity: None   Other Topics Concern     Parent/sibling w/ CABG, MI or angioplasty before 65F 55M? 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; walks dog for exercise        Review of Systems:  Skin:  Negative       Eyes:  Positive for glasses    ENT:  Negative      Respiratory:  Negative dyspnea on exertion stairs    Cardiovascular:  Negative edema;Positive for     Gastroenterology: Negative      Genitourinary:  Negative      Musculoskeletal:  Negative back pain    Neurologic:  Negative      Psychiatric:  Negative      Heme/Lymph/Imm:  Negative      Endocrine:  Positive for diabetes      Physical Exam:  Vitals: /68   Pulse 62   Ht 1.829 m (6')   Wt 109.3 kg (241 lb)   BMI 32.69 kg/m    Constitutional: awake, alert, no distress  Skin: Warm and dry to touch  Head: Normocephalic, atraumatic  Eyes: Conjunctivae and lids unremarkable, sclera white  ENT: No pallor or cyanosis  Neck: Carotid pulses are full and equal bilaterally.  Respiratory: Normal breath sounds, clear to auscultation, no use of sensory muscles, no wheezing or crepts  Cardiac: Regular rate and rhythm, S1-S2 normal.  2/6 Systolic murmur at rusb.  Pulses full and equal bilaterally in all 4 extremities.  trace b/l edema.   Abdomen: soft and nontender.  Extremities and musculoskeletal: No gross motor deficit  Neurological.  Affect normal  Psych: Alert and oriented x3    Recent Lab Results:  LIPID RESULTS:  Lab Results   Component Value Date    CHOL 123 12/05/2016    HDL 50 12/05/2016    LDL 51 12/05/2016    TRIG 108 12/05/2016       LIVER ENZYME RESULTS:  No results found for: AST, ALT    CBC RESULTS:  Lab Results   Component Value Date    WBC 5.3 06/04/2019    RBC 4.94 06/04/2019    HGB 12.5 (L) 06/04/2019    HCT 39.5 (L) 06/04/2019    MCV 80 06/04/2019    MCH 25.3 (L) 06/04/2019    MCHC 31.6 06/04/2019    RDW 16.8 (H) 06/04/2019     06/04/2019       BMP RESULTS:  Lab Results   Component Value Date     (L) 07/03/2019    POTASSIUM 4.4 07/03/2019    CHLORIDE 100 07/03/2019    CO2 29 07/03/2019    ANIONGAP 10.4 07/03/2019     (H) 07/03/2019    BUN 12 07/03/2019    CR 1.06 07/03/2019    GFRESTIMATED 68 07/03/2019    GFRESTBLACK 82 07/03/2019    FATMATA 9.9 07/03/2019        A1C RESULTS:  Lab Results   Component Value Date    A1C 6.7 (H) 06/12/2017       INR RESULTS:  Lab Results   Component  Value Date    INR 1.15 (H) 06/04/2019    INR 0.98 08/21/2006       CC  No referring provider defined for this encounter.    All medical records were reviewed in detail and discussed with the patient. Greater than 45 mins were spent with the patient, 50% of this time was spent on counseling and coordination of care.  After visit summary was printed and given to the patient.

## 2019-09-26 NOTE — LETTER
9/26/2019    Merlin Emery Brown, MD  [facility] [address]  [facility] [address]    RE: Bryant Mckenzie       Dear Colleague,    I had the pleasure of seeing Bryant Mckenzie in the [facility].    HPI and Plan:   Today I had the pleasure of seeing Bryant Mckenzie at [facility] in [place]. He is a pleasant 79 year old patient with diabetes whom I initially saw a couple of months ago for dyspnea on exertion.  The work-up at that time revealed severe left ventricular dysfunction with an EF of 30%.  As part of the work-up he underwent coronary angiogram which showed  of the proximal circumflex and moderate disease in the RCA which was negative by FFR.  A cardiac MRI showed severe LV dilation with ejection fraction of 21% and mild to moderately depressed RV function.  There was scar in the inferolateral wall consistent with LCx infarct but most of the tissue was viable.  He was then seen by Dr. Rivera and given good viability in the circumflex distribution he recommended PCI of the circumflex .  The patient is scheduled for the procedure on 10/24/2019.  In the interim, we have started him on heart failure medications up titration of which have been somewhat limited due to soft blood pressure.  He is currently on 5 mg of lisinopril and 6.25 mg of Coreg twice daily.  He is taking 40 mg of Lasix once a day and reports that his lower extremity edema has significant he is also scheduled to see Stella with a repeat transthoracic echocardiogram.  He also has mild aortic stenosis with a mean gradient of 17 and aortic valve area of 1.8 cm .  Today he presents to the clinic with his sons and his wife.  He reports that he has been doing well and has noticed improvement in his functional capacity and lower extremity edema.  He still has mild edema and has increased the dose of Lasix to 40 mg a week ago.  He is taking part in cardiac  rehabilitation and is able to exerciseon the treadmill for 15 to 20 minutes followed by 15 to 20-minute workout on the recumbent bike without any difficulty.      Assessment and plan    1.  Mixed ischemic and nonischemic cardiomyopathy with ejection fraction of 30%   The patient scheduled to undergo repeat transthoracic echocardiogram to reassess ejection fraction followed by intervention on the  on 10/24/2019.  He is on appropriate cardiac medications up titration of which have  been somewhat restricted by his soft blood pressure.  He takes his blood pressure at home daily and reports it to be in 100s/50s.  Even after working out in cardiac rehabilitation maximum blood pressure he achieves is 130/60.  Today in the clinic his blood pressure is 138/68 which the patient says is very unusual.  Due to this I will only uptitrate his Coreg to 9.75 mg p.o. twice daily.  Ideally I would like him to be on spironolactone and Entresto but due to his soft blood pressure my choices are limited.  I will repeat a transthoracic echocardiogram 6 months after  PCI to reassess EF.  His last LDL was 51 mg/dL in 2016.  I will repeat it before next visit.  He is on 40 mg of Lipitor which I will continue.    2.  Mild aortic stenosis  Does not need any further work-up at this time.  Will monitor with regular echocardiogram.    3.  Diabetes  Well controlled per patient    Thank you for allowing me to participate in the care of Bryant Mckenzie.     Mejia Phillips MD  Cardiology      Orders Placed This Encounter   Medications     carvedilol (COREG) 6.25 MG tablet     Sig: Take 1 tablet (6.25 mg) by mouth 2 times daily (with meals)     Dispense:  180 tablet     Refill:  3     Take one and half pill daily.       Medications Discontinued During This Encounter   Medication Reason     carvedilol (COREG) 6.25 MG tablet        Encounter Diagnosis   Name Primary?     Ischemic cardiomyopathy        CURRENT MEDICATIONS:  Current Outpatient  Medications   Medication Sig Dispense Refill     ASPIRIN 81 PO Take by mouth daily        atorvastatin (LIPITOR) 40 MG tablet Take 1 tablet (40 mg) by mouth daily 90 tablet 3     carvedilol (COREG) 6.25 MG tablet Take 1 tablet (6.25 mg) by mouth 2 times daily (with meals) 180 tablet 3     furosemide (LASIX) 20 MG tablet Take 20 mg by mouth daily       insulin glargine (LANTUS) 100 UNIT/ML injection Inject 16 Units Subcutaneous At Bedtime 6/3 Took Lantus 8 Units at bedtime 15 mL 3     insulin lispro (HUMALOG KWIKPEN) 100 UNIT/ML injection 11 units with breakfast,15 units at noon, and 14 units before evening meal 36 mL 3     lisinopril (PRINIVIL/ZESTRIL) 5 MG tablet Take 5 mg by mouth daily       metFORMIN (GLUCOPHAGE) 500 MG tablet TAKE 2 TABLETS (1,000 MG) BY MOUTH TWICE A DAY WITH MEALS 360 tablet 0     Multiple Vitamins-Minerals (MULTIVITAL PO) Take 1 tablet by mouth daily       potassium chloride ER (MICRO-K) 10 MEQ CR capsule Take 10 mEq by mouth daily        tamsulosin (FLOMAX) 0.4 MG capsule Take 0.4 mg by mouth daily       Ferrous Sulfate (IRON) 325 (65 Fe) MG tablet Take 1 tablet by mouth daily         ALLERGIES     Allergies   Allergen Reactions     No Known Allergies        PAST MEDICAL HISTORY:  Past Medical History:   Diagnosis Date     Atrophic gastritis 9/8/2016     Atrophic gastritis 9/8/2016     Benign non-nodular prostatic hyperplasia with lower urinary tract symptoms 9/8/2016     History of diabetes mellitus      Hyperlipidemia LDL goal <100 9/8/2016     Obstructive sleep apnea syndrome 9/8/2016     SOB (shortness of breath) 5/30/2019     Type 2 diabetes mellitus without complication (H) 9/6/2016     Vitamin B12 deficiency (non anemic) 9/8/2016       PAST SURGICAL HISTORY:  Past Surgical History:   Procedure Laterality Date     CV HEART CATHETERIZATION WITH POSSIBLE INTERVENTION N/A 6/4/2019    Procedure: Heart Catheterization with Possible Intervention;  Surgeon: Alex Bazan MD;   Location:  HEART CARDIAC CATH LAB     ESOPHAGOSCOPY, GASTROSCOPY, DUODENOSCOPY (EGD), COMBINED N/A 1/20/2015    Procedure: COMBINED ESOPHAGOSCOPY, GASTROSCOPY, DUODENOSCOPY (EGD), BIOPSY SINGLE OR MULTIPLE;  Surgeon: Allan Marroquin MD;  Location:  GI     EXCISE LESION TRUNK N/A 4/17/2019    Procedure: EXCISE CYST UPPER BACK;  Surgeon: Rene Murray MD;  Location:  OR     HERNIA REPAIR       HYDROCELECTOMY SCROTAL       ORTHOPEDIC SURGERY      both knees replaced       FAMILY HISTORY:  Family History   Problem Relation Age of Onset     Coronary Artery Disease Father 53     Cerebrovascular Disease Father      Diabetes Mother      Diabetes Brother        SOCIAL HISTORY:  Social History     Socioeconomic History     Marital status:      Spouse name: None     Number of children: None     Years of education: None     Highest education level: None   Occupational History     None   Social Needs     Financial resource strain: None     Food insecurity:     Worry: None     Inability: None     Transportation needs:     Medical: None     Non-medical: None   Tobacco Use     Smoking status: Never Smoker     Smokeless tobacco: Never Used   Substance and Sexual Activity     Alcohol use: Not Currently     Alcohol/week: 0.0 standard drinks     Drug use: No     Sexual activity: Never   Lifestyle     Physical activity:     Days per week: None     Minutes per session: None     Stress: None   Relationships     Social connections:     Talks on phone: None     Gets together: None     Attends Ep[employer]alian service: None     Active member of club or organization: None     Attends meetings of clubs or organizations: None     Relationship status: None     Intimate partner violence:     Fear of current or ex partner: None     Emotionally abused: None     Physically abused: None     Forced sexual activity: None   Other Topics Concern     Parent/sibling w/ CABG, MI or angioplasty before 65F 55M? 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; walks dog for exercise        Review of Systems:  Skin:  Negative       Eyes:  Positive for glasses    ENT:  Negative      Respiratory:  Negative dyspnea on exertion stairs    Cardiovascular:  Negative edema;Positive for    Gastroenterology: Negative      Genitourinary:  Negative      Musculoskeletal:  Negative back pain    Neurologic:  Negative      Psychiatric:  Negative      Heme/Lymph/Imm:  Negative      Endocrine:  Positive for diabetes      Physical Exam:  Vitals: /68   Pulse 62   Ht 1.829 m (6')   Wt 109.3 kg (241 lb)   BMI 32.69 kg/m     Constitutional: awake, alert, no distress  Skin: Warm and dry to touch  Head: Normocephalic, atraumatic  Eyes: Conjunctivae and lids unremarkable, sclera white  ENT: No pallor or cyanosis  Neck: Carotid pulses are full and equal bilaterally.  Respiratory: Normal breath sounds, clear to auscultation, no use of sensory muscles, no wheezing or crepts  Cardiac: Regular rate and rhythm, S1-S2 normal.  2/6 Systolic murmur at rusb.  Pulses full and equal bilaterally in all 4 extremities.  trace b/l edema.   Abdomen: soft and nontender.  Extremities and musculoskeletal: No gross motor deficit  Neurological.  Affect normal  Psych: Alert and oriented x3    Recent Lab Results:  LIPID RESULTS:  Lab Results   Component Value Date    CHOL 123 12/05/2016    HDL 50 12/05/2016    LDL 51 12/05/2016    TRIG 108 12/05/2016       LIVER ENZYME RESULTS:  No results found for: AST, ALT    CBC RESULTS:  Lab Results   Component Value Date    WBC 5.3 06/04/2019    RBC 4.94 06/04/2019    HGB 12.5 (L) 06/04/2019    HCT 39.5 (L) 06/04/2019    MCV 80 06/04/2019    MCH 25.3 (L) 06/04/2019    MCHC 31.6 06/04/2019    RDW 16.8 (H) 06/04/2019     06/04/2019       BMP RESULTS:  Lab Results   Component Value Date     (L) 07/03/2019    POTASSIUM 4.4 07/03/2019    CHLORIDE 100 07/03/2019    CO2 29 07/03/2019    ANIONGAP 10.4 07/03/2019      (H) 07/03/2019    BUN 12 07/03/2019    CR 1.06 07/03/2019    GFRESTIMATED 68 07/03/2019    GFRESTBLACK 82 07/03/2019    FATMATA 9.9 07/03/2019        A1C RESULTS:  Lab Results   Component Value Date    A1C 6.7 (H) 06/12/2017       INR RESULTS:  Lab Results   Component Value Date    INR 1.15 (H) 06/04/2019    INR 0.98 08/21/2006       CC  No referring provider defined for this encounter.    All medical records were reviewed in detail and discussed with the patient. Greater than 45 mins were spent with the patient, 50% of this time was spent on counseling and coordination of care.  After visit summary was printed and given to the patient.      Thank you for allowing me to participate in the care of your patient.    Sincerely,     Mejia Phillips MD     [facility]

## 2019-09-27 ENCOUNTER — HOSPITAL ENCOUNTER (OUTPATIENT)
Dept: CARDIAC REHAB | Facility: CLINIC | Age: 79
End: 2019-09-27
Attending: INTERNAL MEDICINE
Payer: MEDICARE

## 2019-09-27 PROCEDURE — 93798 PHYS/QHP OP CAR RHAB W/ECG: CPT

## 2019-09-27 PROCEDURE — 40000116 ZZH STATISTIC OP CR VISIT

## 2019-09-30 ENCOUNTER — HOSPITAL ENCOUNTER (OUTPATIENT)
Dept: CARDIAC REHAB | Facility: CLINIC | Age: 79
End: 2019-09-30
Attending: INTERNAL MEDICINE
Payer: MEDICARE

## 2019-09-30 PROCEDURE — 40000116 ZZH STATISTIC OP CR VISIT

## 2019-09-30 PROCEDURE — 93798 PHYS/QHP OP CAR RHAB W/ECG: CPT

## 2019-10-04 ENCOUNTER — HOSPITAL ENCOUNTER (OUTPATIENT)
Dept: CARDIAC REHAB | Facility: CLINIC | Age: 79
End: 2019-10-04
Attending: INTERNAL MEDICINE
Payer: MEDICARE

## 2019-10-04 PROCEDURE — 40000116 ZZH STATISTIC OP CR VISIT: Performed by: REHABILITATION PRACTITIONER

## 2019-10-04 PROCEDURE — 93798 PHYS/QHP OP CAR RHAB W/ECG: CPT | Performed by: REHABILITATION PRACTITIONER

## 2019-10-07 ENCOUNTER — HOSPITAL ENCOUNTER (OUTPATIENT)
Dept: CARDIAC REHAB | Facility: CLINIC | Age: 79
End: 2019-10-07
Attending: INTERNAL MEDICINE
Payer: MEDICARE

## 2019-10-07 ENCOUNTER — TELEPHONE (OUTPATIENT)
Dept: CARDIOLOGY | Facility: CLINIC | Age: 79
End: 2019-10-07

## 2019-10-07 DIAGNOSIS — I25.5 ISCHEMIC CARDIOMYOPATHY: ICD-10-CM

## 2019-10-07 PROCEDURE — 93798 PHYS/QHP OP CAR RHAB W/ECG: CPT | Performed by: REHABILITATION PRACTITIONER

## 2019-10-07 PROCEDURE — 40000116 ZZH STATISTIC OP CR VISIT: Performed by: REHABILITATION PRACTITIONER

## 2019-10-07 RX ORDER — CARVEDILOL 6.25 MG/1
6.25 TABLET ORAL 2 TIMES DAILY WITH MEALS
Status: CANCELLED | OUTPATIENT
Start: 2019-10-07

## 2019-10-09 RX ORDER — CARVEDILOL 3.12 MG/1
9.38 TABLET ORAL 2 TIMES DAILY WITH MEALS
Qty: 540 TABLET | Refills: 3 | Status: SHIPPED | OUTPATIENT
Start: 2019-10-09 | End: 2021-01-08

## 2019-10-09 NOTE — TELEPHONE ENCOUNTER
Would like him to take 6.25 + 3.125 = 9.37 mg twice a day. Coreg is bid dosing.     Thanks,   Mejia    Routing comment      Rx sent to [facility] in [place] per patient's wife's request.

## 2019-10-10 ENCOUNTER — HOSPITAL ENCOUNTER (OUTPATIENT)
Dept: CARDIOLOGY | Facility: CLINIC | Age: 79
Discharge: HOME | End: 2019-10-10
Attending: INTERNAL MEDICINE | Admitting: INTERNAL MEDICINE
Payer: MEDICARE

## 2019-10-10 ENCOUNTER — OFFICE VISIT (OUTPATIENT)
Dept: CARDIOLOGY | Facility: CLINIC | Age: 79
End: 2019-10-10
Payer: MEDICARE

## 2019-10-10 VITALS
WEIGHT: 243 LBS | HEART RATE: 56 BPM | BODY MASS INDEX: 32.91 KG/M2 | DIASTOLIC BLOOD PRESSURE: 67 MMHG | HEIGHT: 72 IN | SYSTOLIC BLOOD PRESSURE: 111 MMHG

## 2019-10-10 DIAGNOSIS — I25.82 CORONARY ARTERY CHRONIC TOTAL OCCLUSION: ICD-10-CM

## 2019-10-10 DIAGNOSIS — I25.5 ISCHEMIC CARDIOMYOPATHY: Primary | ICD-10-CM

## 2019-10-10 DIAGNOSIS — I42.9 CARDIOMYOPATHY, IDIOPATHIC (H): ICD-10-CM

## 2019-10-10 DIAGNOSIS — E78.5 HYPERLIPIDEMIA LDL GOAL <100: ICD-10-CM

## 2019-10-10 PROCEDURE — 99214 OFFICE O/P EST MOD 30 MIN: CPT | Mod: 25 | Performed by: PHYSICIAN ASSISTANT

## 2019-10-10 PROCEDURE — 93306 TTE W/DOPPLER COMPLETE: CPT | Mod: 26 | Performed by: INTERNAL MEDICINE

## 2019-10-10 PROCEDURE — 25500064 ZZH RX 255 OP 636: Performed by: INTERNAL MEDICINE

## 2019-10-10 PROCEDURE — 40000264 ECHOCARDIOGRAM COMPLETE

## 2019-10-10 RX ORDER — FUROSEMIDE 40 MG
40 TABLET ORAL DAILY
COMMUNITY
End: 2019-12-04

## 2019-10-10 RX ORDER — LISINOPRIL 5 MG/1
5 TABLET ORAL DAILY
COMMUNITY
Start: 2019-10-10 | End: 2019-10-30

## 2019-10-10 RX ADMIN — HUMAN ALBUMIN MICROSPHERES AND PERFLUTREN 9 ML: 10; .22 INJECTION, SOLUTION INTRAVENOUS at 08:45

## 2019-10-10 ASSESSMENT — MIFFLIN-ST. JEOR: SCORE: 1865.24

## 2019-10-10 NOTE — LETTER
10/10/2019    Merlin Emery Brown, MD  [facility] [address]  [facility] [address]    RE: Bryant Mckenzie       Dear Colleague,    I had the pleasure of seeing Bryant Mckenzie in the [facility].      Cardiology Progress Note    Date of Service: 10/10/2019  Patient seen today in follow up of: cardiomyopathy, CAD  Primary cardiologist: Dr. Phillips    HPI:  Bryant Mckenzie is a very pleasant 79 year old male with a history of diabetes mellitus type 2, hyperlipidemia, mild aortic stenosis, obstructive sleep apnea, recently diagnosed cardiomyopathy and coronary artery disease who is here today for cardiology follow up.    He presented this spring with complaints of worsening exertional dyspnea.  A stress echocardiogram was ordered which showed severe LV dysfunction at rest with an ejection fraction of 25 to 30% with no improvement during stress. A dedicated transthoracic echocardiogram was ordered which showed moderate LV dilation and severely reduced LV systolic function with an LVEF of 30 to 35%.  There is severe global hypokinesis of the LV without segmental wall motion abnormalities.  Mild aortic stenosis was noted. A CT coronary angiogram was subsequently ordered but this was not completed as his coronary calcium score was over 3000.      He was referred to cardiology clinic and see by Dr. Phillips on 6/3/19. Coronary angiography was recommended. He was noted to have a 10% proximal LAD stenosis.  The mid to distal circumflex was 100% stenosed.  He had a proximal to mid RCA lesion which was 50% stenosed.  Aggressive medical management was recommended with possible consideration for intervention to the  of the left circumflex. He underwent a cardiac MRI in early July for further evaluation of his cardiomyopathy. This showed a severely dilated LV with a LVEF of 21%. There was mild to moderate RV dysfunction. Late gadolinium enhancement was consistent  with a prior small infarction in the circumflex distribution. Given the small amount of enhancement and the global nature of his LV dysfunction, the cardiomyopathy was felt to likely be mixed.     We have been working to optimize his medications although this has been limited by his blood pressure. He fortunately has not had significant issues with congestive heart failure and has been maintained on low dose diuretics.     He saw Dr. Rivera on 8/12/19 to discuss possible intervention to the circumflex . He felt like he would potentially be a good candidate for intervention and tentatively scheduled him on 10/24/19. In the meantime, he recommended further titration of his cardiomyopathy medications and a follow up echocardiogram. He was last seen by Dr. Phillips on 9/26/19 and was doing well at that time. His carvedilol was increased to 9.75 mg twice daily at that time. He has been participating in cardiac rehab without any difficulty.     He is here today with his wife. He continues to feel with without exertional dyspnea, orthopnea, PND, chest discomfort or peripheral edema. His blood pressures have been stable on the higher dose of carvedilol, and he has been tolerating this well.     ASSESSMENT/PLAN:  1.  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 With an LVEF of 20% by cardiac MRI in July.   2.  Chronic systolic congestive heart failure. On lasix 20 mg daily.   3.  Coronary artery disease. With a  of the mid to distal circumflex and moderate proximal to mid RCA disease.   4.  Mild aortic stenosis.  5.  DM type II.     Marshall is here today for follow up. He has a severe likely mixed both nonischemic and ischemic cardiomyopathy and fortunately is clinically doing very well. He has a known  of the circumflex with predominantly viable myocardium per cardiac MRI.  He has been evaluated by Dr. Rivera and is felt to be a good candidate for intervention. This is scheduled to 10/24. We  discussed in this today and reviewed the risks and benefits of the procedure. Risks and benefits including, bleeding, bruising, infection, allergic reaction, kidney damage (including need for dialysis), stroke, heart attack, vascular damage, emergency open heart surgery, up to and including death were reviewed. He understands the procedure may be limited by radiation or contrast exposure and is not always succesful. He understands and is willing to proceed.     In regards to his medical therapy, he remains on aspirin and statin. He is also appropriately on carvedilol and lisinopril for his cardiomyopathy. We have slowly been titrating his medications given his relatively low blood pressures at times. Fortunately, he is tolerating this well. He remains on carvedilol 9.75 mg twice daily and lisinopril 5 mg daily. I suggested today we attempt to increase his lisinopril.  I am going to have him take an extra 2.5 mg in the evening.  If he tolerates this dose for 1 week and his blood pressures are stable he will increase this to 5 mg daily.  He does check his pressures at home on a regular basis. He appears euvolemic on exam today on his current diuretic dose and I made no changes to that today.      Lastly, a repeat echocardiogram was done today to re-evaluate his LVEF. This is pending at the time of our visit today. We will let him know the results of this. If his EF remains and he tolerates the increased lisinopril dose, I would consider switching him to Entresto.    He has follow up arranged with myself after his coronary angiogram. We will further optimize his medications at that time. In the meantime, I asked him to call with questions or concerns.    Orders this Visit:  No orders of the defined types were placed in this encounter.    Orders Placed This Encounter   Medications     furosemide (LASIX) 40 MG tablet     Sig: Take 40 mg by mouth daily     Menaquinone-7 (VITAMIN K2 PO)     Sig: Take by mouth daily      lisinopril (PRINIVIL/ZESTRIL) 5 MG tablet     Sig: Take 1 tablet (5 mg) by mouth 2 times daily     Medications Discontinued During This Encounter   Medication Reason     furosemide (LASIX) 20 MG tablet Dose adjustment     Ferrous Sulfate (IRON) 325 (65 Fe) MG tablet Stopped by Patient     lisinopril (PRINIVIL/ZESTRIL) 5 MG tablet Reorder       CURRENT MEDICATIONS:  Current Outpatient Medications   Medication Sig Dispense Refill     ASPIRIN 81 PO Take by mouth daily        atorvastatin (LIPITOR) 40 MG tablet Take 1 tablet (40 mg) by mouth daily 90 tablet 3     carvedilol (COREG) 3.125 MG tablet Take 3 tablets (9.375 mg) by mouth 2 times daily (with meals) 540 tablet 3     furosemide (LASIX) 40 MG tablet Take 40 mg by mouth daily       insulin glargine (LANTUS) 100 UNIT/ML injection Inject 16 Units Subcutaneous At Bedtime 6/3 Took Lantus 8 Units at bedtime 15 mL 3     insulin lispro (HUMALOG KWIKPEN) 100 UNIT/ML injection 11 units with breakfast,15 units at noon, and 14 units before evening meal 36 mL 3     lisinopril (PRINIVIL/ZESTRIL) 5 MG tablet Take 1 tablet (5 mg) by mouth 2 times daily       Menaquinone-7 (VITAMIN K2 PO) Take by mouth daily       metFORMIN (GLUCOPHAGE) 500 MG tablet TAKE 2 TABLETS (1,000 MG) BY MOUTH TWICE A DAY WITH MEALS 360 tablet 0     Multiple Vitamins-Minerals (MULTIVITAL PO) Take 1 tablet by mouth daily       potassium chloride ER (MICRO-K) 10 MEQ CR capsule Take 10 mEq by mouth daily        tamsulosin (FLOMAX) 0.4 MG capsule Take 0.4 mg by mouth daily       ALLERGIES  Allergies   Allergen Reactions     No Known Allergies      PAST MEDICAL HISTORY:  Past Medical History:   Diagnosis Date     Atrophic gastritis 9/8/2016     Atrophic gastritis 9/8/2016     Benign non-nodular prostatic hyperplasia with lower urinary tract symptoms 9/8/2016     History of diabetes mellitus      Hyperlipidemia LDL goal <100 9/8/2016     Obstructive sleep apnea syndrome 9/8/2016     SOB (shortness of breath)  5/30/2019     Type 2 diabetes mellitus without complication (H) 9/6/2016     Vitamin B12 deficiency (non anemic) 9/8/2016     PAST SURGICAL HISTORY:  Past Surgical History:   Procedure Laterality Date     CV HEART CATHETERIZATION WITH POSSIBLE INTERVENTION N/A 6/4/2019    Procedure: Heart Catheterization with Possible Intervention;  Surgeon: Alex Bazan MD;  Location:  HEART CARDIAC CATH LAB     ESOPHAGOSCOPY, GASTROSCOPY, DUODENOSCOPY (EGD), COMBINED N/A 1/20/2015    Procedure: COMBINED ESOPHAGOSCOPY, GASTROSCOPY, DUODENOSCOPY (EGD), BIOPSY SINGLE OR MULTIPLE;  Surgeon: Allan Marroquin MD;  Location:  GI     EXCISE LESION TRUNK N/A 4/17/2019    Procedure: EXCISE CYST UPPER BACK;  Surgeon: Rene Murray MD;  Location:  OR     HERNIA REPAIR       HYDROCELECTOMY SCROTAL       ORTHOPEDIC SURGERY      both knees replaced     FAMILY HISTORY:  Family History   Problem Relation Age of Onset     Coronary Artery Disease Father 53     Cerebrovascular Disease Father      Diabetes Mother      Diabetes Brother      SOCIAL HISTORY:  Social History     Socioeconomic History     Marital status:      Spouse name: None     Number of children: None     Years of education: None     Highest education level: None   Occupational History     None   Social Needs     Financial resource strain: None     Food insecurity:     Worry: None     Inability: None     Transportation needs:     Medical: None     Non-medical: None   Tobacco Use     Smoking status: Never Smoker     Smokeless tobacco: Never Used   Substance and Sexual Activity     Alcohol use: Not Currently     Alcohol/week: 0.0 standard drinks     Drug use: No     Sexual activity: Never   Lifestyle     Physical activity:     Days per week: None     Minutes per session: None     Stress: None   Relationships     Social connections:     Talks on phone: None     Gets together: None     Attends [employer] service: None     Active member of club or organization:  None     Attends meetings of clubs or organizations: None     Relationship status: None     Intimate partner violence:     Fear of current or ex partner: None     Emotionally abused: None     Physically abused: None     Forced sexual activity: None   Other Topics Concern     Parent/sibling w/ CABG, MI or angioplasty before 65F 55M? 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; walks dog for exercise      Review of Systems:  Skin:  Negative     Eyes:  Positive for glasses  ENT:  Negative    Respiratory:  Negative    Cardiovascular:  Negative    Gastroenterology: Negative    Genitourinary:  Negative    Musculoskeletal:  Negative    Neurologic:  Negative    Psychiatric:  Negative    Heme/Lymph/Imm:  Negative    Endocrine:  Positive for diabetes     Physical Exam:  Vitals: /67   Pulse 56   Ht 1.829 m (6')   Wt 110.2 kg (243 lb)   BMI 32.96 kg/m      Wt Readings from Last 4 Encounters:   10/10/19 110.2 kg (243 lb)   09/26/19 109.3 kg (241 lb)   08/12/19 108.9 kg (240 lb)   07/03/19 108.5 kg (239 lb 4.8 oz)     GEN: well nourished, in no acute distress.  HEENT:  Pupils equal, round. Sclerae nonicteric.   NECK: JVP appears normal at 30 degrees.  C/V:  Regular rate and rhythm, II/VI systolic murmur at the RUSB  RESP: Respirations are unlabored. Clear to auscultation bilaterally without wheezing, rales, or rhonchi.  GI: Abdomen soft, nontender.  EXTREM: No LE edema.  NEURO: Alert and oriented, cooperative.  SKIN: Warm and dry.     Recent Lab Results:  LIPID RESULTS:  Lab Results   Component Value Date    CHOL 123 12/05/2016    HDL 50 12/05/2016    LDL 51 12/05/2016    TRIG 108 12/05/2016     LIVER ENZYME RESULTS:  No results found for: AST, ALT  CBC RESULTS:  Lab Results   Component Value Date    WBC 5.3 06/04/2019    RBC 4.94 06/04/2019    HGB 12.5 (L) 06/04/2019    HCT 39.5 (L) 06/04/2019    MCV 80 06/04/2019    MCH 25.3 (L) 06/04/2019    MCHC 31.6 06/04/2019    RDW 16.8 (H) 06/04/2019      06/04/2019     BMP RESULTS:  Lab Results   Component Value Date     (L) 07/03/2019    POTASSIUM 4.4 07/03/2019    CHLORIDE 100 07/03/2019    CO2 29 07/03/2019    ANIONGAP 10.4 07/03/2019     (H) 07/03/2019    BUN 12 07/03/2019    CR 1.06 07/03/2019    GFRESTIMATED 68 07/03/2019    GFRESTBLACK 82 07/03/2019    FATMATA 9.9 07/03/2019      A1C RESULTS:  Lab Results   Component Value Date    A1C 6.7 (H) 06/12/2017     INR RESULTS:  Lab Results   Component Value Date    INR 1.15 (H) 06/04/2019    INR 0.98 08/21/2006       New/Pertinent imaging results since last visit:  None    Stella Elliott PA-C  [facility]      Thank you for allowing me to participate in the care of your patient.      Sincerely,     Stella Elliott PA-C     [facility]

## 2019-10-10 NOTE — PROGRESS NOTES
Cardiology Progress Note    Date of Service: 10/10/2019  Patient seen today in follow up of: cardiomyopathy, CAD  Primary cardiologist: Dr. Phillips    HPI:  Bryant Mckenzie is a very pleasant 79 year old male with a history of diabetes mellitus type 2, hyperlipidemia, mild aortic stenosis, obstructive sleep apnea, recently diagnosed cardiomyopathy and coronary artery disease who is here today for cardiology follow up.    He presented this spring with complaints of worsening exertional dyspnea.  A stress echocardiogram was ordered which showed severe LV dysfunction at rest with an ejection fraction of 25 to 30% with no improvement during stress. A dedicated transthoracic echocardiogram was ordered which showed moderate LV dilation and severely reduced LV systolic function with an LVEF of 30 to 35%.  There is severe global hypokinesis of the LV without segmental wall motion abnormalities.  Mild aortic stenosis was noted. A CT coronary angiogram was subsequently ordered but this was not completed as his coronary calcium score was over 3000.      He was referred to cardiology clinic and see by Dr. Phillips on 6/3/19. Coronary angiography was recommended. He was noted to have a 10% proximal LAD stenosis.  The mid to distal circumflex was 100% stenosed.  He had a proximal to mid RCA lesion which was 50% stenosed.  Aggressive medical management was recommended with possible consideration for intervention to the  of the left circumflex. He underwent a cardiac MRI in early July for further evaluation of his cardiomyopathy. This showed a severely dilated LV with a LVEF of 21%. There was mild to moderate RV dysfunction. Late gadolinium enhancement was consistent with a prior small infarction in the circumflex distribution. Given the small amount of enhancement and the global nature of his LV dysfunction, the cardiomyopathy was felt to likely be mixed.     We have been working to optimize his medications although this  has been limited by his blood pressure. He fortunately has not had significant issues with congestive heart failure and has been maintained on low dose diuretics.     He saw Dr. Rivera on 8/12/19 to discuss possible intervention to the circumflex . He felt like he would potentially be a good candidate for intervention and tentatively scheduled him on 10/24/19. In the meantime, he recommended further titration of his cardiomyopathy medications and a follow up echocardiogram. He was last seen by Dr. Phillips on 9/26/19 and was doing well at that time. His carvedilol was increased to 9.75 mg twice daily at that time. He has been participating in cardiac rehab without any difficulty.     He is here today with his wife. He continues to feel with without exertional dyspnea, orthopnea, PND, chest discomfort or peripheral edema. His blood pressures have been stable on the higher dose of carvedilol, and he has been tolerating this well.     ASSESSMENT/PLAN:  1.  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 With an LVEF of 20% by cardiac MRI in July.   2.  Chronic systolic congestive heart failure. On lasix 20 mg daily.   3.  Coronary artery disease. With a  of the mid to distal circumflex and moderate proximal to mid RCA disease.   4.  Mild aortic stenosis.  5.  DM type II.     Marshall is here today for follow up. He has a severe likely mixed both nonischemic and ischemic cardiomyopathy and fortunately is clinically doing very well. He has a known  of the circumflex with predominantly viable myocardium per cardiac MRI.  He has been evaluated by Dr. Rivera and is felt to be a good candidate for intervention. This is scheduled to 10/24. We discussed in this today and reviewed the risks and benefits of the procedure. Risks and benefits including, bleeding, bruising, infection, allergic reaction, kidney damage (including need for dialysis), stroke, heart attack, vascular damage, emergency open heart  surgery, up to and including death were reviewed. He understands the procedure may be limited by radiation or contrast exposure and is not always succesful. He understands and is willing to proceed.     In regards to his medical therapy, he remains on aspirin and statin. He is also appropriately on carvedilol and lisinopril for his cardiomyopathy. We have slowly been titrating his medications given his relatively low blood pressures at times. Fortunately, he is tolerating this well. He remains on carvedilol 9.75 mg twice daily and lisinopril 5 mg daily. I suggested today we attempt to increase his lisinopril.  I am going to have him take an extra 2.5 mg in the evening.  If he tolerates this dose for 1 week and his blood pressures are stable he will increase this to 5 mg daily.  He does check his pressures at home on a regular basis. He appears euvolemic on exam today on his current diuretic dose and I made no changes to that today.      Lastly, a repeat echocardiogram was done today to re-evaluate his LVEF. This is pending at the time of our visit today. We will let him know the results of this. If his EF remains and he tolerates the increased lisinopril dose, I would consider switching him to Entresto.    He has follow up arranged with myself after his coronary angiogram. We will further optimize his medications at that time. In the meantime, I asked him to call with questions or concerns.    Orders this Visit:  No orders of the defined types were placed in this encounter.    Orders Placed This Encounter   Medications     furosemide (LASIX) 40 MG tablet     Sig: Take 40 mg by mouth daily     Menaquinone-7 (VITAMIN K2 PO)     Sig: Take by mouth daily     lisinopril (PRINIVIL/ZESTRIL) 5 MG tablet     Sig: Take 1 tablet (5 mg) by mouth 2 times daily     Medications Discontinued During This Encounter   Medication Reason     furosemide (LASIX) 20 MG tablet Dose adjustment     Ferrous Sulfate (IRON) 325 (65 Fe) MG tablet  Stopped by Patient     lisinopril (PRINIVIL/ZESTRIL) 5 MG tablet Reorder       CURRENT MEDICATIONS:  Current Outpatient Medications   Medication Sig Dispense Refill     ASPIRIN 81 PO Take by mouth daily        atorvastatin (LIPITOR) 40 MG tablet Take 1 tablet (40 mg) by mouth daily 90 tablet 3     carvedilol (COREG) 3.125 MG tablet Take 3 tablets (9.375 mg) by mouth 2 times daily (with meals) 540 tablet 3     furosemide (LASIX) 40 MG tablet Take 40 mg by mouth daily       insulin glargine (LANTUS) 100 UNIT/ML injection Inject 16 Units Subcutaneous At Bedtime 6/3 Took Lantus 8 Units at bedtime 15 mL 3     insulin lispro (HUMALOG KWIKPEN) 100 UNIT/ML injection 11 units with breakfast,15 units at noon, and 14 units before evening meal 36 mL 3     lisinopril (PRINIVIL/ZESTRIL) 5 MG tablet Take 1 tablet (5 mg) by mouth 2 times daily       Menaquinone-7 (VITAMIN K2 PO) Take by mouth daily       metFORMIN (GLUCOPHAGE) 500 MG tablet TAKE 2 TABLETS (1,000 MG) BY MOUTH TWICE A DAY WITH MEALS 360 tablet 0     Multiple Vitamins-Minerals (MULTIVITAL PO) Take 1 tablet by mouth daily       potassium chloride ER (MICRO-K) 10 MEQ CR capsule Take 10 mEq by mouth daily        tamsulosin (FLOMAX) 0.4 MG capsule Take 0.4 mg by mouth daily       ALLERGIES  Allergies   Allergen Reactions     No Known Allergies      PAST MEDICAL HISTORY:  Past Medical History:   Diagnosis Date     Atrophic gastritis 9/8/2016     Atrophic gastritis 9/8/2016     Benign non-nodular prostatic hyperplasia with lower urinary tract symptoms 9/8/2016     History of diabetes mellitus      Hyperlipidemia LDL goal <100 9/8/2016     Obstructive sleep apnea syndrome 9/8/2016     SOB (shortness of breath) 5/30/2019     Type 2 diabetes mellitus without complication (H) 9/6/2016     Vitamin B12 deficiency (non anemic) 9/8/2016     PAST SURGICAL HISTORY:  Past Surgical History:   Procedure Laterality Date     CV HEART CATHETERIZATION WITH POSSIBLE INTERVENTION N/A 6/4/2019     Procedure: Heart Catheterization with Possible Intervention;  Surgeon: Alex Bazan MD;  Location:  HEART CARDIAC CATH LAB     ESOPHAGOSCOPY, GASTROSCOPY, DUODENOSCOPY (EGD), COMBINED N/A 1/20/2015    Procedure: COMBINED ESOPHAGOSCOPY, GASTROSCOPY, DUODENOSCOPY (EGD), BIOPSY SINGLE OR MULTIPLE;  Surgeon: Allan Marroquin MD;  Location:  GI     EXCISE LESION TRUNK N/A 4/17/2019    Procedure: EXCISE CYST UPPER BACK;  Surgeon: Rene Murray MD;  Location:  OR     HERNIA REPAIR       HYDROCELECTOMY SCROTAL       ORTHOPEDIC SURGERY      both knees replaced     FAMILY HISTORY:  Family History   Problem Relation Age of Onset     Coronary Artery Disease Father 53     Cerebrovascular Disease Father      Diabetes Mother      Diabetes Brother      SOCIAL HISTORY:  Social History     Socioeconomic History     Marital status:      Spouse name: None     Number of children: None     Years of education: None     Highest education level: None   Occupational History     None   Social Needs     Financial resource strain: None     Food insecurity:     Worry: None     Inability: None     Transportation needs:     Medical: None     Non-medical: None   Tobacco Use     Smoking status: Never Smoker     Smokeless tobacco: Never Used   Substance and Sexual Activity     Alcohol use: Not Currently     Alcohol/week: 0.0 standard drinks     Drug use: No     Sexual activity: Never   Lifestyle     Physical activity:     Days per week: None     Minutes per session: None     Stress: None   Relationships     Social connections:     Talks on phone: None     Gets together: None     Attends Hindu service: None     Active member of club or organization: None     Attends meetings of clubs or organizations: None     Relationship status: None     Intimate partner violence:     Fear of current or ex partner: None     Emotionally abused: None     Physically abused: None     Forced sexual activity: None   Other Topics  Concern     Parent/sibling w/ CABG, MI or angioplasty before 65F 55M? 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; walks dog for exercise      Review of Systems:  Skin:  Negative     Eyes:  Positive for glasses  ENT:  Negative    Respiratory:  Negative    Cardiovascular:  Negative    Gastroenterology: Negative    Genitourinary:  Negative    Musculoskeletal:  Negative    Neurologic:  Negative    Psychiatric:  Negative    Heme/Lymph/Imm:  Negative    Endocrine:  Positive for diabetes     Physical Exam:  Vitals: /67   Pulse 56   Ht 1.829 m (6')   Wt 110.2 kg (243 lb)   BMI 32.96 kg/m     Wt Readings from Last 4 Encounters:   10/10/19 110.2 kg (243 lb)   09/26/19 109.3 kg (241 lb)   08/12/19 108.9 kg (240 lb)   07/03/19 108.5 kg (239 lb 4.8 oz)     GEN: well nourished, in no acute distress.  HEENT:  Pupils equal, round. Sclerae nonicteric.   NECK: JVP appears normal at 30 degrees.  C/V:  Regular rate and rhythm, II/VI systolic murmur at the RUSB  RESP: Respirations are unlabored. Clear to auscultation bilaterally without wheezing, rales, or rhonchi.  GI: Abdomen soft, nontender.  EXTREM: No LE edema.  NEURO: Alert and oriented, cooperative.  SKIN: Warm and dry.     Recent Lab Results:  LIPID RESULTS:  Lab Results   Component Value Date    CHOL 123 12/05/2016    HDL 50 12/05/2016    LDL 51 12/05/2016    TRIG 108 12/05/2016     LIVER ENZYME RESULTS:  No results found for: AST, ALT  CBC RESULTS:  Lab Results   Component Value Date    WBC 5.3 06/04/2019    RBC 4.94 06/04/2019    HGB 12.5 (L) 06/04/2019    HCT 39.5 (L) 06/04/2019    MCV 80 06/04/2019    MCH 25.3 (L) 06/04/2019    MCHC 31.6 06/04/2019    RDW 16.8 (H) 06/04/2019     06/04/2019     BMP RESULTS:  Lab Results   Component Value Date     (L) 07/03/2019    POTASSIUM 4.4 07/03/2019    CHLORIDE 100 07/03/2019    CO2 29 07/03/2019    ANIONGAP 10.4 07/03/2019     (H) 07/03/2019    BUN 12 07/03/2019    CR 1.06  07/03/2019    GFRESTIMATED 68 07/03/2019    GFRESTBLACK 82 07/03/2019    FATAMTA 9.9 07/03/2019      A1C RESULTS:  Lab Results   Component Value Date    A1C 6.7 (H) 06/12/2017     INR RESULTS:  Lab Results   Component Value Date    INR 1.15 (H) 06/04/2019    INR 0.98 08/21/2006       New/Pertinent imaging results since last visit:  None    Stella Elliott PA-C  [facility]

## 2019-10-10 NOTE — PATIENT INSTRUCTIONS
Thank you for your [facility] visit today. Your provider has recommended the following:    Medication Changes:  Let's increase your lisinopril dose. Please take an extra 1/2 tablet (2.5 mg) before bedtime. If you tolerate this for a week and your blood pressure is stable, then increase to a full tablet (5 mg) at bedtime.  Recommendations:  Please call us with questions or concerns.  Follow-up:  See me back for cardiology follow up as scheduled.  Reminder:  Please bring in all current medications, over the counter supplements and vitamin bottles to your next appointment.            [facility]

## 2019-10-11 ENCOUNTER — HOSPITAL ENCOUNTER (OUTPATIENT)
Dept: CARDIAC REHAB | Facility: CLINIC | Age: 79
End: 2019-10-11
Attending: PHYSICIAN ASSISTANT
Payer: MEDICARE

## 2019-10-11 PROCEDURE — 40000116 ZZH STATISTIC OP CR VISIT

## 2019-10-11 PROCEDURE — 93798 PHYS/QHP OP CAR RHAB W/ECG: CPT

## 2019-10-14 ENCOUNTER — HOSPITAL ENCOUNTER (OUTPATIENT)
Dept: CARDIAC REHAB | Facility: CLINIC | Age: 79
End: 2019-10-14
Attending: INTERNAL MEDICINE
Payer: MEDICARE

## 2019-10-14 PROCEDURE — 93798 PHYS/QHP OP CAR RHAB W/ECG: CPT

## 2019-10-14 PROCEDURE — 40000116 ZZH STATISTIC OP CR VISIT

## 2019-10-18 ENCOUNTER — HOSPITAL ENCOUNTER (OUTPATIENT)
Dept: CARDIAC REHAB | Facility: CLINIC | Age: 79
End: 2019-10-18
Attending: INTERNAL MEDICINE
Payer: MEDICARE

## 2019-10-18 PROCEDURE — 40000116 ZZH STATISTIC OP CR VISIT: Performed by: REHABILITATION PRACTITIONER

## 2019-10-18 PROCEDURE — 93798 PHYS/QHP OP CAR RHAB W/ECG: CPT | Performed by: REHABILITATION PRACTITIONER

## 2019-10-21 ENCOUNTER — HOSPITAL ENCOUNTER (OUTPATIENT)
Dept: CARDIAC REHAB | Facility: CLINIC | Age: 79
End: 2019-10-21
Attending: INTERNAL MEDICINE
Payer: MEDICARE

## 2019-10-21 PROCEDURE — 40000116 ZZH STATISTIC OP CR VISIT

## 2019-10-21 PROCEDURE — 93798 PHYS/QHP OP CAR RHAB W/ECG: CPT

## 2019-10-23 DIAGNOSIS — I25.82 CORONARY ARTERY CHRONIC TOTAL OCCLUSION: Primary | ICD-10-CM

## 2019-10-23 RX ORDER — POTASSIUM CHLORIDE 1500 MG/1
20 TABLET, EXTENDED RELEASE ORAL
Status: CANCELLED | OUTPATIENT
Start: 2019-10-23

## 2019-10-23 RX ORDER — ASPIRIN 81 MG/1
81 TABLET ORAL DAILY
Status: CANCELLED | OUTPATIENT
Start: 2019-10-23 | End: 2019-10-25

## 2019-10-23 RX ORDER — LIDOCAINE 40 MG/G
CREAM TOPICAL
Status: CANCELLED | OUTPATIENT
Start: 2019-10-23

## 2019-10-23 RX ORDER — SODIUM CHLORIDE 9 MG/ML
INJECTION, SOLUTION INTRAVENOUS CONTINUOUS
Status: CANCELLED | OUTPATIENT
Start: 2019-10-23

## 2019-10-23 NOTE — PROGRESS NOTES
Contacted patient to review pre-procedure instructions: patient is scheduled for  procedure on 10/24/19. Patient's arrival time is 10:00 am and procedure time is 12:00 pm. Patient is aware to be NPO except for medications after midnight. Patient will hold the medications furosemide and metformin. Patient will contact his PCP for plan for insulin management. Order placed for BMP to be drawn on 10/28/19, pt advised to schedule lab appointment and continue to hold metformin until he is called with results. Patient has no known contrast dye allergy. Patient is not taking anti-coagulation medication. Patient's renal function is WNL for procedure. Patient will continue daily aspirin dose 81 mg. Patient has arranged for transportation and 24 hour follow up care. Patient verbalized understanding of all instructions. Orders for procedure entered. Pt transferred to scheduling to make lab appointment.

## 2019-10-24 ENCOUNTER — SURGERY (OUTPATIENT)
Age: 79
End: 2019-10-24
Payer: MEDICARE

## 2019-10-24 ENCOUNTER — HOSPITAL ENCOUNTER (OUTPATIENT)
Facility: CLINIC | Age: 79
Discharge: HOME | End: 2019-10-24
Admitting: INTERNAL MEDICINE
Payer: MEDICARE

## 2019-10-24 VITALS
TEMPERATURE: 97.6 F | RESPIRATION RATE: 18 BRPM | WEIGHT: 241.2 LBS | HEIGHT: 72 IN | SYSTOLIC BLOOD PRESSURE: 121 MMHG | OXYGEN SATURATION: 97 % | DIASTOLIC BLOOD PRESSURE: 64 MMHG | HEART RATE: 58 BPM | BODY MASS INDEX: 32.67 KG/M2

## 2019-10-24 DIAGNOSIS — I25.82 CORONARY ARTERY CHRONIC TOTAL OCCLUSION: ICD-10-CM

## 2019-10-24 LAB
ANION GAP SERPL CALCULATED.3IONS-SCNC: 4 MMOL/L (ref 3–14)
APTT PPP: 34 SEC (ref 22–37)
BUN SERPL-MCNC: 14 MG/DL (ref 7–30)
CALCIUM SERPL-MCNC: 8.8 MG/DL (ref 8.5–10.1)
CHLORIDE SERPL-SCNC: 104 MMOL/L (ref 94–109)
CO2 SERPL-SCNC: 26 MMOL/L (ref 20–32)
CREAT SERPL-MCNC: 0.9 MG/DL (ref 0.66–1.25)
ERYTHROCYTE [DISTWIDTH] IN BLOOD BY AUTOMATED COUNT: 14.8 % (ref 10–15)
GFR SERPL CREATININE-BSD FRML MDRD: 81 ML/MIN/{1.73_M2}
GLUCOSE BLDC GLUCOMTR-MCNC: 150 MG/DL (ref 70–99)
GLUCOSE SERPL-MCNC: 196 MG/DL (ref 70–99)
HCT VFR BLD AUTO: 40.4 % (ref 40–53)
HGB BLD-MCNC: 13.3 G/DL (ref 13.3–17.7)
INR PPP: 1.19 (ref 0.86–1.14)
MCH RBC QN AUTO: 28.7 PG (ref 26.5–33)
MCHC RBC AUTO-ENTMCNC: 32.9 G/DL (ref 31.5–36.5)
MCV RBC AUTO: 87 FL (ref 78–100)
PLATELET # BLD AUTO: 200 10E9/L (ref 150–450)
POTASSIUM SERPL-SCNC: 4.4 MMOL/L (ref 3.4–5.3)
RBC # BLD AUTO: 4.64 10E12/L (ref 4.4–5.9)
SODIUM SERPL-SCNC: 134 MMOL/L (ref 133–144)
WBC # BLD AUTO: 5.5 10E9/L (ref 4–11)

## 2019-10-24 PROCEDURE — 25000132 ZZH RX MED GY IP 250 OP 250 PS 637: Mod: GY | Performed by: INTERNAL MEDICINE

## 2019-10-24 PROCEDURE — 82962 GLUCOSE BLOOD TEST: CPT

## 2019-10-24 PROCEDURE — 99152 MOD SED SAME PHYS/QHP 5/>YRS: CPT | Performed by: INTERNAL MEDICINE

## 2019-10-24 PROCEDURE — 40000852 ZZH STATISTIC HEART CATH LAB OR EP LAB

## 2019-10-24 PROCEDURE — 85027 COMPLETE CBC AUTOMATED: CPT | Performed by: INTERNAL MEDICINE

## 2019-10-24 PROCEDURE — 92929 ZZHC PRQ TRLUML CORONARY BM STENT W/ANGIO ADDL ART/BRNCH: CPT | Mod: LC | Performed by: INTERNAL MEDICINE

## 2019-10-24 PROCEDURE — 93454 CORONARY ARTERY ANGIO S&I: CPT | Performed by: INTERNAL MEDICINE

## 2019-10-24 PROCEDURE — 27210788 ZZH MANIFOLD CR3: Performed by: INTERNAL MEDICINE

## 2019-10-24 PROCEDURE — C1887 CATHETER, GUIDING: HCPCS | Performed by: INTERNAL MEDICINE

## 2019-10-24 PROCEDURE — C9607 PERC D-E COR REVASC CHRO SIN: HCPCS | Mod: LC | Performed by: INTERNAL MEDICINE

## 2019-10-24 PROCEDURE — 40000235 ZZH STATISTIC TELEMETRY

## 2019-10-24 PROCEDURE — 99152 MOD SED SAME PHYS/QHP 5/>YRS: CPT | Mod: GC | Performed by: INTERNAL MEDICINE

## 2019-10-24 PROCEDURE — 36415 COLL VENOUS BLD VENIPUNCTURE: CPT

## 2019-10-24 PROCEDURE — 85730 THROMBOPLASTIN TIME PARTIAL: CPT | Performed by: INTERNAL MEDICINE

## 2019-10-24 PROCEDURE — 92943 PRQ TRLUML REVSC CH OCC ANT: CPT | Mod: LC | Performed by: INTERNAL MEDICINE

## 2019-10-24 PROCEDURE — C1894 INTRO/SHEATH, NON-LASER: HCPCS | Performed by: INTERNAL MEDICINE

## 2019-10-24 PROCEDURE — C1760 CLOSURE DEV, VASC: HCPCS | Performed by: INTERNAL MEDICINE

## 2019-10-24 PROCEDURE — 80048 BASIC METABOLIC PNL TOTAL CA: CPT | Performed by: INTERNAL MEDICINE

## 2019-10-24 PROCEDURE — 85347 COAGULATION TIME ACTIVATED: CPT | Mod: 91

## 2019-10-24 PROCEDURE — 85610 PROTHROMBIN TIME: CPT | Performed by: INTERNAL MEDICINE

## 2019-10-24 PROCEDURE — 25000128 H RX IP 250 OP 636: Performed by: INTERNAL MEDICINE

## 2019-10-24 PROCEDURE — C1725 CATH, TRANSLUMIN NON-LASER: HCPCS | Performed by: INTERNAL MEDICINE

## 2019-10-24 PROCEDURE — 40000065 ZZH STATISTIC EKG NON-CHARGEABLE

## 2019-10-24 PROCEDURE — 93010 ELECTROCARDIOGRAM REPORT: CPT | Mod: 76 | Performed by: INTERNAL MEDICINE

## 2019-10-24 PROCEDURE — 99153 MOD SED SAME PHYS/QHP EA: CPT | Performed by: INTERNAL MEDICINE

## 2019-10-24 PROCEDURE — C1769 GUIDE WIRE: HCPCS | Performed by: INTERNAL MEDICINE

## 2019-10-24 PROCEDURE — 25800030 ZZH RX IP 258 OP 636: Performed by: INTERNAL MEDICINE

## 2019-10-24 PROCEDURE — 93005 ELECTROCARDIOGRAM TRACING: CPT

## 2019-10-24 PROCEDURE — C1874 STENT, COATED/COV W/DEL SYS: HCPCS | Performed by: INTERNAL MEDICINE

## 2019-10-24 PROCEDURE — 27210794 ZZH OR GENERAL SUPPLY STERILE: Performed by: INTERNAL MEDICINE

## 2019-10-24 DEVICE — STENT RESOLUTE ONYX DE 2.7FR 2.50X22MM RONYX DE25022UX: Type: IMPLANTABLE DEVICE | Status: FUNCTIONAL

## 2019-10-24 RX ORDER — NALOXONE HYDROCHLORIDE 0.4 MG/ML
.1-.4 INJECTION, SOLUTION INTRAMUSCULAR; INTRAVENOUS; SUBCUTANEOUS
Status: DISCONTINUED | OUTPATIENT
Start: 2019-10-24 | End: 2019-10-24 | Stop reason: HOSPADM

## 2019-10-24 RX ORDER — LIDOCAINE 40 MG/G
CREAM TOPICAL
Status: DISCONTINUED | OUTPATIENT
Start: 2019-10-24 | End: 2019-10-24 | Stop reason: HOSPADM

## 2019-10-24 RX ORDER — NITROGLYCERIN 0.4 MG/1
0.4 TABLET SUBLINGUAL EVERY 5 MIN PRN
Status: DISCONTINUED | OUTPATIENT
Start: 2019-10-24 | End: 2019-10-24 | Stop reason: HOSPADM

## 2019-10-24 RX ORDER — ASPIRIN 81 MG/1
81 TABLET ORAL DAILY
Status: DISCONTINUED | OUTPATIENT
Start: 2019-10-24 | End: 2019-10-24 | Stop reason: HOSPADM

## 2019-10-24 RX ORDER — SODIUM CHLORIDE 9 MG/ML
INJECTION, SOLUTION INTRAVENOUS CONTINUOUS
Status: DISCONTINUED | OUTPATIENT
Start: 2019-10-24 | End: 2019-10-24 | Stop reason: HOSPADM

## 2019-10-24 RX ORDER — NALOXONE HYDROCHLORIDE 0.4 MG/ML
.2-.4 INJECTION, SOLUTION INTRAMUSCULAR; INTRAVENOUS; SUBCUTANEOUS
Status: DISCONTINUED | OUTPATIENT
Start: 2019-10-24 | End: 2019-10-24 | Stop reason: HOSPADM

## 2019-10-24 RX ORDER — NITROGLYCERIN 5 MG/ML
VIAL (ML) INTRAVENOUS
Status: DISCONTINUED | OUTPATIENT
Start: 2019-10-24 | End: 2019-10-24 | Stop reason: HOSPADM

## 2019-10-24 RX ORDER — POTASSIUM CHLORIDE 1500 MG/1
20 TABLET, EXTENDED RELEASE ORAL
Status: DISCONTINUED | OUTPATIENT
Start: 2019-10-24 | End: 2019-10-24 | Stop reason: HOSPADM

## 2019-10-24 RX ORDER — FENTANYL CITRATE 50 UG/ML
INJECTION, SOLUTION INTRAMUSCULAR; INTRAVENOUS
Status: DISCONTINUED | OUTPATIENT
Start: 2019-10-24 | End: 2019-10-24 | Stop reason: HOSPADM

## 2019-10-24 RX ORDER — HEPARIN SODIUM 1000 [USP'U]/ML
INJECTION, SOLUTION INTRAVENOUS; SUBCUTANEOUS
Status: DISCONTINUED | OUTPATIENT
Start: 2019-10-24 | End: 2019-10-24 | Stop reason: HOSPADM

## 2019-10-24 RX ORDER — LISINOPRIL 2.5 MG/1
2.5 TABLET ORAL AT BEDTIME
COMMUNITY
End: 2019-10-30

## 2019-10-24 RX ORDER — IOPAMIDOL 755 MG/ML
INJECTION, SOLUTION INTRAVASCULAR
Status: DISCONTINUED | OUTPATIENT
Start: 2019-10-24 | End: 2019-10-24 | Stop reason: HOSPADM

## 2019-10-24 RX ORDER — ATROPINE SULFATE 0.1 MG/ML
0.5 INJECTION INTRAVENOUS EVERY 5 MIN PRN
Status: DISCONTINUED | OUTPATIENT
Start: 2019-10-24 | End: 2019-10-24 | Stop reason: HOSPADM

## 2019-10-24 RX ORDER — ACETAMINOPHEN 325 MG/1
650 TABLET ORAL EVERY 4 HOURS PRN
Status: DISCONTINUED | OUTPATIENT
Start: 2019-10-24 | End: 2019-10-24 | Stop reason: HOSPADM

## 2019-10-24 RX ORDER — FLUMAZENIL 0.1 MG/ML
0.2 INJECTION, SOLUTION INTRAVENOUS
Status: DISCONTINUED | OUTPATIENT
Start: 2019-10-24 | End: 2019-10-24 | Stop reason: HOSPADM

## 2019-10-24 RX ORDER — FENTANYL CITRATE 50 UG/ML
25-50 INJECTION, SOLUTION INTRAMUSCULAR; INTRAVENOUS
Status: DISCONTINUED | OUTPATIENT
Start: 2019-10-24 | End: 2019-10-24 | Stop reason: HOSPADM

## 2019-10-24 RX ADMIN — MIDAZOLAM 1 MG: 1 INJECTION INTRAMUSCULAR; INTRAVENOUS at 13:00

## 2019-10-24 RX ADMIN — HEPARIN SODIUM 11000 UNITS: 1000 INJECTION, SOLUTION INTRAVENOUS; SUBCUTANEOUS at 13:14

## 2019-10-24 RX ADMIN — FENTANYL CITRATE 25 MCG: 50 INJECTION, SOLUTION INTRAMUSCULAR; INTRAVENOUS at 13:33

## 2019-10-24 RX ADMIN — FENTANYL CITRATE 50 MCG: 50 INJECTION, SOLUTION INTRAMUSCULAR; INTRAVENOUS at 13:20

## 2019-10-24 RX ADMIN — FENTANYL CITRATE 25 MCG: 50 INJECTION, SOLUTION INTRAMUSCULAR; INTRAVENOUS at 13:12

## 2019-10-24 RX ADMIN — FENTANYL CITRATE 25 MCG: 50 INJECTION, SOLUTION INTRAMUSCULAR; INTRAVENOUS at 13:54

## 2019-10-24 RX ADMIN — MIDAZOLAM 0.5 MG: 1 INJECTION INTRAMUSCULAR; INTRAVENOUS at 13:50

## 2019-10-24 RX ADMIN — HEPARIN SODIUM 3000 UNITS: 1000 INJECTION, SOLUTION INTRAVENOUS; SUBCUTANEOUS at 13:26

## 2019-10-24 RX ADMIN — HEPARIN SODIUM 3000 UNITS: 1000 INJECTION, SOLUTION INTRAVENOUS; SUBCUTANEOUS at 14:09

## 2019-10-24 RX ADMIN — MIDAZOLAM 0.5 MG: 1 INJECTION INTRAMUSCULAR; INTRAVENOUS at 13:33

## 2019-10-24 RX ADMIN — HEPARIN SODIUM 1000 UNITS: 1000 INJECTION, SOLUTION INTRAVENOUS; SUBCUTANEOUS at 13:50

## 2019-10-24 RX ADMIN — MIDAZOLAM 1 MG: 1 INJECTION INTRAMUSCULAR; INTRAVENOUS at 14:10

## 2019-10-24 RX ADMIN — IOPAMIDOL 316 ML: 755 INJECTION, SOLUTION INTRAVENOUS at 14:52

## 2019-10-24 RX ADMIN — FENTANYL CITRATE 50 MCG: 50 INJECTION, SOLUTION INTRAMUSCULAR; INTRAVENOUS at 13:00

## 2019-10-24 RX ADMIN — NITROGLYCERIN 200 MCG: 5 INJECTION, SOLUTION INTRAVENOUS at 14:18

## 2019-10-24 RX ADMIN — FENTANYL CITRATE 50 MCG: 50 INJECTION, SOLUTION INTRAMUSCULAR; INTRAVENOUS at 14:35

## 2019-10-24 RX ADMIN — ASPIRIN 324 MG: 81 TABLET, COATED ORAL at 14:28

## 2019-10-24 RX ADMIN — MIDAZOLAM 0.5 MG: 1 INJECTION INTRAMUSCULAR; INTRAVENOUS at 13:12

## 2019-10-24 RX ADMIN — MIDAZOLAM 0.5 MG: 1 INJECTION INTRAMUSCULAR; INTRAVENOUS at 13:30

## 2019-10-24 RX ADMIN — MIDAZOLAM 0.5 MG: 1 INJECTION INTRAMUSCULAR; INTRAVENOUS at 13:54

## 2019-10-24 RX ADMIN — NITROGLYCERIN 300 MCG: 5 INJECTION, SOLUTION INTRAVENOUS at 14:39

## 2019-10-24 RX ADMIN — SODIUM CHLORIDE: 9 INJECTION, SOLUTION INTRAVENOUS at 10:42

## 2019-10-24 RX ADMIN — TICAGRELOR 180 MG: 90 TABLET ORAL at 14:29

## 2019-10-24 RX ADMIN — FENTANYL CITRATE 25 MCG: 50 INJECTION, SOLUTION INTRAMUSCULAR; INTRAVENOUS at 13:30

## 2019-10-24 RX ADMIN — MIDAZOLAM 1 MG: 1 INJECTION INTRAMUSCULAR; INTRAVENOUS at 14:35

## 2019-10-24 RX ADMIN — FENTANYL CITRATE 25 MCG: 50 INJECTION, SOLUTION INTRAMUSCULAR; INTRAVENOUS at 13:58

## 2019-10-24 RX ADMIN — FENTANYL CITRATE 50 MCG: 50 INJECTION, SOLUTION INTRAMUSCULAR; INTRAVENOUS at 14:10

## 2019-10-24 RX ADMIN — MIDAZOLAM 1 MG: 1 INJECTION INTRAMUSCULAR; INTRAVENOUS at 13:20

## 2019-10-24 RX ADMIN — MIDAZOLAM 0.5 MG: 1 INJECTION INTRAMUSCULAR; INTRAVENOUS at 13:58

## 2019-10-24 RX ADMIN — FENTANYL CITRATE 25 MCG: 50 INJECTION, SOLUTION INTRAMUSCULAR; INTRAVENOUS at 13:50

## 2019-10-24 ASSESSMENT — MIFFLIN-ST. JEOR: SCORE: 1857.08

## 2019-10-24 NOTE — Clinical Note
The first balloon was inserted into the circumflex.Max pressure = 14 zarina. Total duration = 15 seconds.     Max pressure = 8 zarina. Total duration = 7 seconds.    Balloon reinflated a second time: Max pressure = 8 zarina. Total duration = 7 seconds.  Balloon reinflated a third time: Max pressure = 16 zarina. Total duration = 15 seconds.  Balloon reinflated a fourth time: Max pressure = 16 zarina. Total duration = 14 seconds.

## 2019-10-24 NOTE — Clinical Note
The first balloon was inserted into the circumflex.Max pressure = 12 zarina. Total duration = 12 seconds.     Max pressure = 16 zarina. Total duration = 15 seconds.    Balloon reinflated a second time: Max pressure = 16 zarina. Total duration = 15 seconds.  Balloon reinflated a third time: Max pressure = 18 zarina. Total duration = 10 seconds.  Balloon reinflated a fourth time: Max pressure = 18 zarina. Total duration = 10 seconds.

## 2019-10-24 NOTE — DISCHARGE INSTRUCTIONS
Cardiac Angioplasty/Stent Discharge Instructions - Femoral & Radial    After you go home:      Have an adult stay with you until tomorrow.    Drink extra fluids for 2 days.    You may resume your normal diet.    No smoking       For 24 hours - due to the sedation you received:    Relax and take it easy.    Do NOT make any important or legal decisions.    Do NOT drive or operate machines at home or at work.    Do NOT drink alcohol.    Care of Wrist & Groin Puncture Sites:      For the first 24 hrs - check the puncture site every 1-2 hours while awake.    For 2 days, when you cough, sneeze, laugh or move your bowels,  hold your hand over the puncture site and press firmly on/above the site    Remove the bandaid after 24 hours. If there is minor oozing, apply another bandaid and remove it after 12 hours.    It is normal to have a small bruise or pea size lump at the site.    You may shower tomorrow. Do NOT take a bath, or use a hot tub or pool for at least 3 days. Do NOT scrub the site. Do not use lotion or powder near the puncture site.    Activity - For 2 days:    Wrist site:      do not use your hand or arm to support your weight (such as rising from a chair)     do not bend your wrist (such as lifting a garage door).    do not lift more than 5 pounds or exercise your arm (such as tennis, golf or bowling).    Do NOT do any heavy activity such as exercise, lifting, or straining.     Groin site:        No stooping or squatting    Do NOT do any heavy activity such as exercise, lifting, or straining.     No housework, yard work or any activity that make you sweat    Do NOT lift more than 10 pounds    Bleeding:    If you start bleeding from the site in your wrist:      Sit down and press firmly on/above the site with your fingers for 10 minutes.     Once bleeding stops, keep your arm still for 2 hours.     Call [facility] as soon as you can.    If you start bleeding from the site in your groin:      Lie down flat and  press firmly on/above the site for 10 minutes.    Once bleeding stops, lay flat for 2 hours.     Call [facility] as soon as you can.    Call 911 right away if you have heavy bleeding or bleeding that does not stop.      Medicines:      If you are taking an antiplatelet medication such as Plavix, Brilinta or Effient, do not stop taking it until you talk to your cardiologist.        If you are on Metformin (Glucophage), do not restart it until you have blood tests (within 2 to 3 days after discharge).  After you have your blood drawn, you may restart the Metformin.     Take your medications, including blood thinners, unless your provider tells you not to.  If you take Coumadin (Warfarin), have your INR checked by your provider in  3-5 days. Call your clinic to schedule this.    If you have stopped any medicines, check with your provider about when to restart them.    Follow Up Appointments:      Follow up with [facility] Nurse Practitioner at [facility] of patient preference in 7-10 days.    Cardiac Rehab will contact you for follow up care.    Call the clinic if:      You have increased pain or a large or growing hard lump around the site.    The site is red, swollen, hot or tender.    Blood or fluid is draining from the site.    You have chills or a fever greater than 101 F (38 C).    Your arm or leg feels numb, cool or changes color.    You have hives, a rash or unusual itching.    New pain in the back or belly that you cannot control with Tylenol.    Any questions or concerns.    Other Instructions:      If you received a stent - carry your stent card with you at all times.        [facility] [place]:    [phone] [facility] (7 days a week)

## 2019-10-24 NOTE — Clinical Note
The first balloon was inserted into the circumflex.Max pressure = 10 zarina. Total duration = 30 seconds.     Max pressure = 10 zarina. Total duration = 10 seconds.    Balloon reinflated a second time: Max pressure = 10 zarina. Total duration = 10 seconds.

## 2019-10-24 NOTE — Clinical Note
The first balloon was inserted into the circumflex.Max pressure = 14 zarina. Total duration = 22 seconds.     Max pressure = 14 zarina. Total duration = 15 seconds.    Balloon reinflated a second time: Max pressure = 14 zarina. Total duration = 15 seconds.

## 2019-10-24 NOTE — PROGRESS NOTES
Care Suites Admission Nursing Note    Reason for admission:   CS arrival time: 1000  Accompanied by: Wife  Name/phone of DC : Leatha [phone]  Medications held: Metformin, lasix  Consent signed: Contrast D/C instructions reviewed with pt with verbal understanding received. Procedure consent obtained by MD.  Abnormal assessment/labs: No  If abnormal, provider notified: n/a   Education/questions answered: Dr Rivera here to explain procedure to patient and wife and obtain consent.   Plan: Pt up to void then taken ambulatory to cath lab with RN. Will return to Care Suites for recovery post procedure.

## 2019-10-24 NOTE — Clinical Note
The first balloon was inserted into the circumflex.Max pressure = 6 zarina. Total duration = 23 seconds.

## 2019-10-25 ENCOUNTER — TELEPHONE (OUTPATIENT)
Dept: CARDIOLOGY | Facility: CLINIC | Age: 79
End: 2019-10-25

## 2019-10-25 LAB
KCT BLD-ACNC: 259 SEC (ref 75–150)
KCT BLD-ACNC: 263 SEC (ref 75–150)
KCT BLD-ACNC: 275 SEC (ref 75–150)
KCT BLD-ACNC: 324 SEC (ref 75–150)

## 2019-10-25 NOTE — TELEPHONE ENCOUNTER
Left message on pt's cell phone post discharge from care suites after coronary angiography with a femora/radial approach. Patient successful  of proximal left circmflex artery. Has follow up next week with Sujey. Left message to return call, gave him my nurse's number.    Instructed patient to relax and take it easy, increase fluid intake for the next 24 hours. May remove the Band-Aid after 24 hours, but if there is minor oozing apply another and may remove second Band-Aid after 12 hours. Also, no heavy exercise for 2 days, do not take a bath, or use a hot tub or pool for at least 3 days but may shower.     Review with patient care of groin site to not scrub the site, for the first 2 days do not squat or stoop, and hold your hand over the puncture site when you cough, sneeze or have a bowel movement. It is normal to have a lump or bruise. No lifting >10 lbs for 3-5 days. Lie down and place firm pressure on groin site if it start bleeding.    Review the importance of dual antiplatelet therapy uninterrupted for one year for drug eluting stents. Patient taking Brillinta, reviewed possible side effects and to call our office if they have breathing concerns.    Instruct patient to call 911 right away if the bleeding is heavy or does not stop. Call the clinic if there is a large or growing lump around the site, the site is red, swollen, hot, or tender, have fever >101 degrees F or chills, your arm or leg feels numb or cool, or hives, a rash, or unusual itching, shortness of breath, or chest discomfort.    JENNIFER Garcias, CNP  Cardiolgoy

## 2019-10-25 NOTE — PROGRESS NOTES
Patient s/p PTCR and back from cath lab at 1500.  Patient has right radial TR band in place and right groin angioseal- both WNL.  Patient VS WNL, SB, denies pain, tolerates PO, and voiding.  TR band removed per protocol and right radial site is CDI without hematoma or swelling.  Patient has prescription of brilinta and has no questions regarding AVS instructions.  Up to ambulate and void and right groin site remains soft, CDI.      1855-Patient out to discharge door and egg sized hematoma noticed at right radial site- manual pressure held immediately for 5 minutes and patient returned to care suites for monitoring.  Started to swell another time and manual pressure reapplied for another 10 minutes.  Site now stable but will continue to assess for further hematoma over next 30 minutes.     1950- Right radial site remains soft but now ecchymotic with palpable pulse, patient denies pain.  Reinforced teaching and holding manual pressure to patient and wife Leatha if swelling reoccurs after discharge.    Care Suites Discharge Nursing Note    Education/questions answered: YES  Patient DC location:HOME  Accompanied by: LEATHA PETERSEN discharge time: 1955

## 2019-10-28 ENCOUNTER — HOSPITAL ENCOUNTER (OUTPATIENT)
Dept: CARDIAC REHAB | Facility: CLINIC | Age: 79
End: 2019-10-28
Attending: INTERNAL MEDICINE
Payer: MEDICARE

## 2019-10-28 DIAGNOSIS — I25.82 CORONARY ARTERY CHRONIC TOTAL OCCLUSION: ICD-10-CM

## 2019-10-28 LAB
ANION GAP SERPL CALCULATED.3IONS-SCNC: 6 MMOL/L (ref 3–14)
BUN SERPL-MCNC: 12 MG/DL (ref 7–30)
CALCIUM SERPL-MCNC: 8.5 MG/DL (ref 8.5–10.1)
CHLORIDE SERPL-SCNC: 103 MMOL/L (ref 94–109)
CO2 SERPL-SCNC: 25 MMOL/L (ref 20–32)
CREAT SERPL-MCNC: 0.99 MG/DL (ref 0.66–1.25)
GFR SERPL CREATININE-BSD FRML MDRD: 73 ML/MIN/{1.73_M2}
GLUCOSE SERPL-MCNC: 288 MG/DL (ref 70–99)
INTERPRETATION ECG - MUSE: NORMAL
INTERPRETATION ECG - MUSE: NORMAL
POTASSIUM SERPL-SCNC: 4.4 MMOL/L (ref 3.4–5.3)
SODIUM SERPL-SCNC: 134 MMOL/L (ref 133–144)

## 2019-10-28 PROCEDURE — 80048 BASIC METABOLIC PNL TOTAL CA: CPT | Performed by: INTERNAL MEDICINE

## 2019-10-28 PROCEDURE — 36415 COLL VENOUS BLD VENIPUNCTURE: CPT | Performed by: INTERNAL MEDICINE

## 2019-10-28 PROCEDURE — 93798 PHYS/QHP OP CAR RHAB W/ECG: CPT

## 2019-10-28 PROCEDURE — 40000116 ZZH STATISTIC OP CR VISIT

## 2019-10-29 ENCOUNTER — TELEPHONE (OUTPATIENT)
Dept: CARDIOLOGY | Facility: CLINIC | Age: 79
End: 2019-10-29

## 2019-10-29 NOTE — TELEPHONE ENCOUNTER
Received call from pt inquiring about BMP results drawn yesterday. Pt has been holding metformin after angiogram done on 10/24/19. Advised pt that results are stable, glucose elevated, OK to resume metformin. Pt verbalized understanding and agreement with plan.     Component      Latest Ref Rng & Units 10/28/2019   Sodium      133 - 144 mmol/L 134   Potassium      3.4 - 5.3 mmol/L 4.4   Chloride      94 - 109 mmol/L 103   Carbon Dioxide      20 - 32 mmol/L 25   Anion Gap      3 - 14 mmol/L 6   Glucose      70 - 99 mg/dL 288 (H)   Urea Nitrogen      7 - 30 mg/dL 12   Creatinine      0.66 - 1.25 mg/dL 0.99   GFR Estimate      >60 mL/min/1.73:m2 73   GFR Estimate If Black      >60 mL/min/1.73:m2 84   Calcium      8.5 - 10.1 mg/dL 8.5

## 2019-10-30 ENCOUNTER — OFFICE VISIT (OUTPATIENT)
Dept: CARDIOLOGY | Facility: CLINIC | Age: 79
End: 2019-10-30
Payer: MEDICARE

## 2019-10-30 ENCOUNTER — HOSPITAL ENCOUNTER (OUTPATIENT)
Dept: CARDIAC REHAB | Facility: CLINIC | Age: 79
End: 2019-10-30
Attending: INTERNAL MEDICINE
Payer: MEDICARE

## 2019-10-30 VITALS
OXYGEN SATURATION: 97 % | SYSTOLIC BLOOD PRESSURE: 100 MMHG | HEIGHT: 72 IN | DIASTOLIC BLOOD PRESSURE: 60 MMHG | WEIGHT: 240 LBS | HEART RATE: 57 BPM | BODY MASS INDEX: 32.51 KG/M2

## 2019-10-30 DIAGNOSIS — I42.8 NONISCHEMIC CARDIOMYOPATHY (H): ICD-10-CM

## 2019-10-30 DIAGNOSIS — Z98.890 STATUS POST CORONARY ANGIOGRAM: Primary | ICD-10-CM

## 2019-10-30 DIAGNOSIS — I25.5 ISCHEMIC CARDIOMYOPATHY: ICD-10-CM

## 2019-10-30 DIAGNOSIS — I25.82 CORONARY ARTERY CHRONIC TOTAL OCCLUSION: ICD-10-CM

## 2019-10-30 PROCEDURE — 93798 PHYS/QHP OP CAR RHAB W/ECG: CPT

## 2019-10-30 PROCEDURE — 40000116 ZZH STATISTIC OP CR VISIT

## 2019-10-30 PROCEDURE — 99214 OFFICE O/P EST MOD 30 MIN: CPT | Performed by: PHYSICIAN ASSISTANT

## 2019-10-30 RX ORDER — LISINOPRIL 5 MG/1
5 TABLET ORAL DAILY
Qty: 60 TABLET | Refills: 3 | COMMUNITY
Start: 2019-10-30 | End: 2019-12-04

## 2019-10-30 ASSESSMENT — MIFFLIN-ST. JEOR: SCORE: 1851.63

## 2019-10-30 NOTE — PROGRESS NOTES
Cardiology Progress Note    Date of Service: 10/30/2019  Patient seen today in follow up of: cardiomyopathy, s/p  intervention  Primary cardiologist: Dr. Phillips    HPI:  Bryant Mckenzie is a very pleasant 79 year old male with a history of diabetes mellitus type 2, hyperlipidemia, mild aortic stenosis, obstructive sleep apnea, recently diagnosed cardiomyopathy and coronary artery disease who is here today for cardiology follow up.    Please see my prior note from 10/10 for full details regarding his medical history. Briefly, he presented this spring with shortness of breath and was found to have a severe cardiomyopathy with an LVEF of around 30%.  A CT coronary angiogram was subsequently ordered but this was not completed as his coronary calcium score was over 3000.      He was seen by Dr. Phillips in June. Coronary angiogram was recommended. He had a  10% proximal LAD stenosis.  The mid to distal circumflex was 100% stenosed.  He had a proximal to mid RCA lesion which was 50% stenosed.  Aggressive medical management was recommended with possible consideration for intervention to the  of the left circumflex. A cardiac MRI showed a severely dilated LV with an LVEF of 21%. There was mild to moderate RV dysfunction. Late gadolinium enhancement was consistent with a prior small infarction in the circumflex distribution. Given the small amount of enhancement and the global nature of his LV dysfunction, the cardiomyopathy was felt to likely be mixed.     We have slowly been working to optimize his medications. His blood pressure runs on the lower end of normal but he has been tolerating slow dose increases. He is maintained on 40 mg of lasix and has done well on this dose.    He saw Dr. Rivera in August to discuss possible intervention to the circumflex  and was felt to be a good candidate. Unfortunately, a recent echocardiogram on 10/10 showed an LVEF of 30 - 35%. His LV was only moderately dilated.    He  underwent successful intervention to the circumflex  with Dr. Rivera on 10/24. He was started on Brilinta for DAPT. He is here today for follow up after this. He continues to feel well. He had some mild shortness of breath when walking up the stairs this week but otherwise continues to be free of significant dyspnea on exertion with his usual activities. He denies chest pain, orthopnea, PND, weight gain, or leg swelling. He has some bruising around his radial and femoral access sites but denies pain, numbness, or tingling.     ASSESSMENT/PLAN:  1.  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 With an LVEF of 30 - 35% by echocardiogram on 10/10. His currently medical therapy includes lisinopril 5 mg in the morning and 2.5 mg in the evening as well as carvedilol 9.375 mg twice daily. I suggested we continue to work to optimize his medications as he has been tolerating these thus far. Today, I asked him to increase his lisinopril to 5 mg twice daily. I will plan to see him back in one month. If he is tolerating this dose, I will plan to switch him to low dose Entresto at his next visit. We reviewed Entresto and it's benefits and he is agreeable.  2.  Chronic systolic congestive heart failure. On lasix 40 mg daily. He appears euvolemic on exam today. Labs today show stable renal function and normal electrolytes.   3.  Coronary artery disease. S/p recent successful intervention to the  circumflex. He also had moderate RCA disease. He will continue aspirin and Brilinta to be continued for at least one year uninterrupted. He is also on high intensity statin therapy with atorvastatin 40 mg daily.   4.  Mild aortic stenosis.  5.  DM type II.     As noted above, I will see Marshall back in about a month for re-evaluation and further medication optimization. We will plan to repeat an echocardiogram this spring for re-evaluation of his LVEF. If his EF remains < 35%, he may be a candidate for an ICD. I  did briefly review this with he and his wife today.     Orders this Visit:  Orders Placed This Encounter   Procedures     Basic metabolic panel     Follow-Up with Cardiac Advanced Practice Provider     Orders Placed This Encounter   Medications     lisinopril (PRINIVIL/ZESTRIL) 5 MG tablet     Sig: Take 1 tablet (5 mg) by mouth 2 times daily     Dispense:  60 tablet     Refill:  3     Medications Discontinued During This Encounter   Medication Reason     lisinopril (PRINIVIL/ZESTRIL) 2.5 MG tablet      lisinopril (PRINIVIL/ZESTRIL) 5 MG tablet Reorder       CURRENT MEDICATIONS:  Current Outpatient Medications   Medication Sig Dispense Refill     ASPIRIN 81 PO Take by mouth daily        atorvastatin (LIPITOR) 40 MG tablet Take 1 tablet (40 mg) by mouth daily 90 tablet 3     carvedilol (COREG) 3.125 MG tablet Take 3 tablets (9.375 mg) by mouth 2 times daily (with meals) 540 tablet 3     furosemide (LASIX) 40 MG tablet Take 40 mg by mouth daily       insulin glargine (LANTUS) 100 UNIT/ML injection Inject 16 Units Subcutaneous At Bedtime 6/3 Took Lantus 8 Units at bedtime 15 mL 3     insulin lispro (HUMALOG KWIKPEN) 100 UNIT/ML injection 11 units with breakfast,15 units at noon, and 14 units before evening meal 36 mL 3     lisinopril (PRINIVIL/ZESTRIL) 5 MG tablet Take 1 tablet (5 mg) by mouth 2 times daily 60 tablet 3     Menaquinone-7 (VITAMIN K2 PO) Take by mouth daily       metFORMIN (GLUCOPHAGE) 500 MG tablet TAKE 2 TABLETS (1,000 MG) BY MOUTH TWICE A DAY WITH MEALS 360 tablet 0     Multiple Vitamins-Minerals (MULTIVITAL PO) Take 1 tablet by mouth daily       potassium chloride ER (MICRO-K) 10 MEQ CR capsule Take 10 mEq by mouth daily        tamsulosin (FLOMAX) 0.4 MG capsule Take 0.4 mg by mouth daily       ticagrelor (BRILINTA) 90 MG tablet Take 1 tablet (90 mg) by mouth 2 times daily Start this evening. 180 tablet 3     ALLERGIES  Allergies   Allergen Reactions     No Known Allergies      PAST MEDICAL  HISTORY:  Past Medical History:   Diagnosis Date     Atrophic gastritis 9/8/2016     Atrophic gastritis 9/8/2016     Benign non-nodular prostatic hyperplasia with lower urinary tract symptoms 9/8/2016     History of diabetes mellitus      Hyperlipidemia LDL goal <100 9/8/2016     Obstructive sleep apnea syndrome 9/8/2016     SOB (shortness of breath) 5/30/2019     Type 2 diabetes mellitus without complication (H) 9/6/2016     Vitamin B12 deficiency (non anemic) 9/8/2016     PAST SURGICAL HISTORY:  Past Surgical History:   Procedure Laterality Date     CV CHRONIC TOTAL OCCLUSION N/A 10/24/2019    Procedure: Coronary Total Occlusion;  Surgeon: Lupe Posada MD;  Location:  HEART CARDIAC CATH LAB     CV CORONARY ANGIOGRAM N/A 10/24/2019    Procedure: Coronary Angiogram;  Surgeon: Lupe Posada MD;  Location:  HEART CARDIAC CATH LAB     CV HEART CATHETERIZATION WITH POSSIBLE INTERVENTION N/A 6/4/2019    Procedure: Heart Catheterization with Possible Intervention;  Surgeon: Alex Bazan MD;  Location:  HEART CARDIAC CATH LAB     CV PCI STENT DRUG ELUTING N/A 10/24/2019    Procedure: PCI Stent Drug Eluting;  Surgeon: Lupe Posada MD;  Location:  HEART CARDIAC CATH LAB     ESOPHAGOSCOPY, GASTROSCOPY, DUODENOSCOPY (EGD), COMBINED N/A 1/20/2015    Procedure: COMBINED ESOPHAGOSCOPY, GASTROSCOPY, DUODENOSCOPY (EGD), BIOPSY SINGLE OR MULTIPLE;  Surgeon: Allan Marroquin MD;  Location:  GI     EXCISE LESION TRUNK N/A 4/17/2019    Procedure: EXCISE CYST UPPER BACK;  Surgeon: Rene Murray MD;  Location:  OR     HERNIA REPAIR       HYDROCELECTOMY SCROTAL       ORTHOPEDIC SURGERY      both knees replaced     FAMILY HISTORY:  Family History   Problem Relation Age of Onset     Coronary Artery Disease Father 53     Cerebrovascular Disease Father      Diabetes Mother      Diabetes Brother      SOCIAL HISTORY:  Social History     Socioeconomic History     Marital status:       Spouse name: None     Number of children: None     Years of education: None     Highest education level: None   Occupational History     None   Social Needs     Financial resource strain: None     Food insecurity:     Worry: None     Inability: None     Transportation needs:     Medical: None     Non-medical: None   Tobacco Use     Smoking status: Never Smoker     Smokeless tobacco: Never Used   Substance and Sexual Activity     Alcohol use: Not Currently     Alcohol/week: 0.0 standard drinks     Drug use: No     Sexual activity: Never   Lifestyle     Physical activity:     Days per week: None     Minutes per session: None     Stress: None   Relationships     Social connections:     Talks on phone: None     Gets together: None     Attends Yarsani service: None     Active member of club or organization: None     Attends meetings of clubs or organizations: None     Relationship status: None     Intimate partner violence:     Fear of current or ex partner: None     Emotionally abused: None     Physically abused: None     Forced sexual activity: None   Other Topics Concern     Parent/sibling w/ CABG, MI or angioplasty before 65F 55M? 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; walks dog for exercise      Review of Systems:  Skin:  Negative     Eyes:  Positive for glasses  ENT:  Negative    Respiratory:  Positive for dyspnea on exertion;shortness of breath  Cardiovascular:  Negative edema;Positive for;lightheadedness  Gastroenterology: Negative    Genitourinary:  Negative    Musculoskeletal:  Negative back pain  Neurologic:  Negative    Psychiatric:  Negative    Heme/Lymph/Imm:  Negative    Endocrine:  Positive for diabetes     Physical Exam:  Vitals: /60   Pulse 57   Ht 1.829 m (6')   Wt 108.9 kg (240 lb)   SpO2 97%   BMI 32.55 kg/m     Wt Readings from Last 4 Encounters:   10/30/19 108.9 kg (240 lb)   10/24/19 109.4 kg (241 lb 3.2 oz)   10/10/19 110.2 kg (243 lb)    09/26/19 109.3 kg (241 lb)     GEN: well nourished, in no acute distress.  HEENT:  Pupils equal, round. Sclerae nonicteric.   NECK: no JVD at 30 degrees.  C/V:  Regular rate and rhythm, no murmur, rub or gallop.  R radial access site with moderate surrounding ecchymosis. No hematoma or bruit.   RESP: Respirations are unlabored. Clear to auscultation bilaterally without wheezing, rales, or rhonchi.  GI: Abdomen soft, nontender.  EXTREM: no LE edema.  NEURO: Alert and oriented, cooperative.  SKIN: Warm and dry.     Recent Lab Results:  LIPID RESULTS:  Lab Results   Component Value Date    CHOL 123 12/05/2016    HDL 50 12/05/2016    LDL 51 12/05/2016    TRIG 108 12/05/2016     LIVER ENZYME RESULTS:  No results found for: AST, ALT  CBC RESULTS:  Lab Results   Component Value Date    WBC 5.5 10/24/2019    RBC 4.64 10/24/2019    HGB 13.3 10/24/2019    HCT 40.4 10/24/2019    MCV 87 10/24/2019    MCH 28.7 10/24/2019    MCHC 32.9 10/24/2019    RDW 14.8 10/24/2019     10/24/2019     BMP RESULTS:  Lab Results   Component Value Date     10/28/2019    POTASSIUM 4.4 10/28/2019    CHLORIDE 103 10/28/2019    CO2 25 10/28/2019    ANIONGAP 6 10/28/2019     (H) 10/28/2019    BUN 12 10/28/2019    CR 0.99 10/28/2019    GFRESTIMATED 73 10/28/2019    GFRESTBLACK 84 10/28/2019    FATMATA 8.5 10/28/2019      A1C RESULTS:  Lab Results   Component Value Date    A1C 6.7 (H) 06/12/2017     INR RESULTS:  Lab Results   Component Value Date    INR 1.19 (H) 10/24/2019    INR 1.15 (H) 06/04/2019     Stella Elliott PA-C  [facility]

## 2019-10-30 NOTE — PATIENT INSTRUCTIONS
Thank you for your [facility] visit today. Your provider has recommended the following:    Medication Changes:  INCREASE lisinopril to 5 mg twice daily.   Recommendations:  Please let us know if you continue to have significant shortness of breath.   Follow-up:  See me for cardiology follow up in one month.  Reminder:  Please bring in all current medications, over the counter supplements and vitamin bottles to your next appointment.            [facility]

## 2019-10-30 NOTE — LETTER
10/30/2019    Merlin Emery Brown, MD  [facility] [address]  [facility] [address]    RE: Bryant Mckenzie       Dear Colleague,    I had the pleasure of seeing Bryant Mckenzie in the [facility].      Cardiology Progress Note    Date of Service: 10/30/2019  Patient seen today in follow up of: cardiomyopathy, s/p  intervention  Primary cardiologist: Dr. Phillips    HPI:  Bryant Mckenzie is a very pleasant 79 year old male with a history of diabetes mellitus type 2, hyperlipidemia, mild aortic stenosis, obstructive sleep apnea, recently diagnosed cardiomyopathy and coronary artery disease who is here today for cardiology follow up.    Please see my prior note from 10/10 for full details regarding his medical history. Briefly, he presented this spring with shortness of breath and was found to have a severe cardiomyopathy with an LVEF of around 30%.  A CT coronary angiogram was subsequently ordered but this was not completed as his coronary calcium score was over 3000.      He was seen by Dr. Phillips in June. Coronary angiogram was recommended. He had a  10% proximal LAD stenosis.  The mid to distal circumflex was 100% stenosed.  He had a proximal to mid RCA lesion which was 50% stenosed.  Aggressive medical management was recommended with possible consideration for intervention to the  of the left circumflex. A cardiac MRI showed a severely dilated LV with an LVEF of 21%. There was mild to moderate RV dysfunction. Late gadolinium enhancement was consistent with a prior small infarction in the circumflex distribution. Given the small amount of enhancement and the global nature of his LV dysfunction, the cardiomyopathy was felt to likely be mixed.     We have slowly been working to optimize his medications. His blood pressure runs on the lower end of normal but he has been tolerating slow dose increases. He is maintained on 40 mg of lasix and has done  well on this dose.    He saw Dr. Rivera in August to discuss possible intervention to the circumflex  and was felt to be a good candidate. Unfortunately, a recent echocardiogram on 10/10 showed an LVEF of 30 - 35%. His LV was only moderately dilated.    He underwent successful intervention to the circumflex  with Dr. Rivera on 10/24. He was started on Brilinta for DAPT. He is here today for follow up after this. He continues to feel well. He had some mild shortness of breath when walking up the stairs this week but otherwise continues to be free of significant dyspnea on exertion with his usual activities. He denies chest pain, orthopnea, PND, weight gain, or leg swelling. He has some bruising around his radial and femoral access sites but denies pain, numbness, or tingling.     ASSESSMENT/PLAN:  1.  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 With an LVEF of 30 - 35% by echocardiogram on 10/10. His currently medical therapy includes lisinopril 5 mg in the morning and 2.5 mg in the evening as well as carvedilol 9.375 mg twice daily. I suggested we continue to work to optimize his medications as he has been tolerating these thus far. Today, I asked him to increase his lisinopril to 5 mg twice daily. I will plan to see him back in one month. If he is tolerating this dose, I will plan to switch him to low dose Entresto at his next visit. We reviewed Entresto and it's benefits and he is agreeable.  2.  Chronic systolic congestive heart failure. On lasix 40 mg daily. He appears euvolemic on exam today. Labs today show stable renal function and normal electrolytes.   3.  Coronary artery disease. S/p recent successful intervention to the  circumflex. He also had moderate RCA disease. He will continue aspirin and Brilinta to be continued for at least one year uninterrupted. He is also on high intensity statin therapy with atorvastatin 40 mg daily.   4.  Mild aortic stenosis.  5.  DM type  II.     As noted above, I will see Marshall back in about a month for re-evaluation and further medication optimization. We will plan to repeat an echocardiogram this spring for re-evaluation of his LVEF. If his EF remains < 35%, he may be a candidate for an ICD. I did briefly review this with he and his wife today.     Orders this Visit:  Orders Placed This Encounter   Procedures     Basic metabolic panel     Follow-Up with Cardiac Advanced Practice Provider     Orders Placed This Encounter   Medications     lisinopril (PRINIVIL/ZESTRIL) 5 MG tablet     Sig: Take 1 tablet (5 mg) by mouth 2 times daily     Dispense:  60 tablet     Refill:  3     Medications Discontinued During This Encounter   Medication Reason     lisinopril (PRINIVIL/ZESTRIL) 2.5 MG tablet      lisinopril (PRINIVIL/ZESTRIL) 5 MG tablet Reorder       CURRENT MEDICATIONS:  Current Outpatient Medications   Medication Sig Dispense Refill     ASPIRIN 81 PO Take by mouth daily        atorvastatin (LIPITOR) 40 MG tablet Take 1 tablet (40 mg) by mouth daily 90 tablet 3     carvedilol (COREG) 3.125 MG tablet Take 3 tablets (9.375 mg) by mouth 2 times daily (with meals) 540 tablet 3     furosemide (LASIX) 40 MG tablet Take 40 mg by mouth daily       insulin glargine (LANTUS) 100 UNIT/ML injection Inject 16 Units Subcutaneous At Bedtime 6/3 Took Lantus 8 Units at bedtime 15 mL 3     insulin lispro (HUMALOG KWIKPEN) 100 UNIT/ML injection 11 units with breakfast,15 units at noon, and 14 units before evening meal 36 mL 3     lisinopril (PRINIVIL/ZESTRIL) 5 MG tablet Take 1 tablet (5 mg) by mouth 2 times daily 60 tablet 3     Menaquinone-7 (VITAMIN K2 PO) Take by mouth daily       metFORMIN (GLUCOPHAGE) 500 MG tablet TAKE 2 TABLETS (1,000 MG) BY MOUTH TWICE A DAY WITH MEALS 360 tablet 0     Multiple Vitamins-Minerals (MULTIVITAL PO) Take 1 tablet by mouth daily       potassium chloride ER (MICRO-K) 10 MEQ CR capsule Take 10 mEq by mouth daily        tamsulosin  (FLOMAX) 0.4 MG capsule Take 0.4 mg by mouth daily       ticagrelor (BRILINTA) 90 MG tablet Take 1 tablet (90 mg) by mouth 2 times daily Start this evening. 180 tablet 3     ALLERGIES  Allergies   Allergen Reactions     No Known Allergies      PAST MEDICAL HISTORY:  Past Medical History:   Diagnosis Date     Atrophic gastritis 9/8/2016     Atrophic gastritis 9/8/2016     Benign non-nodular prostatic hyperplasia with lower urinary tract symptoms 9/8/2016     History of diabetes mellitus      Hyperlipidemia LDL goal <100 9/8/2016     Obstructive sleep apnea syndrome 9/8/2016     SOB (shortness of breath) 5/30/2019     Type 2 diabetes mellitus without complication (H) 9/6/2016     Vitamin B12 deficiency (non anemic) 9/8/2016     PAST SURGICAL HISTORY:  Past Surgical History:   Procedure Laterality Date     CV CHRONIC TOTAL OCCLUSION N/A 10/24/2019    Procedure: Coronary Total Occlusion;  Surgeon: Lupe Posada MD;  Location:  HEART CARDIAC CATH LAB     CV CORONARY ANGIOGRAM N/A 10/24/2019    Procedure: Coronary Angiogram;  Surgeon: Lupe Posada MD;  Location:  HEART CARDIAC CATH LAB     CV HEART CATHETERIZATION WITH POSSIBLE INTERVENTION N/A 6/4/2019    Procedure: Heart Catheterization with Possible Intervention;  Surgeon: Alex Bazan MD;  Location:  HEART CARDIAC CATH LAB     CV PCI STENT DRUG ELUTING N/A 10/24/2019    Procedure: PCI Stent Drug Eluting;  Surgeon: Lupe Posada MD;  Location:  HEART CARDIAC CATH LAB     ESOPHAGOSCOPY, GASTROSCOPY, DUODENOSCOPY (EGD), COMBINED N/A 1/20/2015    Procedure: COMBINED ESOPHAGOSCOPY, GASTROSCOPY, DUODENOSCOPY (EGD), BIOPSY SINGLE OR MULTIPLE;  Surgeon: Allan Marroquin MD;  Location:  GI     EXCISE LESION TRUNK N/A 4/17/2019    Procedure: EXCISE CYST UPPER BACK;  Surgeon: Rene Murray MD;  Location:  OR     HERNIA REPAIR       HYDROCELECTOMY SCROTAL       ORTHOPEDIC SURGERY      both knees replaced     FAMILY  HISTORY:  Family History   Problem Relation Age of Onset     Coronary Artery Disease Father 53     Cerebrovascular Disease Father      Diabetes Mother      Diabetes Brother      SOCIAL HISTORY:  Social History     Socioeconomic History     Marital status:      Spouse name: None     Number of children: None     Years of education: None     Highest education level: None   Occupational History     None   Social Needs     Financial resource strain: None     Food insecurity:     Worry: None     Inability: None     Transportation needs:     Medical: None     Non-medical: None   Tobacco Use     Smoking status: Never Smoker     Smokeless tobacco: Never Used   Substance and Sexual Activity     Alcohol use: Not Currently     Alcohol/week: 0.0 standard drinks     Drug use: No     Sexual activity: Never   Lifestyle     Physical activity:     Days per week: None     Minutes per session: None     Stress: None   Relationships     Social connections:     Talks on phone: None     Gets together: None     Attends Orthodox service: None     Active member of club or organization: None     Attends meetings of clubs or organizations: None     Relationship status: None     Intimate partner violence:     Fear of current or ex partner: None     Emotionally abused: None     Physically abused: None     Forced sexual activity: None   Other Topics Concern     Parent/sibling w/ CABG, MI or angioplasty before 65F 55M? 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; walks dog for exercise      Review of Systems:  Skin:  Negative     Eyes:  Positive for glasses  ENT:  Negative    Respiratory:  Positive for dyspnea on exertion;shortness of breath  Cardiovascular:  Negative edema;Positive for;lightheadedness  Gastroenterology: Negative    Genitourinary:  Negative    Musculoskeletal:  Negative back pain  Neurologic:  Negative    Psychiatric:  Negative    Heme/Lymph/Imm:  Negative    Endocrine:  Positive for diabetes      Physical Exam:  Vitals: /60   Pulse 57   Ht 1.829 m (6')   Wt 108.9 kg (240 lb)   SpO2 97%   BMI 32.55 kg/m      Wt Readings from Last 4 Encounters:   10/30/19 108.9 kg (240 lb)   10/24/19 109.4 kg (241 lb 3.2 oz)   10/10/19 110.2 kg (243 lb)   09/26/19 109.3 kg (241 lb)     GEN: well nourished, in no acute distress.  HEENT:  Pupils equal, round. Sclerae nonicteric.   NECK: no JVD at 30 degrees.  C/V:  Regular rate and rhythm, no murmur, rub or gallop.  R radial access site with moderate surrounding ecchymosis. No hematoma or bruit.   RESP: Respirations are unlabored. Clear to auscultation bilaterally without wheezing, rales, or rhonchi.  GI: Abdomen soft, nontender.  EXTREM: no LE edema.  NEURO: Alert and oriented, cooperative.  SKIN: Warm and dry.     Recent Lab Results:  LIPID RESULTS:  Lab Results   Component Value Date    CHOL 123 12/05/2016    HDL 50 12/05/2016    LDL 51 12/05/2016    TRIG 108 12/05/2016     LIVER ENZYME RESULTS:  No results found for: AST, ALT  CBC RESULTS:  Lab Results   Component Value Date    WBC 5.5 10/24/2019    RBC 4.64 10/24/2019    HGB 13.3 10/24/2019    HCT 40.4 10/24/2019    MCV 87 10/24/2019    MCH 28.7 10/24/2019    MCHC 32.9 10/24/2019    RDW 14.8 10/24/2019     10/24/2019     BMP RESULTS:  Lab Results   Component Value Date     10/28/2019    POTASSIUM 4.4 10/28/2019    CHLORIDE 103 10/28/2019    CO2 25 10/28/2019    ANIONGAP 6 10/28/2019     (H) 10/28/2019    BUN 12 10/28/2019    CR 0.99 10/28/2019    GFRESTIMATED 73 10/28/2019    GFRESTBLACK 84 10/28/2019    FATMATA 8.5 10/28/2019      A1C RESULTS:  Lab Results   Component Value Date    A1C 6.7 (H) 06/12/2017     INR RESULTS:  Lab Results   Component Value Date    INR 1.19 (H) 10/24/2019    INR 1.15 (H) 06/04/2019         Thank you for allowing me to participate in the care of your patient.    Sincerely,     Stella Elliott PA-C     [facility]

## 2019-11-04 ENCOUNTER — HEALTH MAINTENANCE LETTER (OUTPATIENT)
Age: 79
End: 2019-11-04

## 2019-11-07 ENCOUNTER — HOSPITAL ENCOUNTER (OUTPATIENT)
Dept: CARDIAC REHAB | Facility: CLINIC | Age: 79
End: 2019-11-07
Attending: INTERNAL MEDICINE
Payer: MEDICARE

## 2019-11-07 PROCEDURE — 40000116 ZZH STATISTIC OP CR VISIT

## 2019-11-07 PROCEDURE — 93798 PHYS/QHP OP CAR RHAB W/ECG: CPT

## 2019-11-07 PROCEDURE — 93797 PHYS/QHP OP CAR RHAB WO ECG: CPT

## 2019-11-07 PROCEDURE — 40000575 ZZH STATISTIC OP CARDIAC VISIT #2

## 2019-12-04 ENCOUNTER — OFFICE VISIT (OUTPATIENT)
Dept: CARDIOLOGY | Facility: CLINIC | Age: 79
End: 2019-12-04
Attending: PHYSICIAN ASSISTANT
Payer: MEDICARE

## 2019-12-04 VITALS
SYSTOLIC BLOOD PRESSURE: 117 MMHG | HEART RATE: 60 BPM | DIASTOLIC BLOOD PRESSURE: 63 MMHG | WEIGHT: 242.7 LBS | BODY MASS INDEX: 32.87 KG/M2 | HEIGHT: 72 IN

## 2019-12-04 DIAGNOSIS — I25.5 ISCHEMIC CARDIOMYOPATHY: ICD-10-CM

## 2019-12-04 DIAGNOSIS — I42.8 NONISCHEMIC CARDIOMYOPATHY (H): ICD-10-CM

## 2019-12-04 DIAGNOSIS — Z98.890 STATUS POST CORONARY ANGIOGRAM: ICD-10-CM

## 2019-12-04 DIAGNOSIS — I25.5 ISCHEMIC CARDIOMYOPATHY: Primary | ICD-10-CM

## 2019-12-04 LAB
ANION GAP SERPL CALCULATED.3IONS-SCNC: 10.2 MMOL/L (ref 6–17)
BUN SERPL-MCNC: 13 MG/DL (ref 7–30)
CALCIUM SERPL-MCNC: 9.3 MG/DL (ref 8.5–10.5)
CHLORIDE SERPL-SCNC: 98 MMOL/L (ref 98–107)
CO2 SERPL-SCNC: 27 MMOL/L (ref 23–29)
CREAT SERPL-MCNC: 1.29 MG/DL (ref 0.7–1.3)
GFR SERPL CREATININE-BSD FRML MDRD: 54 ML/MIN/{1.73_M2}
GLUCOSE SERPL-MCNC: 214 MG/DL (ref 70–105)
POTASSIUM SERPL-SCNC: 4.2 MMOL/L (ref 3.5–5.1)
SODIUM SERPL-SCNC: 131 MMOL/L (ref 136–145)

## 2019-12-04 PROCEDURE — 99214 OFFICE O/P EST MOD 30 MIN: CPT | Performed by: PHYSICIAN ASSISTANT

## 2019-12-04 PROCEDURE — 36415 COLL VENOUS BLD VENIPUNCTURE: CPT | Performed by: INTERNAL MEDICINE

## 2019-12-04 PROCEDURE — 80048 BASIC METABOLIC PNL TOTAL CA: CPT | Performed by: INTERNAL MEDICINE

## 2019-12-04 RX ORDER — FUROSEMIDE 20 MG
20 TABLET ORAL DAILY
COMMUNITY
Start: 2019-12-04 | End: 2021-01-01

## 2019-12-04 ASSESSMENT — MIFFLIN-ST. JEOR: SCORE: 1863.88

## 2019-12-04 NOTE — PATIENT INSTRUCTIONS
Thank you for your [facility] visit today. Your provider has recommended the following:  Medication Changes:  STOP potassium supplement  DECREASE lasix to 20 mg daily.  STOP lisinopril.   START Entresto 12-13 (1/2 tablet) twice daily on Saturday, December 7th. We need at least a 36 hour wash out period before you start Entresto.   Recommendations:  -  Call us with dizziness/lightheadedness, worsening shortness of breath, leg swelling or weight gain. If you're noticing extra fluid retention after decreasing the lasix, then please let us know and we'll go back up to the 40 mg daily.  -  Lab work in 2 weeks or so to recheck your sodium and potassium level.  You can do this in [place] or any [place] clinic.   Follow-up:  See me back for cardiology follow up in one month.  Reminder:  Please bring in all current medications, over the counter supplements and vitamin bottles to your next appointment.            [facility]

## 2019-12-04 NOTE — PROGRESS NOTES
Cardiology Progress Note    Date of Service: 12/04/2019  Patient seen today in follow up of: cardiomyopathy   Primary cardiologist: Dr. Phillips    HPI:  Bryant Mckenzie is a very pleasant 79 year old male with a history of diabetes mellitus type 2, hyperlipidemia, mild aortic stenosis, obstructive sleep apnea, a diagnosed cardiomyopathy and coronary artery disease who is here today for cardiology follow up.     Please see my prior note from 10/10 for full details regarding his medical history. Briefly, he presented this spring with shortness of breath and was found to have a severe cardiomyopathy with an LVEF of around 30%.   He had a markedly abnormal CT calcium score and went on to have coronary angiography.  This showed a 10% proximal LAD stenosis.  The mid to distal circumflex was 100% stenosed.  He had a proximal to mid RCA lesion which was 50% stenosed.  Aggressive medical management was recommended with possible consideration for intervention to the  of the left circumflex. A cardiac MRI in July showed a severely dilated LV with an LVEF of 21%. There was mild to moderate RV dysfunction. Late gadolinium enhancement was consistent with a prior small infarction in the circumflex distribution. Given the small amount of enhancement and the global nature of his LV dysfunction, the cardiomyopathy was felt to likely be mixed.      We have slowly been working to optimize his medications. His blood pressure run as his blood pressure tends to run at the lower end of normal.  He has not had issues with congestive heart failure over the last several months.  In August, he saw Dr. Rivera to discuss possible intervention to the circumflex  and was felt to be a good candidate.  He has had persistent LV dysfunction despite medical therapy with an EF of 30 to 35% in October by echocardiogram although his LV did appear less dilated.      He ultimately underwent successful intervention to the circumflex  on  10/24.  He has been on aspirin and Brilinta since then without any issues.  I last saw him about 6 weeks ago.  I attended to increase his lisinopril to 5 mg twice daily.  He is back for reevaluation after this.  He noted after increasing his lisinopril to twice daily he had some worsening lightheadedness during the day.  He eventually went back to his 5 mg in the morning and this is gotten better.  He now only notices some very brief dizziness after he works out on the treadmill.  This passes within a few seconds.  He denies presyncope or syncope.    He otherwise has been feeling well.  He denies shortness of breath or chest discomfort.  His weights have been stable.  His blood pressures have been stable between 100 and 120 systolic.    ASSESSMENT/PLAN:  1.  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  2.  Chronic systolic congestive heart failure.  Maintained on Lasix 40 mg daily.  3.  Coronary artery disease. S/p recent intervention to the  circumflex in October. He also had moderate RCA disease. He will continue aspirin and Brilinta to be continued for at least one year uninterrupted. He is also on high intensity statin therapy with atorvastatin 40 mg daily.   4.  Mild aortic stenosis.  5.  DM type II.     Marshall is here today for follow-up.  As noted above, he has a mixed primarily nonischemic cardiomyopathy which was diagnosed earlier this year.  Despite medical therapy, his EF has remained low.  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  Medical therapy for his cardiomyopathy has been limited somewhat by his blood pressure but we have been attempting to slowly optimize his medications.  He currently is on lisinopril 5 mg twice daily as well as carvedilol 9.375 milligrams twice daily.      Today, we discussed switching him from lisinopril to low-dose Entresto.  We reviewed the benefits of Entresto versus  lisinopril and he is agreeable.  I will plan to have him start on a low-dose of Entresto, 12-13 mg twice daily.  He will stop his lisinopril and in 2 days we will start Entresto.  I asked him to contact us if he has any issues with dizziness after starting this.  Also would get his Lasix back a bit to 20 mg daily.  I did ask him to let us know go back up to 40 mg daily if he has any weight gain, shortness of breath, or worsening swelling with this change.  I also stopped his potassium supplement today.    I will see him back for follow-up in a few weeks.  We will also repeat a basic metabolic panel in a few weeks to renal function and electrolytes. He will follow up this spring with Dr. Phillips with a repeat echocardiogram to re-evaluate his EF. If it remains < 35%, he may be a candidate for an ICD.    Orders this Visit:  No orders of the defined types were placed in this encounter.    No orders of the defined types were placed in this encounter.    There are no discontinued medications.    CURRENT MEDICATIONS:  Current Outpatient Medications   Medication Sig Dispense Refill     ASPIRIN 81 PO Take by mouth daily        atorvastatin (LIPITOR) 40 MG tablet Take 1 tablet (40 mg) by mouth daily 90 tablet 3     carvedilol (COREG) 3.125 MG tablet Take 3 tablets (9.375 mg) by mouth 2 times daily (with meals) 540 tablet 3     furosemide (LASIX) 40 MG tablet Take 40 mg by mouth daily       insulin glargine (LANTUS) 100 UNIT/ML injection Inject 16 Units Subcutaneous At Bedtime 6/3 Took Lantus 8 Units at bedtime 15 mL 3     insulin lispro (HUMALOG KWIKPEN) 100 UNIT/ML injection 11 units with breakfast,15 units at noon, and 14 units before evening meal 36 mL 3     lisinopril (PRINIVIL/ZESTRIL) 5 MG tablet Take 1 tablet (5 mg) by mouth 2 times daily 60 tablet 3     Menaquinone-7 (VITAMIN K2 PO) Take by mouth daily       metFORMIN (GLUCOPHAGE) 500 MG tablet TAKE 2 TABLETS (1,000 MG) BY MOUTH TWICE A DAY WITH MEALS 360 tablet 0      Multiple Vitamins-Minerals (MULTIVITAL PO) Take 1 tablet by mouth daily       potassium chloride ER (MICRO-K) 10 MEQ CR capsule Take 10 mEq by mouth daily        tamsulosin (FLOMAX) 0.4 MG capsule Take 0.4 mg by mouth daily       ticagrelor (BRILINTA) 90 MG tablet Take 1 tablet (90 mg) by mouth 2 times daily Start this evening. 180 tablet 3     ALLERGIES  Allergies   Allergen Reactions     No Known Allergies      PAST MEDICAL HISTORY:  Past Medical History:   Diagnosis Date     Atrophic gastritis 9/8/2016     Atrophic gastritis 9/8/2016     Benign non-nodular prostatic hyperplasia with lower urinary tract symptoms 9/8/2016     History of diabetes mellitus      Hyperlipidemia LDL goal <100 9/8/2016     Obstructive sleep apnea syndrome 9/8/2016     SOB (shortness of breath) 5/30/2019     Type 2 diabetes mellitus without complication (H) 9/6/2016     Vitamin B12 deficiency (non anemic) 9/8/2016     PAST SURGICAL HISTORY:  Past Surgical History:   Procedure Laterality Date     CV CHRONIC TOTAL OCCLUSION N/A 10/24/2019    Procedure: Coronary Total Occlusion;  Surgeon: Lupe Posada MD;  Location:  HEART CARDIAC CATH LAB     CV CORONARY ANGIOGRAM N/A 10/24/2019    Procedure: Coronary Angiogram;  Surgeon: Lupe Posada MD;  Location:  HEART CARDIAC CATH LAB     CV HEART CATHETERIZATION WITH POSSIBLE INTERVENTION N/A 6/4/2019    Procedure: Heart Catheterization with Possible Intervention;  Surgeon: Alex Bazan MD;  Location: [facility] CARDIAC CATH LAB     CV PCI STENT DRUG ELUTING N/A 10/24/2019    Procedure: PCI Stent Drug Eluting;  Surgeon: Lupe Posada MD;  Location:  HEART CARDIAC CATH LAB     ESOPHAGOSCOPY, GASTROSCOPY, DUODENOSCOPY (EGD), COMBINED N/A 1/20/2015    Procedure: COMBINED ESOPHAGOSCOPY, GASTROSCOPY, DUODENOSCOPY (EGD), BIOPSY SINGLE OR MULTIPLE;  Surgeon: Allan Marroquin MD;  Location:  GI     EXCISE LESION TRUNK N/A 4/17/2019    Procedure: EXCISE CYST UPPER  BACK;  Surgeon: Rene Murray MD;  Location: [facility] OR     HERNIA REPAIR       HYDROCELECTOMY SCROTAL       ORTHOPEDIC SURGERY      both knees replaced     FAMILY HISTORY:  Family History   Problem Relation Age of Onset     Coronary Artery Disease Father 53     Cerebrovascular Disease Father      Diabetes Mother      Diabetes Brother      SOCIAL HISTORY:  Social History     Socioeconomic History     Marital status:      Spouse name: Not on file     Number of children: Not on file     Years of education: Not on file     Highest education level: Not on file   Occupational History     Not on file   Social Needs     Financial resource strain: Not on file     Food insecurity:     Worry: Not on file     Inability: Not on file     Transportation needs:     Medical: Not on file     Non-medical: Not on file   Tobacco Use     Smoking status: Never Smoker     Smokeless tobacco: Never Used   Substance and Sexual Activity     Alcohol use: Not Currently     Alcohol/week: 0.0 standard drinks     Drug use: No     Sexual activity: Never   Lifestyle     Physical activity:     Days per week: Not on file     Minutes per session: Not on file     Stress: Not on file   Relationships     Social connections:     Talks on phone: Not on file     Gets together: Not on file     Attends Rastafarian service: Not on file     Active member of club or organization: Not on file     Attends meetings of clubs or organizations: Not on file     Relationship status: Not on file     Intimate partner violence:     Fear of current or ex partner: Not on file     Emotionally abused: Not on file     Physically abused: Not on file     Forced sexual activity: Not on file   Other Topics Concern     Parent/sibling w/ CABG, MI or angioplasty before 65F 55M? 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   Wt Readings from Last 4 Encounters:   10/30/19 108.9 kg (240 lb)   10/24/19 109.4 kg (241 lb 3.2 oz)   10/10/19 110.2 kg (243 lb)   09/26/19 109.3 kg (241 lb)     GEN: well nourished, in no acute distress.  HEENT:  Pupils equal, round. Sclerae nonicteric.   EXTREM: No LE edema.  NEURO: Alert and oriented, cooperative.  SKIN: Warm and dry.     Recent Lab Results:  LIPID RESULTS:  Lab Results   Component Value Date    CHOL 123 12/05/2016    HDL 50 12/05/2016    LDL 51 12/05/2016    TRIG 108 12/05/2016     LIVER ENZYME RESULTS:  No results found for: AST, ALT  CBC RESULTS:  Lab Results   Component Value Date    WBC 5.5 10/24/2019    RBC 4.64 10/24/2019    HGB 13.3 10/24/2019    HCT 40.4 10/24/2019    MCV 87 10/24/2019    MCH 28.7 10/24/2019    MCHC 32.9 10/24/2019    RDW 14.8 10/24/2019     10/24/2019     BMP RESULTS:  Lab Results   Component Value Date     10/28/2019    POTASSIUM 4.4 10/28/2019    CHLORIDE 103 10/28/2019    CO2 25 10/28/2019    ANIONGAP 6 10/28/2019     (H) 10/28/2019    BUN 12 10/28/2019    CR 0.99 10/28/2019    GFRESTIMATED 73 10/28/2019    GFRESTBLACK 84 10/28/2019    FATMATA 8.5 10/28/2019      A1C RESULTS:  Lab Results   Component Value Date    A1C 6.7 (H) 06/12/2017     INR RESULTS:  Lab Results   Component Value Date    INR 1.19 (H) 10/24/2019    INR 1.15 (H) 06/04/2019       New/Pertinent imaging results since last visit:  None    Stella Elliott PA-C  [facility]

## 2019-12-04 NOTE — LETTER
12/4/2019    Merlin Emery Brown, MD  [facility] [address]  [facility] [address]    RE: Bryant Mckenzie       Dear Colleague,    I had the pleasure of seeing Bryant Mckenzie in the [facility].      Cardiology Progress Note    Date of Service: 12/04/2019  Patient seen today in follow up of: cardiomyopathy   Primary cardiologist: Dr. Phillips    HPI:  Bryant Mckenzie is a very pleasant 79 year old male with a history of diabetes mellitus type 2, hyperlipidemia, mild aortic stenosis, obstructive sleep apnea, a diagnosed cardiomyopathy and coronary artery disease who is here today for cardiology follow up.     Please see my prior note from 10/10 for full details regarding his medical history. Briefly, he presented this spring with shortness of breath and was found to have a severe cardiomyopathy with an LVEF of around 30%.   He had a markedly abnormal CT calcium score and went on to have coronary angiography.  This showed a 10% proximal LAD stenosis.  The mid to distal circumflex was 100% stenosed.  He had a proximal to mid RCA lesion which was 50% stenosed.  Aggressive medical management was recommended with possible consideration for intervention to the  of the left circumflex. A cardiac MRI in July showed a severely dilated LV with an LVEF of 21%. There was mild to moderate RV dysfunction. Late gadolinium enhancement was consistent with a prior small infarction in the circumflex distribution. Given the small amount of enhancement and the global nature of his LV dysfunction, the cardiomyopathy was felt to likely be mixed.      We have slowly been working to optimize his medications. His blood pressure run as his blood pressure tends to run at the lower end of normal.  He has not had issues with congestive heart failure over the last several months.  In August, he saw Dr. Rivera to discuss possible intervention to the circumflex  and was felt to be  a good candidate.  He has had persistent LV dysfunction despite medical therapy with an EF of 30 to 35% in October by echocardiogram although his LV did appear less dilated.      He ultimately underwent successful intervention to the circumflex  on 10/24.  He has been on aspirin and Brilinta since then without any issues.  I last saw him about 6 weeks ago.  I attended to increase his lisinopril to 5 mg twice daily.  He is back for reevaluation after this.  He noted after increasing his lisinopril to twice daily he had some worsening lightheadedness during the day.  He eventually went back to his 5 mg in the morning and this is gotten better.  He now only notices some very brief dizziness after he works out on the treadmill.  This passes within a few seconds.  He denies presyncope or syncope.    He otherwise has been feeling well.  He denies shortness of breath or chest discomfort.  His weights have been stable.  His blood pressures have been stable between 100 and 120 systolic.    ASSESSMENT/PLAN:  1.  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  2.  Chronic systolic congestive heart failure.  Maintained on Lasix 40 mg daily.  3.  Coronary artery disease. S/p recent intervention to the  circumflex in October. He also had moderate RCA disease. He will continue aspirin and Brilinta to be continued for at least one year uninterrupted. He is also on high intensity statin therapy with atorvastatin 40 mg daily.   4.  Mild aortic stenosis.  5.  DM type II.     Marshall is here today for follow-up.  As noted above, he has a mixed primarily nonischemic cardiomyopathy which was diagnosed earlier this year.  Despite medical therapy, his EF has remained low.  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  Medical therapy for his cardiomyopathy has been limited somewhat by his blood pressure but we have been attempting to  slowly optimize his medications.  He currently is on lisinopril 5 mg twice daily as well as carvedilol 9.375 milligrams twice daily.      Today, we discussed switching him from lisinopril to low-dose Entresto.  We reviewed the benefits of Entresto versus lisinopril and he is agreeable.  I will plan to have him start on a low-dose of Entresto, 12-13 mg twice daily.  He will stop his lisinopril and in 2 days we will start Entresto.  I asked him to contact us if he has any issues with dizziness after starting this.  Also would get his Lasix back a bit to 20 mg daily.  I did ask him to let us know go back up to 40 mg daily if he has any weight gain, shortness of breath, or worsening swelling with this change.  I also stopped his potassium supplement today.    I will see him back for follow-up in a few weeks.  We will also repeat a basic metabolic panel in a few weeks to renal function and electrolytes. He will follow up this spring with Dr. Phillips with a repeat echocardiogram to re-evaluate his EF. If it remains < 35%, he may be a candidate for an ICD.    Orders this Visit:  No orders of the defined types were placed in this encounter.    No orders of the defined types were placed in this encounter.    There are no discontinued medications.    CURRENT MEDICATIONS:  Current Outpatient Medications   Medication Sig Dispense Refill     ASPIRIN 81 PO Take by mouth daily        atorvastatin (LIPITOR) 40 MG tablet Take 1 tablet (40 mg) by mouth daily 90 tablet 3     carvedilol (COREG) 3.125 MG tablet Take 3 tablets (9.375 mg) by mouth 2 times daily (with meals) 540 tablet 3     furosemide (LASIX) 40 MG tablet Take 40 mg by mouth daily       insulin glargine (LANTUS) 100 UNIT/ML injection Inject 16 Units Subcutaneous At Bedtime 6/3 Took Lantus 8 Units at bedtime 15 mL 3     insulin lispro (HUMALOG KWIKPEN) 100 UNIT/ML injection 11 units with breakfast,15 units at noon, and 14 units before evening meal 36 mL 3     lisinopril  (PRINIVIL/ZESTRIL) 5 MG tablet Take 1 tablet (5 mg) by mouth 2 times daily 60 tablet 3     Menaquinone-7 (VITAMIN K2 PO) Take by mouth daily       metFORMIN (GLUCOPHAGE) 500 MG tablet TAKE 2 TABLETS (1,000 MG) BY MOUTH TWICE A DAY WITH MEALS 360 tablet 0     Multiple Vitamins-Minerals (MULTIVITAL PO) Take 1 tablet by mouth daily       potassium chloride ER (MICRO-K) 10 MEQ CR capsule Take 10 mEq by mouth daily        tamsulosin (FLOMAX) 0.4 MG capsule Take 0.4 mg by mouth daily       ticagrelor (BRILINTA) 90 MG tablet Take 1 tablet (90 mg) by mouth 2 times daily Start this evening. 180 tablet 3     ALLERGIES  Allergies   Allergen Reactions     No Known Allergies      PAST MEDICAL HISTORY:  Past Medical History:   Diagnosis Date     Atrophic gastritis 9/8/2016     Atrophic gastritis 9/8/2016     Benign non-nodular prostatic hyperplasia with lower urinary tract symptoms 9/8/2016     History of diabetes mellitus      Hyperlipidemia LDL goal <100 9/8/2016     Obstructive sleep apnea syndrome 9/8/2016     SOB (shortness of breath) 5/30/2019     Type 2 diabetes mellitus without complication (H) 9/6/2016     Vitamin B12 deficiency (non anemic) 9/8/2016     PAST SURGICAL HISTORY:  Past Surgical History:   Procedure Laterality Date     CV CHRONIC TOTAL OCCLUSION N/A 10/24/2019    Procedure: Coronary Total Occlusion;  Surgeon: Lupe Posada MD;  Location: [facility] CARDIAC CATH LAB     CV CORONARY ANGIOGRAM N/A 10/24/2019    Procedure: Coronary Angiogram;  Surgeon: Lupe Posada MD;  Location: [facility] CARDIAC CATH LAB     CV HEART CATHETERIZATION WITH POSSIBLE INTERVENTION N/A 6/4/2019    Procedure: Heart Catheterization with Possible Intervention;  Surgeon: Alex Bazan MD;  Location: [facility] CARDIAC CATH LAB     CV PCI STENT DRUG ELUTING N/A 10/24/2019    Procedure: PCI Stent Drug Eluting;  Surgeon: Lupe Posada MD;  Location: [facility] CARDIAC CATH LAB     ESOPHAGOSCOPY,  GASTROSCOPY, DUODENOSCOPY (EGD), COMBINED N/A 1/20/2015    Procedure: COMBINED ESOPHAGOSCOPY, GASTROSCOPY, DUODENOSCOPY (EGD), BIOPSY SINGLE OR MULTIPLE;  Surgeon: Allan Marroquin MD;  Location:  GI     EXCISE LESION TRUNK N/A 4/17/2019    Procedure: EXCISE CYST UPPER BACK;  Surgeon: Rene Murray MD;  Location: [facility] OR     HERNIA REPAIR       HYDROCELECTOMY SCROTAL       ORTHOPEDIC SURGERY      both knees replaced     FAMILY HISTORY:  Family History   Problem Relation Age of Onset     Coronary Artery Disease Father 53     Cerebrovascular Disease Father      Diabetes Mother      Diabetes Brother      SOCIAL HISTORY:  Social History     Socioeconomic History     Marital status:      Spouse name: Not on file     Number of children: Not on file     Years of education: Not on file     Highest education level: Not on file   Occupational History     Not on file   Social Needs     Financial resource strain: Not on file     Food insecurity:     Worry: Not on file     Inability: Not on file     Transportation needs:     Medical: Not on file     Non-medical: Not on file   Tobacco Use     Smoking status: Never Smoker     Smokeless tobacco: Never Used   Substance and Sexual Activity     Alcohol use: Not Currently     Alcohol/week: 0.0 standard drinks     Drug use: No     Sexual activity: Never   Lifestyle     Physical activity:     Days per week: Not on file     Minutes per session: Not on file     Stress: Not on file   Relationships     Social connections:     Talks on phone: Not on file     Gets together: Not on file     Attends Sabianism service: Not on file     Active member of club or organization: Not on file     Attends meetings of clubs or organizations: Not on file     Relationship status: Not on file     Intimate partner violence:     Fear of current or ex partner: Not on file     Emotionally abused: Not on file     Physically abused: Not on file     Forced sexual activity: Not on file   Other Topics  Concern     Parent/sibling w/ CABG, MI or angioplasty before 65F 55M? 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   Wt Readings from Last 4 Encounters:   10/30/19 108.9 kg (240 lb)   10/24/19 109.4 kg (241 lb 3.2 oz)   10/10/19 110.2 kg (243 lb)   09/26/19 109.3 kg (241 lb)     GEN: well nourished, in no acute distress.  HEENT:  Pupils equal, round. Sclerae nonicteric.   EXTREM: No LE edema.  NEURO: Alert and oriented, cooperative.  SKIN: Warm and dry.     Recent Lab Results:  LIPID RESULTS:  Lab Results   Component Value Date    CHOL 123 12/05/2016    HDL 50 12/05/2016    LDL 51 12/05/2016    TRIG 108 12/05/2016     LIVER ENZYME RESULTS:  No results found for: AST, ALT  CBC RESULTS:  Lab Results   Component Value Date    WBC 5.5 10/24/2019    RBC 4.64 10/24/2019    HGB 13.3 10/24/2019    HCT 40.4 10/24/2019    MCV 87 10/24/2019    MCH 28.7 10/24/2019    MCHC 32.9 10/24/2019    RDW 14.8 10/24/2019     10/24/2019     BMP RESULTS:  Lab Results   Component Value Date     10/28/2019    POTASSIUM 4.4 10/28/2019    CHLORIDE 103 10/28/2019    CO2 25 10/28/2019    ANIONGAP 6 10/28/2019     (H) 10/28/2019    BUN 12 10/28/2019    CR 0.99 10/28/2019    GFRESTIMATED 73 10/28/2019    GFRESTBLACK 84 10/28/2019    FATMATA 8.5 10/28/2019      A1C RESULTS:  Lab Results   Component Value Date    A1C 6.7 (H) 06/12/2017     INR RESULTS:  Lab Results   Component Value Date    INR 1.19 (H) 10/24/2019    INR 1.15 (H) 06/04/2019       New/Pertinent imaging results since last visit:  None    Stella Elliott PA-C  [facility]      Thank you for allowing me to  participate in the care of your patient.      Sincerely,     Stella Elliott PA-C     [facility]    cc:   Stella Elliott PA-C  [address] [place] [phone]

## 2019-12-04 NOTE — LETTER
12/4/2019    Merlin Emery Brown, MD  [facility] [address]  [facility] [address]    RE: Bryant Mckenzie       Dear Colleague,    I had the pleasure of seeing Bryant Mckenzie in the [facility].      Cardiology Progress Note    Date of Service: 12/04/2019  Patient seen today in follow up of: cardiomyopathy   Primary cardiologist: Dr. Phillips    HPI:  Bryant Mckenzie is a very pleasant 79 year old male with a history of diabetes mellitus type 2, hyperlipidemia, mild aortic stenosis, obstructive sleep apnea, a diagnosed cardiomyopathy and coronary artery disease who is here today for cardiology follow up.     Please see my prior note from 10/10 for full details regarding his medical history. Briefly, he presented this spring with shortness of breath and was found to have a severe cardiomyopathy with an LVEF of around 30%.   He had a markedly abnormal CT calcium score and went on to have coronary angiography.  This showed a 10% proximal LAD stenosis.  The mid to distal circumflex was 100% stenosed.  He had a proximal to mid RCA lesion which was 50% stenosed.  Aggressive medical management was recommended with possible consideration for intervention to the  of the left circumflex. A cardiac MRI in July showed a severely dilated LV with an LVEF of 21%. There was mild to moderate RV dysfunction. Late gadolinium enhancement was consistent with a prior small infarction in the circumflex distribution. Given the small amount of enhancement and the global nature of his LV dysfunction, the cardiomyopathy was felt to likely be mixed.      We have slowly been working to optimize his medications. His blood pressure run as his blood pressure tends to run at the lower end of normal.  He has not had issues with congestive heart failure over the last several months.  In August, he saw Dr. Rivera to discuss possible intervention to the circumflex  and was felt to be  a good candidate.  He has had persistent LV dysfunction despite medical therapy with an EF of 30 to 35% in October by echocardiogram although his LV did appear less dilated.      He ultimately underwent successful intervention to the circumflex  on 10/24.  He has been on aspirin and Brilinta since then without any issues.  I last saw him about 6 weeks ago.  I attended to increase his lisinopril to 5 mg twice daily.  He is back for reevaluation after this.  He noted after increasing his lisinopril to twice daily he had some worsening lightheadedness during the day.  He eventually went back to his 5 mg in the morning and this is gotten better.  He now only notices some very brief dizziness after he works out on the treadmill.  This passes within a few seconds.  He denies presyncope or syncope.    He otherwise has been feeling well.  He denies shortness of breath or chest discomfort.  His weights have been stable.  His blood pressures have been stable between 100 and 120 systolic.    ASSESSMENT/PLAN:  1.  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  2.  Chronic systolic congestive heart failure.  Maintained on Lasix 40 mg daily.  3.  Coronary artery disease. S/p recent intervention to the  circumflex in October. He also had moderate RCA disease. He will continue aspirin and Brilinta to be continued for at least one year uninterrupted. He is also on high intensity statin therapy with atorvastatin 40 mg daily.   4.  Mild aortic stenosis.  5.  DM type II.     Marshall is here today for follow-up.  As noted above, he has a mixed primarily nonischemic cardiomyopathy which was diagnosed earlier this year.  Despite medical therapy, his EF has remained low.  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  Medical therapy for his cardiomyopathy has been limited somewhat by his blood pressure but we have been attempting to  slowly optimize his medications.  He currently is on lisinopril 5 mg twice daily as well as carvedilol 9.375 milligrams twice daily.      Today, we discussed switching him from lisinopril to low-dose Entresto.  We reviewed the benefits of Entresto versus lisinopril and he is agreeable.  I will plan to have him start on a low-dose of Entresto, 12-13 mg twice daily.  He will stop his lisinopril and in 2 days we will start Entresto.  I asked him to contact us if he has any issues with dizziness after starting this.  Also would get his Lasix back a bit to 20 mg daily.  I did ask him to let us know go back up to 40 mg daily if he has any weight gain, shortness of breath, or worsening swelling with this change.  I also stopped his potassium supplement today.    I will see him back for follow-up in a few weeks.  We will also repeat a basic metabolic panel in a few weeks to renal function and electrolytes. He will follow up this spring with Dr. Phillips with a repeat echocardiogram to re-evaluate his EF. If it remains < 35%, he may be a candidate for an ICD.    Orders this Visit:  No orders of the defined types were placed in this encounter.    No orders of the defined types were placed in this encounter.    There are no discontinued medications.    CURRENT MEDICATIONS:  Current Outpatient Medications   Medication Sig Dispense Refill     ASPIRIN 81 PO Take by mouth daily        atorvastatin (LIPITOR) 40 MG tablet Take 1 tablet (40 mg) by mouth daily 90 tablet 3     carvedilol (COREG) 3.125 MG tablet Take 3 tablets (9.375 mg) by mouth 2 times daily (with meals) 540 tablet 3     furosemide (LASIX) 40 MG tablet Take 40 mg by mouth daily       insulin glargine (LANTUS) 100 UNIT/ML injection Inject 16 Units Subcutaneous At Bedtime 6/3 Took Lantus 8 Units at bedtime 15 mL 3     insulin lispro (HUMALOG KWIKPEN) 100 UNIT/ML injection 11 units with breakfast,15 units at noon, and 14 units before evening meal 36 mL 3     lisinopril  (PRINIVIL/ZESTRIL) 5 MG tablet Take 1 tablet (5 mg) by mouth 2 times daily 60 tablet 3     Menaquinone-7 (VITAMIN K2 PO) Take by mouth daily       metFORMIN (GLUCOPHAGE) 500 MG tablet TAKE 2 TABLETS (1,000 MG) BY MOUTH TWICE A DAY WITH MEALS 360 tablet 0     Multiple Vitamins-Minerals (MULTIVITAL PO) Take 1 tablet by mouth daily       potassium chloride ER (MICRO-K) 10 MEQ CR capsule Take 10 mEq by mouth daily        tamsulosin (FLOMAX) 0.4 MG capsule Take 0.4 mg by mouth daily       ticagrelor (BRILINTA) 90 MG tablet Take 1 tablet (90 mg) by mouth 2 times daily Start this evening. 180 tablet 3     ALLERGIES  Allergies   Allergen Reactions     No Known Allergies      PAST MEDICAL HISTORY:  Past Medical History:   Diagnosis Date     Atrophic gastritis 9/8/2016     Atrophic gastritis 9/8/2016     Benign non-nodular prostatic hyperplasia with lower urinary tract symptoms 9/8/2016     History of diabetes mellitus      Hyperlipidemia LDL goal <100 9/8/2016     Obstructive sleep apnea syndrome 9/8/2016     SOB (shortness of breath) 5/30/2019     Type 2 diabetes mellitus without complication (H) 9/6/2016     Vitamin B12 deficiency (non anemic) 9/8/2016     PAST SURGICAL HISTORY:  Past Surgical History:   Procedure Laterality Date     CV CHRONIC TOTAL OCCLUSION N/A 10/24/2019    Procedure: Coronary Total Occlusion;  Surgeon: Lupe Posada MD;  Location: [facility] CARDIAC CATH LAB     CV CORONARY ANGIOGRAM N/A 10/24/2019    Procedure: Coronary Angiogram;  Surgeon: Lupe Posada MD;  Location: [facility] CARDIAC CATH LAB     CV HEART CATHETERIZATION WITH POSSIBLE INTERVENTION N/A 6/4/2019    Procedure: Heart Catheterization with Possible Intervention;  Surgeon: Alex Bazan MD;  Location: [facility] CARDIAC CATH LAB     CV PCI STENT DRUG ELUTING N/A 10/24/2019    Procedure: PCI Stent Drug Eluting;  Surgeon: Lupe Posada MD;  Location: [facility] CARDIAC CATH LAB     ESOPHAGOSCOPY,  GASTROSCOPY, DUODENOSCOPY (EGD), COMBINED N/A 1/20/2015    Procedure: COMBINED ESOPHAGOSCOPY, GASTROSCOPY, DUODENOSCOPY (EGD), BIOPSY SINGLE OR MULTIPLE;  Surgeon: Allan Marroquin MD;  Location:  GI     EXCISE LESION TRUNK N/A 4/17/2019    Procedure: EXCISE CYST UPPER BACK;  Surgeon: Rene Murray MD;  Location: [facility] OR     HERNIA REPAIR       HYDROCELECTOMY SCROTAL       ORTHOPEDIC SURGERY      both knees replaced     FAMILY HISTORY:  Family History   Problem Relation Age of Onset     Coronary Artery Disease Father 53     Cerebrovascular Disease Father      Diabetes Mother      Diabetes Brother      SOCIAL HISTORY:  Social History     Socioeconomic History     Marital status:      Spouse name: Not on file     Number of children: Not on file     Years of education: Not on file     Highest education level: Not on file   Occupational History     Not on file   Social Needs     Financial resource strain: Not on file     Food insecurity:     Worry: Not on file     Inability: Not on file     Transportation needs:     Medical: Not on file     Non-medical: Not on file   Tobacco Use     Smoking status: Never Smoker     Smokeless tobacco: Never Used   Substance and Sexual Activity     Alcohol use: Not Currently     Alcohol/week: 0.0 standard drinks     Drug use: No     Sexual activity: Never   Lifestyle     Physical activity:     Days per week: Not on file     Minutes per session: Not on file     Stress: Not on file   Relationships     Social connections:     Talks on phone: Not on file     Gets together: Not on file     Attends Yarsanism service: Not on file     Active member of club or organization: Not on file     Attends meetings of clubs or organizations: Not on file     Relationship status: Not on file     Intimate partner violence:     Fear of current or ex partner: Not on file     Emotionally abused: Not on file     Physically abused: Not on file     Forced sexual activity: Not on file   Other Topics  Concern     Parent/sibling w/ CABG, MI or angioplasty before 65F 55M? 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   Wt Readings from Last 4 Encounters:   10/30/19 108.9 kg (240 lb)   10/24/19 109.4 kg (241 lb 3.2 oz)   10/10/19 110.2 kg (243 lb)   09/26/19 109.3 kg (241 lb)     GEN: well nourished, in no acute distress.  HEENT:  Pupils equal, round. Sclerae nonicteric.   EXTREM: No LE edema.  NEURO: Alert and oriented, cooperative.  SKIN: Warm and dry.     Recent Lab Results:  LIPID RESULTS:  Lab Results   Component Value Date    CHOL 123 12/05/2016    HDL 50 12/05/2016    LDL 51 12/05/2016    TRIG 108 12/05/2016     LIVER ENZYME RESULTS:  No results found for: AST, ALT  CBC RESULTS:  Lab Results   Component Value Date    WBC 5.5 10/24/2019    RBC 4.64 10/24/2019    HGB 13.3 10/24/2019    HCT 40.4 10/24/2019    MCV 87 10/24/2019    MCH 28.7 10/24/2019    MCHC 32.9 10/24/2019    RDW 14.8 10/24/2019     10/24/2019     BMP RESULTS:  Lab Results   Component Value Date     10/28/2019    POTASSIUM 4.4 10/28/2019    CHLORIDE 103 10/28/2019    CO2 25 10/28/2019    ANIONGAP 6 10/28/2019     (H) 10/28/2019    BUN 12 10/28/2019    CR 0.99 10/28/2019    GFRESTIMATED 73 10/28/2019    GFRESTBLACK 84 10/28/2019    FATMATA 8.5 10/28/2019      A1C RESULTS:  Lab Results   Component Value Date    A1C 6.7 (H) 06/12/2017     INR RESULTS:  Lab Results   Component Value Date    INR 1.19 (H) 10/24/2019    INR 1.15 (H) 06/04/2019       New/Pertinent imaging results since last visit:  None        Thank you for allowing me to participate in the care of your  patient.    Sincerely,     Stella Elliott PA-C     [facility]

## 2019-12-18 DIAGNOSIS — I25.5 ISCHEMIC CARDIOMYOPATHY: ICD-10-CM

## 2019-12-18 LAB
ANION GAP SERPL CALCULATED.3IONS-SCNC: 7 MMOL/L (ref 3–14)
BUN SERPL-MCNC: 13 MG/DL (ref 7–30)
CALCIUM SERPL-MCNC: 8.6 MG/DL (ref 8.5–10.1)
CHLORIDE SERPL-SCNC: 104 MMOL/L (ref 94–109)
CO2 SERPL-SCNC: 27 MMOL/L (ref 20–32)
CREAT SERPL-MCNC: 0.96 MG/DL (ref 0.66–1.25)
GFR SERPL CREATININE-BSD FRML MDRD: 76 ML/MIN/{1.73_M2}
GLUCOSE SERPL-MCNC: 181 MG/DL (ref 70–99)
POTASSIUM SERPL-SCNC: 4.6 MMOL/L (ref 3.4–5.3)
SODIUM SERPL-SCNC: 138 MMOL/L (ref 133–144)

## 2019-12-18 PROCEDURE — 36415 COLL VENOUS BLD VENIPUNCTURE: CPT | Performed by: INTERNAL MEDICINE

## 2019-12-18 PROCEDURE — 80048 BASIC METABOLIC PNL TOTAL CA: CPT | Performed by: PHYSICIAN ASSISTANT

## 2020-01-02 ENCOUNTER — OFFICE VISIT (OUTPATIENT)
Dept: CARDIOLOGY | Facility: CLINIC | Age: 80
End: 2020-01-02
Attending: PHYSICIAN ASSISTANT
Payer: MEDICARE

## 2020-01-02 VITALS
BODY MASS INDEX: 33.18 KG/M2 | WEIGHT: 245 LBS | DIASTOLIC BLOOD PRESSURE: 64 MMHG | HEART RATE: 59 BPM | HEIGHT: 72 IN | SYSTOLIC BLOOD PRESSURE: 110 MMHG

## 2020-01-02 DIAGNOSIS — I25.5 ISCHEMIC CARDIOMYOPATHY: Primary | ICD-10-CM

## 2020-01-02 PROCEDURE — 99214 OFFICE O/P EST MOD 30 MIN: CPT | Performed by: PHYSICIAN ASSISTANT

## 2020-01-02 ASSESSMENT — MIFFLIN-ST. JEOR: SCORE: 1874.31

## 2020-01-02 NOTE — PROGRESS NOTES
Cardiology Progress Note    Date of Service: 01/02/2020  Patient seen today in follow up of: cardiomyopathy  Primary cardiologist: Dr. Phillips    HPI:  Bryant Mckenzie is a very pleasant 79 year old male with a history of diabetes mellitus type 2, hyperlipidemia, mild aortic stenosis, obstructive sleep apnea, a diagnosed cardiomyopathy and coronary artery disease who is here today for cardiology follow up.    Please see my prior note from 10/10 for full details regarding his medical history. Briefly, he presented this spring with shortness of breath and was found to have a severe cardiomyopathy with an LVEF of around 30%.   He had a markedly abnormal CT calcium score and went on to have coronary angiography.  This showed a 10% proximal LAD stenosis.  The mid to distal circumflex was 100% stenosed.  He had a proximal to mid RCA lesion which was 50% stenosed.  Aggressive medical management was recommended with possible consideration for intervention to the  of the left circumflex. A cardiac MRI in July showed a severely dilated LV with an LVEF of 21%. There was mild to moderate RV dysfunction. Late gadolinium enhancement was consistent with a prior small infarction in the circumflex distribution. Given the small amount of enhancement and the global nature of his LV dysfunction, the cardiomyopathy was felt to likely be mixed.      We have slowly been working to optimize his medications as his blood pressure tends to run at the lower end of normal.  He has not had issues with congestive heart failure over the last several months.  In August, he saw Dr. Rivera to discuss possible intervention to the circumflex  and was felt to be a good candidate.  He has had persistent LV dysfunction despite medical therapy with an EF of 30 to 35% in October by echocardiogram although his LV did appear less dilated.       He ultimately underwent successful intervention to the circumflex  on 10/24. He is maintained on  aspirin and Brilinta.     He is back today for routine follow up. About a month ago, we elected to switch him from lisinopril to Entresto after discussing the benefits. I also cut his lasix back to 20 mg daily. He is back today after this. He is tolerating this without any issues. He denies dizziness, lightheadedness, or any other concerns. His weight is up a few pounds although his wife thinks this is related to some weight gain around the holidays. He continues to have mild exertional dyspnea with activities such as climbing the stairs. No orthopnea or PND. No chest pain.     ASSESSMENT/PLAN:  1.  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  2.  Chronic systolic congestive heart failure.  Maintained on Lasix 40 mg daily.  3.  Coronary artery disease. S/p recent intervention to the  circumflex in October. He also had moderate RCA disease. He will continue aspirin and Brilinta to be continued for at least one year uninterrupted. He is also on high intensity statin therapy with atorvastatin 40 mg daily.   4.  Mild aortic stenosis.  5.  DM type II.     Marshall is here today for follow up regarding ongoing management of his cardiomyopathy. He fortunately has been doing very well over the last several months with stable NYHA class II symptoms. He is currently maintained on carvedilol 9.375 mg twice daily and Entresto 12-13 mg twice daily. Today, I recommended we increase Entresto to 24-26 mg twice daily. He is agreeable and will call if he has any issues with this. We'll see if long term Entresto will be an affordable option for him. If not, we'll transition him back to lisinopril.     He appears euvolemic on exam today. I recommended he continue on lasix 20 mg daily.     I will have him return to see Dr. Phillips in three months with a repeat echocardiogram prior to re-evaluate his LVEF. If this remains low, we will need to discuss potential ICD implantation. We have briefly discussed this in  the past. He will contact us sooner with any concerns and I'd be happy to see him.    Orders this Visit:  Orders Placed This Encounter   Procedures     Echocardiogram Complete     Orders Placed This Encounter   Medications     sacubitril-valsartan (ENTRESTO) 24-26 MG per tablet     Sig: Take 1 tablet by mouth 2 times daily     Dispense:  30 tablet     Refill:  3     Medications Discontinued During This Encounter   Medication Reason     sacubitril-valsartan (ENTRESTO) 24-26 MG per tablet Reorder       CURRENT MEDICATIONS:  Current Outpatient Medications   Medication Sig Dispense Refill     ASPIRIN 81 PO Take by mouth daily        atorvastatin (LIPITOR) 40 MG tablet Take 1 tablet (40 mg) by mouth daily 90 tablet 3     carvedilol (COREG) 3.125 MG tablet Take 3 tablets (9.375 mg) by mouth 2 times daily (with meals) 540 tablet 3     furosemide (LASIX) 20 MG tablet Take 1 tablet (20 mg) by mouth daily       insulin glargine (LANTUS) 100 UNIT/ML injection Inject 16 Units Subcutaneous At Bedtime 6/3 Took Lantus 8 Units at bedtime 15 mL 3     insulin lispro (HUMALOG KWIKPEN) 100 UNIT/ML injection 11 units with breakfast,15 units at noon, and 14 units before evening meal 36 mL 3     Menaquinone-7 (VITAMIN K2 PO) Take by mouth daily       metFORMIN (GLUCOPHAGE) 500 MG tablet TAKE 2 TABLETS (1,000 MG) BY MOUTH TWICE A DAY WITH MEALS 360 tablet 0     Multiple Vitamins-Minerals (MULTIVITAL PO) Take 1 tablet by mouth daily       sacubitril-valsartan (ENTRESTO) 24-26 MG per tablet Take 1 tablet by mouth 2 times daily 30 tablet 3     tamsulosin (FLOMAX) 0.4 MG capsule Take 0.4 mg by mouth daily       ticagrelor (BRILINTA) 90 MG tablet Take 1 tablet (90 mg) by mouth 2 times daily Start this evening. 180 tablet 3     ALLERGIES  Allergies   Allergen Reactions     No Known Allergies      PAST MEDICAL HISTORY:  Past Medical History:   Diagnosis Date     Atrophic gastritis 9/8/2016     Atrophic gastritis 9/8/2016     Benign non-nodular  prostatic hyperplasia with lower urinary tract symptoms 9/8/2016     History of diabetes mellitus      Hyperlipidemia LDL goal <100 9/8/2016     Obstructive sleep apnea syndrome 9/8/2016     SOB (shortness of breath) 5/30/2019     Type 2 diabetes mellitus without complication (H) 9/6/2016     Vitamin B12 deficiency (non anemic) 9/8/2016     PAST SURGICAL HISTORY:  Past Surgical History:   Procedure Laterality Date     CV CHRONIC TOTAL OCCLUSION N/A 10/24/2019    Procedure: Coronary Total Occlusion;  Surgeon: Lupe Posada MD;  Location:  HEART CARDIAC CATH LAB     CV CORONARY ANGIOGRAM N/A 10/24/2019    Procedure: Coronary Angiogram;  Surgeon: Lupe Posada MD;  Location:  HEART CARDIAC CATH LAB     CV HEART CATHETERIZATION WITH POSSIBLE INTERVENTION N/A 6/4/2019    Procedure: Heart Catheterization with Possible Intervention;  Surgeon: Alex Bazan MD;  Location:  HEART CARDIAC CATH LAB     CV PCI STENT DRUG ELUTING N/A 10/24/2019    Procedure: PCI Stent Drug Eluting;  Surgeon: Lupe Posada MD;  Location:  HEART CARDIAC CATH LAB     ESOPHAGOSCOPY, GASTROSCOPY, DUODENOSCOPY (EGD), COMBINED N/A 1/20/2015    Procedure: COMBINED ESOPHAGOSCOPY, GASTROSCOPY, DUODENOSCOPY (EGD), BIOPSY SINGLE OR MULTIPLE;  Surgeon: Allan Marroquin MD;  Location:  GI     EXCISE LESION TRUNK N/A 4/17/2019    Procedure: EXCISE CYST UPPER BACK;  Surgeon: Rene Murray MD;  Location:  OR     HERNIA REPAIR       HYDROCELECTOMY SCROTAL       ORTHOPEDIC SURGERY      both knees replaced     FAMILY HISTORY:  Family History   Problem Relation Age of Onset     Coronary Artery Disease Father 53     Cerebrovascular Disease Father      Diabetes Mother      Diabetes Brother      SOCIAL HISTORY:  Social History     Socioeconomic History     Marital status:      Spouse name: None     Number of children: None     Years of education: None     Highest education level: None   Occupational  History     None   Social Needs     Financial resource strain: None     Food insecurity:     Worry: None     Inability: None     Transportation needs:     Medical: None     Non-medical: None   Tobacco Use     Smoking status: Never Smoker     Smokeless tobacco: Never Used   Substance and Sexual Activity     Alcohol use: Not Currently     Alcohol/week: 0.0 standard drinks     Drug use: No     Sexual activity: Never   Lifestyle     Physical activity:     Days per week: None     Minutes per session: None     Stress: None   Relationships     Social connections:     Talks on phone: None     Gets together: None     Attends Jainism service: None     Active member of club or organization: None     Attends meetings of clubs or organizations: None     Relationship status: None     Intimate partner violence:     Fear of current or ex partner: None     Emotionally abused: None     Physically abused: None     Forced sexual activity: None   Other Topics Concern     Parent/sibling w/ CABG, MI or angioplasty before 65F 55M? 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; walks dog for exercise      Review of Systems:  Skin:  Negative     Eyes:  Positive for glasses  ENT:  Negative    Respiratory:  Positive for shortness of breath  Cardiovascular:    Positive for;lightheadedness  Gastroenterology: Negative    Genitourinary:  Positive for prostate problem  Musculoskeletal:  Positive for back pain  Neurologic:  Negative    Psychiatric:  Negative    Heme/Lymph/Imm:  Negative    Endocrine:  Positive for diabetes     Physical Exam:  Vitals: /64   Pulse 59   Ht 1.829 m (6')   Wt 111.1 kg (245 lb)   BMI 33.23 kg/m     Wt Readings from Last 4 Encounters:   01/02/20 111.1 kg (245 lb)   12/04/19 110.1 kg (242 lb 11.2 oz)   10/30/19 108.9 kg (240 lb)   10/24/19 109.4 kg (241 lb 3.2 oz)     GEN: well nourished, in no acute distress.  HEENT:  Pupils equal, round. Sclerae nonicteric.   NECK: JVP is normal at 30  degrees.  C/V:  Regular rate and rhythm, II/vI systolic murmur at the LSB  RESP: Respirations are unlabored. Clear to auscultation bilaterally without wheezing, rales, or rhonchi.  GI: Abdomen soft, nontender.  EXTREM: no LE edema.  NEURO: Alert and oriented, cooperative.  SKIN: Warm and dry.     Recent Lab Results:  LIPID RESULTS:  Lab Results   Component Value Date    CHOL 123 12/05/2016    HDL 50 12/05/2016    LDL 51 12/05/2016    TRIG 108 12/05/2016     LIVER ENZYME RESULTS:  No results found for: AST, ALT  CBC RESULTS:  Lab Results   Component Value Date    WBC 5.5 10/24/2019    RBC 4.64 10/24/2019    HGB 13.3 10/24/2019    HCT 40.4 10/24/2019    MCV 87 10/24/2019    MCH 28.7 10/24/2019    MCHC 32.9 10/24/2019    RDW 14.8 10/24/2019     10/24/2019     BMP RESULTS:  Lab Results   Component Value Date     12/18/2019    POTASSIUM 4.6 12/18/2019    CHLORIDE 104 12/18/2019    CO2 27 12/18/2019    ANIONGAP 7 12/18/2019     (H) 12/18/2019    BUN 13 12/18/2019    CR 0.96 12/18/2019    GFRESTIMATED 76 12/18/2019    GFRESTBLACK 88 12/18/2019    FATMATA 8.6 12/18/2019      A1C RESULTS:  Lab Results   Component Value Date    A1C 6.7 (H) 06/12/2017     INR RESULTS:  Lab Results   Component Value Date    INR 1.19 (H) 10/24/2019    INR 1.15 (H) 06/04/2019       New/Pertinent imaging results since last visit:  None    Stella Elliott PA-C  [facility]

## 2020-01-02 NOTE — LETTER
1/2/2020    Merlin Emery Brown, MD  [facility] [address]  [facility] [address]    RE: Bryant Mckenzie       Dear Colleague,    I had the pleasure of seeing Bryant Mckenzie in the [facility].      Cardiology Progress Note    Date of Service: 01/02/2020  Patient seen today in follow up of: cardiomyopathy  Primary cardiologist: Dr. Phillips    HPI:  Bryant Mckenzie is a very pleasant 79 year old male with a history of diabetes mellitus type 2, hyperlipidemia, mild aortic stenosis, obstructive sleep apnea, a diagnosed cardiomyopathy and coronary artery disease who is here today for cardiology follow up.    Please see my prior note from 10/10 for full details regarding his medical history. Briefly, he presented this spring with shortness of breath and was found to have a severe cardiomyopathy with an LVEF of around 30%.   He had a markedly abnormal CT calcium score and went on to have coronary angiography.  This showed a 10% proximal LAD stenosis.  The mid to distal circumflex was 100% stenosed.  He had a proximal to mid RCA lesion which was 50% stenosed.  Aggressive medical management was recommended with possible consideration for intervention to the  of the left circumflex. A cardiac MRI in July showed a severely dilated LV with an LVEF of 21%. There was mild to moderate RV dysfunction. Late gadolinium enhancement was consistent with a prior small infarction in the circumflex distribution. Given the small amount of enhancement and the global nature of his LV dysfunction, the cardiomyopathy was felt to likely be mixed.      We have slowly been working to optimize his medications as his blood pressure tends to run at the lower end of normal.  He has not had issues with congestive heart failure over the last several months.  In August, he saw Dr. Rivera to discuss possible intervention to the circumflex  and was felt to be a good candidate.  He has  had persistent LV dysfunction despite medical therapy with an EF of 30 to 35% in October by echocardiogram although his LV did appear less dilated.       He ultimately underwent successful intervention to the circumflex  on 10/24. He is maintained on aspirin and Brilinta.     He is back today for routine follow up. About a month ago, we elected to switch him from lisinopril to Entresto after discussing the benefits. I also cut his lasix back to 20 mg daily. He is back today after this. He is tolerating this without any issues. He denies dizziness, lightheadedness, or any other concerns. His weight is up a few pounds although his wife thinks this is related to some weight gain around the holidays. He continues to have mild exertional dyspnea with activities such as climbing the stairs. No orthopnea or PND. No chest pain.     ASSESSMENT/PLAN:  1.  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  2.  Chronic systolic congestive heart failure.  Maintained on Lasix 40 mg daily.  3.  Coronary artery disease. S/p recent intervention to the  circumflex in October. He also had moderate RCA disease. He will continue aspirin and Brilinta to be continued for at least one year uninterrupted. He is also on high intensity statin therapy with atorvastatin 40 mg daily.   4.  Mild aortic stenosis.  5.  DM type II.     Marshall is here today for follow up regarding ongoing management of his cardiomyopathy. He fortunately has been doing very well over the last several months with stable NYHA class II symptoms. He is currently maintained on carvedilol 9.375 mg twice daily and Entresto 12-13 mg twice daily. Today, I recommended we increase Entresto to 24-26 mg twice daily. He is agreeable and will call if he has any issues with this. We'll see if long term Entresto will be an affordable option for him. If not, we'll transition him back to lisinopril.     He appears euvolemic on exam today. I recommended he  continue on lasix 20 mg daily.     I will have him return to see Dr. Phillips in three months with a repeat echocardiogram prior to re-evaluate his LVEF. If this remains low, we will need to discuss potential ICD implantation. We have briefly discussed this in the past. He will contact us sooner with any concerns and I'd be happy to see him.    Orders this Visit:  Orders Placed This Encounter   Procedures     Echocardiogram Complete     Orders Placed This Encounter   Medications     sacubitril-valsartan (ENTRESTO) 24-26 MG per tablet     Sig: Take 1 tablet by mouth 2 times daily     Dispense:  30 tablet     Refill:  3     Medications Discontinued During This Encounter   Medication Reason     sacubitril-valsartan (ENTRESTO) 24-26 MG per tablet Reorder       CURRENT MEDICATIONS:  Current Outpatient Medications   Medication Sig Dispense Refill     ASPIRIN 81 PO Take by mouth daily        atorvastatin (LIPITOR) 40 MG tablet Take 1 tablet (40 mg) by mouth daily 90 tablet 3     carvedilol (COREG) 3.125 MG tablet Take 3 tablets (9.375 mg) by mouth 2 times daily (with meals) 540 tablet 3     furosemide (LASIX) 20 MG tablet Take 1 tablet (20 mg) by mouth daily       insulin glargine (LANTUS) 100 UNIT/ML injection Inject 16 Units Subcutaneous At Bedtime 6/3 Took Lantus 8 Units at bedtime 15 mL 3     insulin lispro (HUMALOG KWIKPEN) 100 UNIT/ML injection 11 units with breakfast,15 units at noon, and 14 units before evening meal 36 mL 3     Menaquinone-7 (VITAMIN K2 PO) Take by mouth daily       metFORMIN (GLUCOPHAGE) 500 MG tablet TAKE 2 TABLETS (1,000 MG) BY MOUTH TWICE A DAY WITH MEALS 360 tablet 0     Multiple Vitamins-Minerals (MULTIVITAL PO) Take 1 tablet by mouth daily       sacubitril-valsartan (ENTRESTO) 24-26 MG per tablet Take 1 tablet by mouth 2 times daily 30 tablet 3     tamsulosin (FLOMAX) 0.4 MG capsule Take 0.4 mg by mouth daily       ticagrelor (BRILINTA) 90 MG tablet Take 1 tablet (90 mg) by mouth 2 times daily  Start this evening. 180 tablet 3     ALLERGIES  Allergies   Allergen Reactions     No Known Allergies      PAST MEDICAL HISTORY:  Past Medical History:   Diagnosis Date     Atrophic gastritis 9/8/2016     Atrophic gastritis 9/8/2016     Benign non-nodular prostatic hyperplasia with lower urinary tract symptoms 9/8/2016     History of diabetes mellitus      Hyperlipidemia LDL goal <100 9/8/2016     Obstructive sleep apnea syndrome 9/8/2016     SOB (shortness of breath) 5/30/2019     Type 2 diabetes mellitus without complication (H) 9/6/2016     Vitamin B12 deficiency (non anemic) 9/8/2016     PAST SURGICAL HISTORY:  Past Surgical History:   Procedure Laterality Date     CV CHRONIC TOTAL OCCLUSION N/A 10/24/2019    Procedure: Coronary Total Occlusion;  Surgeon: Lupe Posada MD;  Location:  HEART CARDIAC CATH LAB     CV CORONARY ANGIOGRAM N/A 10/24/2019    Procedure: Coronary Angiogram;  Surgeon: Lupe Posada MD;  Location:  HEART CARDIAC CATH LAB     CV HEART CATHETERIZATION WITH POSSIBLE INTERVENTION N/A 6/4/2019    Procedure: Heart Catheterization with Possible Intervention;  Surgeon: Alex Bazan MD;  Location:  HEART CARDIAC CATH LAB     CV PCI STENT DRUG ELUTING N/A 10/24/2019    Procedure: PCI Stent Drug Eluting;  Surgeon: Lupe Posada MD;  Location:  HEART CARDIAC CATH LAB     ESOPHAGOSCOPY, GASTROSCOPY, DUODENOSCOPY (EGD), COMBINED N/A 1/20/2015    Procedure: COMBINED ESOPHAGOSCOPY, GASTROSCOPY, DUODENOSCOPY (EGD), BIOPSY SINGLE OR MULTIPLE;  Surgeon: Allan Marroquin MD;  Location:  GI     EXCISE LESION TRUNK N/A 4/17/2019    Procedure: EXCISE CYST UPPER BACK;  Surgeon: Rene Murray MD;  Location:  OR     HERNIA REPAIR       HYDROCELECTOMY SCROTAL       ORTHOPEDIC SURGERY      both knees replaced     FAMILY HISTORY:  Family History   Problem Relation Age of Onset     Coronary Artery Disease Father 53     Cerebrovascular Disease Father       Diabetes Mother      Diabetes Brother      SOCIAL HISTORY:  Social History     Socioeconomic History     Marital status:      Spouse name: None     Number of children: None     Years of education: None     Highest education level: None   Occupational History     None   Social Needs     Financial resource strain: None     Food insecurity:     Worry: None     Inability: None     Transportation needs:     Medical: None     Non-medical: None   Tobacco Use     Smoking status: Never Smoker     Smokeless tobacco: Never Used   Substance and Sexual Activity     Alcohol use: Not Currently     Alcohol/week: 0.0 standard drinks     Drug use: No     Sexual activity: Never   Lifestyle     Physical activity:     Days per week: None     Minutes per session: None     Stress: None   Relationships     Social connections:     Talks on phone: None     Gets together: None     Attends Evangelical service: None     Active member of club or organization: None     Attends meetings of clubs or organizations: None     Relationship status: None     Intimate partner violence:     Fear of current or ex partner: None     Emotionally abused: None     Physically abused: None     Forced sexual activity: None   Other Topics Concern     Parent/sibling w/ CABG, MI or angioplasty before 65F 55M? 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; walks dog for exercise      Review of Systems:  Skin:  Negative     Eyes:  Positive for glasses  ENT:  Negative    Respiratory:  Positive for shortness of breath  Cardiovascular:    Positive for;lightheadedness  Gastroenterology: Negative    Genitourinary:  Positive for prostate problem  Musculoskeletal:  Positive for back pain  Neurologic:  Negative    Psychiatric:  Negative    Heme/Lymph/Imm:  Negative    Endocrine:  Positive for diabetes     Physical Exam:  Vitals: /64   Pulse 59   Ht 1.829 m (6')   Wt 111.1 kg (245 lb)   BMI 33.23 kg/m      Wt Readings from Last 4  Encounters:   01/02/20 111.1 kg (245 lb)   12/04/19 110.1 kg (242 lb 11.2 oz)   10/30/19 108.9 kg (240 lb)   10/24/19 109.4 kg (241 lb 3.2 oz)     GEN: well nourished, in no acute distress.  HEENT:  Pupils equal, round. Sclerae nonicteric.   NECK: JVP is normal at 30 degrees.  C/V:  Regular rate and rhythm, II/vI systolic murmur at the LSB  RESP: Respirations are unlabored. Clear to auscultation bilaterally without wheezing, rales, or rhonchi.  GI: Abdomen soft, nontender.  EXTREM: no LE edema.  NEURO: Alert and oriented, cooperative.  SKIN: Warm and dry.     Recent Lab Results:  LIPID RESULTS:  Lab Results   Component Value Date    CHOL 123 12/05/2016    HDL 50 12/05/2016    LDL 51 12/05/2016    TRIG 108 12/05/2016     LIVER ENZYME RESULTS:  No results found for: AST, ALT  CBC RESULTS:  Lab Results   Component Value Date    WBC 5.5 10/24/2019    RBC 4.64 10/24/2019    HGB 13.3 10/24/2019    HCT 40.4 10/24/2019    MCV 87 10/24/2019    MCH 28.7 10/24/2019    MCHC 32.9 10/24/2019    RDW 14.8 10/24/2019     10/24/2019     BMP RESULTS:  Lab Results   Component Value Date     12/18/2019    POTASSIUM 4.6 12/18/2019    CHLORIDE 104 12/18/2019    CO2 27 12/18/2019    ANIONGAP 7 12/18/2019     (H) 12/18/2019    BUN 13 12/18/2019    CR 0.96 12/18/2019    GFRESTIMATED 76 12/18/2019    GFRESTBLACK 88 12/18/2019    FATMATA 8.6 12/18/2019      A1C RESULTS:  Lab Results   Component Value Date    A1C 6.7 (H) 06/12/2017     INR RESULTS:  Lab Results   Component Value Date    INR 1.19 (H) 10/24/2019    INR 1.15 (H) 06/04/2019       New/Pertinent imaging results since last visit:  None    Stella Elliott PA-C  [facility]    Thank you for allowing me to participate in the care of your patient.      Sincerely,     Stella Elliott PA-C     [facility]

## 2020-01-02 NOTE — PATIENT INSTRUCTIONS
Thank you for your [facility] visit today. Your provider has recommended the following:    Medication Changes:  INCREASE entresto to a full tablet twice daily. You can start by increasing just the evening dose to a full tablet for a few days and if you tolerate that then increase to a full tablet twice daily.   Recommendations:  -  Let us know if Entresto is going to be too expensive and we'll switch you back to lisinopril.  -  Call us with dizziness, lightheadedness or any other issues prior to seeing Dr. Phillips.  Follow-up:  See Dr. Phillips for cardiology follow up in March with a repeat echocardiogram prior to that appointment.  Reminder:  Please bring in all current medications, over the counter supplements and vitamin bottles to your next appointment.            [facility]  echocardio

## 2020-01-03 ENCOUNTER — HOSPITAL ENCOUNTER (EMERGENCY)
Facility: CLINIC | Age: 80
Discharge: HOME | End: 2020-01-03
Attending: EMERGENCY MEDICINE | Admitting: EMERGENCY MEDICINE
Payer: MEDICARE

## 2020-01-03 VITALS
HEART RATE: 64 BPM | BODY MASS INDEX: 33.59 KG/M2 | TEMPERATURE: 97.8 F | OXYGEN SATURATION: 98 % | HEIGHT: 72 IN | DIASTOLIC BLOOD PRESSURE: 68 MMHG | RESPIRATION RATE: 18 BRPM | SYSTOLIC BLOOD PRESSURE: 108 MMHG | WEIGHT: 248 LBS

## 2020-01-03 DIAGNOSIS — R56.9 GENERALIZED SEIZURE (H): ICD-10-CM

## 2020-01-03 DIAGNOSIS — E16.2 HYPOGLYCEMIA: ICD-10-CM

## 2020-01-03 DIAGNOSIS — T38.3X1A INSULIN OVERDOSE, ACCIDENTAL OR UNINTENTIONAL, INITIAL ENCOUNTER: ICD-10-CM

## 2020-01-03 LAB
ALBUMIN UR-MCNC: 10 MG/DL
ANION GAP SERPL CALCULATED.3IONS-SCNC: 7 MMOL/L (ref 3–14)
APPEARANCE UR: CLEAR
BASOPHILS # BLD AUTO: 0.1 10E9/L (ref 0–0.2)
BASOPHILS NFR BLD AUTO: 1 %
BILIRUB UR QL STRIP: NEGATIVE
BUN SERPL-MCNC: 14 MG/DL (ref 7–30)
CALCIUM SERPL-MCNC: 8.9 MG/DL (ref 8.5–10.1)
CHLORIDE SERPL-SCNC: 105 MMOL/L (ref 94–109)
CO2 SERPL-SCNC: 27 MMOL/L (ref 20–32)
COLOR UR AUTO: ABNORMAL
CREAT SERPL-MCNC: 0.93 MG/DL (ref 0.66–1.25)
DIFFERENTIAL METHOD BLD: ABNORMAL
EOSINOPHIL # BLD AUTO: 0.3 10E9/L (ref 0–0.7)
EOSINOPHIL NFR BLD AUTO: 3.4 %
ERYTHROCYTE [DISTWIDTH] IN BLOOD BY AUTOMATED COUNT: 13.7 % (ref 10–15)
GFR SERPL CREATININE-BSD FRML MDRD: 78 ML/MIN/{1.73_M2}
GLUCOSE BLDC GLUCOMTR-MCNC: 108 MG/DL (ref 70–99)
GLUCOSE BLDC GLUCOMTR-MCNC: 186 MG/DL (ref 70–99)
GLUCOSE BLDC GLUCOMTR-MCNC: 205 MG/DL (ref 70–99)
GLUCOSE BLDC GLUCOMTR-MCNC: 263 MG/DL (ref 70–99)
GLUCOSE BLDC GLUCOMTR-MCNC: 298 MG/DL (ref 70–99)
GLUCOSE SERPL-MCNC: 105 MG/DL (ref 70–99)
GLUCOSE UR STRIP-MCNC: 1000 MG/DL
HCT VFR BLD AUTO: 43.4 % (ref 40–53)
HGB BLD-MCNC: 13.4 G/DL (ref 13.3–17.7)
HGB UR QL STRIP: NEGATIVE
IMM GRANULOCYTES # BLD: 0.1 10E9/L (ref 0–0.4)
IMM GRANULOCYTES NFR BLD: 0.6 %
INTERPRETATION ECG - MUSE: NORMAL
KETONES UR STRIP-MCNC: NEGATIVE MG/DL
LEUKOCYTE ESTERASE UR QL STRIP: NEGATIVE
LYMPHOCYTES # BLD AUTO: 1.7 10E9/L (ref 0.8–5.3)
LYMPHOCYTES NFR BLD AUTO: 17.7 %
MCH RBC QN AUTO: 28.6 PG (ref 26.5–33)
MCHC RBC AUTO-ENTMCNC: 30.9 G/DL (ref 31.5–36.5)
MCV RBC AUTO: 93 FL (ref 78–100)
MONOCYTES # BLD AUTO: 0.8 10E9/L (ref 0–1.3)
MONOCYTES NFR BLD AUTO: 8.6 %
MUCOUS THREADS #/AREA URNS LPF: PRESENT /LPF
NEUTROPHILS # BLD AUTO: 6.4 10E9/L (ref 1.6–8.3)
NEUTROPHILS NFR BLD AUTO: 68.7 %
NITRATE UR QL: NEGATIVE
NRBC # BLD AUTO: 0 10*3/UL
NRBC BLD AUTO-RTO: 0 /100
PH UR STRIP: 6 PH (ref 5–7)
PLATELET # BLD AUTO: 221 10E9/L (ref 150–450)
POTASSIUM SERPL-SCNC: 4.1 MMOL/L (ref 3.4–5.3)
RBC # BLD AUTO: 4.69 10E12/L (ref 4.4–5.9)
RBC #/AREA URNS AUTO: 3 /HPF (ref 0–2)
SODIUM SERPL-SCNC: 139 MMOL/L (ref 133–144)
SOURCE: ABNORMAL
SP GR UR STRIP: 1.01 (ref 1–1.03)
TROPONIN I SERPL-MCNC: 0.02 UG/L (ref 0–0.04)
UROBILINOGEN UR STRIP-MCNC: 2 MG/DL (ref 0–2)
WBC # BLD AUTO: 9.3 10E9/L (ref 4–11)
WBC #/AREA URNS AUTO: 1 /HPF (ref 0–5)

## 2020-01-03 PROCEDURE — 81001 URINALYSIS AUTO W/SCOPE: CPT | Performed by: EMERGENCY MEDICINE

## 2020-01-03 PROCEDURE — 84484 ASSAY OF TROPONIN QUANT: CPT | Performed by: EMERGENCY MEDICINE

## 2020-01-03 PROCEDURE — 93005 ELECTROCARDIOGRAM TRACING: CPT

## 2020-01-03 PROCEDURE — 00000146 ZZHCL STATISTIC GLUCOSE BY METER IP

## 2020-01-03 PROCEDURE — 80048 BASIC METABOLIC PNL TOTAL CA: CPT | Performed by: EMERGENCY MEDICINE

## 2020-01-03 PROCEDURE — 85025 COMPLETE CBC W/AUTO DIFF WBC: CPT | Performed by: EMERGENCY MEDICINE

## 2020-01-03 PROCEDURE — 99284 EMERGENCY DEPT VISIT MOD MDM: CPT

## 2020-01-03 ASSESSMENT — MIFFLIN-ST. JEOR: SCORE: 1887.92

## 2020-01-03 NOTE — ED AVS SNAPSHOT
[facility] Emergency Department  [address]  [facility] [address]  Phone:  [phone]  Fax:  [phone]                                    Bryant Mckenzie   MRN: [id number]    Department:  [facility] Emergency Department   Date of Visit:  1/3/2020           After Visit Summary Signature Page    I have received my discharge instructions, and my questions have been answered. I have discussed any challenges I see with this plan with the nurse or doctor.    ..........................................................................................................................................  Patient/Patient Representative Signature      ..........................................................................................................................................  Patient Representative Print Name and Relationship to Patient    ..................................................               ................................................  Date                                   Time    ..........................................................................................................................................  Reviewed by Signature/Title    ...................................................              ..............................................  Date                                               Time          22EPIC Rev 08/18

## 2020-01-03 NOTE — ED PROVIDER NOTES
History     Chief Complaint:  Hypoglycemia    The history is provided by the patient and the spouse.      Bryant Mckenzie is a 79 year old male, with history of diabetes, hyperlipidemia, aortic stenosis, CAD amongst others as noted below, not currently anticoagulated, who presents with his spouse via EMS for hypoglycemia and associated seizure. Per patient, he was in bed and became acutely diaphoretic and tried to kick off his sheets. He states this is the last thing he remembers prior to waking up with EMS personnel standing over him. Per spouse, patient was having full body convulsions, thus EMS was called.     En route, EMS reports BS of 28 and provided patient with some sugar via IV and patient was given a peanut butter sandwich and orange juice.     Here, patient states that he believes he accidentally took the wrong insulin. He notes that he takes Novolog after meals and then Lantus at bedtime, but believes he took his Novolog instead.     Allergies:  No Known Drug Allergies    Medications:    Baby Asprin   Lipitor   Coreg  Lasix  Lantus  Metformin   entresto   brilinta   Flomax     Past Medical History:    Atrophic gastritis  Diabetes  Hyperlipidemia   KENIA   Aortic stenosis   CAD  Cardiomyopathy     Past Surgical History:    Chronic total occlusion  Coronary angiogram  Heart catherization with possible intervention  PCI stent drug eluting  EGD, combined  Excise lesion trunk  Hernia repair  Hydrocelectomy scrotal  Joint replacement - knee - bilateral    Family History:    Father - CAD, cerebrovascular disease  Mother - diabetes  Brother - diabetes    Social History:  The patient was accompanied to the ED by EMS and spouse.  Smoking Status: No  Smokeless Tobacco: No  Alcohol Use: Not currently  Drug Use: No   Marital Status:       Review of Systems   All other systems reviewed and are negative.    Physical Exam     Patient Vitals for the past 24 hrs:   BP Temp Temp src Heart Rate Resp SpO2 Height  Weight   01/03/20 0320 -- -- -- 67 14 97 % -- --   01/03/20 0310 -- -- -- 66 16 96 % -- --   01/03/20 0300 105/65 -- -- 69 -- -- -- --   01/03/20 0230 103/65 -- -- 70 14 96 % -- --   01/03/20 0200 103/63 -- -- 70 12 95 % -- --   01/03/20 0150 107/64 -- -- 67 14 -- -- --   01/03/20 0130 100/54 -- -- 79 -- -- -- --   01/03/20 0115 105/50 -- -- 75 12 -- -- --   01/03/20 0100 100/60 -- -- 71 18 99 % -- --   01/03/20 0022 108/63 97.8  F (36.6  C) Temporal 50 20 100 % 1.829 m (6') 112.5 kg (248 lb)       Physical Exam  Nursing note and vitals reviewed.  Constitutional: Cooperative.   HENT:   Mouth/Throat: Mucous membranes are normal. No neck rigidity.  Eyes: Pupils are equal, round, and reactive to light. EOMI  Cardiovascular: Bradycardic rate, regular rhythm and normal heart sounds.  No murmur.  Pulmonary/Chest: Effort normal and breath sounds normal. No respiratory distress. No wheezes. No rales.   Abdominal: Soft. Normal appearance and bowel sounds are normal. No distension. There is no tenderness. There is no rigidity and no guarding.   Musculoskeletal: Normal range of motion.   Neurological: Alert. Oriented x4.  CN 2-12 intact.  Strength and sensation normal.    Skin: Skin is warm and dry.    Psychiatric: Normal mood and affect.      Emergency Department Course     ECG:  ECG taken at 0055, ECG read at 0058 by Dr. Kathya MD  Sinus rhythm with 1st degree AV block with occasional premature ventricular complexes  Rate 70 bpm. HI interval 222. QRS duration 106. QT/QTc 422/455. P-R-T axes 62 -19 34.      Laboratory:  Laboratory findings were communicated with the patient and family who voiced understanding of the findings.    CBC: AWNL (WBC 9.3, HGB 13.4, )  BMP: Glucose 105 (H) o/w WNL (Creatinine 0.93)  Troponin (Collected 0034): 0.019  Glucose by Meter (Collected 0034): 108 (H)  Glucose by Meter (Collected 0131): 186 (H)  Glucose by Meter (Collected 0235): 205 (H)  Glucose by Meter (Collected 0345): 263 (H)    Glucose by Meter (Collected 0425): 298 (H)    UA with Microscopic: Urine Glc 1000 (A), Protein Albumin Urine 10 (A), RBC/HPF 3 (H), Mucous Urine Present (A) o/w WNL      Emergency Department Course:  Past medical records, nursing notes, and vitals reviewed.    EKG obtained in the ED, see results above.      (0100)   I performed an exam of the patient as documented above. History obtained from patient and spouse.     IV was inserted and blood was drawn for laboratory testing, results above.    The patient provided a urine sample here in the emergency department. This was sent for laboratory testing, findings above.     (0428)   I rechecked the patient and discussed the results of his workup thus far. Patient is awake, talking and walking around. Discussed plan of care and patient will be discharged.    Findings and plan explained to the Patient and spouse. Patient discharged home with instructions regarding supportive care, medications, and reasons to return. The importance of close follow-up was reviewed.     I personally reviewed the laboratory results with the Patient and spouse and answered all related questions prior to discharge.     Impression & Plan     Medical Decision Making:  Bryant Mckenzie is a 79 year old gentleman with insulin controlled diabetes who presents after a hypoglycemic seizure. This was generalized seizure with no focality. He is back to his normal mental state after correction of his hypoglycemia. He and his wife believe that he took his fast acting insulin as opposed to his long acting insulin this evening by mistake. No fevers or obvious clinical history to support an infectious cause. Urinalysis and blood counts are reassuring. EKG is normal. He denies any findings concerning for neurologic or cardiac cause. We will follow his blood glucose for the next three hours with plan for him to eat and drink complex carbs and protien. If he shows that he can keep his blood glucose up, he  will be stable for discharge.     Diagnosis:    ICD-10-CM   1. Insulin overdose, accidental or unintentional, initial encounter T38.3X1A   2. Hypoglycemia E16.2   3. Generalized seizure - due to hypoglycemia R56.9       Disposition:  Discharged to home.    Scribe Disclosure:  I, Eve Rebolledo, am serving as a scribe at 1:06 AM on 1/3/2020 to document services personally performed by Mike Rasheed MD based on my observations and the provider's statements to me.   1/3/2020   [facility] EMERGENCY DEPARTMENT       Mike Rasheed MD  01/03/20 0517

## 2020-01-03 NOTE — ED TRIAGE NOTES
Pt to ER with c/o diabetic seizure, pts wife called 911 and pt had BS of 28 pt was given D50 per EMS and wife fed him. Pt is type 2 and has never had this prob before

## 2020-01-29 ENCOUNTER — TELEPHONE (OUTPATIENT)
Dept: CARDIOLOGY | Facility: CLINIC | Age: 80
End: 2020-01-29

## 2020-01-29 DIAGNOSIS — I25.5 ISCHEMIC CARDIOMYOPATHY: ICD-10-CM

## 2020-01-29 NOTE — TELEPHONE ENCOUNTER
Patient called in to inquire [facility] Entresto 24-26. I checked the chart and it appears that this was not done. I took Sujey's 1/2/2020 clinic note the 10/10/19 echo and recent labs and a signed Rx for Entresto and faxed this to [facility] at [phone]. I assured patient that this will be done today and it was faxed at 1:45. Patient appreciated the help. Paperwork filed.

## 2020-01-30 NOTE — TELEPHONE ENCOUNTER
Received a vm late yesterday from Susan Restrepo from the [facility] ( [phone]) requesting that we fax patient's records to [phone]. This was done this morning at 8:45.

## 2020-02-16 ENCOUNTER — HEALTH MAINTENANCE LETTER (OUTPATIENT)
Age: 80
End: 2020-02-16

## 2020-03-16 ENCOUNTER — TRANSFERRED RECORDS (OUTPATIENT)
Dept: HEALTH INFORMATION MANAGEMENT | Facility: CLINIC | Age: 80
End: 2020-03-16

## 2020-03-17 ENCOUNTER — TELEPHONE (OUTPATIENT)
Dept: CARDIOLOGY | Facility: CLINIC | Age: 80
End: 2020-03-17

## 2020-03-17 DIAGNOSIS — I25.5 ISCHEMIC CARDIOMYOPATHY: Primary | ICD-10-CM

## 2020-03-17 NOTE — TELEPHONE ENCOUNTER
Phone call to patient to discuss his 3/20 echo and follow-up with Dr. Phillips in light of the virus situation. I told him that Sujey reviewed his situation and felt that both his echo and follow-up with Dr. Phillips can wait till June. The echo was to re evaluate his EF. Patient expressed agreement as he does not want to risk exposure. I told him that I will place new orders and that scheduling will call him when the June schedule opens.

## 2020-06-15 ENCOUNTER — TELEPHONE (OUTPATIENT)
Dept: CARDIOLOGY | Facility: CLINIC | Age: 80
End: 2020-06-15

## 2020-06-15 NOTE — TELEPHONE ENCOUNTER
PATIENT WELLNESS TELEPHONE SCREENING     Step 1: Answer all screening questions.     1. In the past 3 weeks, have you been exposed to someone with a known positive illness below?  COVID-19 (known or suspected): no  Chickenpox: no  Measles: no  Pertussis: no    2. Do you have any of the following new symptoms or symptoms that have started within the past 14 days?   Fever (subjective or >100.0)?: no   A new cough: no}   Shortness of breath: no   Chills? no   New loss of taste or smell? no   Generalized body aches? no   New persistent headache? no   New sore throat? no   Nausea, vomiting or diarrhea: no    Step 2: If the patient is positive for symptoms, consult the ordering provider/consult IP to determine if the procedure is deemed necessary and inform the patient you will call them back.     Step 3 . If no symptoms, patient informed of the no visitor policy in place. yes    Step 4. If positive new symptoms, and the procedure is deemed necessary. Notify your manager/supervisor. The patient must be informed to call the procedural department.  A team member with the appropriate PPE will bring the mask to the patient at door 2. The patient will be brought to the procedural department and registered over the phone.

## 2020-06-16 ENCOUNTER — HOSPITAL ENCOUNTER (OUTPATIENT)
Dept: CARDIOLOGY | Facility: CLINIC | Age: 80
Discharge: HOME | End: 2020-06-16
Attending: PHYSICIAN ASSISTANT | Admitting: PHYSICIAN ASSISTANT
Payer: MEDICARE

## 2020-06-16 DIAGNOSIS — I25.5 ISCHEMIC CARDIOMYOPATHY: ICD-10-CM

## 2020-06-16 PROCEDURE — 93306 TTE W/DOPPLER COMPLETE: CPT | Mod: 26 | Performed by: INTERNAL MEDICINE

## 2020-06-16 PROCEDURE — 93306 TTE W/DOPPLER COMPLETE: CPT

## 2020-06-16 PROCEDURE — 25500064 ZZH RX 255 OP 636: Performed by: PHYSICIAN ASSISTANT

## 2020-06-16 RX ADMIN — HUMAN ALBUMIN MICROSPHERES AND PERFLUTREN 5 ML: 10; .22 INJECTION, SOLUTION INTRAVENOUS at 11:00

## 2020-06-17 ENCOUNTER — TELEPHONE (OUTPATIENT)
Dept: CARDIOLOGY | Facility: CLINIC | Age: 80
End: 2020-06-17

## 2020-06-17 NOTE — TELEPHONE ENCOUNTER
Please let him know his EF looks better! Up from 30 - 35% to 40 - 45%. Dr. Phillips can review further with him at his upcoming appointment but this would mean he would not need a defibrillator. He does have some aortic stenosis which we will need to monitor. Thanks! Sujey     Patient informed of results of echo and had questions about his Virtual visit on 6-23 with Dr. Phillips msg to scheduling to call with instructions.  Patient verbalized understanding and agreed to plan of care.

## 2020-06-23 ENCOUNTER — VIRTUAL VISIT (OUTPATIENT)
Dept: CARDIOLOGY | Facility: CLINIC | Age: 80
End: 2020-06-23
Attending: PHYSICIAN ASSISTANT
Payer: MEDICARE

## 2020-06-23 VITALS
HEIGHT: 72 IN | HEART RATE: 64 BPM | OXYGEN SATURATION: 98 % | DIASTOLIC BLOOD PRESSURE: 66 MMHG | BODY MASS INDEX: 31.59 KG/M2 | WEIGHT: 233.2 LBS | SYSTOLIC BLOOD PRESSURE: 101 MMHG

## 2020-06-23 DIAGNOSIS — I25.5 ISCHEMIC CARDIOMYOPATHY: ICD-10-CM

## 2020-06-23 PROCEDURE — 99214 OFFICE O/P EST MOD 30 MIN: CPT | Mod: 95 | Performed by: INTERNAL MEDICINE

## 2020-06-23 RX ORDER — FAMOTIDINE 20 MG
4000 TABLET ORAL DAILY
COMMUNITY

## 2020-06-23 ASSESSMENT — MIFFLIN-ST. JEOR: SCORE: 1815.79

## 2020-06-23 NOTE — LETTER
6/23/2020    Merlin Emery Brown, MD  [facility] [address]  [facility] [address]    RE: Bryant Mckenzie       Dear Colleague,    I had the pleasure of seeing Bryant Mckenzie in the [facility].    Bryant Mckenzie is a 80 year old male who is being evaluated via a billable video visit.      This clinic visit was performed via telephone/video due to COVID-19 restrictions.    Today, I had the pleasure of connecting with Bryant Mckenzie for a follow-up visit.  He is a pleasant 80 year old patient with a past medical history of coronary disease status post intervention on circumflex  and 50% residual RCA disease, mixed cardiomyopathy type 2 diabetes and hyperlipidemia whom I am seeing for a follow-up visit.    Briefly, the patient initially presented to his primary care physician for exertional dyspnea.  An exercise stress test showed a resting ejection fraction of 25 to 30%.  CT coronary calcium score was over 3000 and he ended up getting coronary angiogram which showed  of the mid circumflex and 50% lesion in the RCA.  Cardiac MRI showed ejection fraction of 21% and good viability in the circumflex distribution.  His other 2 vessels had VERNON-3 flow and hence it appeared that the degree of cardiomyopathy was out of proportion to the severity of coronary artery disease.  In any case, he underwent intervention on the circumflex  with good results.  An echocardiogram performed subsequently showed improvement in ejection fraction to 40-45%.  Of note, he also has aortic stenosis and the most recent echocardiogram has shown the aortic valve area to be 1.1 cm  with mean gradient of 26 mmHg.    The patient is doing extremely well since undergoing  intervention and denies chest pain, shortness of breath, orthopnea, PND, syncope or syncope the only symptoms he reports is occasional lightheadedness with when he  bends over to tie his shoelaces.   He is on dual antiplatelet therapy.  We have been able to start him on Entresto and cut back on the dose of his Lasix.  He has been tolerating all his medications well.    Assessment:  1.  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  Coronary disease status post PCI of circumflex   3.  Moderate disease in RCA- medically managed  4.  Moderate aortic stenosis with aortic valve area of 1.1 cm  and mean gradient of 26 mmHg  5.  Hyperlipidemia    The patient is doing well and has improved significantly since his initial presentation.  He reports occasional lightheadedness on bending and I feel that we may be able to further down titrate the dose of Lasix.  I told him to cut back on Lasix to 10 mg for a week and see how he feels.  If he notices weight gain, shortness of breath, lower extremity edema he is going to increase the dose back to 20.  If not, he will continue to take 10 mg doses and bump the dose to 20 mg on as-needed basis.  I will also repeat an echocardiogram in 6 months to reassess his ejection fraction and aortic stenosis.  I told him that I would prefer that he stays on dual antiplatelet therapy at least for 1 year if not longer.  There is some data to suggest that greater than 1 year of dual antiplatelet therapy has some additional benefit.    Plan:   1.  Echocardiogram in 6 months  2.  Follow-up with JACKIE in 6 months after echocardiogram  3.  Decrease Lasix to 10 mg p.o. daily for 7 days.  Increase the dose to 20 mg if you notice lower extremity edema, weight gain, shortness of breath.  4.  Continue all other medications at the current dose.    Thank you for allowing me to participate in the care of Bryant Mckenzie    This note was completed in part using Dragon voice recognition software. Although reviewed after completion, some word and grammatical errors may occur.    Mejia Phillips MD  Cardiology    No orders of the defined types were placed in this encounter.      Orders Placed This  Encounter   Medications     Vitamin D, Cholecalciferol, 25 MCG (1000 UT) CAPS     Sig: Take 4,000 Units by mouth daily       There are no discontinued medications.    No diagnosis found.    CURRENT MEDICATIONS:  Current Outpatient Medications   Medication Sig Dispense Refill     ASPIRIN 81 PO Take by mouth daily        atorvastatin (LIPITOR) 40 MG tablet Take 1 tablet (40 mg) by mouth daily 90 tablet 3     carvedilol (COREG) 3.125 MG tablet Take 3 tablets (9.375 mg) by mouth 2 times daily (with meals) 540 tablet 3     furosemide (LASIX) 20 MG tablet Take 1 tablet (20 mg) by mouth daily       insulin glargine (LANTUS) 100 UNIT/ML injection Inject 16 Units Subcutaneous At Bedtime 6/3 Took Lantus 8 Units at bedtime 15 mL 3     insulin lispro (HUMALOG KWIKPEN) 100 UNIT/ML injection 11 units with breakfast,15 units at noon, and 14 units before evening meal 36 mL 3     metFORMIN (GLUCOPHAGE) 500 MG tablet TAKE 2 TABLETS (1,000 MG) BY MOUTH TWICE A DAY WITH MEALS 360 tablet 0     Multiple Vitamins-Minerals (MULTIVITAL PO) Take 1 tablet by mouth daily       sacubitril-valsartan (ENTRESTO) 24-26 MG per tablet Take 1 tablet by mouth 2 times daily 30 tablet 3     tamsulosin (FLOMAX) 0.4 MG capsule Take 0.4 mg by mouth daily       ticagrelor (BRILINTA) 90 MG tablet Take 1 tablet (90 mg) by mouth 2 times daily Start this evening. 180 tablet 3     Vitamin D, Cholecalciferol, 25 MCG (1000 UT) CAPS Take 4,000 Units by mouth daily       Menaquinone-7 (VITAMIN K2 PO) Take by mouth daily         ALLERGIES     Allergies   Allergen Reactions     No Known Allergies        PAST MEDICAL HISTORY:  Past Medical History:   Diagnosis Date     Atrophic gastritis 9/8/2016     Benign non-nodular prostatic hyperplasia 09/08/2016     CAD (coronary artery disease)      Hyperlipidemia 09/08/2016     Obstructive sleep apnea syndrome 9/8/2016     Type 2 diabetes mellitus 09/06/2016     Vitamin B12 deficiency (non anemic) 9/8/2016       PAST SURGICAL  HISTORY:  Past Surgical History:   Procedure Laterality Date     CV CHRONIC TOTAL OCCLUSION N/A 10/24/2019    Procedure: Coronary Total Occlusion;  Surgeon: Lupe Posada MD;  Location:  HEART CARDIAC CATH LAB     CV CORONARY ANGIOGRAM N/A 10/24/2019    Procedure: Coronary Angiogram;  Surgeon: Lupe Posada MD;  Location:  HEART CARDIAC CATH LAB     CV HEART CATHETERIZATION WITH POSSIBLE INTERVENTION N/A 6/4/2019    Procedure: Heart Catheterization with Possible Intervention;  Surgeon: Alex Bazan MD;  Location:  HEART CARDIAC CATH LAB     CV PCI STENT DRUG ELUTING N/A 10/24/2019    Procedure: PCI Stent Drug Eluting;  Surgeon: Lupe Posada MD;  Location:  HEART CARDIAC CATH LAB     ESOPHAGOSCOPY, GASTROSCOPY, DUODENOSCOPY (EGD), COMBINED N/A 1/20/2015    Procedure: COMBINED ESOPHAGOSCOPY, GASTROSCOPY, DUODENOSCOPY (EGD), BIOPSY SINGLE OR MULTIPLE;  Surgeon: lAlan Marroquin MD;  Location:  GI     EXCISE LESION TRUNK N/A 4/17/2019    Procedure: EXCISE CYST UPPER BACK;  Surgeon: Rene Murray MD;  Location:  OR     HERNIA REPAIR       HYDROCELECTOMY SCROTAL       JOINT REPLACEMENT (aka KNEE) NOS Bilateral        FAMILY HISTORY:  Family History   Problem Relation Age of Onset     Coronary Artery Disease Father 53     Cerebrovascular Disease Father      Diabetes Mother      Diabetes Brother        SOCIAL HISTORY:  Social History     Socioeconomic History     Marital status:      Spouse name: None     Number of children: None     Years of education: None     Highest education level: None   Occupational History     None   Social Needs     Financial resource strain: None     Food insecurity     Worry: None     Inability: None     Transportation needs     Medical: None     Non-medical: None   Tobacco Use     Smoking status: Never Smoker     Smokeless tobacco: Never Used   Substance and Sexual Activity     Alcohol use: Not Currently     Alcohol/week: 0.0  standard drinks     Drug use: No     Sexual activity: Never   Lifestyle     Physical activity     Days per week: None     Minutes per session: None     Stress: None   Relationships     Social connections     Talks on phone: None     Gets together: None     Attends Temple service: None     Active member of club or organization: None     Attends meetings of clubs or organizations: None     Relationship status: None     Intimate partner violence     Fear of current or ex partner: None     Emotionally abused: None     Physically abused: None     Forced sexual activity: None   Other Topics Concern     Parent/sibling w/ CABG, MI or angioplasty before 65F 55M? 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  ENT/Mouth:  Normal head shape, no evidence of injury or laceration.  EYES: No scleral icterus, normal conjunctivae  Neck:  No evidence of thyromegaly  Chest/Lungs: No audible wheezing. Non labored breathing. No cough.  Cardiovascular: No evidence of elevated jugular venous pressure.   Abdomen:  No observed jaundice.  Extremities: No cyanosis.  Skin: No xanthelasma, normal skin collar. No evidence of facial lacerations.  Neurologic: No focal neurological defect. Normal speech.  Psychiatric: Alert and oriented x3    Thank you for allowing me to participate in the care of your patient.    Sincerely,     Mejia Phillips MD     [facility]

## 2020-06-23 NOTE — PROGRESS NOTES
"Bryant Mckenzie is a 80 year old male who is being evaluated via a billable video visit.      The patient has been notified of following:     \"This video visit will be conducted via a call between you and your physician/provider. We have found that certain health care needs can be provided without the need for an in-person physical exam.  This service lets us provide the care you need with a video conversation.  If a prescription is necessary we can send it directly to your pharmacy.  If lab work is needed we can place an order for that and you can then stop by our lab to have the test done at a later time.    Video visits are billed at different rates depending on your insurance coverage.  Please reach out to your insurance provider with any questions.    If during the course of the call the physician/provider feels a video visit is not appropriate, you will not be charged for this service.\"    Patient has given verbal consent for Video visit? Yes    Will anyone else be joining your video visit? No      Bp:101/66  Pulse:64  Wt:233.2lb    Review Of Systems  Skin: NEGATIVE  Eyes:Ears/Nose/Throat: Glasses  Respiratory: NEGATIVE  Cardiovascular:NEGATIVE  Gastrointestinal: NEGATIVE  Genitourinary:NEGATIVE   Musculoskeletal: NEGATIVE  Neurologic: NEGATIVE  Psychiatric: NEGATIVE  Hematologic/Lymphatic/Immunologic: NEGATIVE  Endocrine: Diabetic    Telephone number of patient:[phone]    Rachel Montalvo ILENE    Video-Visit Details    Type of service:  Video Visit    Video Start Time: 3:30 PM  Video End Time: 4:000 PM    Originating Location (pt. Location): Home    Distant Location (provider location):  [facility]     Platform used for Video Visit: Doximity    This clinic visit was performed via telephone/video due to COVID-19 restrictions.    Today, I had the pleasure of connecting with Bryant Mckenzie for a follow-up visit.  He is a pleasant 80 year old patient with a " past medical history of coronary disease status post intervention on circumflex  and 50% residual RCA disease, mixed cardiomyopathy type 2 diabetes and hyperlipidemia whom I am seeing for a follow-up visit.    Briefly, the patient initially presented to his primary care physician for exertional dyspnea.  An exercise stress test showed a resting ejection fraction of 25 to 30%.  CT coronary calcium score was over 3000 and he ended up getting coronary angiogram which showed  of the mid circumflex and 50% lesion in the RCA.  Cardiac MRI showed ejection fraction of 21% and good viability in the circumflex distribution.  His other 2 vessels had VERNON-3 flow and hence it appeared that the degree of cardiomyopathy was out of proportion to the severity of coronary artery disease.  In any case, he underwent intervention on the circumflex  with good results.  An echocardiogram performed subsequently showed improvement in ejection fraction to 40-45%.  Of note, he also has aortic stenosis and the most recent echocardiogram has shown the aortic valve area to be 1.1 cm  with mean gradient of 26 mmHg.    The patient is doing extremely well since undergoing  intervention and denies chest pain, shortness of breath, orthopnea, PND, syncope or syncope the only symptoms he reports is occasional lightheadedness with when he  bends over to tie his shoelaces.  He is on dual antiplatelet therapy.  We have been able to start him on Entresto and cut back on the dose of his Lasix.  He has been tolerating all his medications well.    Assessment:  1.  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  Coronary disease status post PCI of circumflex   3.  Moderate disease in RCA- medically managed  4.  Moderate aortic stenosis with aortic valve area of 1.1 cm  and mean gradient of 26 mmHg  5.  Hyperlipidemia    The patient is doing well and has improved significantly since his initial presentation.  He reports occasional  lightheadedness on bending and I feel that we may be able to further down titrate the dose of Lasix.  I told him to cut back on Lasix to 10 mg for a week and see how he feels.  If he notices weight gain, shortness of breath, lower extremity edema he is going to increase the dose back to 20.  If not, he will continue to take 10 mg doses and bump the dose to 20 mg on as-needed basis.  I will also repeat an echocardiogram in 6 months to reassess his ejection fraction and aortic stenosis.  I told him that I would prefer that he stays on dual antiplatelet therapy at least for 1 year if not longer.  There is some data to suggest that greater than 1 year of dual antiplatelet therapy has some additional benefit.    Plan:   1.  Echocardiogram in 6 months  2.  Follow-up with JACKIE in 6 months after echocardiogram  3.  Decrease Lasix to 10 mg p.o. daily for 7 days.  Increase the dose to 20 mg if you notice lower extremity edema, weight gain, shortness of breath.  4.  Continue all other medications at the current dose.    Thank you for allowing me to participate in the care of Bryant Mckenzie    This note was completed in part using Dragon voice recognition software. Although reviewed after completion, some word and grammatical errors may occur.    Mejia Phillips MD  Cardiology    No orders of the defined types were placed in this encounter.      Orders Placed This Encounter   Medications     Vitamin D, Cholecalciferol, 25 MCG (1000 UT) CAPS     Sig: Take 4,000 Units by mouth daily       There are no discontinued medications.    No diagnosis found.    CURRENT MEDICATIONS:  Current Outpatient Medications   Medication Sig Dispense Refill     ASPIRIN 81 PO Take by mouth daily        atorvastatin (LIPITOR) 40 MG tablet Take 1 tablet (40 mg) by mouth daily 90 tablet 3     carvedilol (COREG) 3.125 MG tablet Take 3 tablets (9.375 mg) by mouth 2 times daily (with meals) 540 tablet 3     furosemide (LASIX) 20 MG tablet Take 1 tablet  (20 mg) by mouth daily       insulin glargine (LANTUS) 100 UNIT/ML injection Inject 16 Units Subcutaneous At Bedtime 6/3 Took Lantus 8 Units at bedtime 15 mL 3     insulin lispro (HUMALOG KWIKPEN) 100 UNIT/ML injection 11 units with breakfast,15 units at noon, and 14 units before evening meal 36 mL 3     metFORMIN (GLUCOPHAGE) 500 MG tablet TAKE 2 TABLETS (1,000 MG) BY MOUTH TWICE A DAY WITH MEALS 360 tablet 0     Multiple Vitamins-Minerals (MULTIVITAL PO) Take 1 tablet by mouth daily       sacubitril-valsartan (ENTRESTO) 24-26 MG per tablet Take 1 tablet by mouth 2 times daily 30 tablet 3     tamsulosin (FLOMAX) 0.4 MG capsule Take 0.4 mg by mouth daily       ticagrelor (BRILINTA) 90 MG tablet Take 1 tablet (90 mg) by mouth 2 times daily Start this evening. 180 tablet 3     Vitamin D, Cholecalciferol, 25 MCG (1000 UT) CAPS Take 4,000 Units by mouth daily       Menaquinone-7 (VITAMIN K2 PO) Take by mouth daily         ALLERGIES     Allergies   Allergen Reactions     No Known Allergies        PAST MEDICAL HISTORY:  Past Medical History:   Diagnosis Date     Atrophic gastritis 9/8/2016     Benign non-nodular prostatic hyperplasia 09/08/2016     CAD (coronary artery disease)      Hyperlipidemia 09/08/2016     Obstructive sleep apnea syndrome 9/8/2016     Type 2 diabetes mellitus 09/06/2016     Vitamin B12 deficiency (non anemic) 9/8/2016       PAST SURGICAL HISTORY:  Past Surgical History:   Procedure Laterality Date     CV CHRONIC TOTAL OCCLUSION N/A 10/24/2019    Procedure: Coronary Total Occlusion;  Surgeon: Lupe Posada MD;  Location: [facility] CARDIAC CATH LAB     CV CORONARY ANGIOGRAM N/A 10/24/2019    Procedure: Coronary Angiogram;  Surgeon: Lupe Posada MD;  Location: [facility] CARDIAC CATH LAB     CV HEART CATHETERIZATION WITH POSSIBLE INTERVENTION N/A 6/4/2019    Procedure: Heart Catheterization with Possible Intervention;  Surgeon: Alex Bazan MD;  Location: [facility] CARDIAC  CATH LAB     CV PCI STENT DRUG ELUTING N/A 10/24/2019    Procedure: PCI Stent Drug Eluting;  Surgeon: Lupe Posada MD;  Location:  HEART CARDIAC CATH LAB     ESOPHAGOSCOPY, GASTROSCOPY, DUODENOSCOPY (EGD), COMBINED N/A 1/20/2015    Procedure: COMBINED ESOPHAGOSCOPY, GASTROSCOPY, DUODENOSCOPY (EGD), BIOPSY SINGLE OR MULTIPLE;  Surgeon: Allan Marroquin MD;  Location:  GI     EXCISE LESION TRUNK N/A 4/17/2019    Procedure: EXCISE CYST UPPER BACK;  Surgeon: Rene Murray MD;  Location:  OR     HERNIA REPAIR       HYDROCELECTOMY SCROTAL       JOINT REPLACEMENT (aka KNEE) NOS Bilateral        FAMILY HISTORY:  Family History   Problem Relation Age of Onset     Coronary Artery Disease Father 53     Cerebrovascular Disease Father      Diabetes Mother      Diabetes Brother        SOCIAL HISTORY:  Social History     Socioeconomic History     Marital status:      Spouse name: None     Number of children: None     Years of education: None     Highest education level: None   Occupational History     None   Social Needs     Financial resource strain: None     Food insecurity     Worry: None     Inability: None     Transportation needs     Medical: None     Non-medical: None   Tobacco Use     Smoking status: Never Smoker     Smokeless tobacco: Never Used   Substance and Sexual Activity     Alcohol use: Not Currently     Alcohol/week: 0.0 standard drinks     Drug use: No     Sexual activity: Never   Lifestyle     Physical activity     Days per week: None     Minutes per session: None     Stress: None   Relationships     Social connections     Talks on phone: None     Gets together: None     Attends [employer]ism service: None     Active member of club or organization: None     Attends meetings of clubs or organizations: None     Relationship status: None     Intimate partner violence     Fear of current or ex partner: None     Emotionally abused: None     Physically abused: None     Forced sexual  activity: None   Other Topics Concern     Parent/sibling w/ CABG, MI or angioplasty before 65F 55M? 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  ENT/Mouth:  Normal head shape, no evidence of injury or laceration.  EYES: No scleral icterus, normal conjunctivae  Neck:  No evidence of thyromegaly  Chest/Lungs: No audible wheezing. Non labored breathing. No cough.  Cardiovascular: No evidence of elevated jugular venous pressure.   Abdomen:  No observed jaundice.  Extremities: No cyanosis.  Skin: No xanthelasma, normal skin collar. No evidence of facial lacerations.  Neurologic: No focal neurological defect. Normal speech.  Psychiatric: Alert and oriented x3    Mejia Phillips MD

## 2020-11-22 ENCOUNTER — HEALTH MAINTENANCE LETTER (OUTPATIENT)
Age: 80
End: 2020-11-22

## 2020-12-17 ENCOUNTER — HOSPITAL ENCOUNTER (OUTPATIENT)
Dept: CARDIOLOGY | Facility: CLINIC | Age: 80
Discharge: HOME | End: 2020-12-17
Attending: INTERNAL MEDICINE | Admitting: INTERNAL MEDICINE
Payer: MEDICARE

## 2020-12-17 DIAGNOSIS — I25.5 ISCHEMIC CARDIOMYOPATHY: ICD-10-CM

## 2020-12-17 PROCEDURE — 255N000002 HC RX 255 OP 636: Performed by: INTERNAL MEDICINE

## 2020-12-17 PROCEDURE — 93306 TTE W/DOPPLER COMPLETE: CPT | Mod: 26 | Performed by: INTERNAL MEDICINE

## 2020-12-17 PROCEDURE — 93306 TTE W/DOPPLER COMPLETE: CPT

## 2020-12-17 RX ADMIN — HUMAN ALBUMIN MICROSPHERES AND PERFLUTREN 9 ML: 10; .22 INJECTION, SOLUTION INTRAVENOUS at 11:00

## 2020-12-21 ENCOUNTER — TELEPHONE (OUTPATIENT)
Dept: CARDIOLOGY | Facility: CLINIC | Age: 80
End: 2020-12-21

## 2020-12-21 ENCOUNTER — OFFICE VISIT (OUTPATIENT)
Dept: CARDIOLOGY | Facility: CLINIC | Age: 80
End: 2020-12-21
Attending: INTERNAL MEDICINE
Payer: MEDICARE

## 2020-12-21 VITALS
WEIGHT: 228.3 LBS | SYSTOLIC BLOOD PRESSURE: 99 MMHG | HEIGHT: 72 IN | HEART RATE: 72 BPM | BODY MASS INDEX: 30.92 KG/M2 | DIASTOLIC BLOOD PRESSURE: 60 MMHG

## 2020-12-21 DIAGNOSIS — I42.8 NONISCHEMIC CARDIOMYOPATHY (H): ICD-10-CM

## 2020-12-21 DIAGNOSIS — E11.9 TYPE 2 DIABETES MELLITUS WITHOUT COMPLICATION, WITHOUT LONG-TERM CURRENT USE OF INSULIN (H): ICD-10-CM

## 2020-12-21 DIAGNOSIS — I50.22 CHRONIC SYSTOLIC HEART FAILURE (H): Primary | ICD-10-CM

## 2020-12-21 DIAGNOSIS — G47.33 OBSTRUCTIVE SLEEP APNEA SYNDROME: ICD-10-CM

## 2020-12-21 DIAGNOSIS — I25.10 CORONARY ARTERY DISEASE INVOLVING NATIVE CORONARY ARTERY OF NATIVE HEART WITHOUT ANGINA PECTORIS: ICD-10-CM

## 2020-12-21 DIAGNOSIS — I25.5 ISCHEMIC CARDIOMYOPATHY: ICD-10-CM

## 2020-12-21 PROCEDURE — 99214 OFFICE O/P EST MOD 30 MIN: CPT | Performed by: NURSE PRACTITIONER

## 2020-12-21 ASSESSMENT — MIFFLIN-ST. JEOR: SCORE: 1793.56

## 2020-12-21 NOTE — TELEPHONE ENCOUNTER
At patient's request I called his spouse since she was not able to attend his visit this morning with Adolfo to review what was discussed at his visit today.    international unit(s) reviewed what was on the AVS and took time to review the echo and future cardiology appointments.    Also reminded her that he is to finish out his current supply of Brilinta.    I reviewed with her the signs and symptoms of worsening aortic stenosis and if these are observed or mentioned to her by him that they should call us immediately.    All questions and concerns addressed to her satisfaction.

## 2020-12-21 NOTE — PATIENT INSTRUCTIONS
Thank you for your visit with the [facility] today.    Today's plan:   1. You can stop taking Brilinta once you run out of your current supply.  2. Follow up with Sujey or THERON in about 6 months with labs beforehand.  3. Follow up with Dr. Phillips around this time next year with a repeat echocardiogram beforehand.    If you have questions or concerns, please call my nurse team at [phone].    Scheduling phone number: [phone]    It was a pleasure seeing you today!     JENNIFER Li, CNP  Nurse Practitioner  [facility]  December 21, 2020  ________________________________________________________

## 2020-12-21 NOTE — LETTER
"12/21/2020    Merlin Emery Brown, MD  [facility] [address]  [facility] [address]    RE: Bryant Mckenzie       Dear Colleague,    I had the pleasure of seeing Bryant Mckenzie in the [facility].    Cardiology Clinic Progress Note    Service Date: December 21, 2020    Primary Cardiologist: Dr. Phillips; Sujey Elliott PA-C      Reason for Visit: 6 month follow up    HPI:   I had the pleasure of meeting Mr. Bryant Spicer \"Marshall\" Jonah in the clinic today. 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    He presented in spring 2019 with shortness of breath and was found to have a severe cardiomyopathy with an LVEF around 30%.  He had a markedly abnormal CT calcium score and went on to have coronary angiography.  This showed a 10% proximal LAD stenosis.  The mid to distal circumflex was 100% stenosed.  He had a proximal to mid RCA lesion which was 50% stenosed.  Aggressive medical management was recommended with possible consideration for intervention of the  of the left circumflex.  Cardiac MRI in July 2019 showed a severely dilated left ventricle with an LVEF of 21%.  There was mild to moderate RV dysfunction.  Late gadolinium enhancement was consistent with a prior small infarction in the circumflex distribution.  Given the amount of enhancement in the global nature of his LV dysfunction, the cardiomyopathy was felt to likely be mixed ischemic and nonischemic.    He ultimately underwent successful intervention to the circumflex  on 10/24/2019 with Dr. Rivera with placement of a single drug-eluting stent.  He has been maintained on aspirin 81 mg daily and Brilinta 90 mg twice daily. He has " primarily followed with my colleagues Dr. Phillips and Sujey Elliott PA-C for his cardiology care.  He has been maintained on Lasix 20 mg daily and Entresto 24-26 mg twice daily.    Today, Marshall tells me that he has been feeling great since he last checked in with Dr. Phillips in June of this past summer.  Before things were close down with the pandemic, he had been going to a health club around 5 times a day for some light exercise.  He tells me that the health collection reduce opened back up in this past week so he has gone a couple of times recently.  He has been tolerating his usual activity well without concerns for symptoms of chest pain, shortness of breath, dyspnea on exertion, or other exertional symptoms.  He has not had palpitations, dizziness, lightheadedness, orthopnea, PND, or lower extremity edema. His weight has been quite stable and is actually trended down slightly, dropping around 5 pounds since last summer.  His weight on his home scale today was 228 pounds. He says with a smile that this is down to around what he weighed in college and the lowest he has been in quite some time.    A repeat echocardiogram was completed last week on 12/17/2020. Unfortunately, this was a suboptimal study with poor image quality. Left ventricular systolic function was noted to be mildly reduced. A peak AoV pressure gradient of 18.8 mmHg, mean AoV pressure gradient of 6.1 mmHg, and a calculated aortic valve area of 2.9 cm  were noted. However, his aortic valve was not well visualized and it was noted that the pressure gradient could be underestimated due to the image quality.    ASSESSMENT AND PLAN:  1. Mixed ischemic and nonischemic cardiomyopathy  - EF chronically around the 40 to 45% range. NYHA class II symptoms.  - He appears euvolemic on exam without symptoms concerning for acute CHF at this time.  - We will continue with his current dose of Lasix to 20 mg once daily, carvedilol 9.375 mg twice daily, and Entresto at  24-26 mg twice daily. We reviewed to continue to monitor his weights daily and call the clinic if he notices a weight gain of over 3 pounds in 1 day or over 5 pounds in 1 week or if he notices worsening shortness of breath or lower extremity edema.    2. Coronary artery disease  - History of a  of the circumflex artery status post PCI and placement of a single LEVI in 10/2019. He has been on DAPT with aspirin 81 mg daily and Brilinta 90 mg twice daily since his PCI. We reviewed that since he is now over 1 year removed following his intervention that he can stop Brilinta once he runs out of his current supply.  - He seems to be stable without anginal symptoms at this time and exercises regularly, tolerating this well without exertional symptoms.    3. Mild to moderate aortic stenosis  - Recent echocardiogram last week noting a peak AoV pressure gradient of 18.8 mmHg, mean AoV pressure gradient of 6.1 mmHg, and a calculated aortic valve area of 2.9 cm .  However, his aortic valve was not well visualized and it was noted that the pressure gradient could be underestimated due to the image quality.  - He is asymptomatic, so we will plan for repeat echocardiogram in about 1 year.    4. Type 2 diabetes mellitus    5.  Obstructive sleep apnea    Thank you for the opportunity to participate in this delightful patient's care. We will plan to have him see Sujey Elliott PA-C or THERON in follow up in about 6 months for a routine check in with a BMP beforehand. I will otherwise plan to have him see Dr. Phillips for annual follow up visit around this time next year with a repeat echocardiogram beforehand. We would be happy to see him sooner if needed for any concerns in the meantime.    JENNIFER Li, CNP   Nurse Practitioner  [facility]  Pager: [phone]  Text Page  (8am - 5pm, M-F)    Orders this Visit:  No orders of the defined types were placed in this encounter.    Orders Placed This Encounter    Medications     empagliflozin (JARDIANCE) 25 MG TABS tablet     Sig: Take 25 mg by mouth daily     There are no discontinued medications.  Encounter Diagnosis   Name Primary?     Ischemic cardiomyopathy      CURRENT MEDICATIONS:  Current Outpatient Medications   Medication Sig Dispense Refill     ASPIRIN 81 PO Take by mouth daily        atorvastatin (LIPITOR) 40 MG tablet Take 1 tablet (40 mg) by mouth daily 90 tablet 3     carvedilol (COREG) 3.125 MG tablet Take 3 tablets (9.375 mg) by mouth 2 times daily (with meals) 540 tablet 3     empagliflozin (JARDIANCE) 25 MG TABS tablet Take 25 mg by mouth daily       furosemide (LASIX) 20 MG tablet Take 1 tablet (20 mg) by mouth daily       insulin glargine (LANTUS) 100 UNIT/ML injection Inject 16 Units Subcutaneous At Bedtime 6/3 Took Lantus 8 Units at bedtime 15 mL 3     insulin lispro (HUMALOG KWIKPEN) 100 UNIT/ML injection 11 units with breakfast,15 units at noon, and 14 units before evening meal 36 mL 3     metFORMIN (GLUCOPHAGE) 500 MG tablet TAKE 2 TABLETS (1,000 MG) BY MOUTH TWICE A DAY WITH MEALS 360 tablet 0     Multiple Vitamins-Minerals (MULTIVITAL PO) Take 1 tablet by mouth daily       sacubitril-valsartan (ENTRESTO) 24-26 MG per tablet Take 1 tablet by mouth 2 times daily 30 tablet 3     tamsulosin (FLOMAX) 0.4 MG capsule Take 0.4 mg by mouth daily       ticagrelor (BRILINTA) 90 MG tablet Take 1 tablet (90 mg) by mouth 2 times daily Start this evening. 180 tablet 3     Vitamin D, Cholecalciferol, 25 MCG (1000 UT) CAPS Take 4,000 Units by mouth daily       ALLERGIES  Allergies   Allergen Reactions     No Known Allergies      PAST MEDICAL, SURGICAL, FAMILY HISTORY:  History was reviewed and updated as needed, see medical record.    SOCIAL HISTORY:  Social History     Socioeconomic History     Marital status:      Spouse name: Not on file     Number of children: Not on file     Years of education: Not on file     Highest education level: Not on file    Occupational History     Not on file   Social Needs     Financial resource strain: Not on file     Food insecurity     Worry: Not on file     Inability: Not on file     Transportation needs     Medical: Not on file     Non-medical: Not on file   Tobacco Use     Smoking status: Never Smoker     Smokeless tobacco: Never Used   Substance and Sexual Activity     Alcohol use: Not Currently     Alcohol/week: 0.0 standard drinks     Drug use: No     Sexual activity: Never   Lifestyle     Physical activity     Days per week: Not on file     Minutes per session: Not on file     Stress: Not on file   Relationships     Social connections     Talks on phone: Not on file     Gets together: Not on file     Attends Orthodox service: Not on file     Active member of club or organization: Not on file     Attends meetings of clubs or organizations: Not on file     Relationship status: Not on file     Intimate partner violence     Fear of current or ex partner: Not on file     Emotionally abused: Not on file     Physically abused: Not on file     Forced sexual activity: Not on file   Other Topics Concern     Parent/sibling w/ CABG, MI or angioplasty before 65F 55M? 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; walks dog for exercise      Review of Systems:  Skin:  Negative     Eyes:  Positive for glasses  ENT:  Negative    Respiratory:  Negative    Cardiovascular:  Negative    Gastroenterology: Negative    Genitourinary:  Negative    Musculoskeletal:  Negative    Neurologic:  Negative    Psychiatric:  Negative    Heme/Lymph/Imm:  Negative    Endocrine:  Positive for diabetes     Physical Exam:  Vitals: BP 99/60   Pulse 72   Ht 1.829 m (6')   Wt 103.6 kg (228 lb 4.8 oz)   BMI 30.96 kg/m     Wt Readings from Last 4 Encounters:   12/21/20 103.6 kg (228 lb 4.8 oz)   06/23/20 105.8 kg (233 lb 3.2 oz)   01/03/20 112.5 kg (248 lb)   01/02/20 111.1 kg (245 lb)     CONSTITUTIONAL: Appears his stated age, well  nourished, and in no acute distress.  HEENT: Pupils equal, round. Sclerae nonicteric.    NECK: Supple, no masses or JVD appreciated.   C/V:  Regular rate and rhythm, normal S1 and S2, no S3 or S4, grade 2 out of 6 systolic ejection murmur noted. No rub or gallop.  RESP: Respirations are unlabored. Lungs are clear to auscultation bilaterally without wheezing, rales, or rhonchi.  EXTREM: No clubbing, cyanosis, or lower extremity edema bilaterally.   NEURO: Alert and oriented, cooperative. Gait steady. No gross focal deficits.   PSYCH: Affect appropriate, bright. Mentation normal. Responds to questions appropriately.  SKIN: Warm and dry. No apparent rashes or bruising.    Recent Lab Results:  LIPID RESULTS:  Lab Results   Component Value Date    CHOL 123 12/05/2016    HDL 50 12/05/2016    LDL 51 12/05/2016    TRIG 108 12/05/2016     LIVER ENZYME RESULTS:  No results found for: AST, ALT  CBC RESULTS:  Lab Results   Component Value Date    WBC 9.3 01/03/2020    RBC 4.69 01/03/2020    HGB 13.4 01/03/2020    HCT 43.4 01/03/2020    MCV 93 01/03/2020    MCH 28.6 01/03/2020    MCHC 30.9 (L) 01/03/2020    RDW 13.7 01/03/2020     01/03/2020     BMP RESULTS:  Lab Results   Component Value Date     01/03/2020    POTASSIUM 4.1 01/03/2020    CHLORIDE 105 01/03/2020    CO2 27 01/03/2020    ANIONGAP 7 01/03/2020     (H) 01/03/2020    BUN 14 01/03/2020    CR 0.93 01/03/2020    GFRESTIMATED 78 01/03/2020    GFRESTBLACK >90 01/03/2020    FATMATA 8.9 01/03/2020      A1C RESULTS:  Lab Results   Component Value Date    A1C 6.7 (H) 06/12/2017     INR RESULTS:  Lab Results   Component Value Date    INR 1.19 (H) 10/24/2019    INR 1.15 (H) 06/04/2019           This note was completed in part using Dragon voice recognition software. Although reviewed after completion, some word and grammatical errors may occur.      Thank you for allowing me to participate in the care of your patient.    Sincerely,     Castillo Mcgee NP      [facility]

## 2020-12-21 NOTE — PROGRESS NOTES
"  Cardiology Clinic Progress Note    Service Date: December 21, 2020    Primary Cardiologist: Dr. Phillips; Sujey Elliott PA-C      Reason for Visit: 6 month follow up    HPI:   I had the pleasure of meeting Mr. Bryant Spicer \"Marshall\" Jonah in the clinic today. 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    He presented in spring 2019 with shortness of breath and was found to have a severe cardiomyopathy with an LVEF around 30%.  He had a markedly abnormal CT calcium score and went on to have coronary angiography.  This showed a 10% proximal LAD stenosis.  The mid to distal circumflex was 100% stenosed.  He had a proximal to mid RCA lesion which was 50% stenosed.  Aggressive medical management was recommended with possible consideration for intervention of the  of the left circumflex.  Cardiac MRI in July 2019 showed a severely dilated left ventricle with an LVEF of 21%.  There was mild to moderate RV dysfunction.  Late gadolinium enhancement was consistent with a prior small infarction in the circumflex distribution.  Given the amount of enhancement in the global nature of his LV dysfunction, the cardiomyopathy was felt to likely be mixed ischemic and nonischemic.    He ultimately underwent successful intervention to the circumflex  on 10/24/2019 with Dr. Rivera with placement of a single drug-eluting stent.  He has been maintained on aspirin 81 mg daily and Brilinta 90 mg twice daily. He has primarily followed with my colleagues Dr. Phillips and Sujey Elliott PA-C for his cardiology care.  He has been maintained on Lasix 20 mg daily and Entresto 24-26 mg twice daily.    Today, Marshall tells me that he has been feeling great since he last checked in with Dr." Alan in June of this past summer.  Before things were close down with the pandemic, he had been going to a health club around 5 times a day for some light exercise.  He tells me that the health collection reduce opened back up in this past week so he has gone a couple of times recently.  He has been tolerating his usual activity well without concerns for symptoms of chest pain, shortness of breath, dyspnea on exertion, or other exertional symptoms.  He has not had palpitations, dizziness, lightheadedness, orthopnea, PND, or lower extremity edema. His weight has been quite stable and is actually trended down slightly, dropping around 5 pounds since last summer.  His weight on his home scale today was 228 pounds. He says with a smile that this is down to around what he weighed in college and the lowest he has been in quite some time.    A repeat echocardiogram was completed last week on 12/17/2020. Unfortunately, this was a suboptimal study with poor image quality. Left ventricular systolic function was noted to be mildly reduced. A peak AoV pressure gradient of 18.8 mmHg, mean AoV pressure gradient of 6.1 mmHg, and a calculated aortic valve area of 2.9 cm  were noted. However, his aortic valve was not well visualized and it was noted that the pressure gradient could be underestimated due to the image quality.    ASSESSMENT AND PLAN:  1. Mixed ischemic and nonischemic cardiomyopathy  - EF chronically around the 40 to 45% range. NYHA class II symptoms.  - He appears euvolemic on exam without symptoms concerning for acute CHF at this time.  - We will continue with his current dose of Lasix to 20 mg once daily, carvedilol 9.375 mg twice daily, and Entresto at 24-26 mg twice daily. We reviewed to continue to monitor his weights daily and call the clinic if he notices a weight gain of over 3 pounds in 1 day or over 5 pounds in 1 week or if he notices worsening shortness of breath or lower extremity edema.    2. Coronary  artery disease  - History of a  of the circumflex artery status post PCI and placement of a single LEVI in 10/2019. He has been on DAPT with aspirin 81 mg daily and Brilinta 90 mg twice daily since his PCI. We reviewed that since he is now over 1 year removed following his intervention that he can stop Brilinta once he runs out of his current supply.  - He seems to be stable without anginal symptoms at this time and exercises regularly, tolerating this well without exertional symptoms.    3. Mild to moderate aortic stenosis  - Recent echocardiogram last week noting a peak AoV pressure gradient of 18.8 mmHg, mean AoV pressure gradient of 6.1 mmHg, and a calculated aortic valve area of 2.9 cm .  However, his aortic valve was not well visualized and it was noted that the pressure gradient could be underestimated due to the image quality.  - He is asymptomatic, so we will plan for repeat echocardiogram in about 1 year.    4. Type 2 diabetes mellitus    5.  Obstructive sleep apnea    Thank you for the opportunity to participate in this delightful patient's care. We will plan to have him see Sujey Elliott PA-C or THERON in follow up in about 6 months for a routine check in with a BMP beforehand. I will otherwise plan to have him see Dr. Phillips for annual follow up visit around this time next year with a repeat echocardiogram beforehand. We would be happy to see him sooner if needed for any concerns in the meantime.    JENNIFER Li, CNP   Nurse Practitioner  [facility]  Pager: [phone]  Text Page  (8am - 5pm, M-F)    Orders this Visit:  No orders of the defined types were placed in this encounter.    Orders Placed This Encounter   Medications     empagliflozin (JARDIANCE) 25 MG TABS tablet     Sig: Take 25 mg by mouth daily     There are no discontinued medications.  Encounter Diagnosis   Name Primary?     Ischemic cardiomyopathy      CURRENT MEDICATIONS:  Current Outpatient Medications   Medication  Sig Dispense Refill     ASPIRIN 81 PO Take by mouth daily        atorvastatin (LIPITOR) 40 MG tablet Take 1 tablet (40 mg) by mouth daily 90 tablet 3     carvedilol (COREG) 3.125 MG tablet Take 3 tablets (9.375 mg) by mouth 2 times daily (with meals) 540 tablet 3     empagliflozin (JARDIANCE) 25 MG TABS tablet Take 25 mg by mouth daily       furosemide (LASIX) 20 MG tablet Take 1 tablet (20 mg) by mouth daily       insulin glargine (LANTUS) 100 UNIT/ML injection Inject 16 Units Subcutaneous At Bedtime 6/3 Took Lantus 8 Units at bedtime 15 mL 3     insulin lispro (HUMALOG KWIKPEN) 100 UNIT/ML injection 11 units with breakfast,15 units at noon, and 14 units before evening meal 36 mL 3     metFORMIN (GLUCOPHAGE) 500 MG tablet TAKE 2 TABLETS (1,000 MG) BY MOUTH TWICE A DAY WITH MEALS 360 tablet 0     Multiple Vitamins-Minerals (MULTIVITAL PO) Take 1 tablet by mouth daily       sacubitril-valsartan (ENTRESTO) 24-26 MG per tablet Take 1 tablet by mouth 2 times daily 30 tablet 3     tamsulosin (FLOMAX) 0.4 MG capsule Take 0.4 mg by mouth daily       ticagrelor (BRILINTA) 90 MG tablet Take 1 tablet (90 mg) by mouth 2 times daily Start this evening. 180 tablet 3     Vitamin D, Cholecalciferol, 25 MCG (1000 UT) CAPS Take 4,000 Units by mouth daily       ALLERGIES  Allergies   Allergen Reactions     No Known Allergies      PAST MEDICAL, SURGICAL, FAMILY HISTORY:  History was reviewed and updated as needed, see medical record.    SOCIAL HISTORY:  Social History     Socioeconomic History     Marital status:      Spouse name: Not on file     Number of children: Not on file     Years of education: Not on file     Highest education level: Not on file   Occupational History     Not on file   Social Needs     Financial resource strain: Not on file     Food insecurity     Worry: Not on file     Inability: Not on file     Transportation needs     Medical: Not on file     Non-medical: Not on file   Tobacco Use     Smoking status:  Never Smoker     Smokeless tobacco: Never Used   Substance and Sexual Activity     Alcohol use: Not Currently     Alcohol/week: 0.0 standard drinks     Drug use: No     Sexual activity: Never   Lifestyle     Physical activity     Days per week: Not on file     Minutes per session: Not on file     Stress: Not on file   Relationships     Social connections     Talks on phone: Not on file     Gets together: Not on file     Attends Sabianism service: Not on file     Active member of club or organization: Not on file     Attends meetings of clubs or organizations: Not on file     Relationship status: Not on file     Intimate partner violence     Fear of current or ex partner: Not on file     Emotionally abused: Not on file     Physically abused: Not on file     Forced sexual activity: Not on file   Other Topics Concern     Parent/sibling w/ CABG, MI or angioplasty before 65F 55M? 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; walks dog for exercise      Review of Systems:  Skin:  Negative     Eyes:  Positive for glasses  ENT:  Negative    Respiratory:  Negative    Cardiovascular:  Negative    Gastroenterology: Negative    Genitourinary:  Negative    Musculoskeletal:  Negative    Neurologic:  Negative    Psychiatric:  Negative    Heme/Lymph/Imm:  Negative    Endocrine:  Positive for diabetes     Physical Exam:  Vitals: BP 99/60   Pulse 72   Ht 1.829 m (6')   Wt 103.6 kg (228 lb 4.8 oz)   BMI 30.96 kg/m     Wt Readings from Last 4 Encounters:   12/21/20 103.6 kg (228 lb 4.8 oz)   06/23/20 105.8 kg (233 lb 3.2 oz)   01/03/20 112.5 kg (248 lb)   01/02/20 111.1 kg (245 lb)     CONSTITUTIONAL: Appears his stated age, well nourished, and in no acute distress.  HEENT: Pupils equal, round. Sclerae nonicteric.    NECK: Supple, no masses or JVD appreciated.   C/V:  Regular rate and rhythm, normal S1 and S2, no S3 or S4, grade 2 out of 6 systolic ejection murmur noted. No rub or gallop.  RESP:  Respirations are unlabored. Lungs are clear to auscultation bilaterally without wheezing, rales, or rhonchi.  EXTREM: No clubbing, cyanosis, or lower extremity edema bilaterally.   NEURO: Alert and oriented, cooperative. Gait steady. No gross focal deficits.   PSYCH: Affect appropriate, bright. Mentation normal. Responds to questions appropriately.  SKIN: Warm and dry. No apparent rashes or bruising.    Recent Lab Results:  LIPID RESULTS:  Lab Results   Component Value Date    CHOL 123 12/05/2016    HDL 50 12/05/2016    LDL 51 12/05/2016    TRIG 108 12/05/2016     LIVER ENZYME RESULTS:  No results found for: AST, ALT  CBC RESULTS:  Lab Results   Component Value Date    WBC 9.3 01/03/2020    RBC 4.69 01/03/2020    HGB 13.4 01/03/2020    HCT 43.4 01/03/2020    MCV 93 01/03/2020    MCH 28.6 01/03/2020    MCHC 30.9 (L) 01/03/2020    RDW 13.7 01/03/2020     01/03/2020     BMP RESULTS:  Lab Results   Component Value Date     01/03/2020    POTASSIUM 4.1 01/03/2020    CHLORIDE 105 01/03/2020    CO2 27 01/03/2020    ANIONGAP 7 01/03/2020     (H) 01/03/2020    BUN 14 01/03/2020    CR 0.93 01/03/2020    GFRESTIMATED 78 01/03/2020    GFRESTBLACK >90 01/03/2020    FATMATA 8.9 01/03/2020      A1C RESULTS:  Lab Results   Component Value Date    A1C 6.7 (H) 06/12/2017     INR RESULTS:  Lab Results   Component Value Date    INR 1.19 (H) 10/24/2019    INR 1.15 (H) 06/04/2019       CC  Mejia Phillips MD  [id number]  [place] [id number]    This note was completed in part using Dragon voice recognition software. Although reviewed after completion, some word and grammatical errors may occur.

## 2021-01-01 ENCOUNTER — OFFICE VISIT (OUTPATIENT)
Dept: SURGERY | Facility: CLINIC | Age: 81
End: 2021-01-01
Payer: COMMERCIAL

## 2021-01-01 ENCOUNTER — HOSPITAL ENCOUNTER (OUTPATIENT)
Dept: CARDIOLOGY | Facility: CLINIC | Age: 81
Discharge: HOME | End: 2021-12-15
Attending: NURSE PRACTITIONER | Admitting: NURSE PRACTITIONER
Payer: COMMERCIAL

## 2021-01-01 ENCOUNTER — TELEPHONE (OUTPATIENT)
Dept: SURGERY | Facility: CLINIC | Age: 81
End: 2021-01-01
Payer: COMMERCIAL

## 2021-01-01 ENCOUNTER — OFFICE VISIT (OUTPATIENT)
Dept: CARDIOLOGY | Facility: CLINIC | Age: 81
End: 2021-01-01
Attending: NURSE PRACTITIONER
Payer: COMMERCIAL

## 2021-01-01 VITALS
HEART RATE: 60 BPM | BODY MASS INDEX: 30.71 KG/M2 | DIASTOLIC BLOOD PRESSURE: 62 MMHG | SYSTOLIC BLOOD PRESSURE: 108 MMHG | HEIGHT: 72 IN | WEIGHT: 226.7 LBS

## 2021-01-01 VITALS
SYSTOLIC BLOOD PRESSURE: 112 MMHG | HEIGHT: 72 IN | DIASTOLIC BLOOD PRESSURE: 72 MMHG | BODY MASS INDEX: 30.61 KG/M2 | WEIGHT: 226 LBS

## 2021-01-01 DIAGNOSIS — I25.5 ISCHEMIC CARDIOMYOPATHY: ICD-10-CM

## 2021-01-01 DIAGNOSIS — K42.9 UMBILICAL HERNIA WITHOUT OBSTRUCTION AND WITHOUT GANGRENE: ICD-10-CM

## 2021-01-01 DIAGNOSIS — I42.8 NONISCHEMIC CARDIOMYOPATHY (H): ICD-10-CM

## 2021-01-01 DIAGNOSIS — K40.90 RIGHT INGUINAL HERNIA: ICD-10-CM

## 2021-01-01 LAB — LVEF ECHO: NORMAL

## 2021-01-01 PROCEDURE — 255N000002 HC RX 255 OP 636: Performed by: NURSE PRACTITIONER

## 2021-01-01 PROCEDURE — 93306 TTE W/DOPPLER COMPLETE: CPT | Mod: 26 | Performed by: INTERNAL MEDICINE

## 2021-01-01 PROCEDURE — 999N000208 ECHOCARDIOGRAM COMPLETE

## 2021-01-01 PROCEDURE — 99214 OFFICE O/P EST MOD 30 MIN: CPT | Performed by: INTERNAL MEDICINE

## 2021-01-01 PROCEDURE — 99203 OFFICE O/P NEW LOW 30 MIN: CPT | Performed by: SURGERY

## 2021-01-01 RX ORDER — CARVEDILOL 3.12 MG/1
9.38 TABLET ORAL 2 TIMES DAILY WITH MEALS
Qty: 540 TABLET | Refills: 1 | Status: SHIPPED | OUTPATIENT
Start: 2021-01-01 | End: 2022-01-01

## 2021-01-01 RX ADMIN — HUMAN ALBUMIN MICROSPHERES AND PERFLUTREN 3 ML: 10; .22 INJECTION, SOLUTION INTRAVENOUS at 10:18

## 2021-01-01 ASSESSMENT — MIFFLIN-ST. JEOR
SCORE: 1778.13
SCORE: 1781.3

## 2021-01-08 DIAGNOSIS — I25.5 ISCHEMIC CARDIOMYOPATHY: ICD-10-CM

## 2021-01-08 RX ORDER — CARVEDILOL 3.12 MG/1
9.38 TABLET ORAL 2 TIMES DAILY WITH MEALS
Qty: 540 TABLET | Refills: 3 | Status: SHIPPED | OUTPATIENT
Start: 2021-01-08 | End: 2021-01-01

## 2021-01-30 ENCOUNTER — IMMUNIZATION (OUTPATIENT)
Dept: NURSING | Facility: CLINIC | Age: 81
End: 2021-01-30
Payer: COMMERCIAL

## 2021-01-30 PROCEDURE — 0001A PR COVID VAC PFIZER DIL RECON 30 MCG/0.3 ML IM: CPT

## 2021-01-30 PROCEDURE — 91300 PR COVID VAC PFIZER DIL RECON 30 MCG/0.3 ML IM: CPT

## 2021-02-20 ENCOUNTER — IMMUNIZATION (OUTPATIENT)
Dept: NURSING | Facility: CLINIC | Age: 81
End: 2021-02-20
Payer: COMMERCIAL

## 2021-02-20 PROCEDURE — 91300 PR COVID VAC PFIZER DIL RECON 30 MCG/0.3 ML IM: CPT

## 2021-02-20 PROCEDURE — 0002A PR COVID VAC PFIZER DIL RECON 30 MCG/0.3 ML IM: CPT

## 2021-04-04 ENCOUNTER — HEALTH MAINTENANCE LETTER (OUTPATIENT)
Age: 81
End: 2021-04-04

## 2021-05-17 ENCOUNTER — TRANSFERRED RECORDS (OUTPATIENT)
Dept: HEALTH INFORMATION MANAGEMENT | Facility: CLINIC | Age: 81
End: 2021-05-17

## 2021-05-29 ENCOUNTER — HEALTH MAINTENANCE LETTER (OUTPATIENT)
Age: 81
End: 2021-05-29

## 2021-06-04 DIAGNOSIS — I25.5 ISCHEMIC CARDIOMYOPATHY: ICD-10-CM

## 2021-06-04 DIAGNOSIS — I42.8 NONISCHEMIC CARDIOMYOPATHY (H): ICD-10-CM

## 2021-06-04 LAB
ANION GAP SERPL CALCULATED.3IONS-SCNC: 6 MMOL/L (ref 3–14)
BUN SERPL-MCNC: 16 MG/DL (ref 7–30)
CALCIUM SERPL-MCNC: 9.3 MG/DL (ref 8.5–10.1)
CHLORIDE SERPL-SCNC: 108 MMOL/L (ref 94–109)
CO2 SERPL-SCNC: 25 MMOL/L (ref 20–32)
CREAT SERPL-MCNC: 0.97 MG/DL (ref 0.66–1.25)
GFR SERPL CREATININE-BSD FRML MDRD: 74 ML/MIN/{1.73_M2}
GLUCOSE SERPL-MCNC: 118 MG/DL (ref 70–99)
POTASSIUM SERPL-SCNC: 4.2 MMOL/L (ref 3.4–5.3)
SODIUM SERPL-SCNC: 139 MMOL/L (ref 133–144)

## 2021-06-04 PROCEDURE — 36415 COLL VENOUS BLD VENIPUNCTURE: CPT | Performed by: NURSE PRACTITIONER

## 2021-06-04 PROCEDURE — 80048 BASIC METABOLIC PNL TOTAL CA: CPT | Performed by: NURSE PRACTITIONER

## 2021-06-07 ENCOUNTER — OFFICE VISIT (OUTPATIENT)
Dept: CARDIOLOGY | Facility: CLINIC | Age: 81
End: 2021-06-07
Attending: NURSE PRACTITIONER
Payer: COMMERCIAL

## 2021-06-07 VITALS
SYSTOLIC BLOOD PRESSURE: 98 MMHG | HEART RATE: 74 BPM | BODY MASS INDEX: 28.21 KG/M2 | WEIGHT: 208 LBS | DIASTOLIC BLOOD PRESSURE: 62 MMHG

## 2021-06-07 DIAGNOSIS — I25.5 ISCHEMIC CARDIOMYOPATHY: ICD-10-CM

## 2021-06-07 DIAGNOSIS — I42.8 NONISCHEMIC CARDIOMYOPATHY (H): ICD-10-CM

## 2021-06-07 PROCEDURE — 99213 OFFICE O/P EST LOW 20 MIN: CPT | Performed by: PHYSICIAN ASSISTANT

## 2021-06-07 NOTE — PATIENT INSTRUCTIONS
Thank you for your [facility] visit today. Your provider has recommended the following:    Medication Changes:  No medication changes today.  Recommendations:  Call us with any concerns.   Follow-up:  See Dr. Phillips for cardiology follow up in December with an echocardiogram.  Reminder:  Please bring in all current medications, over the counter supplements and vitamin bottles to your next appointment.            [facility]

## 2021-06-07 NOTE — PROGRESS NOTES
Cardiology Progress Note    Patient seen today in follow up of: cardiomyopathy  Primary cardiologist: Dr. 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     In the spring of 2019 he presented with shortness of breath symptoms and was found to have a severe cardiomyopathy with a LVEF of around 30%. His CT calcium score was abnormal and he went on to have coronary angiography. There was a mid to distal LCx , a proximal to mid 50% RCA stenosis, and a proximal LAD stenosis of 10%. No intervention was done at that time. He had a follow up cardiac MRI in July of 2019. His LV remained severely depressed at 21% with mild to moderate RV dysfunction. There were findings consistent with a small prior infarct in the circumflex distribution. Ultimately, given the global nature of his LV dysfunction he was felt to have a mixed nonischemic and ischemia cardiomyopathy. He eventually underwent intervention to the circumflex  on 10/24/19 with Dr. Rivera with a single LEVI and completed a year of DAPT.    I have followed Marshall regularly since his diagnosis and we have worked to optimize his medical regimen for his cardiomyopathy. We have slowly been able to titrate his medications and he has been maintained on carvedilol and low dose Entresto. He has had minimal diuretic requirements.      He had a follow up echocardiogram in June of 2020 which showed improvement in his LVEF with an EF of 40 - 45%. He had moderate aortic stenosis. He saw Dr. Phillips in follow up around that time and was doing well at that time. He had a follow up echocardiogram in December of 2020 which unfortunately was a very poor study. No EF was noted but his LV function overall appeared mildly reduced. The peak AoV pressure gradient is  18.8 mmHg but  notably the doppler envelopes were poor thus the gradients may be underestimated. A follow up echo in one year was recommended.    He is back today for follow up. Fortunately, he continues to very well from a cardiac perspective. He has no symptoms today and specifically denies chest pain, shortness of breath, orthopnea, PND, peripheral edema, or lightheadedness. He continues to go to [employer] 5 days a week and does a light work out.  His weight has been trending down. He is down 20 lbs from 6 months ago. He is on a new diabetic medication. He does feel he is eating somewhat less.     ASSESSMENT/PLAN:  Bryant Mckenzie is a very pleasant 81 year old male with a history of coronary artery disease with a  of the LCx s/p intervention in October of 2019 and moderate RCA disease, aortic stenosis, a mixed cardiomyopathy with a LVEF of around 40 - 45%, DM, and hyperlipidemia.     At this point, Marshall seems to be doing quite well from a cardiovascular perspective. He has no anginal or congestive heart failure symptoms to report today. His last echocardiogram was a poor study but overall his LV function has improved since his cardiomyopathy was initially diagnosed in 2019. He has been maintained on low dose Entresto and carvedilol 9.375 mg twice daily. He is also on jardiance. He takes lasix 20 mg daily and appears euvolemic on exam today. He had labs on 6/4 in anticipation of this appointment which we reviewed. His renal function and electrolytes are normal.     He remains on aspirin and high intensity statin for his coronary artery disease.    He does have moderate aortic stenosis which we will follow up with another echocardiogram this winter. As noted above, his last echocardiogram was a poor study.     I am pleased Marshall is doing so well. He will see Dr. Phillips in about six months with an echocardiogram prior. In the meantime, I asked him to contact us with questions or concerns.    Orders this Visit:  No orders of the  defined types were placed in this encounter.    Orders Placed This Encounter   Medications     Semaglutide (OZEMPIC, 0.25 OR 0.5 MG/DOSE, SC)     Sig: Inject 0.5 mg Subcutaneous once a week     Medications Discontinued During This Encounter   Medication Reason     ticagrelor (BRILINTA) 90 MG tablet        CURRENT MEDICATIONS:  Current Outpatient Medications   Medication Sig Dispense Refill     ASPIRIN 81 PO Take by mouth daily        atorvastatin (LIPITOR) 40 MG tablet Take 1 tablet (40 mg) by mouth daily 90 tablet 3     carvedilol (COREG) 3.125 MG tablet Take 3 tablets (9.375 mg) by mouth 2 times daily (with meals) 540 tablet 3     empagliflozin (JARDIANCE) 25 MG TABS tablet Take 25 mg by mouth daily       furosemide (LASIX) 20 MG tablet Take 1 tablet (20 mg) by mouth daily       insulin glargine (LANTUS) 100 UNIT/ML injection Inject 13 Units Subcutaneous At Bedtime  15 mL 3     insulin lispro (HUMALOG KWIKPEN) 100 UNIT/ML injection 11 units with breakfast,15 units at noon, and 14 units before evening meal (Patient taking differently: 6 units with breakfast,6 units at noon, and 0 units before evening meal) 36 mL 3     metFORMIN (GLUCOPHAGE) 500 MG tablet TAKE 2 TABLETS (1,000 MG) BY MOUTH TWICE A DAY WITH MEALS 360 tablet 0     Multiple Vitamins-Minerals (MULTIVITAL PO) Take 1 tablet by mouth daily       sacubitril-valsartan (ENTRESTO) 24-26 MG per tablet Take 1 tablet by mouth 2 times daily 30 tablet 3     Semaglutide (OZEMPIC, 0.25 OR 0.5 MG/DOSE, SC) Inject 0.5 mg Subcutaneous once a week       tamsulosin (FLOMAX) 0.4 MG capsule Take 0.4 mg by mouth daily       Vitamin D, Cholecalciferol, 25 MCG (1000 UT) CAPS Take 4,000 Units by mouth daily       ALLERGIES  Allergies   Allergen Reactions     No Known Allergies      PAST MEDICAL HISTORY:  Past Medical History:   Diagnosis Date     Aortic stenosis 9/23/2016    Echo 9/2016 Mild       Atrophic gastritis 9/8/2016     Benign non-nodular prostatic hyperplasia  09/08/2016     CAD (coronary artery disease)      Hyperlipidemia 09/08/2016     Ischemic cardiomyopathy 6/12/2019     Nonischemic cardiomyopathy (H) 10/30/2019     Obstructive sleep apnea syndrome 9/8/2016     Type 2 diabetes mellitus 09/06/2016     Vitamin B12 deficiency (non anemic) 9/8/2016     PAST SURGICAL HISTORY:  Past Surgical History:   Procedure Laterality Date     CV CHRONIC TOTAL OCCLUSION N/A 10/24/2019    Procedure: Coronary Total Occlusion;  Surgeon: Lupe Posada MD;  Location:  HEART CARDIAC CATH LAB     CV CORONARY ANGIOGRAM N/A 10/24/2019    Procedure: Coronary Angiogram;  Surgeon: Lupe Posada MD;  Location:  HEART CARDIAC CATH LAB     CV HEART CATHETERIZATION WITH POSSIBLE INTERVENTION N/A 6/4/2019    Procedure: Heart Catheterization with Possible Intervention;  Surgeon: Alex Bazan MD;  Location:  HEART CARDIAC CATH LAB     CV PCI STENT DRUG ELUTING N/A 10/24/2019    Procedure: PCI Stent Drug Eluting;  Surgeon: Lupe Posada MD;  Location:  HEART CARDIAC CATH LAB     ESOPHAGOSCOPY, GASTROSCOPY, DUODENOSCOPY (EGD), COMBINED N/A 1/20/2015    Procedure: COMBINED ESOPHAGOSCOPY, GASTROSCOPY, DUODENOSCOPY (EGD), BIOPSY SINGLE OR MULTIPLE;  Surgeon: Allan Marroquin MD;  Location:  GI     EXCISE LESION TRUNK N/A 4/17/2019    Procedure: EXCISE CYST UPPER BACK;  Surgeon: Rene Murray MD;  Location:  OR     HERNIA REPAIR       HYDROCELECTOMY SCROTAL       JOINT REPLACEMENT (aka KNEE) NOS Bilateral      FAMILY HISTORY:  Family History   Problem Relation Age of Onset     Coronary Artery Disease Father 53     Cerebrovascular Disease Father      Diabetes Mother      Diabetes Brother      SOCIAL HISTORY:  Social History     Socioeconomic History     Marital status:      Spouse name: None     Number of children: None     Years of education: None     Highest education level: None   Occupational History     None   Social Needs     Financial  resource strain: None     Food insecurity     Worry: None     Inability: None     Transportation needs     Medical: None     Non-medical: None   Tobacco Use     Smoking status: Never Smoker     Smokeless tobacco: Never Used   Substance and Sexual Activity     Alcohol use: Not Currently     Alcohol/week: 0.0 standard drinks     Drug use: No     Sexual activity: Never   Lifestyle     Physical activity     Days per week: None     Minutes per session: None     Stress: None   Relationships     Social connections     Talks on phone: None     Gets together: None     Attends Mandaen service: None     Active member of club or organization: None     Attends meetings of clubs or organizations: None     Relationship status: None     Intimate partner violence     Fear of current or ex partner: None     Emotionally abused: None     Physically abused: None     Forced sexual activity: None   Other Topics Concern     Parent/sibling w/ CABG, MI or angioplasty before 65F 55M? 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; walks dog for exercise      Review of Systems:  Skin:  Negative     Eyes:  Positive for glasses  ENT:  Negative    Respiratory:  Negative    Cardiovascular:    Positive for;lightheadedness  Gastroenterology: Negative    Genitourinary:  not assessed    Musculoskeletal:  Negative    Neurologic:  Negative    Psychiatric:  Negative    Heme/Lymph/Imm:  Negative    Endocrine:  Positive for diabetes     Physical Exam:  Vitals: BP 98/62   Pulse 74   Wt 94.3 kg (208 lb)   BMI 28.21 kg/m     Wt Readings from Last 4 Encounters:   06/07/21 94.3 kg (208 lb)   12/21/20 103.6 kg (228 lb 4.8 oz)   06/23/20 105.8 kg (233 lb 3.2 oz)   01/03/20 112.5 kg (248 lb)     GEN: well nourished, in no acute distress.  HEENT:  Pupils equal, round. Sclerae nonicteric.   C/V:  Regular rate and rhythm, no murmur, rub or gallop.    RESP: Respirations are unlabored. Clear to auscultation bilaterally without wheezing,  rales, or rhonchi.  GI: Abdomen soft, nontender.  EXTREM: no LE edema.  NEURO: Alert and oriented, cooperative.  SKIN: Warm and dry.     Recent Lab Results:  LIPID RESULTS:  Lab Results   Component Value Date    CHOL 123 12/05/2016    HDL 50 12/05/2016    LDL 51 12/05/2016    TRIG 108 12/05/2016     LIVER ENZYME RESULTS:  No results found for: AST, ALT  CBC RESULTS:  Lab Results   Component Value Date    WBC 9.3 01/03/2020    RBC 4.69 01/03/2020    HGB 13.4 01/03/2020    HCT 43.4 01/03/2020    MCV 93 01/03/2020    MCH 28.6 01/03/2020    MCHC 30.9 (L) 01/03/2020    RDW 13.7 01/03/2020     01/03/2020     BMP RESULTS:  Lab Results   Component Value Date     06/04/2021    POTASSIUM 4.2 06/04/2021    CHLORIDE 108 06/04/2021    CO2 25 06/04/2021    ANIONGAP 6 06/04/2021     (H) 06/04/2021    BUN 16 06/04/2021    CR 0.97 06/04/2021    GFRESTIMATED 74 06/04/2021    GFRESTBLACK 85 06/04/2021    FATMATA 9.3 06/04/2021      A1C RESULTS:  Lab Results   Component Value Date    A1C 6.7 (H) 06/12/2017     INR RESULTS:  Lab Results   Component Value Date    INR 1.19 (H) 10/24/2019    INR 1.15 (H) 06/04/2019       Stella Elliott PA-C  [facility]

## 2021-06-07 NOTE — LETTER
6/7/2021    Merlin Emery Brown, MD  [facility] [address]  [facility] [address]    RE: Bryant Mckenzie       Dear Colleague,    I had the pleasure of seeing Bryant Mckenzie in the [facility].      Cardiology Progress Note    Patient seen today in follow up of: cardiomyopathy  Primary cardiologist: Dr. 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     In the spring of 2019 he presented with shortness of breath symptoms and was found to have a severe cardiomyopathy with a LVEF of around 30%. His CT calcium score was abnormal and he went on to have coronary angiography. There was a mid to distal LCx , a proximal to mid 50% RCA stenosis, and a proximal LAD stenosis of 10%. No intervention was done at that time. He had a follow up cardiac MRI in July of 2019. His LV remained severely depressed at 21% with mild to moderate RV dysfunction. There were findings consistent with a small prior infarct in the circumflex distribution. Ultimately, given the global nature of his LV dysfunction he was felt to have a mixed nonischemic and ischemia cardiomyopathy. He eventually underwent intervention to the circumflex  on 10/24/19 with Dr. Rivera with a single LEVI and completed a year of DAPT.    I have followed Marshall regularly since his diagnosis and we have worked to optimize his medical regimen for his cardiomyopathy. We have slowly been able to titrate his medications and he has been maintained on carvedilol and low dose Entresto. He has had minimal diuretic requirements.      He had a follow up echocardiogram in June of 2020 which showed improvement in his LVEF with an EF of 40 - 45%. He had moderate aortic stenosis. He saw Dr. Phillips in  follow up around that time and was doing well at that time. He had a follow up echocardiogram in December of 2020 which unfortunately was a very poor study. No EF was noted but his LV function overall appeared mildly reduced. The peak AoV pressure gradient is  18.8 mmHg but notably the doppler envelopes were poor thus the gradients may be underestimated. A follow up echo in one year was recommended.    He is back today for follow up. Fortunately, he continues to very well from a cardiac perspective. He has no symptoms today and specifically denies chest pain, shortness of breath, orthopnea, PND, peripheral edema, or lightheadedness. He continues to go to [employer] 5 days a week and does a light work out.  His weight has been trending down. He is down 20 lbs from 6 months ago. He is on a new diabetic medication. He does feel he is eating somewhat less.     ASSESSMENT/PLAN:  Bryant Mckenzie is a very pleasant 81 year old male with a history of coronary artery disease with a  of the LCx s/p intervention in October of 2019 and moderate RCA disease, aortic stenosis, a mixed cardiomyopathy with a LVEF of around 40 - 45%, DM, and hyperlipidemia.     At this point, Marshall seems to be doing quite well from a cardiovascular perspective. He has no anginal or congestive heart failure symptoms to report today. His last echocardiogram was a poor study but overall his LV function has improved since his cardiomyopathy was initially diagnosed in 2019. He has been maintained on low dose Entresto and carvedilol 9.375 mg twice daily. He is also on jardiance. He takes lasix 20 mg daily and appears euvolemic on exam today. He had labs on 6/4 in anticipation of this appointment which we reviewed. His renal function and electrolytes are normal.     He remains on aspirin and high intensity statin for his coronary artery disease.    He does have moderate aortic stenosis which we will follow up with another echocardiogram this winter. As  noted above, his last echocardiogram was a poor study.     I am pleased Marshall is doing so well. He will see Dr. Phillips in about six months with an echocardiogram prior. In the meantime, I asked him to contact us with questions or concerns.    Orders this Visit:  No orders of the defined types were placed in this encounter.    Orders Placed This Encounter   Medications     Semaglutide (OZEMPIC, 0.25 OR 0.5 MG/DOSE, SC)     Sig: Inject 0.5 mg Subcutaneous once a week     Medications Discontinued During This Encounter   Medication Reason     ticagrelor (BRILINTA) 90 MG tablet        CURRENT MEDICATIONS:  Current Outpatient Medications   Medication Sig Dispense Refill     ASPIRIN 81 PO Take by mouth daily        atorvastatin (LIPITOR) 40 MG tablet Take 1 tablet (40 mg) by mouth daily 90 tablet 3     carvedilol (COREG) 3.125 MG tablet Take 3 tablets (9.375 mg) by mouth 2 times daily (with meals) 540 tablet 3     empagliflozin (JARDIANCE) 25 MG TABS tablet Take 25 mg by mouth daily       furosemide (LASIX) 20 MG tablet Take 1 tablet (20 mg) by mouth daily       insulin glargine (LANTUS) 100 UNIT/ML injection Inject 13 Units Subcutaneous At Bedtime  15 mL 3     insulin lispro (HUMALOG KWIKPEN) 100 UNIT/ML injection 11 units with breakfast,15 units at noon, and 14 units before evening meal (Patient taking differently: 6 units with breakfast,6 units at noon, and 0 units before evening meal) 36 mL 3     metFORMIN (GLUCOPHAGE) 500 MG tablet TAKE 2 TABLETS (1,000 MG) BY MOUTH TWICE A DAY WITH MEALS 360 tablet 0     Multiple Vitamins-Minerals (MULTIVITAL PO) Take 1 tablet by mouth daily       sacubitril-valsartan (ENTRESTO) 24-26 MG per tablet Take 1 tablet by mouth 2 times daily 30 tablet 3     Semaglutide (OZEMPIC, 0.25 OR 0.5 MG/DOSE, SC) Inject 0.5 mg Subcutaneous once a week       tamsulosin (FLOMAX) 0.4 MG capsule Take 0.4 mg by mouth daily       Vitamin D, Cholecalciferol, 25 MCG (1000 UT) CAPS Take 4,000 Units by mouth  daily       ALLERGIES  Allergies   Allergen Reactions     No Known Allergies      PAST MEDICAL HISTORY:  Past Medical History:   Diagnosis Date     Aortic stenosis 9/23/2016    Echo 9/2016 Mild       Atrophic gastritis 9/8/2016     Benign non-nodular prostatic hyperplasia 09/08/2016     CAD (coronary artery disease)      Hyperlipidemia 09/08/2016     Ischemic cardiomyopathy 6/12/2019     Nonischemic cardiomyopathy (H) 10/30/2019     Obstructive sleep apnea syndrome 9/8/2016     Type 2 diabetes mellitus 09/06/2016     Vitamin B12 deficiency (non anemic) 9/8/2016     PAST SURGICAL HISTORY:  Past Surgical History:   Procedure Laterality Date     CV CHRONIC TOTAL OCCLUSION N/A 10/24/2019    Procedure: Coronary Total Occlusion;  Surgeon: Lupe Posada MD;  Location:  HEART CARDIAC CATH LAB     CV CORONARY ANGIOGRAM N/A 10/24/2019    Procedure: Coronary Angiogram;  Surgeon: Lupe Posada MD;  Location:  HEART CARDIAC CATH LAB     CV HEART CATHETERIZATION WITH POSSIBLE INTERVENTION N/A 6/4/2019    Procedure: Heart Catheterization with Possible Intervention;  Surgeon: Alex Bazan MD;  Location:  HEART CARDIAC CATH LAB     CV PCI STENT DRUG ELUTING N/A 10/24/2019    Procedure: PCI Stent Drug Eluting;  Surgeon: Lupe Posada MD;  Location:  HEART CARDIAC CATH LAB     ESOPHAGOSCOPY, GASTROSCOPY, DUODENOSCOPY (EGD), COMBINED N/A 1/20/2015    Procedure: COMBINED ESOPHAGOSCOPY, GASTROSCOPY, DUODENOSCOPY (EGD), BIOPSY SINGLE OR MULTIPLE;  Surgeon: Allan Marroquin MD;  Location:  GI     EXCISE LESION TRUNK N/A 4/17/2019    Procedure: EXCISE CYST UPPER BACK;  Surgeon: Rene Murray MD;  Location:  OR     HERNIA REPAIR       HYDROCELECTOMY SCROTAL       JOINT REPLACEMENT (aka KNEE) NOS Bilateral      FAMILY HISTORY:  Family History   Problem Relation Age of Onset     Coronary Artery Disease Father 53     Cerebrovascular Disease Father      Diabetes Mother      Diabetes  Brother      SOCIAL HISTORY:  Social History     Socioeconomic History     Marital status:      Spouse name: None     Number of children: None     Years of education: None     Highest education level: None   Occupational History     None   Social Needs     Financial resource strain: None     Food insecurity     Worry: None     Inability: None     Transportation needs     Medical: None     Non-medical: None   Tobacco Use     Smoking status: Never Smoker     Smokeless tobacco: Never Used   Substance and Sexual Activity     Alcohol use: Not Currently     Alcohol/week: 0.0 standard drinks     Drug use: No     Sexual activity: Never   Lifestyle     Physical activity     Days per week: None     Minutes per session: None     Stress: None   Relationships     Social connections     Talks on phone: None     Gets together: None     Attends Alevism service: None     Active member of club or organization: None     Attends meetings of clubs or organizations: None     Relationship status: None     Intimate partner violence     Fear of current or ex partner: None     Emotionally abused: None     Physically abused: None     Forced sexual activity: None   Other Topics Concern     Parent/sibling w/ CABG, MI or angioplasty before 65F 55M? 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; walks dog for exercise      Review of Systems:  Skin:  Negative     Eyes:  Positive for glasses  ENT:  Negative    Respiratory:  Negative    Cardiovascular:    Positive for;lightheadedness  Gastroenterology: Negative    Genitourinary:  not assessed    Musculoskeletal:  Negative    Neurologic:  Negative    Psychiatric:  Negative    Heme/Lymph/Imm:  Negative    Endocrine:  Positive for diabetes     Physical Exam:  Vitals: BP 98/62   Pulse 74   Wt 94.3 kg (208 lb)   BMI 28.21 kg/m     Wt Readings from Last 4 Encounters:   06/07/21 94.3 kg (208 lb)   12/21/20 103.6 kg (228 lb 4.8 oz)   06/23/20 105.8 kg (233 lb 3.2 oz)    01/03/20 112.5 kg (248 lb)     GEN: well nourished, in no acute distress.  HEENT:  Pupils equal, round. Sclerae nonicteric.   C/V:  Regular rate and rhythm, no murmur, rub or gallop.    RESP: Respirations are unlabored. Clear to auscultation bilaterally without wheezing, rales, or rhonchi.  GI: Abdomen soft, nontender.  EXTREM: no LE edema.  NEURO: Alert and oriented, cooperative.  SKIN: Warm and dry.     Recent Lab Results:  LIPID RESULTS:  Lab Results   Component Value Date    CHOL 123 12/05/2016    HDL 50 12/05/2016    LDL 51 12/05/2016    TRIG 108 12/05/2016     LIVER ENZYME RESULTS:  No results found for: AST, ALT  CBC RESULTS:  Lab Results   Component Value Date    WBC 9.3 01/03/2020    RBC 4.69 01/03/2020    HGB 13.4 01/03/2020    HCT 43.4 01/03/2020    MCV 93 01/03/2020    MCH 28.6 01/03/2020    MCHC 30.9 (L) 01/03/2020    RDW 13.7 01/03/2020     01/03/2020     BMP RESULTS:  Lab Results   Component Value Date     06/04/2021    POTASSIUM 4.2 06/04/2021    CHLORIDE 108 06/04/2021    CO2 25 06/04/2021    ANIONGAP 6 06/04/2021     (H) 06/04/2021    BUN 16 06/04/2021    CR 0.97 06/04/2021    GFRESTIMATED 74 06/04/2021    GFRESTBLACK 85 06/04/2021    FATMATA 9.3 06/04/2021      A1C RESULTS:  Lab Results   Component Value Date    A1C 6.7 (H) 06/12/2017     INR RESULTS:  Lab Results   Component Value Date    INR 1.19 (H) 10/24/2019    INR 1.15 (H) 06/04/2019       Stella Elliott PA-C  [facility]    cc:   Castillo Mcgee, TULIO  [address] [id number]

## 2021-08-30 ENCOUNTER — HOSPITAL ENCOUNTER (OUTPATIENT)
Dept: PET IMAGING | Facility: CLINIC | Age: 81
Discharge: HOME | End: 2021-08-30
Attending: INTERNAL MEDICINE | Admitting: INTERNAL MEDICINE
Payer: COMMERCIAL

## 2021-08-30 DIAGNOSIS — R63.4 WEIGHT LOSS, UNINTENTIONAL: ICD-10-CM

## 2021-08-30 DIAGNOSIS — R91.1 PULMONARY NODULE: ICD-10-CM

## 2021-08-30 DIAGNOSIS — R53.83 FATIGUE: ICD-10-CM

## 2021-08-30 PROCEDURE — A9552 F18 FDG: HCPCS | Performed by: INTERNAL MEDICINE

## 2021-08-30 PROCEDURE — 78815 PET IMAGE W/CT SKULL-THIGH: CPT | Mod: 26 | Performed by: STUDENT IN AN ORGANIZED HEALTH CARE EDUCATION/TRAINING PROGRAM

## 2021-08-30 PROCEDURE — 78815 PET IMAGE W/CT SKULL-THIGH: CPT

## 2021-08-30 PROCEDURE — 343N000001 HC RX 343: Performed by: INTERNAL MEDICINE

## 2021-08-30 RX ADMIN — FLUDEOXYGLUCOSE F-18 12.64 MCI.: 500 INJECTION, SOLUTION INTRAVENOUS at 13:54

## 2021-09-18 ENCOUNTER — HEALTH MAINTENANCE LETTER (OUTPATIENT)
Age: 81
End: 2021-09-18

## 2021-12-17 NOTE — LETTER
12/17/2021    Merlin Emery Brown, MD  [facility] [address]  [facility] [address]    RE: Bryant Mckenzie       Dear Colleague,     I had the pleasure of seeing Bryant Mckenzie in the [facility].  HPI and Plan:   Today I had the pleasure of seeing Bryant Mckenzie at [facility]. He is a pleasant 81 year old patient who presents to the clinic for a follow-up visit.  We have been following the patient for cardiomyopathy, coronary disease and aortic stenosis.  For a detailed description please refer to my prior note from 6/23/2020.    In short we initially saw the patient for heart failure symptoms and he was found to have severely reduced ejection fraction.  Coronary angiogram showed   of mid circumflex and 50% disease in the RCA.  It was thought that his cardiomyopathy was mixed [both ischemic and nonischemic].  However, he underwent has intervention on the  due to presence of extensive viable myocardium.  Since then his ejection fraction has improved and is now at 40-45%.  Of course, he is also on guideline directed medical therapy.  In addition he was also noted to have aortic stenosis on echocardiogram and the study performed recently shows mean gradient of 37 mmHg and aortic valve area of 1.0 cm .  As far as symptoms are concerned the patient does not have any.  He denies lightheadedness, dizziness, chest pain, shortness of breath, decreased exercise tolerance or fatigue.  He is able to exercise on treadmill for over 30 minutes without any difficulty and has never had any problems.    Assessment plan:  1.  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  Coronary disease status post PCI of circumflex   3.  Moderate disease in RCA- medically managed  4.  Moderate-severe aortic stenosis with aortic valve area of 1.0 cm  and mean gradient of 37 mmHg  5.  Hyperlipidemia     Patient is overall doing well and does not have any complaints  today.  I reviewed the results of the recently performed echocardiogram and told him that we are getting close to situation of valve replacement.  I did give him a brochure for TAVR and went over the procedure briefly.  I told him that because he is asymptomatic and the valve area is 1.0 cm  I would like to repeat the echocardiogram in 6 months and see him in the clinic after that.  However, he should let us know if he starts to become symptomatic in which case we will expedite things.  I am going to continue all his medications unchanged today.      Thank you for allowing me to participate in the care of Bryant Mckenzie    This note was completed in part using Dragon voice recognition software. Although reviewed after completion, some word and grammatical errors may occur.    Mejia Phillips MD  Cardiology    Orders Placed This Encounter   Procedures     Echocardiogram Complete       No orders of the defined types were placed in this encounter.      Medications Discontinued During This Encounter   Medication Reason     furosemide (LASIX) 20 MG tablet Stopped by Patient       Encounter Diagnoses   Name Primary?     Ischemic cardiomyopathy      Nonischemic cardiomyopathy (H)        CURRENT MEDICATIONS:  Current Outpatient Medications   Medication Sig Dispense Refill     ASPIRIN 81 PO Take by mouth daily        atorvastatin (LIPITOR) 40 MG tablet Take 1 tablet (40 mg) by mouth daily 90 tablet 3     carvedilol (COREG) 3.125 MG tablet Take 3 tablets (9.375 mg) by mouth 2 times daily (with meals) 540 tablet 3     empagliflozin (JARDIANCE) 25 MG TABS tablet Take 25 mg by mouth daily       insulin glargine (LANTUS) 100 UNIT/ML injection Inject 13 Units Subcutaneous At Bedtime  15 mL 3     insulin lispro (HUMALOG KWIKPEN) 100 UNIT/ML injection 11 units with breakfast,15 units at noon, and 14 units before evening meal (Patient taking differently: 6 units with breakfast,6 units at noon, and 0 units before evening meal) 36 mL  3     metFORMIN (GLUCOPHAGE) 500 MG tablet TAKE 2 TABLETS (1,000 MG) BY MOUTH TWICE A DAY WITH MEALS 360 tablet 0     Multiple Vitamins-Minerals (MULTIVITAL PO) Take 1 tablet by mouth daily       sacubitril-valsartan (ENTRESTO) 24-26 MG per tablet Take 1 tablet by mouth 2 times daily 30 tablet 3     tamsulosin (FLOMAX) 0.4 MG capsule Take 0.4 mg by mouth daily       Vitamin D, Cholecalciferol, 25 MCG (1000 UT) CAPS Take 4,000 Units by mouth daily       Semaglutide (OZEMPIC, 0.25 OR 0.5 MG/DOSE, SC) Inject 0.5 mg Subcutaneous once a week         ALLERGIES     Allergies   Allergen Reactions     No Known Allergies        PAST MEDICAL HISTORY:  Past Medical History:   Diagnosis Date     Aortic stenosis 9/23/2016    Echo 9/2016 Mild       Atrophic gastritis 9/8/2016     Benign non-nodular prostatic hyperplasia 09/08/2016     CAD (coronary artery disease)      Hyperlipidemia 09/08/2016     Ischemic cardiomyopathy 6/12/2019     Nonischemic cardiomyopathy (H) 10/30/2019     Obstructive sleep apnea syndrome 9/8/2016     Type 2 diabetes mellitus 09/06/2016     Vitamin B12 deficiency (non anemic) 9/8/2016       PAST SURGICAL HISTORY:  Past Surgical History:   Procedure Laterality Date     CV CORONARY ANGIOGRAM N/A 10/24/2019    Procedure: Coronary Angiogram;  Surgeon: Lupe Posada MD;  Location: [facility] CARDIAC CATH LAB     CV HEART CATHETERIZATION WITH POSSIBLE INTERVENTION N/A 6/4/2019    Procedure: Heart Catheterization with Possible Intervention;  Surgeon: Alex Bazan MD;  Location: [facility] CARDIAC CATH LAB     CV PCI CHRONIC TOTAL OCCLUSION N/A 10/24/2019    Procedure: Coronary Total Occlusion;  Surgeon: Lupe Posada MD;  Location: [facility] CARDIAC CATH LAB     CV PCI STENT DRUG ELUTING N/A 10/24/2019    Procedure: PCI Stent Drug Eluting;  Surgeon: Lupe Posada MD;  Location: [facility] CARDIAC CATH LAB     ESOPHAGOSCOPY, GASTROSCOPY, DUODENOSCOPY (EGD), COMBINED N/A 1/20/2015     Procedure: COMBINED ESOPHAGOSCOPY, GASTROSCOPY, DUODENOSCOPY (EGD), BIOPSY SINGLE OR MULTIPLE;  Surgeon: Allan Marroquin MD;  Location:  GI     EXCISE LESION TRUNK N/A 4/17/2019    Procedure: EXCISE CYST UPPER BACK;  Surgeon: Rene Murray MD;  Location: [facility] OR     HERNIA REPAIR       HYDROCELECTOMY SCROTAL       JOINT REPLACEMENT (aka KNEE) NOS Bilateral        FAMILY HISTORY:  Family History   Problem Relation Age of Onset     Coronary Artery Disease Father 53     Cerebrovascular Disease Father      Diabetes Mother      Diabetes Brother        SOCIAL HISTORY:  Social History     Socioeconomic History     Marital status:      Spouse name: None     Number of children: None     Years of education: None     Highest education level: None   Occupational History     None   Tobacco Use     Smoking status: Never Smoker     Smokeless tobacco: Never Used   Substance and Sexual Activity     Alcohol use: Not Currently     Alcohol/week: 0.0 standard drinks     Drug use: No     Sexual activity: Never   Other Topics Concern     Parent/sibling w/ CABG, MI or angioplasty before 65F 55M? 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; walks dog for exercise      Social Determinants of Health     Financial Resource Strain: Not on file   Food Insecurity: Not on file   Transportation Needs: Not on file   Physical Activity: Not on file   Stress: Not on file   Social Connections: Not on file   Intimate Partner Violence: Not on file   Housing Stability: Not on file       Review of Systems:  Skin:  Negative       Eyes:  Positive for glasses    ENT:  Negative      Respiratory:  Negative       Cardiovascular:  Negative;palpitations;chest pain;lightheadedness;dizziness;syncope or near-syncope;cyanosis;fatigue;edema      Gastroenterology: Negative      Genitourinary:  Positive for urinary frequency;nocturia    Musculoskeletal:  Negative      Neurologic:  Negative      Psychiatric:  Negative       Heme/Lymph/Imm:  Negative      Endocrine:  Positive for diabetes      Physical Exam:  Vitals: /62 (BP Location: Right arm, Cuff Size: Adult Large)   Pulse 60   Ht 1.829 m (6')   Wt 102.8 kg (226 lb 11.2 oz)   BMI 30.75 kg/m    Eyes: No icterus.  Pulmonary: Chest symmetric, lungs clear bilaterally and no crackles, wheezes or rales.  Cardiovascular: RRR with normal S1 and S2, systolic murmur, JVP normal.  Musculoskeletal: Edema of the lower extremities: None.  Neurologic: Oriented and appropriate without obvious focal deficits.   Psychiatric: Normal affect.     Recent Lab Results:  LIPID RESULTS:  Lab Results   Component Value Date    CHOL 123 12/05/2016    HDL 50 12/05/2016    LDL 51 12/05/2016    TRIG 108 12/05/2016       LIVER ENZYME RESULTS:  No results found for: AST, ALT    CBC RESULTS:  Lab Results   Component Value Date    WBC 9.3 01/03/2020    RBC 4.69 01/03/2020    HGB 13.4 01/03/2020    HCT 43.4 01/03/2020    MCV 93 01/03/2020    MCH 28.6 01/03/2020    MCHC 30.9 (L) 01/03/2020    RDW 13.7 01/03/2020     01/03/2020       BMP RESULTS:  Lab Results   Component Value Date     06/04/2021    POTASSIUM 4.2 06/04/2021    CHLORIDE 108 06/04/2021    CO2 25 06/04/2021    ANIONGAP 6 06/04/2021     (H) 06/04/2021    BUN 16 06/04/2021    CR 0.97 06/04/2021    GFRESTIMATED 74 06/04/2021    GFRESTBLACK 85 06/04/2021    FATMATA 9.3 06/04/2021        A1C RESULTS:  Lab Results   Component Value Date    A1C 6.7 (H) 06/12/2017       INR RESULTS:  Lab Results   Component Value Date    INR 1.19 (H) 10/24/2019    INR 1.15 (H) 06/04/2019       All medical records were reviewed in detail and discussed with the patient. Greater than 30 mins were spent with the patient, 50% of this time was spent on counseling and coordination of care.  After visit summary was printed and given to the patient.      Mejia Phillips MD   [facility]      cc:   Castillo  Arely, NP  [address] [place] [address]

## 2021-12-18 NOTE — PROGRESS NOTES
HPI and Plan:   Today I had the pleasure of seeing Bryant Mckenzie at [facility]. He is a pleasant 81 year old patient who presents to the clinic for a follow-up visit.  We have been following the patient for cardiomyopathy, coronary disease and aortic stenosis.  For a detailed description please refer to my prior note from 6/23/2020.    In short we initially saw the patient for heart failure symptoms and he was found to have severely reduced ejection fraction.  Coronary angiogram showed   of mid circumflex and 50% disease in the RCA.  It was thought that his cardiomyopathy was mixed [both ischemic and nonischemic].  However, he underwent has intervention on the  due to presence of extensive viable myocardium.  Since then his ejection fraction has improved and is now at 40-45%.  Of course, he is also on guideline directed medical therapy.  In addition he was also noted to have aortic stenosis on echocardiogram and the study performed recently shows mean gradient of 37 mmHg and aortic valve area of 1.0 cm .  As far as symptoms are concerned the patient does not have any.  He denies lightheadedness, dizziness, chest pain, shortness of breath, decreased exercise tolerance or fatigue.  He is able to exercise on treadmill for over 30 minutes without any difficulty and has never had any problems.    Assessment plan:  1.  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  Coronary disease status post PCI of circumflex   3.  Moderate disease in RCA- medically managed  4.  Moderate-severe aortic stenosis with aortic valve area of 1.0 cm  and mean gradient of 37 mmHg  5.  Hyperlipidemia     Patient is overall doing well and does not have any complaints today.  I reviewed the results of the recently performed echocardiogram and told him that we are getting close to situation of valve replacement.  I did give him a brochure for TAVR and went over the procedure briefly.  I told him that  because he is asymptomatic and the valve area is 1.0 cm  I would like to repeat the echocardiogram in 6 months and see him in the clinic after that.  However, he should let us know if he starts to become symptomatic in which case we will expedite things.  I am going to continue all his medications unchanged today.      Thank you for allowing me to participate in the care of Bryant Mckenzie    This note was completed in part using Dragon voice recognition software. Although reviewed after completion, some word and grammatical errors may occur.    Mejia Phillips MD  Cardiology    Orders Placed This Encounter   Procedures     Echocardiogram Complete       No orders of the defined types were placed in this encounter.      Medications Discontinued During This Encounter   Medication Reason     furosemide (LASIX) 20 MG tablet Stopped by Patient       Encounter Diagnoses   Name Primary?     Ischemic cardiomyopathy      Nonischemic cardiomyopathy (H)        CURRENT MEDICATIONS:  Current Outpatient Medications   Medication Sig Dispense Refill     ASPIRIN 81 PO Take by mouth daily        atorvastatin (LIPITOR) 40 MG tablet Take 1 tablet (40 mg) by mouth daily 90 tablet 3     carvedilol (COREG) 3.125 MG tablet Take 3 tablets (9.375 mg) by mouth 2 times daily (with meals) 540 tablet 3     empagliflozin (JARDIANCE) 25 MG TABS tablet Take 25 mg by mouth daily       insulin glargine (LANTUS) 100 UNIT/ML injection Inject 13 Units Subcutaneous At Bedtime  15 mL 3     insulin lispro (HUMALOG KWIKPEN) 100 UNIT/ML injection 11 units with breakfast,15 units at noon, and 14 units before evening meal (Patient taking differently: 6 units with breakfast,6 units at noon, and 0 units before evening meal) 36 mL 3     metFORMIN (GLUCOPHAGE) 500 MG tablet TAKE 2 TABLETS (1,000 MG) BY MOUTH TWICE A DAY WITH MEALS 360 tablet 0     Multiple Vitamins-Minerals (MULTIVITAL PO) Take 1 tablet by mouth daily       sacubitril-valsartan (ENTRESTO) 24-26  MG per tablet Take 1 tablet by mouth 2 times daily 30 tablet 3     tamsulosin (FLOMAX) 0.4 MG capsule Take 0.4 mg by mouth daily       Vitamin D, Cholecalciferol, 25 MCG (1000 UT) CAPS Take 4,000 Units by mouth daily       Semaglutide (OZEMPIC, 0.25 OR 0.5 MG/DOSE, SC) Inject 0.5 mg Subcutaneous once a week         ALLERGIES     Allergies   Allergen Reactions     No Known Allergies        PAST MEDICAL HISTORY:  Past Medical History:   Diagnosis Date     Aortic stenosis 9/23/2016    Echo 9/2016 Mild       Atrophic gastritis 9/8/2016     Benign non-nodular prostatic hyperplasia 09/08/2016     CAD (coronary artery disease)      Hyperlipidemia 09/08/2016     Ischemic cardiomyopathy 6/12/2019     Nonischemic cardiomyopathy (H) 10/30/2019     Obstructive sleep apnea syndrome 9/8/2016     Type 2 diabetes mellitus 09/06/2016     Vitamin B12 deficiency (non anemic) 9/8/2016       PAST SURGICAL HISTORY:  Past Surgical History:   Procedure Laterality Date     CV CORONARY ANGIOGRAM N/A 10/24/2019    Procedure: Coronary Angiogram;  Surgeon: Lupe Posada MD;  Location:  HEART CARDIAC CATH LAB     CV HEART CATHETERIZATION WITH POSSIBLE INTERVENTION N/A 6/4/2019    Procedure: Heart Catheterization with Possible Intervention;  Surgeon: Alex Bazan MD;  Location:  HEART CARDIAC CATH LAB     CV PCI CHRONIC TOTAL OCCLUSION N/A 10/24/2019    Procedure: Coronary Total Occlusion;  Surgeon: Lupe Posada MD;  Location:  HEART CARDIAC CATH LAB     CV PCI STENT DRUG ELUTING N/A 10/24/2019    Procedure: PCI Stent Drug Eluting;  Surgeon: Lupe Posada MD;  Location:  HEART CARDIAC CATH LAB     ESOPHAGOSCOPY, GASTROSCOPY, DUODENOSCOPY (EGD), COMBINED N/A 1/20/2015    Procedure: COMBINED ESOPHAGOSCOPY, GASTROSCOPY, DUODENOSCOPY (EGD), BIOPSY SINGLE OR MULTIPLE;  Surgeon: Allan Marroquin MD;  Location:  GI     EXCISE LESION TRUNK N/A 4/17/2019    Procedure: EXCISE CYST UPPER BACK;  Surgeon:  Rene Murray MD;  Location: [facility] OR     HERNIA REPAIR       HYDROCELECTOMY SCROTAL       JOINT REPLACEMENT (aka KNEE) NOS Bilateral        FAMILY HISTORY:  Family History   Problem Relation Age of Onset     Coronary Artery Disease Father 53     Cerebrovascular Disease Father      Diabetes Mother      Diabetes Brother        SOCIAL HISTORY:  Social History     Socioeconomic History     Marital status:      Spouse name: None     Number of children: None     Years of education: None     Highest education level: None   Occupational History     None   Tobacco Use     Smoking status: Never Smoker     Smokeless tobacco: Never Used   Substance and Sexual Activity     Alcohol use: Not Currently     Alcohol/week: 0.0 standard drinks     Drug use: No     Sexual activity: Never   Other Topics Concern     Parent/sibling w/ CABG, MI or angioplasty before 65F 55M? 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; walks dog for exercise      Social Determinants of Health     Financial Resource Strain: Not on file   Food Insecurity: Not on file   Transportation Needs: Not on file   Physical Activity: Not on file   Stress: Not on file   Social Connections: Not on file   Intimate Partner Violence: Not on file   Housing Stability: Not on file       Review of Systems:  Skin:  Negative       Eyes:  Positive for glasses    ENT:  Negative      Respiratory:  Negative       Cardiovascular:  Negative;palpitations;chest pain;lightheadedness;dizziness;syncope or near-syncope;cyanosis;fatigue;edema      Gastroenterology: Negative      Genitourinary:  Positive for urinary frequency;nocturia    Musculoskeletal:  Negative      Neurologic:  Negative      Psychiatric:  Negative      Heme/Lymph/Imm:  Negative      Endocrine:  Positive for diabetes      Physical Exam:  Vitals: /62 (BP Location: Right arm, Cuff Size: Adult Large)   Pulse 60   Ht 1.829 m (6')   Wt 102.8 kg (226 lb 11.2 oz)   BMI 30.75 kg/m     Eyes: No icterus.  Pulmonary: Chest symmetric, lungs clear bilaterally and no crackles, wheezes or rales.  Cardiovascular: RRR with normal S1 and S2, systolic murmur, JVP normal.  Musculoskeletal: Edema of the lower extremities: None.  Neurologic: Oriented and appropriate without obvious focal deficits.   Psychiatric: Normal affect.     Recent Lab Results:  LIPID RESULTS:  Lab Results   Component Value Date    CHOL 123 12/05/2016    HDL 50 12/05/2016    LDL 51 12/05/2016    TRIG 108 12/05/2016       LIVER ENZYME RESULTS:  No results found for: AST, ALT    CBC RESULTS:  Lab Results   Component Value Date    WBC 9.3 01/03/2020    RBC 4.69 01/03/2020    HGB 13.4 01/03/2020    HCT 43.4 01/03/2020    MCV 93 01/03/2020    MCH 28.6 01/03/2020    MCHC 30.9 (L) 01/03/2020    RDW 13.7 01/03/2020     01/03/2020       BMP RESULTS:  Lab Results   Component Value Date     06/04/2021    POTASSIUM 4.2 06/04/2021    CHLORIDE 108 06/04/2021    CO2 25 06/04/2021    ANIONGAP 6 06/04/2021     (H) 06/04/2021    BUN 16 06/04/2021    CR 0.97 06/04/2021    GFRESTIMATED 74 06/04/2021    GFRESTBLACK 85 06/04/2021    FATMATA 9.3 06/04/2021        A1C RESULTS:  Lab Results   Component Value Date    A1C 6.7 (H) 06/12/2017       INR RESULTS:  Lab Results   Component Value Date    INR 1.19 (H) 10/24/2019    INR 1.15 (H) 06/04/2019       CC  Castillo Mcgee NP  [address] [place] [address]    All medical records were reviewed in detail and discussed with the patient. Greater than 30 mins were spent with the patient, 50% of this time was spent on counseling and coordination of care.  After visit summary was printed and given to the patient.

## 2021-12-23 PROBLEM — K40.90 RIGHT INGUINAL HERNIA: Status: ACTIVE | Noted: 2021-01-01

## 2021-12-23 PROBLEM — K42.9 UMBILICAL HERNIA WITHOUT OBSTRUCTION AND WITHOUT GANGRENE: Status: ACTIVE | Noted: 2021-01-01

## 2021-12-24 NOTE — TELEPHONE ENCOUNTER
Type of surgery: robotic bilateral inguinal hernia repair, robotic umbilical hernia repair  Location of surgery: [facility]  Date and time of surgery: 2/2/22 7:30am  Surgeon: Dr Murray  Pre-Op Appt Date: pt to schedule  Post-Op Appt Date: pt to schedule   Packet sent out: Yes  Pre-cert/Authorization completed:  Not Applicable  Date: 12/24/21

## 2021-12-28 NOTE — TELEPHONE ENCOUNTER
"Received call from patient, who saw Dr. Phillips 9/26/19. Per note,     \"Today in the clinic his blood pressure is 138/68 which the patient says is very unusual.  Due to this I will only uptitrate his Coreg to 9.75 mg p.o. twice daily\"    Patient stating Rx sent to pharmacy was for 6.25 mg tabs. Would like updated Rx reflecting the new dose.     Per Dr. Phillips's last refill in Notes to Pharmacy, \"take one and half pill daily\".     Will route to Dr. Phillips for clarification.  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 On methadone 40mg daily.   -continue with methadone 40mg daily.  -call clinic in AM to confirm dosage.

## 2021-12-29 NOTE — PROGRESS NOTES
Surgery Consultation, [facility]         Rene Murray MD, MD    Bryant Mckenzie MRN# [id number]   YOB: 1940 Age: 81 year old     PCP:  Brown, Merlin Emery [phone]    Chief Complaint: Right inguinal hernia    History of Present Illness:  Bryant Mckenzie is a 81 year old male who presented with a an intermittent bulge near the right groin. The bulge comes and goes but has gotten larger over time. It is uncomfortable when the patient is engaging in exertional activity.  Has a history of contralateral hernia repair which was performed many years ago.  No evidence of recurrence there.  Patient has been wearing a truss for comfort with some relief.  The patient is here to discuss possible surgical repair of the right inguinal hernia.    PMH:  Bryant Mckenzie  has a past medical history of Aortic stenosis (9/23/2016), Atrophic gastritis (9/8/2016), Benign non-nodular prostatic hyperplasia (09/08/2016), CAD (coronary artery disease), Hyperlipidemia (09/08/2016), Ischemic cardiomyopathy (6/12/2019), Nonischemic cardiomyopathy (H) (10/30/2019), Obstructive sleep apnea syndrome (9/8/2016), Type 2 diabetes mellitus (09/06/2016), and Vitamin B12 deficiency (non anemic) (9/8/2016).  PSH:  Bryant Mckenzie  has a past surgical history that includes Esophagoscopy, gastroscopy, duodenoscopy (EGD), combined (N/A, 1/20/2015); hernia repair; Hydrocelectomy scrotal; Excise lesion trunk (N/A, 4/17/2019); Heart Catheterization with Possible Intervention (N/A, 6/4/2019); Coronary Angiogram (N/A, 10/24/2019); Percutaneous Coronary Intervention Stent Drug Eluting (N/A, 10/24/2019); Percutaneous Coronary Intervention of Chronic Total Occlusion (N/A, 10/24/2019); and JOINT REPLACEMENT (aka KNEE) NOS (Bilateral).    Home medications and allergies reviewed.    Social History:  Bryant Mckenzie  reports that he has never smoked. He has never used smokeless tobacco. He reports previous alcohol use.  He reports that he does not use drugs.  Family History:  Bryant Mckenzie family history includes Cerebrovascular Disease in his father; Coronary Artery Disease (age of onset: 53) in his father; Diabetes in his brother and mother.    ROS:  The 10 point Review of Systems is negative other than noted in the HPI.    Physical Exam:  Blood pressure 112/72, height 1.829 m (6'), weight 102.5 kg (226 lb).  226 lbs 0 oz  Healthy-appearing gentleman in no distress.  Converses normally, affect unremarkable  Pupils equal round and reactive to light.  Moist mucous membranes, tongue midline   No cervical lymphadenopathy or thyromegaly.   Lung fields clear, breathing comfortably.   Heart normal sinus rhythm.  No murmurs rubs or gallops.  No obvious neurological deficits  Abdomen soft, nontender, nondistended.  Obvious 2 cm umbilical hernia, easily reduced.  Obvious right inguinal hernia, clearly palpable but reducible.  Immediately recurs.  Some suggestion for left inguinal fullness.       Assessment/plan:  Pleasant male with what appears to be an inguinal hernia. I explained that these are usually not a cause for small bowel incarceration or obstruction, but do become larger over time. They can certainly be uncomfortable and repair is warranted. I recommended a robot-assisted right inguinal hernia repair, possible bilateral, probable umbilical hernia repair. This would normally be done under a general anesthetic. Risks of surgery were explained to the patient, including hernia recurrence, wound infection, chronic pain, or mesh removal.  Patient was going to notify the office when they were ready to schedule surgery.    Isaias Murray M.D.  [facility]  [phone]    Please route or send letter to:  Primary Care Provider (PCP) and Referring Provider

## 2021-12-29 NOTE — TELEPHONE ENCOUNTER
Planned Procedure: Robot assisted bilateral inguinal hernia repair; Robot assisted umbilical hernia repair    Date: 02/02/2022    Surgeon: Terri      Patient saw Dr. Murray on 12/23/21 for consultation.    He reports that since this time he has developed testicular swelling.  Recent ultrasound shows a hydrocele (ordered by Dr. Merlin Brown)    Dr. Colon and patient wondering if this is something Dr Murray can deal with during hernia surgery or if he needs to see a urologist    Informed him that this will be discussed with Dr. Murray and he will receive a call back. Did inform him that the hernia could potentially be causing or exacerbating the testicular symptoms    Will call patient back    Odette Phillips, RN-BSN

## 2021-12-29 NOTE — TELEPHONE ENCOUNTER
Name of caller: Patient    Reason for Call:  Patient scheduled for [facility] surgery with Dr. Murray 2/2/2022. He now has an enlarged testicle and has been diagnosed with hydrocele. Wondering if this is something Dr. Murray would deal with? Or urology?    Surgeon:  Dr. Murray       Best phone number to reach pt at is: [phone]  Or  [phone]    Ok to leave a message with medical info? Yes.

## 2021-12-30 NOTE — TELEPHONE ENCOUNTER
Per Dr. Murray:    Good chance that his hydrocele will regress after surgery.  If not, he can get a urology evaluation.     Called patient with this information    Pt happy to hear this. He will call HARVEY Phillips RN-BSN

## 2022-01-01 ENCOUNTER — APPOINTMENT (OUTPATIENT)
Dept: GENERAL RADIOLOGY | Facility: CLINIC | Age: 82
DRG: 264 | End: 2022-01-01
Payer: COMMERCIAL

## 2022-01-01 ENCOUNTER — ANESTHESIA EVENT (OUTPATIENT)
Dept: SURGERY | Facility: CLINIC | Age: 82
DRG: 264 | End: 2022-01-01
Payer: COMMERCIAL

## 2022-01-01 ENCOUNTER — HOSPITAL ENCOUNTER (OUTPATIENT)
Dept: CARDIOLOGY | Facility: CLINIC | Age: 82
Discharge: HOME | End: 2022-10-25
Attending: INTERNAL MEDICINE | Admitting: INTERNAL MEDICINE
Payer: COMMERCIAL

## 2022-01-01 ENCOUNTER — TELEPHONE (OUTPATIENT)
Dept: CARDIOLOGY | Facility: CLINIC | Age: 82
End: 2022-01-01

## 2022-01-01 ENCOUNTER — OFFICE VISIT (OUTPATIENT)
Dept: CARDIOLOGY | Facility: CLINIC | Age: 82
End: 2022-01-01
Payer: COMMERCIAL

## 2022-01-01 ENCOUNTER — HOSPITAL ENCOUNTER (OUTPATIENT)
Facility: CLINIC | Age: 82
Discharge: HOME | End: 2022-02-02
Attending: SURGERY | Admitting: SURGERY
Payer: COMMERCIAL

## 2022-01-01 ENCOUNTER — ANESTHESIA EVENT (OUTPATIENT)
Dept: INTENSIVE CARE | Facility: CLINIC | Age: 82
DRG: 264 | End: 2022-01-01
Payer: COMMERCIAL

## 2022-01-01 ENCOUNTER — HEALTH MAINTENANCE LETTER (OUTPATIENT)
Age: 82
End: 2022-01-01

## 2022-01-01 ENCOUNTER — CARE COORDINATION (OUTPATIENT)
Dept: CARDIOLOGY | Facility: CLINIC | Age: 82
End: 2022-01-01

## 2022-01-01 ENCOUNTER — LAB (OUTPATIENT)
Dept: LAB | Facility: CLINIC | Age: 82
End: 2022-01-01

## 2022-01-01 ENCOUNTER — TELEPHONE (OUTPATIENT)
Dept: SURGERY | Facility: CLINIC | Age: 82
End: 2022-01-01
Payer: COMMERCIAL

## 2022-01-01 ENCOUNTER — APPOINTMENT (OUTPATIENT)
Dept: SURGERY | Facility: PHYSICIAN GROUP | Age: 82
End: 2022-01-01
Payer: COMMERCIAL

## 2022-01-01 ENCOUNTER — APPOINTMENT (OUTPATIENT)
Dept: CARDIOLOGY | Facility: CLINIC | Age: 82
DRG: 264 | End: 2022-01-01
Attending: INTERNAL MEDICINE
Payer: COMMERCIAL

## 2022-01-01 ENCOUNTER — APPOINTMENT (OUTPATIENT)
Dept: CT IMAGING | Facility: CLINIC | Age: 82
DRG: 264 | End: 2022-01-01
Attending: SURGERY
Payer: COMMERCIAL

## 2022-01-01 ENCOUNTER — ANESTHESIA (OUTPATIENT)
Dept: SURGERY | Facility: CLINIC | Age: 82
DRG: 264 | End: 2022-01-01
Payer: COMMERCIAL

## 2022-01-01 ENCOUNTER — MEDICAL CORRESPONDENCE (OUTPATIENT)
Dept: HEALTH INFORMATION MANAGEMENT | Facility: CLINIC | Age: 82
End: 2022-01-01

## 2022-01-01 ENCOUNTER — OFFICE VISIT (OUTPATIENT)
Dept: CARDIOLOGY | Facility: CLINIC | Age: 82
End: 2022-01-01
Attending: PHYSICIAN ASSISTANT
Payer: COMMERCIAL

## 2022-01-01 ENCOUNTER — TELEPHONE (OUTPATIENT)
Dept: CARDIOLOGY | Facility: CLINIC | Age: 82
End: 2022-01-01
Payer: COMMERCIAL

## 2022-01-01 ENCOUNTER — APPOINTMENT (OUTPATIENT)
Dept: GENERAL RADIOLOGY | Facility: CLINIC | Age: 82
DRG: 264 | End: 2022-01-01
Attending: INTERNAL MEDICINE
Payer: COMMERCIAL

## 2022-01-01 ENCOUNTER — APPOINTMENT (OUTPATIENT)
Dept: GENERAL RADIOLOGY | Facility: CLINIC | Age: 82
DRG: 264 | End: 2022-01-01
Attending: EMERGENCY MEDICINE
Payer: COMMERCIAL

## 2022-01-01 ENCOUNTER — ANESTHESIA (OUTPATIENT)
Dept: INTENSIVE CARE | Facility: CLINIC | Age: 82
DRG: 264 | End: 2022-01-01
Payer: COMMERCIAL

## 2022-01-01 ENCOUNTER — APPOINTMENT (OUTPATIENT)
Dept: GENERAL RADIOLOGY | Facility: CLINIC | Age: 82
DRG: 264 | End: 2022-01-01
Attending: SURGERY
Payer: COMMERCIAL

## 2022-01-01 ENCOUNTER — VIRTUAL VISIT (OUTPATIENT)
Dept: CARDIOLOGY | Facility: CLINIC | Age: 82
End: 2022-01-01
Payer: COMMERCIAL

## 2022-01-01 ENCOUNTER — ANESTHESIA EVENT (OUTPATIENT)
Dept: SURGERY | Facility: CLINIC | Age: 82
End: 2022-01-01
Payer: COMMERCIAL

## 2022-01-01 ENCOUNTER — HOSPITAL ENCOUNTER (OUTPATIENT)
Facility: CLINIC | Age: 82
Discharge: HOME | End: 2022-06-15
Admitting: INTERNAL MEDICINE
Payer: COMMERCIAL

## 2022-01-01 ENCOUNTER — HOSPITAL ENCOUNTER (OUTPATIENT)
Dept: CARDIOLOGY | Facility: CLINIC | Age: 82
Discharge: HOME | End: 2022-06-10
Attending: INTERNAL MEDICINE | Admitting: INTERNAL MEDICINE
Payer: COMMERCIAL

## 2022-01-01 ENCOUNTER — APPOINTMENT (OUTPATIENT)
Dept: GENERAL RADIOLOGY | Facility: CLINIC | Age: 82
DRG: 264 | End: 2022-01-01
Attending: ANESTHESIOLOGY
Payer: COMMERCIAL

## 2022-01-01 ENCOUNTER — HOSPITAL ENCOUNTER (OUTPATIENT)
Dept: CARDIOLOGY | Facility: CLINIC | Age: 82
Discharge: HOME | End: 2022-10-14
Attending: INTERNAL MEDICINE | Admitting: INTERNAL MEDICINE
Payer: COMMERCIAL

## 2022-01-01 ENCOUNTER — APPOINTMENT (OUTPATIENT)
Dept: CT IMAGING | Facility: CLINIC | Age: 82
DRG: 264 | End: 2022-01-01
Payer: COMMERCIAL

## 2022-01-01 ENCOUNTER — TRANSFERRED RECORDS (OUTPATIENT)
Dept: HEALTH INFORMATION MANAGEMENT | Facility: CLINIC | Age: 82
End: 2022-01-01
Payer: COMMERCIAL

## 2022-01-01 ENCOUNTER — APPOINTMENT (OUTPATIENT)
Dept: CARDIOLOGY | Facility: CLINIC | Age: 82
DRG: 264 | End: 2022-01-01
Attending: NURSE PRACTITIONER
Payer: COMMERCIAL

## 2022-01-01 ENCOUNTER — HOSPITAL ENCOUNTER (INPATIENT)
Facility: CLINIC | Age: 82
LOS: 7 days | DRG: 264 | End: 2022-11-11
Attending: EMERGENCY MEDICINE | Admitting: HOSPITALIST
Payer: COMMERCIAL

## 2022-01-01 ENCOUNTER — ANESTHESIA (OUTPATIENT)
Dept: SURGERY | Facility: CLINIC | Age: 82
End: 2022-01-01
Payer: COMMERCIAL

## 2022-01-01 VITALS
DIASTOLIC BLOOD PRESSURE: 56 MMHG | BODY MASS INDEX: 31.06 KG/M2 | HEIGHT: 72 IN | HEART RATE: 56 BPM | WEIGHT: 229.3 LBS | OXYGEN SATURATION: 99 % | SYSTOLIC BLOOD PRESSURE: 90 MMHG

## 2022-01-01 VITALS
DIASTOLIC BLOOD PRESSURE: 52 MMHG | WEIGHT: 218.4 LBS | BODY MASS INDEX: 29.58 KG/M2 | HEIGHT: 72 IN | SYSTOLIC BLOOD PRESSURE: 92 MMHG | OXYGEN SATURATION: 99 % | HEART RATE: 64 BPM

## 2022-01-01 VITALS
BODY MASS INDEX: 30.2 KG/M2 | HEIGHT: 72 IN | SYSTOLIC BLOOD PRESSURE: 92 MMHG | OXYGEN SATURATION: 97 % | HEART RATE: 47 BPM | WEIGHT: 223 LBS | DIASTOLIC BLOOD PRESSURE: 62 MMHG

## 2022-01-01 VITALS
WEIGHT: 220.6 LBS | BODY MASS INDEX: 29.88 KG/M2 | SYSTOLIC BLOOD PRESSURE: 92 MMHG | HEIGHT: 72 IN | HEART RATE: 67 BPM | DIASTOLIC BLOOD PRESSURE: 58 MMHG | OXYGEN SATURATION: 97 %

## 2022-01-01 VITALS
DIASTOLIC BLOOD PRESSURE: 55 MMHG | BODY MASS INDEX: 29.08 KG/M2 | TEMPERATURE: 100.8 F | RESPIRATION RATE: 11 BRPM | HEART RATE: 146 BPM | HEIGHT: 72 IN | WEIGHT: 214.73 LBS | OXYGEN SATURATION: 95 % | SYSTOLIC BLOOD PRESSURE: 83 MMHG

## 2022-01-01 VITALS
WEIGHT: 214 LBS | BODY MASS INDEX: 28.99 KG/M2 | HEART RATE: 61 BPM | HEIGHT: 72 IN | SYSTOLIC BLOOD PRESSURE: 84 MMHG | DIASTOLIC BLOOD PRESSURE: 56 MMHG

## 2022-01-01 VITALS
SYSTOLIC BLOOD PRESSURE: 106 MMHG | WEIGHT: 219.4 LBS | DIASTOLIC BLOOD PRESSURE: 60 MMHG | HEART RATE: 70 BPM | TEMPERATURE: 97 F | OXYGEN SATURATION: 98 % | HEIGHT: 72 IN | RESPIRATION RATE: 18 BRPM | BODY MASS INDEX: 29.72 KG/M2

## 2022-01-01 VITALS
OXYGEN SATURATION: 95 % | DIASTOLIC BLOOD PRESSURE: 82 MMHG | HEART RATE: 78 BPM | WEIGHT: 224 LBS | BODY MASS INDEX: 30.34 KG/M2 | RESPIRATION RATE: 16 BRPM | TEMPERATURE: 98.4 F | HEIGHT: 72 IN | SYSTOLIC BLOOD PRESSURE: 96 MMHG

## 2022-01-01 DIAGNOSIS — R06.02 SOB (SHORTNESS OF BREATH): ICD-10-CM

## 2022-01-01 DIAGNOSIS — I35.0 AORTIC STENOSIS: Primary | ICD-10-CM

## 2022-01-01 DIAGNOSIS — I25.82 CORONARY ARTERY CHRONIC TOTAL OCCLUSION: ICD-10-CM

## 2022-01-01 DIAGNOSIS — I42.8 NONISCHEMIC CARDIOMYOPATHY (H): ICD-10-CM

## 2022-01-01 DIAGNOSIS — I35.0 AORTIC VALVE STENOSIS, ETIOLOGY OF CARDIAC VALVE DISEASE UNSPECIFIED: Primary | ICD-10-CM

## 2022-01-01 DIAGNOSIS — I35.0 AORTIC STENOSIS: ICD-10-CM

## 2022-01-01 DIAGNOSIS — I35.0 AORTIC VALVE STENOSIS, ETIOLOGY OF CARDIAC VALVE DISEASE UNSPECIFIED: ICD-10-CM

## 2022-01-01 DIAGNOSIS — K40.20 BILATERAL INGUINAL HERNIA WITHOUT OBSTRUCTION OR GANGRENE, RECURRENCE NOT SPECIFIED: ICD-10-CM

## 2022-01-01 DIAGNOSIS — I25.10 CORONARY ARTERY DISEASE INVOLVING NATIVE CORONARY ARTERY OF NATIVE HEART WITHOUT ANGINA PECTORIS: ICD-10-CM

## 2022-01-01 DIAGNOSIS — K55.9 ISCHEMIA, BOWEL (H): ICD-10-CM

## 2022-01-01 DIAGNOSIS — I50.22 CHRONIC SYSTOLIC CONGESTIVE HEART FAILURE (H): Primary | ICD-10-CM

## 2022-01-01 DIAGNOSIS — K42.9 UMBILICAL HERNIA WITHOUT OBSTRUCTION AND WITHOUT GANGRENE: Primary | ICD-10-CM

## 2022-01-01 DIAGNOSIS — I35.0 SEVERE AORTIC STENOSIS: ICD-10-CM

## 2022-01-01 DIAGNOSIS — I25.5 ISCHEMIC CARDIOMYOPATHY: Primary | ICD-10-CM

## 2022-01-01 DIAGNOSIS — I95.9 HYPOTENSION, UNSPECIFIED HYPOTENSION TYPE: ICD-10-CM

## 2022-01-01 DIAGNOSIS — I50.22 CHRONIC SYSTOLIC HEART FAILURE (H): ICD-10-CM

## 2022-01-01 DIAGNOSIS — I25.5 ISCHEMIC CARDIOMYOPATHY: ICD-10-CM

## 2022-01-01 DIAGNOSIS — I42.8 OTHER CARDIOMYOPATHY (H): Primary | ICD-10-CM

## 2022-01-01 DIAGNOSIS — I35.0 NONRHEUMATIC AORTIC VALVE STENOSIS: ICD-10-CM

## 2022-01-01 DIAGNOSIS — I35.0 SYMPTOMATIC SEVERE AORTIC STENOSIS WITH LOW EJECTION FRACTION: ICD-10-CM

## 2022-01-01 DIAGNOSIS — I50.21 ACUTE SYSTOLIC CONGESTIVE HEART FAILURE (H): ICD-10-CM

## 2022-01-01 DIAGNOSIS — E11.9 TYPE 2 DIABETES MELLITUS WITHOUT COMPLICATION, WITHOUT LONG-TERM CURRENT USE OF INSULIN (H): ICD-10-CM

## 2022-01-01 DIAGNOSIS — I50.21 ACUTE SYSTOLIC CONGESTIVE HEART FAILURE (H): Primary | ICD-10-CM

## 2022-01-01 DIAGNOSIS — K40.90 RIGHT INGUINAL HERNIA: Primary | ICD-10-CM

## 2022-01-01 DIAGNOSIS — E78.5 HYPERLIPIDEMIA LDL GOAL <100: Primary | ICD-10-CM

## 2022-01-01 LAB
ABO/RH(D): NORMAL
ABO/RH(D): NORMAL
ALBUMIN SERPL-MCNC: 2.3 G/DL (ref 3.4–5)
ALBUMIN SERPL-MCNC: 2.4 G/DL (ref 3.4–5)
ALBUMIN SERPL-MCNC: 2.5 G/DL (ref 3.4–5)
ALBUMIN SERPL-MCNC: 2.8 G/DL (ref 3.4–5)
ALBUMIN SERPL-MCNC: 2.9 G/DL (ref 3.4–5)
ALBUMIN UR-MCNC: 50 MG/DL
ALP SERPL-CCNC: 59 U/L (ref 40–150)
ALP SERPL-CCNC: 59 U/L (ref 40–150)
ALT SERPL W P-5'-P-CCNC: 21 U/L (ref 0–70)
ALT SERPL W P-5'-P-CCNC: 24 U/L (ref 0–70)
ALT SERPL-CCNC: 17 U/L (ref 9–46)
AMORPH CRY #/AREA URNS HPF: ABNORMAL /HPF
ANION GAP SERPL CALCULATED.3IONS-SCNC: 10 MMOL/L (ref 3–14)
ANION GAP SERPL CALCULATED.3IONS-SCNC: 11 MMOL/L (ref 3–14)
ANION GAP SERPL CALCULATED.3IONS-SCNC: 12 MMOL/L (ref 3–14)
ANION GAP SERPL CALCULATED.3IONS-SCNC: 12 MMOL/L (ref 3–14)
ANION GAP SERPL CALCULATED.3IONS-SCNC: 5 MMOL/L (ref 3–14)
ANION GAP SERPL CALCULATED.3IONS-SCNC: 6 MMOL/L (ref 3–14)
ANION GAP SERPL CALCULATED.3IONS-SCNC: 8 MMOL/L (ref 3–14)
ANION GAP SERPL CALCULATED.3IONS-SCNC: 9 MMOL/L (ref 3–14)
ANTIBODY SCREEN: NEGATIVE
ANTIBODY SCREEN: NEGATIVE
APPEARANCE UR: ABNORMAL
APTT PPP: 43 SECONDS (ref 22–38)
AST SERPL W P-5'-P-CCNC: 18 U/L (ref 0–45)
AST SERPL W P-5'-P-CCNC: 20 U/L (ref 0–45)
AST SERPL-CCNC: 17 U/L (ref 10–35)
ATRIAL RATE - MUSE: 62 BPM
ATRIAL RATE - MUSE: 64 BPM
ATRIAL RATE - MUSE: 84 BPM
BACTERIA #/AREA URNS HPF: ABNORMAL /HPF
BACTERIA BLD CULT: ABNORMAL
BACTERIA BLD CULT: ABNORMAL
BASE EXCESS BLDA CALC-SCNC: -0.4 MMOL/L (ref -9–1.8)
BASE EXCESS BLDA CALC-SCNC: -0.7 MMOL/L (ref -9–1.8)
BASE EXCESS BLDA CALC-SCNC: -1.7 MMOL/L (ref -9–1.8)
BASE EXCESS BLDA CALC-SCNC: -2.3 MMOL/L (ref -9–1.8)
BASE EXCESS BLDA CALC-SCNC: -4.3 MMOL/L (ref -9–1.8)
BASE EXCESS BLDA CALC-SCNC: -5.5 MMOL/L (ref -9–1.8)
BASE EXCESS BLDA CALC-SCNC: -6.4 MMOL/L (ref -9–1.8)
BASE EXCESS BLDV CALC-SCNC: -0.8 MMOL/L (ref -7.7–1.9)
BASE EXCESS BLDV CALC-SCNC: -1.7 MMOL/L (ref -7.7–1.9)
BASOPHILS # BLD AUTO: 0.1 10E3/UL (ref 0–0.2)
BASOPHILS # BLD MANUAL: 0 10E3/UL (ref 0–0.2)
BASOPHILS NFR BLD AUTO: 1 %
BASOPHILS NFR BLD MANUAL: 0 %
BILIRUB DIRECT SERPL-MCNC: 0.4 MG/DL (ref 0–0.2)
BILIRUB DIRECT SERPL-MCNC: 0.4 MG/DL (ref 0–0.2)
BILIRUB SERPL-MCNC: 3.8 MG/DL (ref 0.2–1.3)
BILIRUB SERPL-MCNC: 4 MG/DL (ref 0.2–1.3)
BILIRUB UR QL STRIP: NEGATIVE
BLD PROD TYP BPU: NORMAL
BLOOD COMPONENT TYPE: NORMAL
BUN SERPL-MCNC: 17 MG/DL (ref 7–30)
BUN SERPL-MCNC: 20 MG/DL (ref 7–30)
BUN SERPL-MCNC: 21 MG/DL (ref 7–30)
BUN SERPL-MCNC: 23 MG/DL (ref 7–30)
BUN SERPL-MCNC: 25 MG/DL (ref 7–30)
BUN SERPL-MCNC: 63 MG/DL (ref 7–30)
BUN SERPL-MCNC: 63 MG/DL (ref 7–30)
BUN SERPL-MCNC: 68 MG/DL (ref 7–30)
BUN SERPL-MCNC: 69 MG/DL (ref 7–30)
BUN SERPL-MCNC: 69 MG/DL (ref 7–30)
BUN SERPL-MCNC: 70 MG/DL (ref 7–30)
BUN SERPL-MCNC: 75 MG/DL (ref 7–30)
BUN SERPL-MCNC: 77 MG/DL (ref 7–30)
BUN SERPL-MCNC: 78 MG/DL (ref 7–30)
BUN SERPL-MCNC: 82 MG/DL (ref 7–30)
BUN SERPL-MCNC: 85 MG/DL (ref 7–30)
BUN SERPL-MCNC: 88 MG/DL (ref 7–30)
BURR CELLS BLD QL SMEAR: SLIGHT
CA-I BLD-MCNC: 4.4 MG/DL (ref 4.4–5.2)
CA-I BLD-MCNC: 4.5 MG/DL (ref 4.4–5.2)
CA-I BLD-MCNC: 4.6 MG/DL (ref 4.4–5.2)
CALCIUM SERPL-MCNC: 7.7 MG/DL (ref 8.5–10.1)
CALCIUM SERPL-MCNC: 7.7 MG/DL (ref 8.5–10.1)
CALCIUM SERPL-MCNC: 7.8 MG/DL (ref 8.5–10.1)
CALCIUM SERPL-MCNC: 7.8 MG/DL (ref 8.5–10.1)
CALCIUM SERPL-MCNC: 7.9 MG/DL (ref 8.5–10.1)
CALCIUM SERPL-MCNC: 8.1 MG/DL (ref 8.5–10.1)
CALCIUM SERPL-MCNC: 8.3 MG/DL (ref 8.5–10.1)
CALCIUM SERPL-MCNC: 8.5 MG/DL (ref 8.5–10.1)
CALCIUM SERPL-MCNC: 8.6 MG/DL (ref 8.5–10.1)
CALCIUM SERPL-MCNC: 8.7 MG/DL (ref 8.5–10.1)
CALCIUM SERPL-MCNC: 8.8 MG/DL (ref 8.5–10.1)
CALCIUM SERPL-MCNC: 8.9 MG/DL (ref 8.5–10.1)
CALCIUM SERPL-MCNC: 8.9 MG/DL (ref 8.5–10.1)
CALCIUM SERPL-MCNC: 9 MG/DL (ref 8.5–10.1)
CALCIUM SERPL-MCNC: 9 MG/DL (ref 8.5–10.1)
CALCIUM SERPL-MCNC: 9.1 MG/DL (ref 8.5–10.1)
CATH EF ESTIMATED: 25 %
CHLORIDE BLD-SCNC: 100 MMOL/L (ref 94–109)
CHLORIDE BLD-SCNC: 100 MMOL/L (ref 94–109)
CHLORIDE BLD-SCNC: 101 MMOL/L (ref 94–109)
CHLORIDE BLD-SCNC: 101 MMOL/L (ref 94–109)
CHLORIDE BLD-SCNC: 102 MMOL/L (ref 94–109)
CHLORIDE BLD-SCNC: 103 MMOL/L (ref 94–109)
CHLORIDE BLD-SCNC: 103 MMOL/L (ref 94–109)
CHLORIDE BLD-SCNC: 104 MMOL/L (ref 94–109)
CHLORIDE BLD-SCNC: 105 MMOL/L (ref 94–109)
CHLORIDE BLD-SCNC: 107 MMOL/L (ref 94–109)
CHLORIDE BLD-SCNC: 111 MMOL/L (ref 94–109)
CHLORIDE BLD-SCNC: 113 MMOL/L (ref 94–109)
CHLORIDE BLD-SCNC: 115 MMOL/L (ref 94–109)
CHLORIDE BLD-SCNC: 118 MMOL/L (ref 94–109)
CHLORIDE BLD-SCNC: 121 MMOL/L (ref 94–109)
CHLORIDE BLD-SCNC: 121 MMOL/L (ref 94–109)
CHLORIDE BLD-SCNC: 123 MMOL/L (ref 94–109)
CHLORIDE BLD-SCNC: 124 MMOL/L (ref 94–109)
CHLORIDE BLD-SCNC: 126 MMOL/L (ref 94–109)
CO2 SERPL-SCNC: 18 MMOL/L (ref 20–32)
CO2 SERPL-SCNC: 18 MMOL/L (ref 20–32)
CO2 SERPL-SCNC: 19 MMOL/L (ref 20–32)
CO2 SERPL-SCNC: 19 MMOL/L (ref 20–32)
CO2 SERPL-SCNC: 20 MMOL/L (ref 20–32)
CO2 SERPL-SCNC: 21 MMOL/L (ref 20–32)
CO2 SERPL-SCNC: 22 MMOL/L (ref 20–32)
CO2 SERPL-SCNC: 22 MMOL/L (ref 20–32)
CO2 SERPL-SCNC: 24 MMOL/L (ref 20–32)
CO2 SERPL-SCNC: 26 MMOL/L (ref 20–32)
CO2 SERPL-SCNC: 26 MMOL/L (ref 20–32)
CO2 SERPL-SCNC: 27 MMOL/L (ref 20–32)
CODING SYSTEM: NORMAL
COHGB MFR BLD: 100 % (ref 92–100)
COHGB MFR BLD: 95 % (ref 92–100)
COLOR UR AUTO: YELLOW
CPB POCT: NO
CREAT BLD-MCNC: 1 MG/DL (ref 0.7–1.3)
CREAT SERPL-MCNC: 0.87 MG/DL (ref 0.66–1.25)
CREAT SERPL-MCNC: 0.92 MG/DL (ref 0.66–1.25)
CREAT SERPL-MCNC: 0.95 MG/DL (ref 0.66–1.25)
CREAT SERPL-MCNC: 1 MG/DL (ref 0.66–1.25)
CREAT SERPL-MCNC: 1.08 MG/DL (ref 0.66–1.25)
CREAT SERPL-MCNC: 1.32 MG/DL (ref 0.66–1.25)
CREAT SERPL-MCNC: 1.39 MG/DL (ref 0.66–1.25)
CREAT SERPL-MCNC: 1.43 MG/DL (ref 0.66–1.25)
CREAT SERPL-MCNC: 1.43 MG/DL (ref 0.66–1.25)
CREAT SERPL-MCNC: 1.65 MG/DL (ref 0.66–1.25)
CREAT SERPL-MCNC: 1.73 MG/DL (ref 0.66–1.25)
CREAT SERPL-MCNC: 1.81 MG/DL (ref 0.66–1.25)
CREAT SERPL-MCNC: 1.91 MG/DL (ref 0.66–1.25)
CREAT SERPL-MCNC: 1.99 MG/DL (ref 0.66–1.25)
CREAT SERPL-MCNC: 2.01 MG/DL (ref 0.66–1.25)
CREAT SERPL-MCNC: 2.12 MG/DL (ref 0.66–1.25)
CREAT SERPL-MCNC: 2.6 MG/DL (ref 0.66–1.25)
CREAT SERPL-MCNC: 2.79 MG/DL (ref 0.66–1.25)
CREAT SERPL-MCNC: 2.81 MG/DL (ref 0.66–1.25)
CREAT UR-MCNC: 141 MG/DL
CREAT UR-MCNC: 141 MG/DL
CREATININE (EXTERNAL): 1 MG/DL (ref 0.7–1.18)
CROSSMATCH: NORMAL
DIASTOLIC BLOOD PRESSURE - MUSE: NORMAL MMHG
ENTEROCOCCUS FAECALIS: NOT DETECTED
ENTEROCOCCUS FAECIUM: NOT DETECTED
EOSINOPHIL # BLD AUTO: 0.2 10E3/UL (ref 0–0.7)
EOSINOPHIL # BLD MANUAL: 0 10E3/UL (ref 0–0.7)
EOSINOPHIL NFR BLD AUTO: 4 %
EOSINOPHIL NFR BLD MANUAL: 0 %
ERYTHROCYTE [DISTWIDTH] IN BLOOD BY AUTOMATED COUNT: 16.8 % (ref 10–15)
ERYTHROCYTE [DISTWIDTH] IN BLOOD BY AUTOMATED COUNT: 16.9 % (ref 10–15)
ERYTHROCYTE [DISTWIDTH] IN BLOOD BY AUTOMATED COUNT: 17 % (ref 10–15)
ERYTHROCYTE [DISTWIDTH] IN BLOOD BY AUTOMATED COUNT: 17 % (ref 10–15)
ERYTHROCYTE [DISTWIDTH] IN BLOOD BY AUTOMATED COUNT: 17.2 % (ref 10–15)
ERYTHROCYTE [DISTWIDTH] IN BLOOD BY AUTOMATED COUNT: 17.3 % (ref 10–15)
ERYTHROCYTE [DISTWIDTH] IN BLOOD BY AUTOMATED COUNT: 17.4 % (ref 10–15)
ERYTHROCYTE [DISTWIDTH] IN BLOOD BY AUTOMATED COUNT: 17.6 % (ref 10–15)
ERYTHROCYTE [DISTWIDTH] IN BLOOD BY AUTOMATED COUNT: 17.6 % (ref 10–15)
FASTING STATUS PATIENT QL REPORTED: YES
FRACT EXCRET NA UR+SERPL-RTO: NORMAL %
GFR ESTIMATED (EXTERNAL): 71 ML/MIN/1.73M2
GFR ESTIMATED (IF AFRICAN AMERICAN) (EXTERNAL): 83 ML/MIN/1.73M2
GFR SERPL CREATININE-BSD FRML MDRD: 22 ML/MIN/1.73M2
GFR SERPL CREATININE-BSD FRML MDRD: 22 ML/MIN/1.73M2
GFR SERPL CREATININE-BSD FRML MDRD: 24 ML/MIN/1.73M2
GFR SERPL CREATININE-BSD FRML MDRD: 31 ML/MIN/1.73M2
GFR SERPL CREATININE-BSD FRML MDRD: 33 ML/MIN/1.73M2
GFR SERPL CREATININE-BSD FRML MDRD: 33 ML/MIN/1.73M2
GFR SERPL CREATININE-BSD FRML MDRD: 35 ML/MIN/1.73M2
GFR SERPL CREATININE-BSD FRML MDRD: 37 ML/MIN/1.73M2
GFR SERPL CREATININE-BSD FRML MDRD: 39 ML/MIN/1.73M2
GFR SERPL CREATININE-BSD FRML MDRD: 42 ML/MIN/1.73M2
GFR SERPL CREATININE-BSD FRML MDRD: 50 ML/MIN/1.73M2
GFR SERPL CREATININE-BSD FRML MDRD: 50 ML/MIN/1.73M2
GFR SERPL CREATININE-BSD FRML MDRD: 51 ML/MIN/1.73M2
GFR SERPL CREATININE-BSD FRML MDRD: 55 ML/MIN/1.73M2
GFR SERPL CREATININE-BSD FRML MDRD: 69 ML/MIN/1.73M2
GFR SERPL CREATININE-BSD FRML MDRD: 76 ML/MIN/1.73M2
GFR SERPL CREATININE-BSD FRML MDRD: 81 ML/MIN/1.73M2
GFR SERPL CREATININE-BSD FRML MDRD: 84 ML/MIN/1.73M2
GFR SERPL CREATININE-BSD FRML MDRD: 87 ML/MIN/1.73M2
GFR SERPL CREATININE-BSD FRML MDRD: >60 ML/MIN/1.73M2
GLUCOSE (EXTERNAL): 305 MG/DL (ref 65–99)
GLUCOSE BLD-MCNC: 118 MG/DL (ref 70–99)
GLUCOSE BLD-MCNC: 120 MG/DL (ref 70–99)
GLUCOSE BLD-MCNC: 125 MG/DL (ref 70–99)
GLUCOSE BLD-MCNC: 126 MG/DL (ref 70–99)
GLUCOSE BLD-MCNC: 136 MG/DL (ref 70–99)
GLUCOSE BLD-MCNC: 144 MG/DL (ref 70–99)
GLUCOSE BLD-MCNC: 156 MG/DL (ref 70–99)
GLUCOSE BLD-MCNC: 167 MG/DL (ref 70–99)
GLUCOSE BLD-MCNC: 169 MG/DL (ref 70–99)
GLUCOSE BLD-MCNC: 174 MG/DL (ref 70–99)
GLUCOSE BLD-MCNC: 220 MG/DL (ref 70–99)
GLUCOSE BLD-MCNC: 245 MG/DL (ref 70–99)
GLUCOSE BLD-MCNC: 255 MG/DL (ref 70–99)
GLUCOSE BLD-MCNC: 296 MG/DL (ref 70–99)
GLUCOSE BLD-MCNC: 310 MG/DL (ref 70–99)
GLUCOSE BLD-MCNC: 323 MG/DL (ref 70–99)
GLUCOSE BLD-MCNC: 340 MG/DL (ref 70–99)
GLUCOSE BLD-MCNC: 371 MG/DL (ref 70–99)
GLUCOSE BLD-MCNC: 77 MG/DL (ref 70–99)
GLUCOSE BLD-MCNC: 98 MG/DL (ref 70–99)
GLUCOSE BLDC GLUCOMTR-MCNC: 110 MG/DL (ref 70–99)
GLUCOSE BLDC GLUCOMTR-MCNC: 110 MG/DL (ref 70–99)
GLUCOSE BLDC GLUCOMTR-MCNC: 114 MG/DL (ref 70–99)
GLUCOSE BLDC GLUCOMTR-MCNC: 115 MG/DL (ref 70–99)
GLUCOSE BLDC GLUCOMTR-MCNC: 118 MG/DL (ref 70–99)
GLUCOSE BLDC GLUCOMTR-MCNC: 118 MG/DL (ref 70–99)
GLUCOSE BLDC GLUCOMTR-MCNC: 119 MG/DL (ref 70–99)
GLUCOSE BLDC GLUCOMTR-MCNC: 120 MG/DL (ref 70–99)
GLUCOSE BLDC GLUCOMTR-MCNC: 123 MG/DL (ref 70–99)
GLUCOSE BLDC GLUCOMTR-MCNC: 126 MG/DL (ref 70–99)
GLUCOSE BLDC GLUCOMTR-MCNC: 128 MG/DL (ref 70–99)
GLUCOSE BLDC GLUCOMTR-MCNC: 130 MG/DL (ref 70–99)
GLUCOSE BLDC GLUCOMTR-MCNC: 130 MG/DL (ref 70–99)
GLUCOSE BLDC GLUCOMTR-MCNC: 132 MG/DL (ref 70–99)
GLUCOSE BLDC GLUCOMTR-MCNC: 132 MG/DL (ref 70–99)
GLUCOSE BLDC GLUCOMTR-MCNC: 137 MG/DL (ref 70–99)
GLUCOSE BLDC GLUCOMTR-MCNC: 139 MG/DL (ref 70–99)
GLUCOSE BLDC GLUCOMTR-MCNC: 141 MG/DL (ref 70–99)
GLUCOSE BLDC GLUCOMTR-MCNC: 148 MG/DL (ref 70–99)
GLUCOSE BLDC GLUCOMTR-MCNC: 152 MG/DL (ref 70–99)
GLUCOSE BLDC GLUCOMTR-MCNC: 153 MG/DL (ref 70–99)
GLUCOSE BLDC GLUCOMTR-MCNC: 157 MG/DL (ref 70–99)
GLUCOSE BLDC GLUCOMTR-MCNC: 158 MG/DL (ref 70–99)
GLUCOSE BLDC GLUCOMTR-MCNC: 160 MG/DL (ref 70–99)
GLUCOSE BLDC GLUCOMTR-MCNC: 160 MG/DL (ref 70–99)
GLUCOSE BLDC GLUCOMTR-MCNC: 161 MG/DL (ref 70–99)
GLUCOSE BLDC GLUCOMTR-MCNC: 171 MG/DL (ref 70–99)
GLUCOSE BLDC GLUCOMTR-MCNC: 193 MG/DL (ref 70–99)
GLUCOSE BLDC GLUCOMTR-MCNC: 204 MG/DL (ref 70–99)
GLUCOSE BLDC GLUCOMTR-MCNC: 206 MG/DL (ref 70–99)
GLUCOSE BLDC GLUCOMTR-MCNC: 213 MG/DL (ref 70–99)
GLUCOSE BLDC GLUCOMTR-MCNC: 223 MG/DL (ref 70–99)
GLUCOSE BLDC GLUCOMTR-MCNC: 231 MG/DL (ref 70–99)
GLUCOSE BLDC GLUCOMTR-MCNC: 247 MG/DL (ref 70–99)
GLUCOSE BLDC GLUCOMTR-MCNC: 255 MG/DL (ref 70–99)
GLUCOSE BLDC GLUCOMTR-MCNC: 258 MG/DL (ref 70–99)
GLUCOSE BLDC GLUCOMTR-MCNC: 262 MG/DL (ref 70–99)
GLUCOSE BLDC GLUCOMTR-MCNC: 268 MG/DL (ref 70–99)
GLUCOSE BLDC GLUCOMTR-MCNC: 268 MG/DL (ref 70–99)
GLUCOSE BLDC GLUCOMTR-MCNC: 277 MG/DL (ref 70–99)
GLUCOSE BLDC GLUCOMTR-MCNC: 291 MG/DL (ref 70–99)
GLUCOSE BLDC GLUCOMTR-MCNC: 309 MG/DL (ref 70–99)
GLUCOSE BLDC GLUCOMTR-MCNC: 316 MG/DL (ref 70–99)
GLUCOSE BLDC GLUCOMTR-MCNC: 328 MG/DL (ref 70–99)
GLUCOSE BLDC GLUCOMTR-MCNC: 345 MG/DL (ref 70–99)
GLUCOSE BLDC GLUCOMTR-MCNC: 350 MG/DL (ref 70–99)
GLUCOSE BLDC GLUCOMTR-MCNC: 77 MG/DL (ref 70–99)
GLUCOSE BLDC GLUCOMTR-MCNC: 88 MG/DL (ref 70–99)
GLUCOSE BLDC GLUCOMTR-MCNC: 92 MG/DL (ref 70–99)
GLUCOSE UR STRIP-MCNC: >=1000 MG/DL
HBA1C MFR BLD: 6.8 % (ref 0–5.6)
HBA1C MFR BLD: 8.7 %
HCO3 BLD-SCNC: 18 MMOL/L (ref 21–28)
HCO3 BLD-SCNC: 19 MMOL/L (ref 21–28)
HCO3 BLD-SCNC: 21 MMOL/L (ref 21–28)
HCO3 BLD-SCNC: 22 MMOL/L (ref 21–28)
HCO3 BLD-SCNC: 22 MMOL/L (ref 21–28)
HCO3 BLD-SCNC: 23 MMOL/L (ref 21–28)
HCO3 BLD-SCNC: 23 MMOL/L (ref 21–28)
HCO3 BLDA-SCNC: 20 MMOL/L (ref 21–28)
HCO3 BLDA-SCNC: 27 MMOL/L (ref 21–28)
HCO3 BLDV-SCNC: 23 MMOL/L (ref 21–28)
HCO3 BLDV-SCNC: 28 MMOL/L (ref 21–28)
HCT VFR BLD AUTO: 26.2 % (ref 40–53)
HCT VFR BLD AUTO: 27.3 % (ref 40–53)
HCT VFR BLD AUTO: 28.2 % (ref 40–53)
HCT VFR BLD AUTO: 28.7 % (ref 40–53)
HCT VFR BLD AUTO: 29.6 % (ref 40–53)
HCT VFR BLD AUTO: 30.7 % (ref 40–53)
HCT VFR BLD AUTO: 31 % (ref 40–53)
HCT VFR BLD AUTO: 31.4 % (ref 40–53)
HCT VFR BLD AUTO: 32.7 % (ref 40–53)
HCT VFR BLD AUTO: 38.5 % (ref 40–53)
HCT VFR BLD AUTO: 41.1 % (ref 40–53)
HCT VFR BLD AUTO: 42.3 % (ref 40–53)
HCT VFR BLD CALC: 26 % (ref 40–53)
HGB BLD-MCNC: 10.3 G/DL (ref 13.3–17.7)
HGB BLD-MCNC: 10.4 G/DL (ref 13.3–17.7)
HGB BLD-MCNC: 12.2 G/DL (ref 13.3–17.7)
HGB BLD-MCNC: 12.8 G/DL (ref 13.3–17.7)
HGB BLD-MCNC: 13.2 G/DL (ref 13.3–17.7)
HGB BLD-MCNC: 8.7 G/DL (ref 13.3–17.7)
HGB BLD-MCNC: 8.8 G/DL (ref 13.3–17.7)
HGB BLD-MCNC: 9 G/DL (ref 13.3–17.7)
HGB BLD-MCNC: 9.1 G/DL (ref 13.3–17.7)
HGB BLD-MCNC: 9.2 G/DL (ref 13.3–17.7)
HGB BLD-MCNC: 9.3 G/DL (ref 13.3–17.7)
HGB BLD-MCNC: 9.3 G/DL (ref 13.3–17.7)
HGB BLD-MCNC: 9.5 G/DL (ref 13.3–17.7)
HGB BLD-MCNC: 9.8 G/DL (ref 13.3–17.7)
HGB UR QL STRIP: ABNORMAL
HOLD SPECIMEN: NORMAL
IMM GRANULOCYTES # BLD: 0 10E3/UL
IMM GRANULOCYTES NFR BLD: 0 %
INR PPP: 1.17 (ref 0.85–1.15)
INR PPP: 1.21 (ref 0.85–1.15)
INTERPRETATION ECG - MUSE: NORMAL
ISSUE DATE AND TIME: NORMAL
KETONES UR STRIP-MCNC: NEGATIVE MG/DL
LACTATE BLD-SCNC: 0.5 MMOL/L
LACTATE BLD-SCNC: 0.5 MMOL/L
LACTATE BLD-SCNC: 1.4 MMOL/L
LACTATE SERPL-SCNC: 1.3 MMOL/L (ref 0.7–2)
LACTATE SERPL-SCNC: 1.3 MMOL/L (ref 0.7–2)
LACTATE SERPL-SCNC: 1.4 MMOL/L (ref 0.7–2)
LACTATE SERPL-SCNC: 1.5 MMOL/L (ref 0.7–2)
LACTATE SERPL-SCNC: 1.8 MMOL/L (ref 0.7–2)
LACTATE SERPL-SCNC: 2 MMOL/L (ref 0.7–2)
LACTATE SERPL-SCNC: 2 MMOL/L (ref 0.7–2)
LACTATE SERPL-SCNC: 2.3 MMOL/L (ref 0.7–2)
LACTATE SERPL-SCNC: 2.7 MMOL/L (ref 0.7–2)
LEUKOCYTE ESTERASE UR QL STRIP: NEGATIVE
LIPASE SERPL-CCNC: 24 U/L (ref 73–393)
LISTERIA SPECIES (DETECTED/NOT DETECTED): NOT DETECTED
LVEF ECHO: NORMAL
LYMPHOCYTES # BLD AUTO: 1.2 10E3/UL (ref 0.8–5.3)
LYMPHOCYTES # BLD MANUAL: 1 10E3/UL (ref 0.8–5.3)
LYMPHOCYTES NFR BLD AUTO: 23 %
LYMPHOCYTES NFR BLD MANUAL: 8 %
MAGNESIUM SERPL-MCNC: 2.5 MG/DL (ref 1.6–2.3)
MAGNESIUM SERPL-MCNC: 2.6 MG/DL (ref 1.6–2.3)
MAGNESIUM SERPL-MCNC: 2.6 MG/DL (ref 1.6–2.3)
MAGNESIUM SERPL-MCNC: 2.9 MG/DL (ref 1.6–2.3)
MAGNESIUM SERPL-MCNC: 3 MG/DL (ref 1.6–2.3)
MAGNESIUM SERPL-MCNC: 3.1 MG/DL (ref 1.6–2.3)
MCH RBC QN AUTO: 26.8 PG (ref 26.5–33)
MCH RBC QN AUTO: 26.9 PG (ref 26.5–33)
MCH RBC QN AUTO: 26.9 PG (ref 26.5–33)
MCH RBC QN AUTO: 27 PG (ref 26.5–33)
MCH RBC QN AUTO: 27.1 PG (ref 26.5–33)
MCH RBC QN AUTO: 27.2 PG (ref 26.5–33)
MCH RBC QN AUTO: 27.3 PG (ref 26.5–33)
MCH RBC QN AUTO: 27.4 PG (ref 26.5–33)
MCH RBC QN AUTO: 27.6 PG (ref 26.5–33)
MCH RBC QN AUTO: 27.6 PG (ref 26.5–33)
MCH RBC QN AUTO: 27.8 PG (ref 26.5–33)
MCH RBC QN AUTO: 27.8 PG (ref 26.5–33)
MCHC RBC AUTO-ENTMCNC: 30.6 G/DL (ref 31.5–36.5)
MCHC RBC AUTO-ENTMCNC: 31.1 G/DL (ref 31.5–36.5)
MCHC RBC AUTO-ENTMCNC: 31.2 G/DL (ref 31.5–36.5)
MCHC RBC AUTO-ENTMCNC: 31.4 G/DL (ref 31.5–36.5)
MCHC RBC AUTO-ENTMCNC: 31.7 G/DL (ref 31.5–36.5)
MCHC RBC AUTO-ENTMCNC: 31.8 G/DL (ref 31.5–36.5)
MCHC RBC AUTO-ENTMCNC: 31.9 G/DL (ref 31.5–36.5)
MCHC RBC AUTO-ENTMCNC: 32.4 G/DL (ref 31.5–36.5)
MCHC RBC AUTO-ENTMCNC: 32.6 G/DL (ref 31.5–36.5)
MCHC RBC AUTO-ENTMCNC: 32.8 G/DL (ref 31.5–36.5)
MCHC RBC AUTO-ENTMCNC: 33 G/DL (ref 31.5–36.5)
MCHC RBC AUTO-ENTMCNC: 33.2 G/DL (ref 31.5–36.5)
MCV RBC AUTO: 82 FL (ref 78–100)
MCV RBC AUTO: 83 FL (ref 78–100)
MCV RBC AUTO: 83 FL (ref 78–100)
MCV RBC AUTO: 84 FL (ref 78–100)
MCV RBC AUTO: 84 FL (ref 78–100)
MCV RBC AUTO: 85 FL (ref 78–100)
MCV RBC AUTO: 85 FL (ref 78–100)
MCV RBC AUTO: 86 FL (ref 78–100)
MCV RBC AUTO: 86 FL (ref 78–100)
MCV RBC AUTO: 88 FL (ref 78–100)
MCV RBC AUTO: 89 FL (ref 78–100)
MCV RBC AUTO: 89 FL (ref 78–100)
METAMYELOCYTES # BLD MANUAL: 0.1 10E3/UL
METAMYELOCYTES NFR BLD MANUAL: 1 %
MONOCYTES # BLD AUTO: 0.5 10E3/UL (ref 0–1.3)
MONOCYTES # BLD MANUAL: 0.9 10E3/UL (ref 0–1.3)
MONOCYTES NFR BLD AUTO: 10 %
MONOCYTES NFR BLD MANUAL: 7 %
MUCOUS THREADS #/AREA URNS LPF: PRESENT /LPF
NEUTROPHILS # BLD AUTO: 3.2 10E3/UL (ref 1.6–8.3)
NEUTROPHILS # BLD MANUAL: 10.7 10E3/UL (ref 1.6–8.3)
NEUTROPHILS NFR BLD AUTO: 62 %
NEUTROPHILS NFR BLD MANUAL: 84 %
NITRATE UR QL: NEGATIVE
NRBC # BLD AUTO: 0 10E3/UL
NRBC BLD AUTO-RTO: 0 /100
NT-PROBNP SERPL-MCNC: 2445 PG/ML (ref 0–1800)
NT-PROBNP SERPL-MCNC: 5414 PG/ML (ref 0–1800)
O2/TOTAL GAS SETTING VFR VENT: 0 %
O2/TOTAL GAS SETTING VFR VENT: 21 %
O2/TOTAL GAS SETTING VFR VENT: 30 %
O2/TOTAL GAS SETTING VFR VENT: 30 %
O2/TOTAL GAS SETTING VFR VENT: 32 %
O2/TOTAL GAS SETTING VFR VENT: 40 %
OXYHGB MFR BLD: 92 % (ref 92–100)
OXYHGB MFR BLD: 98 % (ref 92–100)
OXYHGB MFR BLDV: 51 % (ref 70–75)
P AXIS - MUSE: 58 DEGREES
P AXIS - MUSE: 80 DEGREES
P AXIS - MUSE: NORMAL DEGREES
PATH REPORT.COMMENTS IMP SPEC: NORMAL
PATH REPORT.FINAL DX SPEC: NORMAL
PATH REPORT.FINAL DX SPEC: NORMAL
PATH REPORT.GROSS SPEC: NORMAL
PATH REPORT.GROSS SPEC: NORMAL
PATH REPORT.MICROSCOPIC SPEC OTHER STN: NORMAL
PATH REPORT.MICROSCOPIC SPEC OTHER STN: NORMAL
PATH REPORT.RELEVANT HX SPEC: NORMAL
PATH REPORT.RELEVANT HX SPEC: NORMAL
PCO2 BLD: 28 MM HG (ref 35–45)
PCO2 BLD: 30 MM HG (ref 35–45)
PCO2 BLD: 32 MM HG (ref 35–45)
PCO2 BLD: 33 MM HG (ref 35–45)
PCO2 BLD: 36 MM HG (ref 35–45)
PCO2 BLD: 36 MM HG (ref 35–45)
PCO2 BLD: 38 MM HG (ref 35–45)
PCO2 BLDA: 42 MM HG (ref 35–45)
PCO2 BLDA: 43 MM HG (ref 35–45)
PCO2 BLDV: 34 MM HG (ref 40–50)
PCO2 BLDV: 36 MM HG (ref 40–50)
PCO2 BLDV: 38 MM HG (ref 40–50)
PCO2 BLDV: 47 MM HG (ref 40–50)
PH BLD: 7.31 [PH] (ref 7.35–7.45)
PH BLD: 7.37 [PH] (ref 7.35–7.45)
PH BLD: 7.4 [PH] (ref 7.35–7.45)
PH BLD: 7.42 [PH] (ref 7.35–7.45)
PH BLD: 7.43 [PH] (ref 7.35–7.45)
PH BLD: 7.46 [PH] (ref 7.35–7.45)
PH BLD: 7.49 [PH] (ref 7.35–7.45)
PH BLDA: 7.29 [PH] (ref 7.35–7.45)
PH BLDA: 7.4 [PH] (ref 7.35–7.45)
PH BLDV: 7.39 [PH] (ref 7.32–7.43)
PH BLDV: 7.39 [PH] (ref 7.32–7.43)
PH BLDV: 7.42 [PH] (ref 7.32–7.43)
PH BLDV: 7.44 [PH] (ref 7.32–7.43)
PH UR STRIP: 5 [PH] (ref 5–7)
PHOSPHATE SERPL-MCNC: 3 MG/DL (ref 2.5–4.5)
PHOSPHATE SERPL-MCNC: 3 MG/DL (ref 2.5–4.5)
PHOSPHATE SERPL-MCNC: 3.6 MG/DL (ref 2.5–4.5)
PHOSPHATE SERPL-MCNC: 3.6 MG/DL (ref 2.5–4.5)
PHOSPHATE SERPL-MCNC: 3.7 MG/DL (ref 2.5–4.5)
PHOSPHATE SERPL-MCNC: 4 MG/DL (ref 2.5–4.5)
PHOTO IMAGE: NORMAL
PHOTO IMAGE: NORMAL
PLAT MORPH BLD: ABNORMAL
PLATELET # BLD AUTO: 140 10E3/UL (ref 150–450)
PLATELET # BLD AUTO: 157 10E3/UL (ref 150–450)
PLATELET # BLD AUTO: 166 10E3/UL (ref 150–450)
PLATELET # BLD AUTO: 171 10E3/UL (ref 150–450)
PLATELET # BLD AUTO: 172 10E3/UL (ref 150–450)
PLATELET # BLD AUTO: 180 10E3/UL (ref 150–450)
PLATELET # BLD AUTO: 181 10E3/UL (ref 150–450)
PLATELET # BLD AUTO: 182 10E3/UL (ref 150–450)
PLATELET # BLD AUTO: 191 10E3/UL (ref 150–450)
PLATELET # BLD AUTO: 192 10E3/UL (ref 150–450)
PLATELET # BLD AUTO: 198 10E3/UL (ref 150–450)
PLATELET # BLD AUTO: 255 10E3/UL (ref 150–450)
PO2 BLD: 101 MM HG (ref 80–105)
PO2 BLD: 109 MM HG (ref 80–105)
PO2 BLD: 113 MM HG (ref 80–105)
PO2 BLD: 173 MM HG (ref 80–105)
PO2 BLD: 63 MM HG (ref 80–105)
PO2 BLD: 69 MM HG (ref 80–105)
PO2 BLD: 77 MM HG (ref 80–105)
PO2 BLDA: 353 MM HG (ref 80–105)
PO2 BLDA: 77 MM HG (ref 80–105)
PO2 BLDV: 25 MM HG (ref 25–47)
PO2 BLDV: 29 MM HG (ref 25–47)
PO2 BLDV: 41 MM HG (ref 25–47)
PO2 BLDV: 52 MM HG (ref 25–47)
POLYCHROMASIA BLD QL SMEAR: SLIGHT
POTASSIUM (EXTERNAL): 4.9 MMOL/L (ref 3.5–5.3)
POTASSIUM BLD-SCNC: 4.2 MMOL/L (ref 3.4–5.3)
POTASSIUM BLD-SCNC: 4.3 MMOL/L (ref 3.4–5.3)
POTASSIUM BLD-SCNC: 4.4 MMOL/L (ref 3.4–5.3)
POTASSIUM BLD-SCNC: 4.5 MMOL/L (ref 3.4–5.3)
POTASSIUM BLD-SCNC: 4.6 MMOL/L (ref 3.4–5.3)
POTASSIUM BLD-SCNC: 4.7 MMOL/L (ref 3.4–5.3)
POTASSIUM BLD-SCNC: 4.8 MMOL/L (ref 3.4–5.3)
POTASSIUM BLD-SCNC: 4.9 MMOL/L (ref 3.4–5.3)
POTASSIUM BLD-SCNC: 5 MMOL/L (ref 3.4–5.3)
POTASSIUM BLD-SCNC: 5.1 MMOL/L (ref 3.4–5.3)
POTASSIUM BLD-SCNC: 5.2 MMOL/L (ref 3.4–5.3)
POTASSIUM BLD-SCNC: 5.3 MMOL/L (ref 3.4–5.3)
POTASSIUM BLD-SCNC: 5.8 MMOL/L (ref 3.4–5.3)
POTASSIUM BLD-SCNC: 6 MMOL/L (ref 3.4–5.3)
POTASSIUM BLD-SCNC: 6.1 MMOL/L (ref 3.4–5.3)
POTASSIUM BLD-SCNC: 6.2 MMOL/L (ref 3.4–5.3)
PR INTERVAL - MUSE: 222 MS
PR INTERVAL - MUSE: 248 MS
PR INTERVAL - MUSE: NORMAL MS
PROT SERPL-MCNC: 5.7 G/DL (ref 6.8–8.8)
PROT SERPL-MCNC: 5.7 G/DL (ref 6.8–8.8)
QRS DURATION - MUSE: 110 MS
QRS DURATION - MUSE: 116 MS
QRS DURATION - MUSE: 116 MS
QT - MUSE: 318 MS
QT - MUSE: 400 MS
QT - MUSE: 442 MS
QTC - MUSE: 412 MS
QTC - MUSE: 448 MS
QTC - MUSE: 493 MS
R AXIS - MUSE: -2 DEGREES
R AXIS - MUSE: -4 DEGREES
R AXIS - MUSE: -9 DEGREES
RADIOLOGIST FLAGS: ABNORMAL
RBC # BLD AUTO: 3.21 10E6/UL (ref 4.4–5.9)
RBC # BLD AUTO: 3.28 10E6/UL (ref 4.4–5.9)
RBC # BLD AUTO: 3.31 10E6/UL (ref 4.4–5.9)
RBC # BLD AUTO: 3.35 10E6/UL (ref 4.4–5.9)
RBC # BLD AUTO: 3.45 10E6/UL (ref 4.4–5.9)
RBC # BLD AUTO: 3.48 10E6/UL (ref 4.4–5.9)
RBC # BLD AUTO: 3.66 10E6/UL (ref 4.4–5.9)
RBC # BLD AUTO: 3.73 10E6/UL (ref 4.4–5.9)
RBC # BLD AUTO: 3.82 10E6/UL (ref 4.4–5.9)
RBC # BLD AUTO: 4.54 10E6/UL (ref 4.4–5.9)
RBC # BLD AUTO: 4.76 10E6/UL (ref 4.4–5.9)
RBC # BLD AUTO: 4.78 10E6/UL (ref 4.4–5.9)
RBC MORPH BLD: ABNORMAL
RBC URINE: 9 /HPF
SAO2 % BLDV: 46 % (ref 94–100)
SAO2 % BLDV: 74 % (ref 94–100)
SARS-COV-2 RNA RESP QL NAA+PROBE: NEGATIVE
SODIUM BLD-SCNC: 142 MMOL/L (ref 133–144)
SODIUM SERPL-SCNC: 131 MMOL/L (ref 133–144)
SODIUM SERPL-SCNC: 133 MMOL/L (ref 133–144)
SODIUM SERPL-SCNC: 134 MMOL/L (ref 133–144)
SODIUM SERPL-SCNC: 134 MMOL/L (ref 133–144)
SODIUM SERPL-SCNC: 136 MMOL/L (ref 133–144)
SODIUM SERPL-SCNC: 137 MMOL/L (ref 133–144)
SODIUM SERPL-SCNC: 141 MMOL/L (ref 133–144)
SODIUM SERPL-SCNC: 141 MMOL/L (ref 133–144)
SODIUM SERPL-SCNC: 142 MMOL/L (ref 133–144)
SODIUM SERPL-SCNC: 145 MMOL/L (ref 133–144)
SODIUM SERPL-SCNC: 147 MMOL/L (ref 133–144)
SODIUM SERPL-SCNC: 147 MMOL/L (ref 133–144)
SODIUM SERPL-SCNC: 150 MMOL/L (ref 133–144)
SODIUM SERPL-SCNC: 150 MMOL/L (ref 133–144)
SODIUM SERPL-SCNC: 153 MMOL/L (ref 133–144)
SODIUM UR-SCNC: <5 MMOL/L
SP GR UR STRIP: 1.02 (ref 1–1.03)
SPECIMEN EXPIRATION DATE: NORMAL
SPECIMEN EXPIRATION DATE: NORMAL
STAPHYLOCOCCUS AUREUS: NOT DETECTED
STAPHYLOCOCCUS EPIDERMIDIS: NOT DETECTED
STAPHYLOCOCCUS LUGDUNENSIS: DETECTED
STREPTOCOCCUS AGALACTIAE: NOT DETECTED
STREPTOCOCCUS ANGINOSUS GROUP: NOT DETECTED
STREPTOCOCCUS PNEUMONIAE: NOT DETECTED
STREPTOCOCCUS PYOGENES: NOT DETECTED
STREPTOCOCCUS SPECIES: NOT DETECTED
SYSTOLIC BLOOD PRESSURE - MUSE: NORMAL MMHG
T AXIS - MUSE: 114 DEGREES
T AXIS - MUSE: 121 DEGREES
T AXIS - MUSE: 80 DEGREES
TRIGL SERPL-MCNC: 65 MG/DL
TROPONIN I SERPL HS-MCNC: 63 NG/L
UNIT ABO/RH: NORMAL
UNIT NUMBER: NORMAL
UNIT STATUS: NORMAL
UNIT TYPE ISBT: 5100
UNIT TYPE ISBT: 6200
UROBILINOGEN UR STRIP-MCNC: NORMAL MG/DL
UUN UR-MCNC: 294 MG/DL
UUN/CREAT 24H UR: 2 G/G CR
VENTRICULAR RATE- MUSE: 145 BPM
VENTRICULAR RATE- MUSE: 62 BPM
VENTRICULAR RATE- MUSE: 64 BPM
WBC # BLD AUTO: 10.4 10E3/UL (ref 4–11)
WBC # BLD AUTO: 11.1 10E3/UL (ref 4–11)
WBC # BLD AUTO: 11.1 10E3/UL (ref 4–11)
WBC # BLD AUTO: 11.4 10E3/UL (ref 4–11)
WBC # BLD AUTO: 12.3 10E3/UL (ref 4–11)
WBC # BLD AUTO: 12.7 10E3/UL (ref 4–11)
WBC # BLD AUTO: 13 10E3/UL (ref 4–11)
WBC # BLD AUTO: 5.2 10E3/UL (ref 4–11)
WBC # BLD AUTO: 5.6 10E3/UL (ref 4–11)
WBC # BLD AUTO: 6.8 10E3/UL (ref 4–11)
WBC # BLD AUTO: 7.4 10E3/UL (ref 4–11)
WBC # BLD AUTO: 9.4 10E3/UL (ref 4–11)
WBC URINE: 5 /HPF

## 2022-01-01 PROCEDURE — 80048 BASIC METABOLIC PNL TOTAL CA: CPT | Performed by: INTERNAL MEDICINE

## 2022-01-01 PROCEDURE — 84132 ASSAY OF SERUM POTASSIUM: CPT | Performed by: ANESTHESIOLOGY

## 2022-01-01 PROCEDURE — 258N000003 HC RX IP 258 OP 636: Performed by: INTERNAL MEDICINE

## 2022-01-01 PROCEDURE — 85027 COMPLETE CBC AUTOMATED: CPT | Performed by: INTERNAL MEDICINE

## 2022-01-01 PROCEDURE — 250N000009 HC RX 250: Performed by: ANESTHESIOLOGY

## 2022-01-01 PROCEDURE — 200N000001 HC R&B ICU

## 2022-01-01 PROCEDURE — 82330 ASSAY OF CALCIUM: CPT | Performed by: SURGERY

## 2022-01-01 PROCEDURE — P9045 ALBUMIN (HUMAN), 5%, 250 ML: HCPCS | Performed by: NURSE ANESTHETIST, CERTIFIED REGISTERED

## 2022-01-01 PROCEDURE — 88307 TISSUE EXAM BY PATHOLOGIST: CPT | Mod: 26 | Performed by: PATHOLOGY

## 2022-01-01 PROCEDURE — 250N000011 HC RX IP 250 OP 636: Performed by: PHYSICIAN ASSISTANT

## 2022-01-01 PROCEDURE — 83605 ASSAY OF LACTIC ACID: CPT | Performed by: INTERNAL MEDICINE

## 2022-01-01 PROCEDURE — 250N000013 HC RX MED GY IP 250 OP 250 PS 637: Performed by: SURGERY

## 2022-01-01 PROCEDURE — 250N000009 HC RX 250

## 2022-01-01 PROCEDURE — P9016 RBC LEUKOCYTES REDUCED: HCPCS | Performed by: SURGERY

## 2022-01-01 PROCEDURE — 258N000003 HC RX IP 258 OP 636: Performed by: PHYSICIAN ASSISTANT

## 2022-01-01 PROCEDURE — 44120 REMOVAL OF SMALL INTESTINE: CPT | Mod: AS | Performed by: PHYSICIAN ASSISTANT

## 2022-01-01 PROCEDURE — 250N000013 HC RX MED GY IP 250 OP 250 PS 637: Performed by: HOSPITALIST

## 2022-01-01 PROCEDURE — 82947 ASSAY GLUCOSE BLOOD QUANT: CPT | Performed by: ANESTHESIOLOGY

## 2022-01-01 PROCEDURE — 36415 COLL VENOUS BLD VENIPUNCTURE: CPT | Performed by: PHYSICIAN ASSISTANT

## 2022-01-01 PROCEDURE — 83605 ASSAY OF LACTIC ACID: CPT

## 2022-01-01 PROCEDURE — P9016 RBC LEUKOCYTES REDUCED: HCPCS

## 2022-01-01 PROCEDURE — 94003 VENT MGMT INPAT SUBQ DAY: CPT

## 2022-01-01 PROCEDURE — 250N000009 HC RX 250: Performed by: PHYSICIAN ASSISTANT

## 2022-01-01 PROCEDURE — 250N000025 HC SEVOFLURANE, PER MIN: Performed by: SURGERY

## 2022-01-01 PROCEDURE — 999N000157 HC STATISTIC RCP TIME EA 10 MIN

## 2022-01-01 PROCEDURE — 88302 TISSUE EXAM BY PATHOLOGIST: CPT | Mod: TC | Performed by: SURGERY

## 2022-01-01 PROCEDURE — 999N000009 HC STATISTIC AIRWAY CARE

## 2022-01-01 PROCEDURE — 99233 SBSQ HOSP IP/OBS HIGH 50: CPT | Performed by: INTERNAL MEDICINE

## 2022-01-01 PROCEDURE — 84295 ASSAY OF SERUM SODIUM: CPT

## 2022-01-01 PROCEDURE — 250N000009 HC RX 250: Performed by: SURGERY

## 2022-01-01 PROCEDURE — 82803 BLOOD GASES ANY COMBINATION: CPT

## 2022-01-01 PROCEDURE — 250N000013 HC RX MED GY IP 250 OP 250 PS 637: Performed by: PHYSICIAN ASSISTANT

## 2022-01-01 PROCEDURE — 250N000011 HC RX IP 250 OP 636: Performed by: HOSPITALIST

## 2022-01-01 PROCEDURE — 99285 EMERGENCY DEPT VISIT HI MDM: CPT | Mod: 25

## 2022-01-01 PROCEDURE — 272N000001 HC OR GENERAL SUPPLY STERILE: Performed by: SURGERY

## 2022-01-01 PROCEDURE — 85014 HEMATOCRIT: CPT | Performed by: INTERNAL MEDICINE

## 2022-01-01 PROCEDURE — 87149 DNA/RNA DIRECT PROBE: CPT | Performed by: SURGERY

## 2022-01-01 PROCEDURE — 93306 TTE W/DOPPLER COMPLETE: CPT | Mod: 26 | Performed by: INTERNAL MEDICINE

## 2022-01-01 PROCEDURE — 250N000013 HC RX MED GY IP 250 OP 250 PS 637: Performed by: INTERNAL MEDICINE

## 2022-01-01 PROCEDURE — 2894A VOIDCORRECT: CPT | Performed by: SURGERY

## 2022-01-01 PROCEDURE — 82248 BILIRUBIN DIRECT: CPT | Performed by: INTERNAL MEDICINE

## 2022-01-01 PROCEDURE — 86901 BLOOD TYPING SEROLOGIC RH(D): CPT | Performed by: NURSE PRACTITIONER

## 2022-01-01 PROCEDURE — 250N000012 HC RX MED GY IP 250 OP 636 PS 637: Performed by: SURGERY

## 2022-01-01 PROCEDURE — 370N000003 HC ANESTHESIA WARD SERVICE

## 2022-01-01 PROCEDURE — 83735 ASSAY OF MAGNESIUM: CPT | Performed by: SURGERY

## 2022-01-01 PROCEDURE — 360N000077 HC SURGERY LEVEL 4, PER MIN: Performed by: SURGERY

## 2022-01-01 PROCEDURE — 93005 ELECTROCARDIOGRAM TRACING: CPT

## 2022-01-01 PROCEDURE — 82310 ASSAY OF CALCIUM: CPT | Performed by: SURGERY

## 2022-01-01 PROCEDURE — 99232 SBSQ HOSP IP/OBS MODERATE 35: CPT | Performed by: INTERNAL MEDICINE

## 2022-01-01 PROCEDURE — 49002 REOPENING OF ABDOMEN: CPT | Mod: 58 | Performed by: SURGERY

## 2022-01-01 PROCEDURE — 85730 THROMBOPLASTIN TIME PARTIAL: CPT | Performed by: INTERNAL MEDICINE

## 2022-01-01 PROCEDURE — 36415 COLL VENOUS BLD VENIPUNCTURE: CPT | Performed by: ANESTHESIOLOGY

## 2022-01-01 PROCEDURE — 250N000011 HC RX IP 250 OP 636: Performed by: SURGERY

## 2022-01-01 PROCEDURE — 85018 HEMOGLOBIN: CPT | Performed by: ANESTHESIOLOGY

## 2022-01-01 PROCEDURE — 93010 ELECTROCARDIOGRAM REPORT: CPT | Performed by: INTERNAL MEDICINE

## 2022-01-01 PROCEDURE — 83880 ASSAY OF NATRIURETIC PEPTIDE: CPT | Performed by: SURGERY

## 2022-01-01 PROCEDURE — 85014 HEMATOCRIT: CPT | Performed by: EMERGENCY MEDICINE

## 2022-01-01 PROCEDURE — 255N000002 HC RX 255 OP 636: Performed by: INTERNAL MEDICINE

## 2022-01-01 PROCEDURE — 250N000012 HC RX MED GY IP 250 OP 636 PS 637: Performed by: HOSPITALIST

## 2022-01-01 PROCEDURE — 72070 X-RAY EXAM THORAC SPINE 2VWS: CPT

## 2022-01-01 PROCEDURE — 258N000003 HC RX IP 258 OP 636: Performed by: SURGERY

## 2022-01-01 PROCEDURE — 82310 ASSAY OF CALCIUM: CPT | Performed by: NURSE PRACTITIONER

## 2022-01-01 PROCEDURE — 250N000011 HC RX IP 250 OP 636: Performed by: INTERNAL MEDICINE

## 2022-01-01 PROCEDURE — 999N000208 ECHOCARDIOGRAM COMPLETE

## 2022-01-01 PROCEDURE — C9803 HOPD COVID-19 SPEC COLLECT: HCPCS

## 2022-01-01 PROCEDURE — 82248 BILIRUBIN DIRECT: CPT | Performed by: NURSE PRACTITIONER

## 2022-01-01 PROCEDURE — 99291 CRITICAL CARE FIRST HOUR: CPT | Performed by: SURGERY

## 2022-01-01 PROCEDURE — 250N000011 HC RX IP 250 OP 636: Performed by: NURSE ANESTHETIST, CERTIFIED REGISTERED

## 2022-01-01 PROCEDURE — 258N000003 HC RX IP 258 OP 636

## 2022-01-01 PROCEDURE — 250N000009 HC RX 250: Performed by: INTERNAL MEDICINE

## 2022-01-01 PROCEDURE — 83735 ASSAY OF MAGNESIUM: CPT | Performed by: INTERNAL MEDICINE

## 2022-01-01 PROCEDURE — 49585 PR REPAIR UMBILICAL HERN,5+Y/O,REDUC: CPT | Mod: 51 | Performed by: SURGERY

## 2022-01-01 PROCEDURE — 49585 PR REPAIR UMBILICAL HERN,5+Y/O,REDUC: CPT | Mod: AS | Performed by: PHYSICIAN ASSISTANT

## 2022-01-01 PROCEDURE — 36415 COLL VENOUS BLD VENIPUNCTURE: CPT | Performed by: INTERNAL MEDICINE

## 2022-01-01 PROCEDURE — 80048 BASIC METABOLIC PNL TOTAL CA: CPT | Performed by: SURGERY

## 2022-01-01 PROCEDURE — 94002 VENT MGMT INPAT INIT DAY: CPT

## 2022-01-01 PROCEDURE — 82962 GLUCOSE BLOOD TEST: CPT

## 2022-01-01 PROCEDURE — 84300 ASSAY OF URINE SODIUM: CPT | Performed by: SURGERY

## 2022-01-01 PROCEDURE — 74177 CT ABD & PELVIS W/CONTRAST: CPT

## 2022-01-01 PROCEDURE — 99152 MOD SED SAME PHYS/QHP 5/>YRS: CPT | Performed by: INTERNAL MEDICINE

## 2022-01-01 PROCEDURE — 84100 ASSAY OF PHOSPHORUS: CPT | Performed by: SURGERY

## 2022-01-01 PROCEDURE — 99207 PR NO CHARGE LOS: CPT | Performed by: INTERNAL MEDICINE

## 2022-01-01 PROCEDURE — 370N000017 HC ANESTHESIA TECHNICAL FEE, PER MIN: Performed by: SURGERY

## 2022-01-01 PROCEDURE — 999N000208 ECHOCARDIOGRAM LIMITED

## 2022-01-01 PROCEDURE — 82310 ASSAY OF CALCIUM: CPT | Performed by: HOSPITALIST

## 2022-01-01 PROCEDURE — 80048 BASIC METABOLIC PNL TOTAL CA: CPT

## 2022-01-01 PROCEDURE — 86901 BLOOD TYPING SEROLOGIC RH(D): CPT | Performed by: SURGERY

## 2022-01-01 PROCEDURE — 86923 COMPATIBILITY TEST ELECTRIC: CPT | Performed by: SURGERY

## 2022-01-01 PROCEDURE — 72100 X-RAY EXAM L-S SPINE 2/3 VWS: CPT

## 2022-01-01 PROCEDURE — C1894 INTRO/SHEATH, NON-LASER: HCPCS | Performed by: INTERNAL MEDICINE

## 2022-01-01 PROCEDURE — 99214 OFFICE O/P EST MOD 30 MIN: CPT | Performed by: PHYSICIAN ASSISTANT

## 2022-01-01 PROCEDURE — 93000 ELECTROCARDIOGRAM COMPLETE: CPT | Performed by: INTERNAL MEDICINE

## 2022-01-01 PROCEDURE — 999N000065 XR CHEST PORT 1 VIEW

## 2022-01-01 PROCEDURE — 4A023N6 MEASUREMENT OF CARDIAC SAMPLING AND PRESSURE, RIGHT HEART, PERCUTANEOUS APPROACH: ICD-10-PCS | Performed by: INTERNAL MEDICINE

## 2022-01-01 PROCEDURE — 85027 COMPLETE CBC AUTOMATED: CPT | Performed by: SURGERY

## 2022-01-01 PROCEDURE — 82803 BLOOD GASES ANY COMBINATION: CPT | Performed by: INTERNAL MEDICINE

## 2022-01-01 PROCEDURE — 99223 1ST HOSP IP/OBS HIGH 75: CPT | Mod: AI | Performed by: HOSPITALIST

## 2022-01-01 PROCEDURE — 36415 COLL VENOUS BLD VENIPUNCTURE: CPT | Performed by: NURSE PRACTITIONER

## 2022-01-01 PROCEDURE — 93571 IV DOP VEL&/PRESS C FLO 1ST: CPT | Performed by: INTERNAL MEDICINE

## 2022-01-01 PROCEDURE — 36415 COLL VENOUS BLD VENIPUNCTURE: CPT

## 2022-01-01 PROCEDURE — 83690 ASSAY OF LIPASE: CPT | Performed by: INTERNAL MEDICINE

## 2022-01-01 PROCEDURE — 85610 PROTHROMBIN TIME: CPT | Performed by: NURSE PRACTITIONER

## 2022-01-01 PROCEDURE — 36415 COLL VENOUS BLD VENIPUNCTURE: CPT | Performed by: HOSPITALIST

## 2022-01-01 PROCEDURE — 99153 MOD SED SAME PHYS/QHP EA: CPT | Performed by: INTERNAL MEDICINE

## 2022-01-01 PROCEDURE — U0005 INFEC AGEN DETEC AMPLI PROBE: HCPCS | Performed by: EMERGENCY MEDICINE

## 2022-01-01 PROCEDURE — 99215 OFFICE O/P EST HI 40 MIN: CPT | Performed by: PHYSICIAN ASSISTANT

## 2022-01-01 PROCEDURE — 31500 INSERT EMERGENCY AIRWAY: CPT | Performed by: NURSE ANESTHETIST, CERTIFIED REGISTERED

## 2022-01-01 PROCEDURE — 99222 1ST HOSP IP/OBS MODERATE 55: CPT | Performed by: INTERNAL MEDICINE

## 2022-01-01 PROCEDURE — 93460 R&L HRT ART/VENTRICLE ANGIO: CPT | Performed by: INTERNAL MEDICINE

## 2022-01-01 PROCEDURE — 272N000001 HC OR GENERAL SUPPLY STERILE: Performed by: INTERNAL MEDICINE

## 2022-01-01 PROCEDURE — 99291 CRITICAL CARE FIRST HOUR: CPT | Performed by: INTERNAL MEDICINE

## 2022-01-01 PROCEDURE — 71275 CT ANGIOGRAPHY CHEST: CPT

## 2022-01-01 PROCEDURE — 02HP32Z INSERTION OF MONITORING DEVICE INTO PULMONARY TRUNK, PERCUTANEOUS APPROACH: ICD-10-PCS | Performed by: INTERNAL MEDICINE

## 2022-01-01 PROCEDURE — 83605 ASSAY OF LACTIC ACID: CPT | Performed by: SURGERY

## 2022-01-01 PROCEDURE — 250N000011 HC RX IP 250 OP 636

## 2022-01-01 PROCEDURE — XW043H4 INTRODUCTION OF SYNTHETIC HUMAN ANGIOTENSIN II INTO CENTRAL VEIN, PERCUTANEOUS APPROACH, NEW TECHNOLOGY GROUP 4: ICD-10-PCS | Performed by: HOSPITALIST

## 2022-01-01 PROCEDURE — 88302 TISSUE EXAM BY PATHOLOGIST: CPT | Mod: 26 | Performed by: PATHOLOGY

## 2022-01-01 PROCEDURE — 82040 ASSAY OF SERUM ALBUMIN: CPT | Performed by: SURGERY

## 2022-01-01 PROCEDURE — 86850 RBC ANTIBODY SCREEN: CPT | Performed by: SURGERY

## 2022-01-01 PROCEDURE — 84132 ASSAY OF SERUM POTASSIUM: CPT | Performed by: INTERNAL MEDICINE

## 2022-01-01 PROCEDURE — 83605 ASSAY OF LACTIC ACID: CPT | Performed by: NURSE PRACTITIONER

## 2022-01-01 PROCEDURE — 82803 BLOOD GASES ANY COMBINATION: CPT | Performed by: ANESTHESIOLOGY

## 2022-01-01 PROCEDURE — C9113 INJ PANTOPRAZOLE SODIUM, VIA: HCPCS | Performed by: INTERNAL MEDICINE

## 2022-01-01 PROCEDURE — C1769 GUIDE WIRE: HCPCS | Performed by: INTERNAL MEDICINE

## 2022-01-01 PROCEDURE — 44120 REMOVAL OF SMALL INTESTINE: CPT | Mod: 52 | Performed by: SURGERY

## 2022-01-01 PROCEDURE — 360N000080 HC SURGERY LEVEL 7, PER MIN: Performed by: SURGERY

## 2022-01-01 PROCEDURE — 85610 PROTHROMBIN TIME: CPT | Performed by: INTERNAL MEDICINE

## 2022-01-01 PROCEDURE — 0D1B0Z4 BYPASS ILEUM TO CUTANEOUS, OPEN APPROACH: ICD-10-PCS | Performed by: SURGERY

## 2022-01-01 PROCEDURE — 84100 ASSAY OF PHOSPHORUS: CPT | Performed by: INTERNAL MEDICINE

## 2022-01-01 PROCEDURE — 93321 DOPPLER ECHO F-UP/LMTD STD: CPT | Mod: 26 | Performed by: INTERNAL MEDICINE

## 2022-01-01 PROCEDURE — 258N000003 HC RX IP 258 OP 636: Performed by: ANESTHESIOLOGY

## 2022-01-01 PROCEDURE — 82805 BLOOD GASES W/O2 SATURATION: CPT | Performed by: SURGERY

## 2022-01-01 PROCEDURE — 250N000011 HC RX IP 250 OP 636: Performed by: ANESTHESIOLOGY

## 2022-01-01 PROCEDURE — 36600 WITHDRAWAL OF ARTERIAL BLOOD: CPT

## 2022-01-01 PROCEDURE — 80048 BASIC METABOLIC PNL TOTAL CA: CPT | Performed by: PHYSICIAN ASSISTANT

## 2022-01-01 PROCEDURE — 82803 BLOOD GASES ANY COMBINATION: CPT | Performed by: SURGERY

## 2022-01-01 PROCEDURE — 85027 COMPLETE CBC AUTOMATED: CPT | Performed by: PHYSICIAN ASSISTANT

## 2022-01-01 PROCEDURE — 99291 CRITICAL CARE FIRST HOUR: CPT | Mod: 25 | Performed by: INTERNAL MEDICINE

## 2022-01-01 PROCEDURE — 99222 1ST HOSP IP/OBS MODERATE 55: CPT | Performed by: SURGERY

## 2022-01-01 PROCEDURE — 84155 ASSAY OF PROTEIN SERUM: CPT | Performed by: INTERNAL MEDICINE

## 2022-01-01 PROCEDURE — 99214 OFFICE O/P EST MOD 30 MIN: CPT | Mod: 95 | Performed by: PHYSICIAN ASSISTANT

## 2022-01-01 PROCEDURE — 5A1945Z RESPIRATORY VENTILATION, 24-96 CONSECUTIVE HOURS: ICD-10-PCS | Performed by: SURGERY

## 2022-01-01 PROCEDURE — 85014 HEMATOCRIT: CPT | Performed by: NURSE PRACTITIONER

## 2022-01-01 PROCEDURE — 710N000009 HC RECOVERY PHASE 1, LEVEL 1, PER MIN: Performed by: SURGERY

## 2022-01-01 PROCEDURE — 84484 ASSAY OF TROPONIN QUANT: CPT | Performed by: EMERGENCY MEDICINE

## 2022-01-01 PROCEDURE — 86850 RBC ANTIBODY SCREEN: CPT | Performed by: NURSE PRACTITIONER

## 2022-01-01 PROCEDURE — 36415 COLL VENOUS BLD VENIPUNCTURE: CPT | Performed by: SURGERY

## 2022-01-01 PROCEDURE — 250N000013 HC RX MED GY IP 250 OP 250 PS 637: Performed by: ANESTHESIOLOGY

## 2022-01-01 PROCEDURE — 81001 URINALYSIS AUTO W/SCOPE: CPT | Performed by: SURGERY

## 2022-01-01 PROCEDURE — 82803 BLOOD GASES ANY COMBINATION: CPT | Performed by: NURSE PRACTITIONER

## 2022-01-01 PROCEDURE — 82310 ASSAY OF CALCIUM: CPT | Performed by: INTERNAL MEDICINE

## 2022-01-01 PROCEDURE — 250N000009 HC RX 250: Performed by: NURSE ANESTHETIST, CERTIFIED REGISTERED

## 2022-01-01 PROCEDURE — 99215 OFFICE O/P EST HI 40 MIN: CPT | Performed by: INTERNAL MEDICINE

## 2022-01-01 PROCEDURE — 85007 BL SMEAR W/DIFF WBC COUNT: CPT | Performed by: INTERNAL MEDICINE

## 2022-01-01 PROCEDURE — 84540 ASSAY OF URINE/UREA-N: CPT | Performed by: SURGERY

## 2022-01-01 PROCEDURE — 3E043XZ INTRODUCTION OF VASOPRESSOR INTO CENTRAL VEIN, PERCUTANEOUS APPROACH: ICD-10-PCS | Performed by: SURGERY

## 2022-01-01 PROCEDURE — 99204 OFFICE O/P NEW MOD 45 MIN: CPT | Performed by: SURGERY

## 2022-01-01 PROCEDURE — 99291 CRITICAL CARE FIRST HOUR: CPT | Mod: GC | Performed by: ANESTHESIOLOGY

## 2022-01-01 PROCEDURE — 85027 COMPLETE CBC AUTOMATED: CPT | Performed by: HOSPITALIST

## 2022-01-01 PROCEDURE — 210N000002 HC R&B HEART CARE

## 2022-01-01 PROCEDURE — P9045 ALBUMIN (HUMAN), 5%, 250 ML: HCPCS | Performed by: INTERNAL MEDICINE

## 2022-01-01 PROCEDURE — 88307 TISSUE EXAM BY PATHOLOGIST: CPT | Mod: TC | Performed by: SURGERY

## 2022-01-01 PROCEDURE — 83880 ASSAY OF NATRIURETIC PEPTIDE: CPT | Performed by: EMERGENCY MEDICINE

## 2022-01-01 PROCEDURE — 3E0436Z INTRODUCTION OF NUTRITIONAL SUBSTANCE INTO CENTRAL VEIN, PERCUTANEOUS APPROACH: ICD-10-PCS | Performed by: ANESTHESIOLOGY

## 2022-01-01 PROCEDURE — 83036 HEMOGLOBIN GLYCOSYLATED A1C: CPT | Performed by: HOSPITALIST

## 2022-01-01 PROCEDURE — 0BW17DZ REVISION OF INTRALUMINAL DEVICE IN TRACHEA, VIA NATURAL OR ARTIFICIAL OPENING: ICD-10-PCS | Performed by: ANESTHESIOLOGY

## 2022-01-01 PROCEDURE — 74176 CT ABD & PELVIS W/O CONTRAST: CPT

## 2022-01-01 PROCEDURE — 49650 LAP ING HERNIA REPAIR INIT: CPT | Mod: 50 | Performed by: SURGERY

## 2022-01-01 PROCEDURE — 84100 ASSAY OF PHOSPHORUS: CPT | Performed by: PHYSICIAN ASSISTANT

## 2022-01-01 PROCEDURE — 93451 RIGHT HEART CATH: CPT | Performed by: INTERNAL MEDICINE

## 2022-01-01 PROCEDURE — 0DB80ZZ EXCISION OF SMALL INTESTINE, OPEN APPROACH: ICD-10-PCS | Performed by: SURGERY

## 2022-01-01 PROCEDURE — P9045 ALBUMIN (HUMAN), 5%, 250 ML: HCPCS | Performed by: SURGERY

## 2022-01-01 PROCEDURE — 87077 CULTURE AEROBIC IDENTIFY: CPT | Performed by: SURGERY

## 2022-01-01 PROCEDURE — 999N000054 HC STATISTIC EKG NON-CHARGEABLE

## 2022-01-01 PROCEDURE — 71275 CT ANGIOGRAPHY CHEST: CPT | Mod: 26 | Performed by: INTERNAL MEDICINE

## 2022-01-01 PROCEDURE — 93325 DOPPLER ECHO COLOR FLOW MAPG: CPT | Mod: 26 | Performed by: INTERNAL MEDICINE

## 2022-01-01 PROCEDURE — 71045 X-RAY EXAM CHEST 1 VIEW: CPT

## 2022-01-01 PROCEDURE — 93503 INSERT/PLACE HEART CATHETER: CPT | Performed by: INTERNAL MEDICINE

## 2022-01-01 PROCEDURE — 710N000012 HC RECOVERY PHASE 2, PER MINUTE: Performed by: SURGERY

## 2022-01-01 PROCEDURE — 80069 RENAL FUNCTION PANEL: CPT | Performed by: SURGERY

## 2022-01-01 PROCEDURE — 82805 BLOOD GASES W/O2 SATURATION: CPT | Performed by: INTERNAL MEDICINE

## 2022-01-01 PROCEDURE — C1781 MESH (IMPLANTABLE): HCPCS | Performed by: SURGERY

## 2022-01-01 PROCEDURE — 93308 TTE F-UP OR LMTD: CPT | Mod: 26 | Performed by: INTERNAL MEDICINE

## 2022-01-01 PROCEDURE — 84478 ASSAY OF TRIGLYCERIDES: CPT | Performed by: SURGERY

## 2022-01-01 PROCEDURE — 82805 BLOOD GASES W/O2 SATURATION: CPT | Performed by: ANESTHESIOLOGY

## 2022-01-01 PROCEDURE — 36415 COLL VENOUS BLD VENIPUNCTURE: CPT | Performed by: EMERGENCY MEDICINE

## 2022-01-01 PROCEDURE — 258N000001 HC RX 258: Performed by: SURGERY

## 2022-01-01 PROCEDURE — 82565 ASSAY OF CREATININE: CPT

## 2022-01-01 PROCEDURE — 82310 ASSAY OF CALCIUM: CPT | Performed by: EMERGENCY MEDICINE

## 2022-01-01 PROCEDURE — 80069 RENAL FUNCTION PANEL: CPT | Performed by: PHYSICIAN ASSISTANT

## 2022-01-01 PROCEDURE — 999N000040 HC STATISTIC CONSULT NO CHARGE VASC ACCESS

## 2022-01-01 PROCEDURE — C1751 CATH, INF, PER/CENT/MIDLINE: HCPCS | Performed by: INTERNAL MEDICINE

## 2022-01-01 PROCEDURE — 71046 X-RAY EXAM CHEST 2 VIEWS: CPT

## 2022-01-01 PROCEDURE — 999N000141 HC STATISTIC PRE-PROCEDURE NURSING ASSESSMENT: Performed by: SURGERY

## 2022-01-01 PROCEDURE — 93451 RIGHT HEART CATH: CPT | Mod: 26 | Performed by: INTERNAL MEDICINE

## 2022-01-01 PROCEDURE — 49650 LAP ING HERNIA REPAIR INIT: CPT | Mod: 50 | Performed by: PHYSICIAN ASSISTANT

## 2022-01-01 PROCEDURE — 93571 IV DOP VEL&/PRESS C FLO 1ST: CPT | Mod: 26 | Performed by: INTERNAL MEDICINE

## 2022-01-01 PROCEDURE — 99222 1ST HOSP IP/OBS MODERATE 55: CPT | Mod: 25 | Performed by: INTERNAL MEDICINE

## 2022-01-01 PROCEDURE — S2900 ROBOTIC SURGICAL SYSTEM: HCPCS | Performed by: SURGERY

## 2022-01-01 PROCEDURE — 999N000071 HC STATISTIC HEART CATH LAB OR EP LAB

## 2022-01-01 PROCEDURE — 74174 CTA ABD&PLVS W/CONTRAST: CPT

## 2022-01-01 PROCEDURE — 74174 CTA ABD&PLVS W/CONTRAST: CPT | Mod: 26 | Performed by: INTERNAL MEDICINE

## 2022-01-01 PROCEDURE — 83735 ASSAY OF MAGNESIUM: CPT | Performed by: PHYSICIAN ASSISTANT

## 2022-01-01 PROCEDURE — 99215 OFFICE O/P EST HI 40 MIN: CPT | Performed by: NURSE PRACTITIONER

## 2022-01-01 PROCEDURE — 93460 R&L HRT ART/VENTRICLE ANGIO: CPT | Mod: 26 | Performed by: INTERNAL MEDICINE

## 2022-01-01 PROCEDURE — 99238 HOSP IP/OBS DSCHRG MGMT 30/<: CPT | Mod: GC | Performed by: INTERNAL MEDICINE

## 2022-01-01 PROCEDURE — 999N000184 HC STATISTIC TELEMETRY

## 2022-01-01 DEVICE — MESH PROGRIP LAPAROSCOPIC 5.9X3.9" PARIETEX SELF-FIX LPG1510: Type: IMPLANTABLE DEVICE | Site: INGUINAL | Status: FUNCTIONAL

## 2022-01-01 RX ORDER — PROCHLORPERAZINE 25 MG
12.5 SUPPOSITORY, RECTAL RECTAL EVERY 12 HOURS PRN
Status: DISCONTINUED | OUTPATIENT
Start: 2022-01-01 | End: 2022-01-01

## 2022-01-01 RX ORDER — GLYCOPYRROLATE 0.2 MG/ML
INJECTION, SOLUTION INTRAMUSCULAR; INTRAVENOUS PRN
Status: DISCONTINUED | OUTPATIENT
Start: 2022-01-01 | End: 2022-01-01

## 2022-01-01 RX ORDER — NEOSTIGMINE METHYLSULFATE 1 MG/ML
VIAL (ML) INJECTION PRN
Status: DISCONTINUED | OUTPATIENT
Start: 2022-01-01 | End: 2022-01-01

## 2022-01-01 RX ORDER — NALOXONE HYDROCHLORIDE 0.4 MG/ML
0.2 INJECTION, SOLUTION INTRAMUSCULAR; INTRAVENOUS; SUBCUTANEOUS
Status: DISCONTINUED | OUTPATIENT
Start: 2022-01-01 | End: 2022-01-01 | Stop reason: HOSPADM

## 2022-01-01 RX ORDER — ACYCLOVIR 200 MG/1
0-1 CAPSULE ORAL
Status: DISCONTINUED | OUTPATIENT
Start: 2022-01-01 | End: 2022-01-01 | Stop reason: HOSPADM

## 2022-01-01 RX ORDER — MAGNESIUM HYDROXIDE/ALUMINUM HYDROXICE/SIMETHICONE 120; 1200; 1200 MG/30ML; MG/30ML; MG/30ML
30 SUSPENSION ORAL EVERY 4 HOURS PRN
Status: DISCONTINUED | OUTPATIENT
Start: 2022-01-01 | End: 2022-01-01

## 2022-01-01 RX ORDER — ONDANSETRON 2 MG/ML
4 INJECTION INTRAMUSCULAR; INTRAVENOUS EVERY 30 MIN PRN
Status: DISCONTINUED | OUTPATIENT
Start: 2022-01-01 | End: 2022-01-01 | Stop reason: HOSPADM

## 2022-01-01 RX ORDER — DOBUTAMINE HYDROCHLORIDE 200 MG/100ML
2.5-1 INJECTION INTRAVENOUS CONTINUOUS
Status: DISCONTINUED | OUTPATIENT
Start: 2022-01-01 | End: 2022-01-01

## 2022-01-01 RX ORDER — PHENYLEPHRINE HCL IN 0.9% NACL 50MG/250ML
.1-6 PLASTIC BAG, INJECTION (ML) INTRAVENOUS CONTINUOUS
Status: DISCONTINUED | OUTPATIENT
Start: 2022-01-01 | End: 2022-01-01

## 2022-01-01 RX ORDER — IOPAMIDOL 755 MG/ML
115 INJECTION, SOLUTION INTRAVASCULAR ONCE
Status: COMPLETED | OUTPATIENT
Start: 2022-01-01 | End: 2022-01-01

## 2022-01-01 RX ORDER — PROPOFOL 10 MG/ML
INJECTION, EMULSION INTRAVENOUS PRN
Status: DISCONTINUED | OUTPATIENT
Start: 2022-01-01 | End: 2022-01-01

## 2022-01-01 RX ORDER — FENTANYL CITRATE 50 UG/ML
50 INJECTION, SOLUTION INTRAMUSCULAR; INTRAVENOUS EVERY 5 MIN PRN
Status: DISCONTINUED | OUTPATIENT
Start: 2022-01-01 | End: 2022-01-01 | Stop reason: HOSPADM

## 2022-01-01 RX ORDER — DEXTROSE MONOHYDRATE 25 G/50ML
25-50 INJECTION, SOLUTION INTRAVENOUS
Status: DISCONTINUED | OUTPATIENT
Start: 2022-01-01 | End: 2022-01-01

## 2022-01-01 RX ORDER — MAGNESIUM HYDROXIDE 1200 MG/15ML
LIQUID ORAL PRN
Status: DISCONTINUED | OUTPATIENT
Start: 2022-01-01 | End: 2022-01-01 | Stop reason: HOSPADM

## 2022-01-01 RX ORDER — LIDOCAINE 40 MG/G
CREAM TOPICAL
Status: DISCONTINUED | OUTPATIENT
Start: 2022-01-01 | End: 2022-01-01 | Stop reason: HOSPADM

## 2022-01-01 RX ORDER — HYDROCODONE BITARTRATE AND ACETAMINOPHEN 5; 325 MG/1; MG/1
1 TABLET ORAL
Status: DISCONTINUED | OUTPATIENT
Start: 2022-01-01 | End: 2022-01-01 | Stop reason: HOSPADM

## 2022-01-01 RX ORDER — HYDROCODONE BITARTRATE AND ACETAMINOPHEN 5; 325 MG/1; MG/1
1 TABLET ORAL EVERY 8 HOURS PRN
Status: DISCONTINUED | OUTPATIENT
Start: 2022-01-01 | End: 2022-01-01

## 2022-01-01 RX ORDER — POTASSIUM CHLORIDE 1500 MG/1
20 TABLET, EXTENDED RELEASE ORAL
Status: DISCONTINUED | OUTPATIENT
Start: 2022-01-01 | End: 2022-01-01 | Stop reason: HOSPADM

## 2022-01-01 RX ORDER — NOREPINEPHRINE BITARTRATE 0.02 MG/ML
INJECTION, SOLUTION INTRAVENOUS
Status: DISCONTINUED
Start: 2022-01-01 | End: 2022-01-01 | Stop reason: HOSPADM

## 2022-01-01 RX ORDER — METHYLPREDNISOLONE SODIUM SUCCINATE 125 MG/2ML
125 INJECTION, POWDER, LYOPHILIZED, FOR SOLUTION INTRAMUSCULAR; INTRAVENOUS
Status: DISCONTINUED | OUTPATIENT
Start: 2022-01-01 | End: 2022-01-01 | Stop reason: HOSPADM

## 2022-01-01 RX ORDER — CEFAZOLIN SODIUM/WATER 2 G/20 ML
2 SYRINGE (ML) INTRAVENOUS SEE ADMIN INSTRUCTIONS
Status: DISCONTINUED | OUTPATIENT
Start: 2022-01-01 | End: 2022-01-01 | Stop reason: HOSPADM

## 2022-01-01 RX ORDER — NALOXONE HYDROCHLORIDE 0.4 MG/ML
0.2 INJECTION, SOLUTION INTRAMUSCULAR; INTRAVENOUS; SUBCUTANEOUS
Status: DISCONTINUED | OUTPATIENT
Start: 2022-01-01 | End: 2022-01-01

## 2022-01-01 RX ORDER — ZINC GLUCONATE 50 MG
50 TABLET ORAL DAILY
COMMUNITY

## 2022-01-01 RX ORDER — LIDOCAINE 40 MG/G
CREAM TOPICAL
Status: CANCELLED | OUTPATIENT
Start: 2022-01-01

## 2022-01-01 RX ORDER — NALOXONE HYDROCHLORIDE 0.4 MG/ML
0.4 INJECTION, SOLUTION INTRAMUSCULAR; INTRAVENOUS; SUBCUTANEOUS
Status: DISCONTINUED | OUTPATIENT
Start: 2022-01-01 | End: 2022-01-01 | Stop reason: HOSPADM

## 2022-01-01 RX ORDER — NICOTINE POLACRILEX 4 MG
15-30 LOZENGE BUCCAL
Status: DISCONTINUED | OUTPATIENT
Start: 2022-01-01 | End: 2022-01-01

## 2022-01-01 RX ORDER — HYDROMORPHONE HYDROCHLORIDE 1 MG/ML
0.5 INJECTION, SOLUTION INTRAMUSCULAR; INTRAVENOUS; SUBCUTANEOUS
Status: DISCONTINUED | OUTPATIENT
Start: 2022-01-01 | End: 2022-01-01

## 2022-01-01 RX ORDER — NITROGLYCERIN 0.4 MG/1
0.4 TABLET SUBLINGUAL EVERY 5 MIN PRN
Status: DISCONTINUED | OUTPATIENT
Start: 2022-01-01 | End: 2022-01-01

## 2022-01-01 RX ORDER — DEXMEDETOMIDINE HYDROCHLORIDE 4 UG/ML
.1-1.2 INJECTION, SOLUTION INTRAVENOUS CONTINUOUS
Status: DISCONTINUED | OUTPATIENT
Start: 2022-01-01 | End: 2022-01-01

## 2022-01-01 RX ORDER — ATROPINE SULFATE 0.1 MG/ML
0.5 INJECTION INTRAVENOUS
Status: DISCONTINUED | OUTPATIENT
Start: 2022-01-01 | End: 2022-01-01 | Stop reason: HOSPADM

## 2022-01-01 RX ORDER — FUROSEMIDE 20 MG
TABLET ORAL
Qty: 270 TABLET | Refills: 1 | Status: SHIPPED | OUTPATIENT
Start: 2022-01-01 | End: 2022-01-01

## 2022-01-01 RX ORDER — ONDANSETRON 4 MG/1
4 TABLET, ORALLY DISINTEGRATING ORAL EVERY 6 HOURS PRN
Status: DISCONTINUED | OUTPATIENT
Start: 2022-01-01 | End: 2022-01-01

## 2022-01-01 RX ORDER — ASPIRIN 81 MG/1
81 TABLET ORAL EVERY EVENING
Status: DISCONTINUED | OUTPATIENT
Start: 2022-01-01 | End: 2022-01-01

## 2022-01-01 RX ORDER — ONDANSETRON 2 MG/ML
INJECTION INTRAMUSCULAR; INTRAVENOUS PRN
Status: DISCONTINUED | OUTPATIENT
Start: 2022-01-01 | End: 2022-01-01

## 2022-01-01 RX ORDER — ONDANSETRON 2 MG/ML
4 INJECTION INTRAMUSCULAR; INTRAVENOUS
Status: DISCONTINUED | OUTPATIENT
Start: 2022-01-01 | End: 2022-01-01 | Stop reason: HOSPADM

## 2022-01-01 RX ORDER — LORAZEPAM 2 MG/ML
0.5 INJECTION INTRAMUSCULAR
Status: CANCELLED | OUTPATIENT
Start: 2022-01-01

## 2022-01-01 RX ORDER — FUROSEMIDE 10 MG/ML
20 INJECTION INTRAMUSCULAR; INTRAVENOUS ONCE
Status: DISCONTINUED | OUTPATIENT
Start: 2022-01-01 | End: 2022-01-01

## 2022-01-01 RX ORDER — FENTANYL CITRATE 0.05 MG/ML
25 INJECTION, SOLUTION INTRAMUSCULAR; INTRAVENOUS
Status: CANCELLED | OUTPATIENT
Start: 2022-01-01

## 2022-01-01 RX ORDER — LIDOCAINE 50 MG/G
OINTMENT TOPICAL ONCE
Status: COMPLETED | OUTPATIENT
Start: 2022-01-01 | End: 2022-01-01

## 2022-01-01 RX ORDER — FENTANYL CITRATE 50 UG/ML
INJECTION, SOLUTION INTRAMUSCULAR; INTRAVENOUS PRN
Status: DISCONTINUED | OUTPATIENT
Start: 2022-01-01 | End: 2022-01-01

## 2022-01-01 RX ORDER — POTASSIUM CHLORIDE 1500 MG/1
20 TABLET, EXTENDED RELEASE ORAL
Status: CANCELLED | OUTPATIENT
Start: 2022-01-01

## 2022-01-01 RX ORDER — ACETAMINOPHEN 500 MG
1000 TABLET ORAL ONCE
Status: COMPLETED | OUTPATIENT
Start: 2022-01-01 | End: 2022-01-01

## 2022-01-01 RX ORDER — DIPHENHYDRAMINE HYDROCHLORIDE 50 MG/ML
25-50 INJECTION INTRAMUSCULAR; INTRAVENOUS
Status: DISCONTINUED | OUTPATIENT
Start: 2022-01-01 | End: 2022-01-01 | Stop reason: HOSPADM

## 2022-01-01 RX ORDER — FUROSEMIDE 20 MG
20 TABLET ORAL DAILY
Qty: 30 TABLET | Refills: 3 | Status: SHIPPED | OUTPATIENT
Start: 2022-01-01 | End: 2022-01-01

## 2022-01-01 RX ORDER — OXYCODONE HYDROCHLORIDE 5 MG/1
10 TABLET ORAL EVERY 4 HOURS PRN
Status: DISCONTINUED | OUTPATIENT
Start: 2022-01-01 | End: 2022-01-01 | Stop reason: HOSPADM

## 2022-01-01 RX ORDER — SODIUM CHLORIDE 9 MG/ML
INJECTION, SOLUTION INTRAVENOUS CONTINUOUS
Status: DISCONTINUED | OUTPATIENT
Start: 2022-01-01 | End: 2022-01-01 | Stop reason: HOSPADM

## 2022-01-01 RX ORDER — CEFAZOLIN SODIUM 2 G/100ML
2 INJECTION, SOLUTION INTRAVENOUS
Status: DISCONTINUED | OUTPATIENT
Start: 2022-01-01 | End: 2022-01-01

## 2022-01-01 RX ORDER — OXYCODONE HYDROCHLORIDE 5 MG/1
10 TABLET ORAL EVERY 4 HOURS PRN
Status: DISCONTINUED | OUTPATIENT
Start: 2022-01-01 | End: 2022-01-01

## 2022-01-01 RX ORDER — DEXTROSE MONOHYDRATE 100 MG/ML
INJECTION, SOLUTION INTRAVENOUS CONTINUOUS PRN
Status: DISCONTINUED | OUTPATIENT
Start: 2022-01-01 | End: 2022-01-01

## 2022-01-01 RX ORDER — ONDANSETRON 2 MG/ML
4 INJECTION INTRAMUSCULAR; INTRAVENOUS EVERY 6 HOURS PRN
Status: DISCONTINUED | OUTPATIENT
Start: 2022-01-01 | End: 2022-01-01

## 2022-01-01 RX ORDER — ACETAMINOPHEN 325 MG/1
650 TABLET ORAL EVERY 6 HOURS PRN
Status: DISCONTINUED | OUTPATIENT
Start: 2022-01-01 | End: 2022-01-01

## 2022-01-01 RX ORDER — TAMSULOSIN HYDROCHLORIDE 0.4 MG/1
0.4 CAPSULE ORAL DAILY
Status: DISCONTINUED | OUTPATIENT
Start: 2022-01-01 | End: 2022-01-01

## 2022-01-01 RX ORDER — BUPIVACAINE HYDROCHLORIDE AND EPINEPHRINE 5; 5 MG/ML; UG/ML
INJECTION, SOLUTION PERINEURAL PRN
Status: DISCONTINUED | OUTPATIENT
Start: 2022-01-01 | End: 2022-01-01 | Stop reason: HOSPADM

## 2022-01-01 RX ORDER — OXYCODONE HYDROCHLORIDE 5 MG/1
5 TABLET ORAL EVERY 4 HOURS PRN
Status: DISCONTINUED | OUTPATIENT
Start: 2022-01-01 | End: 2022-01-01 | Stop reason: HOSPADM

## 2022-01-01 RX ORDER — FENTANYL CITRATE 50 UG/ML
50 INJECTION, SOLUTION INTRAMUSCULAR; INTRAVENOUS ONCE
Status: DISCONTINUED | OUTPATIENT
Start: 2022-01-01 | End: 2022-01-01 | Stop reason: HOSPADM

## 2022-01-01 RX ORDER — ACETAMINOPHEN 325 MG/1
650 TABLET ORAL EVERY 4 HOURS PRN
Status: DISCONTINUED | OUTPATIENT
Start: 2022-01-01 | End: 2022-01-01 | Stop reason: HOSPADM

## 2022-01-01 RX ORDER — HYDROMORPHONE HYDROCHLORIDE 2 MG/1
2-4 TABLET ORAL
Status: DISCONTINUED | OUTPATIENT
Start: 2022-01-01 | End: 2022-01-01

## 2022-01-01 RX ORDER — SODIUM CHLORIDE, SODIUM LACTATE, POTASSIUM CHLORIDE, CALCIUM CHLORIDE 600; 310; 30; 20 MG/100ML; MG/100ML; MG/100ML; MG/100ML
INJECTION, SOLUTION INTRAVENOUS CONTINUOUS PRN
Status: DISCONTINUED | OUTPATIENT
Start: 2022-01-01 | End: 2022-01-01

## 2022-01-01 RX ORDER — LIDOCAINE HYDROCHLORIDE 20 MG/ML
INJECTION, SOLUTION INFILTRATION; PERINEURAL PRN
Status: DISCONTINUED | OUTPATIENT
Start: 2022-01-01 | End: 2022-01-01

## 2022-01-01 RX ORDER — FLUMAZENIL 0.1 MG/ML
0.2 INJECTION, SOLUTION INTRAVENOUS
Status: DISCONTINUED | OUTPATIENT
Start: 2022-01-01 | End: 2022-01-01 | Stop reason: HOSPADM

## 2022-01-01 RX ORDER — ATROPINE SULFATE 0.1 MG/ML
0.5 INJECTION INTRAVENOUS
Status: ACTIVE | OUTPATIENT
Start: 2022-01-01 | End: 2022-01-01

## 2022-01-01 RX ORDER — MIDAZOLAM HCL IN 0.9 % NACL/PF 1 MG/ML
1-8 PLASTIC BAG, INJECTION (ML) INTRAVENOUS CONTINUOUS
Status: DISCONTINUED | OUTPATIENT
Start: 2022-01-01 | End: 2022-01-01

## 2022-01-01 RX ORDER — AMOXICILLIN 250 MG
1 CAPSULE ORAL 2 TIMES DAILY PRN
Status: DISCONTINUED | OUTPATIENT
Start: 2022-01-01 | End: 2022-01-01

## 2022-01-01 RX ORDER — ALOGLIPTIN 25 MG/1
1 TABLET, FILM COATED ORAL DAILY
COMMUNITY
Start: 2022-01-01 | End: 2022-01-01

## 2022-01-01 RX ORDER — ASPIRIN 81 MG/1
243 TABLET, CHEWABLE ORAL ONCE
Status: CANCELLED | OUTPATIENT
Start: 2022-01-01

## 2022-01-01 RX ORDER — NICOTINE POLACRILEX 4 MG
15-30 LOZENGE BUCCAL
Status: DISCONTINUED | OUTPATIENT
Start: 2022-01-01 | End: 2022-01-01 | Stop reason: ALTCHOICE

## 2022-01-01 RX ORDER — NALOXONE HYDROCHLORIDE 0.4 MG/ML
0.4 INJECTION, SOLUTION INTRAMUSCULAR; INTRAVENOUS; SUBCUTANEOUS
Status: DISCONTINUED | OUTPATIENT
Start: 2022-01-01 | End: 2022-01-01

## 2022-01-01 RX ORDER — CARVEDILOL 6.25 MG/1
6.25 TABLET ORAL 2 TIMES DAILY WITH MEALS
Status: DISCONTINUED | OUTPATIENT
Start: 2022-01-01 | End: 2022-01-01

## 2022-01-01 RX ORDER — CEFAZOLIN SODIUM 2 G/100ML
2 INJECTION, SOLUTION INTRAVENOUS SEE ADMIN INSTRUCTIONS
Status: DISCONTINUED | OUTPATIENT
Start: 2022-01-01 | End: 2022-01-01

## 2022-01-01 RX ORDER — CEFAZOLIN SODIUM/WATER 2 G/20 ML
2 SYRINGE (ML) INTRAVENOUS
Status: COMPLETED | OUTPATIENT
Start: 2022-01-01 | End: 2022-01-01

## 2022-01-01 RX ORDER — SODIUM CHLORIDE 9 MG/ML
75 INJECTION, SOLUTION INTRAVENOUS CONTINUOUS
Status: DISCONTINUED | OUTPATIENT
Start: 2022-01-01 | End: 2022-01-01

## 2022-01-01 RX ORDER — TRAMADOL HYDROCHLORIDE 50 MG/1
50 TABLET ORAL EVERY 6 HOURS PRN
Status: DISCONTINUED | OUTPATIENT
Start: 2022-01-01 | End: 2022-01-01

## 2022-01-01 RX ORDER — HYDROCODONE BITARTRATE AND ACETAMINOPHEN 5; 325 MG/1; MG/1
1 TABLET ORAL EVERY 8 HOURS PRN
COMMUNITY

## 2022-01-01 RX ORDER — SODIUM CHLORIDE 9 MG/ML
INJECTION, SOLUTION INTRAVENOUS CONTINUOUS
Status: CANCELLED | OUTPATIENT
Start: 2022-01-01

## 2022-01-01 RX ORDER — HYDROMORPHONE HCL IN WATER/PF 6 MG/30 ML
0.2 PATIENT CONTROLLED ANALGESIA SYRINGE INTRAVENOUS
Status: DISCONTINUED | OUTPATIENT
Start: 2022-01-01 | End: 2022-01-01

## 2022-01-01 RX ORDER — ACETAMINOPHEN 650 MG/1
650 SUPPOSITORY RECTAL EVERY 6 HOURS PRN
Status: DISCONTINUED | OUTPATIENT
Start: 2022-01-01 | End: 2022-01-01

## 2022-01-01 RX ORDER — CALCIUM GLUCONATE 20 MG/ML
1 INJECTION, SOLUTION INTRAVENOUS ONCE
Status: COMPLETED | OUTPATIENT
Start: 2022-01-01 | End: 2022-01-01

## 2022-01-01 RX ORDER — ASPIRIN 325 MG
325 TABLET ORAL ONCE
Status: CANCELLED | OUTPATIENT
Start: 2022-01-01

## 2022-01-01 RX ORDER — DEXTROSE MONOHYDRATE 25 G/50ML
25-50 INJECTION, SOLUTION INTRAVENOUS
Status: DISCONTINUED | OUTPATIENT
Start: 2022-01-01 | End: 2022-01-01 | Stop reason: ALTCHOICE

## 2022-01-01 RX ORDER — LORAZEPAM 2 MG/ML
2 INJECTION INTRAMUSCULAR ONCE
Status: DISCONTINUED | OUTPATIENT
Start: 2022-01-01 | End: 2022-01-01 | Stop reason: HOSPADM

## 2022-01-01 RX ORDER — FUROSEMIDE 10 MG/ML
40 INJECTION INTRAMUSCULAR; INTRAVENOUS ONCE
Status: COMPLETED | OUTPATIENT
Start: 2022-01-01 | End: 2022-01-01

## 2022-01-01 RX ORDER — NOREPINEPHRINE BITARTRATE 0.06 MG/ML
.01-.6 INJECTION, SOLUTION INTRAVENOUS CONTINUOUS
Status: DISCONTINUED | OUTPATIENT
Start: 2022-01-01 | End: 2022-01-01

## 2022-01-01 RX ORDER — ACETAMINOPHEN 325 MG/1
650 TABLET ORAL EVERY 4 HOURS PRN
Status: DISCONTINUED | OUTPATIENT
Start: 2022-01-01 | End: 2022-01-01

## 2022-01-01 RX ORDER — SODIUM CHLORIDE, SODIUM LACTATE, POTASSIUM CHLORIDE, CALCIUM CHLORIDE 600; 310; 30; 20 MG/100ML; MG/100ML; MG/100ML; MG/100ML
INJECTION, SOLUTION INTRAVENOUS CONTINUOUS
Status: DISCONTINUED | OUTPATIENT
Start: 2022-01-01 | End: 2022-01-01 | Stop reason: HOSPADM

## 2022-01-01 RX ORDER — NOREPINEPHRINE BITARTRATE 0.02 MG/ML
.01-.6 INJECTION, SOLUTION INTRAVENOUS CONTINUOUS
Status: DISCONTINUED | OUTPATIENT
Start: 2022-01-01 | End: 2022-01-01

## 2022-01-01 RX ORDER — ONDANSETRON 4 MG/1
4 TABLET, ORALLY DISINTEGRATING ORAL EVERY 30 MIN PRN
Status: DISCONTINUED | OUTPATIENT
Start: 2022-01-01 | End: 2022-01-01 | Stop reason: HOSPADM

## 2022-01-01 RX ORDER — ASPIRIN 325 MG
325 TABLET ORAL ONCE
Status: DISCONTINUED | OUTPATIENT
Start: 2022-01-01 | End: 2022-01-01 | Stop reason: HOSPADM

## 2022-01-01 RX ORDER — FENTANYL CITRATE 50 UG/ML
25-100 INJECTION, SOLUTION INTRAMUSCULAR; INTRAVENOUS
Status: DISCONTINUED | OUTPATIENT
Start: 2022-01-01 | End: 2022-01-01

## 2022-01-01 RX ORDER — FUROSEMIDE 10 MG/ML
80 INJECTION INTRAMUSCULAR; INTRAVENOUS ONCE
Status: COMPLETED | OUTPATIENT
Start: 2022-01-01 | End: 2022-01-01

## 2022-01-01 RX ORDER — VASOPRESSIN IN 0.9 % NACL 2 UNIT/2ML
SYRINGE (ML) INTRAVENOUS PRN
Status: DISCONTINUED | OUTPATIENT
Start: 2022-01-01 | End: 2022-01-01

## 2022-01-01 RX ORDER — DIPHENHYDRAMINE HCL 25 MG
25 CAPSULE ORAL
Status: DISCONTINUED | OUTPATIENT
Start: 2022-01-01 | End: 2022-01-01 | Stop reason: HOSPADM

## 2022-01-01 RX ORDER — CARVEDILOL 3.12 MG/1
6.25 TABLET ORAL 2 TIMES DAILY WITH MEALS
Qty: 360 TABLET | Refills: 1 | Status: SHIPPED | OUTPATIENT
Start: 2022-01-01

## 2022-01-01 RX ORDER — FENTANYL CITRATE 50 UG/ML
INJECTION, SOLUTION INTRAMUSCULAR; INTRAVENOUS
Status: DISCONTINUED | OUTPATIENT
Start: 2022-01-01 | End: 2022-01-01 | Stop reason: HOSPADM

## 2022-01-01 RX ORDER — SODIUM CHLORIDE 9 MG/ML
INJECTION, SOLUTION INTRAVENOUS CONTINUOUS
Status: DISCONTINUED | OUTPATIENT
Start: 2022-01-01 | End: 2022-01-01

## 2022-01-01 RX ORDER — FLUMAZENIL 0.1 MG/ML
0.2 INJECTION, SOLUTION INTRAVENOUS
Status: ACTIVE | OUTPATIENT
Start: 2022-01-01 | End: 2022-01-01

## 2022-01-01 RX ORDER — PIPERACILLIN SODIUM, TAZOBACTAM SODIUM 3; .375 G/15ML; G/15ML
3.38 INJECTION, POWDER, LYOPHILIZED, FOR SOLUTION INTRAVENOUS EVERY 6 HOURS
Status: DISCONTINUED | OUTPATIENT
Start: 2022-01-01 | End: 2022-01-01

## 2022-01-01 RX ORDER — TRAMADOL HYDROCHLORIDE 50 MG/1
50 TABLET ORAL EVERY 6 HOURS PRN
COMMUNITY

## 2022-01-01 RX ORDER — LIDOCAINE 40 MG/G
CREAM TOPICAL
Status: DISCONTINUED | OUTPATIENT
Start: 2022-01-01 | End: 2022-01-01

## 2022-01-01 RX ORDER — HYDROMORPHONE HCL IN WATER/PF 6 MG/30 ML
0.4 PATIENT CONTROLLED ANALGESIA SYRINGE INTRAVENOUS EVERY 5 MIN PRN
Status: DISCONTINUED | OUTPATIENT
Start: 2022-01-01 | End: 2022-01-01 | Stop reason: HOSPADM

## 2022-01-01 RX ORDER — AMOXICILLIN 250 MG
2 CAPSULE ORAL 2 TIMES DAILY PRN
Status: DISCONTINUED | OUTPATIENT
Start: 2022-01-01 | End: 2022-01-01

## 2022-01-01 RX ORDER — ACETAMINOPHEN 325 MG/10.15ML
650 LIQUID ORAL EVERY 4 HOURS PRN
Status: DISCONTINUED | OUTPATIENT
Start: 2022-01-01 | End: 2022-01-01

## 2022-01-01 RX ORDER — SODIUM CHLORIDE, SODIUM LACTATE, POTASSIUM CHLORIDE, CALCIUM CHLORIDE 600; 310; 30; 20 MG/100ML; MG/100ML; MG/100ML; MG/100ML
INJECTION, SOLUTION INTRAVENOUS CONTINUOUS
Status: DISCONTINUED | OUTPATIENT
Start: 2022-01-01 | End: 2022-01-01

## 2022-01-01 RX ORDER — ONDANSETRON 2 MG/ML
INJECTION INTRAMUSCULAR; INTRAVENOUS
Status: COMPLETED
Start: 2022-01-01 | End: 2022-01-01

## 2022-01-01 RX ORDER — HEPARIN SODIUM 5000 [USP'U]/.5ML
5000 INJECTION, SOLUTION INTRAVENOUS; SUBCUTANEOUS EVERY 8 HOURS
Status: DISCONTINUED | OUTPATIENT
Start: 2022-01-01 | End: 2022-01-01

## 2022-01-01 RX ORDER — ACETAMINOPHEN 650 MG/1
650 SUPPOSITORY RECTAL EVERY 6 HOURS
Status: DISCONTINUED | OUTPATIENT
Start: 2022-01-01 | End: 2022-01-01 | Stop reason: HOSPADM

## 2022-01-01 RX ORDER — DIGOXIN 0.25 MG/ML
250 INJECTION INTRAMUSCULAR; INTRAVENOUS ONCE
Status: COMPLETED | OUTPATIENT
Start: 2022-01-01 | End: 2022-01-01

## 2022-01-01 RX ORDER — HYDROCODONE BITARTRATE AND ACETAMINOPHEN 5; 325 MG/1; MG/1
1-2 TABLET ORAL EVERY 4 HOURS PRN
Qty: 15 TABLET | Refills: 0 | Status: SHIPPED | OUTPATIENT
Start: 2022-01-01 | End: 2022-01-01

## 2022-01-01 RX ORDER — IOPAMIDOL 755 MG/ML
106 INJECTION, SOLUTION INTRAVASCULAR ONCE
Status: COMPLETED | OUTPATIENT
Start: 2022-01-01 | End: 2022-01-01

## 2022-01-01 RX ORDER — FENTANYL CITRATE 50 UG/ML
25 INJECTION, SOLUTION INTRAMUSCULAR; INTRAVENOUS
Status: DISCONTINUED | OUTPATIENT
Start: 2022-01-01 | End: 2022-01-01

## 2022-01-01 RX ORDER — ATORVASTATIN CALCIUM 40 MG/1
40 TABLET, FILM COATED ORAL EVERY EVENING
Status: DISCONTINUED | OUTPATIENT
Start: 2022-01-01 | End: 2022-01-01

## 2022-01-01 RX ORDER — LIDOCAINE 4 G/G
1-3 PATCH TOPICAL
Status: DISCONTINUED | OUTPATIENT
Start: 2022-01-01 | End: 2022-01-01

## 2022-01-01 RX ORDER — PROCHLORPERAZINE MALEATE 5 MG
5 TABLET ORAL EVERY 6 HOURS PRN
Status: DISCONTINUED | OUTPATIENT
Start: 2022-01-01 | End: 2022-01-01

## 2022-01-01 RX ORDER — DEXAMETHASONE SODIUM PHOSPHATE 4 MG/ML
INJECTION, SOLUTION INTRA-ARTICULAR; INTRALESIONAL; INTRAMUSCULAR; INTRAVENOUS; SOFT TISSUE PRN
Status: DISCONTINUED | OUTPATIENT
Start: 2022-01-01 | End: 2022-01-01

## 2022-01-01 RX ORDER — CELECOXIB 100 MG/1
100 CAPSULE ORAL DAILY
COMMUNITY

## 2022-01-01 RX ORDER — LIDOCAINE HYDROCHLORIDE 20 MG/ML
JELLY TOPICAL ONCE
Status: COMPLETED | OUTPATIENT
Start: 2022-01-01 | End: 2022-01-01

## 2022-01-01 RX ORDER — CHLORHEXIDINE GLUCONATE ORAL RINSE 1.2 MG/ML
15 SOLUTION DENTAL EVERY 12 HOURS
Status: DISCONTINUED | OUTPATIENT
Start: 2022-01-01 | End: 2022-01-01 | Stop reason: HOSPADM

## 2022-01-01 RX ORDER — FENTANYL CITRATE 50 UG/ML
25 INJECTION, SOLUTION INTRAMUSCULAR; INTRAVENOUS
Status: DISCONTINUED | OUTPATIENT
Start: 2022-01-01 | End: 2022-01-01 | Stop reason: HOSPADM

## 2022-01-01 RX ORDER — ASPIRIN 81 MG/1
243 TABLET, CHEWABLE ORAL ONCE
Status: DISCONTINUED | OUTPATIENT
Start: 2022-01-01 | End: 2022-01-01 | Stop reason: HOSPADM

## 2022-01-01 RX ORDER — FUROSEMIDE 20 MG
TABLET ORAL
Qty: 270 TABLET | Refills: 1 | Status: SHIPPED | OUTPATIENT
Start: 2022-01-01

## 2022-01-01 RX ORDER — OXYCODONE HYDROCHLORIDE 5 MG/1
5 TABLET ORAL EVERY 4 HOURS PRN
Status: DISCONTINUED | OUTPATIENT
Start: 2022-01-01 | End: 2022-01-01

## 2022-01-01 RX ORDER — LORAZEPAM 0.5 MG/1
0.5 TABLET ORAL
Status: CANCELLED | OUTPATIENT
Start: 2022-01-01

## 2022-01-01 RX ORDER — MULTIVIT WITH MINERALS/LUTEIN
1000 TABLET ORAL DAILY
COMMUNITY

## 2022-01-01 RX ADMIN — VANCOMYCIN HYDROCHLORIDE 2000 MG: 10 INJECTION, POWDER, LYOPHILIZED, FOR SOLUTION INTRAVENOUS at 02:23

## 2022-01-01 RX ADMIN — PIPERACILLIN AND TAZOBACTAM 3.38 G: 3; .375 INJECTION, POWDER, FOR SOLUTION INTRAVENOUS at 16:30

## 2022-01-01 RX ADMIN — ALBUMIN HUMAN 12.5 G: 0.05 INJECTION, SOLUTION INTRAVENOUS at 12:20

## 2022-01-01 RX ADMIN — SODIUM CHLORIDE, POTASSIUM CHLORIDE, SODIUM LACTATE AND CALCIUM CHLORIDE: 600; 310; 30; 20 INJECTION, SOLUTION INTRAVENOUS at 07:49

## 2022-01-01 RX ADMIN — ATORVASTATIN CALCIUM 40 MG: 40 TABLET, FILM COATED ORAL at 19:38

## 2022-01-01 RX ADMIN — INSULIN ASPART 1 UNITS: 100 INJECTION, SOLUTION INTRAVENOUS; SUBCUTANEOUS at 02:01

## 2022-01-01 RX ADMIN — PHENYLEPHRINE HYDROCHLORIDE 5 MCG/KG/MIN: 10 INJECTION INTRAVENOUS at 17:02

## 2022-01-01 RX ADMIN — SODIUM CHLORIDE: 9 INJECTION, SOLUTION INTRAVENOUS at 10:47

## 2022-01-01 RX ADMIN — ALBUMIN HUMAN: 0.05 INJECTION, SOLUTION INTRAVENOUS at 15:34

## 2022-01-01 RX ADMIN — PHENYLEPHRINE HYDROCHLORIDE 5.5 MCG/KG/MIN: 10 INJECTION INTRAVENOUS at 10:53

## 2022-01-01 RX ADMIN — PHENYLEPHRINE HYDROCHLORIDE 6 MCG/KG/MIN: 10 INJECTION INTRAVENOUS at 17:01

## 2022-01-01 RX ADMIN — SODIUM CHLORIDE 4.5 UNITS/HR: 9 INJECTION, SOLUTION INTRAVENOUS at 12:48

## 2022-01-01 RX ADMIN — ATORVASTATIN CALCIUM 40 MG: 40 TABLET, FILM COATED ORAL at 20:44

## 2022-01-01 RX ADMIN — PIPERACILLIN AND TAZOBACTAM 3.38 G: 3; .375 INJECTION, POWDER, FOR SOLUTION INTRAVENOUS at 05:04

## 2022-01-01 RX ADMIN — ACETAMINOPHEN 1000 MG: 500 TABLET ORAL at 06:22

## 2022-01-01 RX ADMIN — INSULIN ASPART 1 UNITS: 100 INJECTION, SOLUTION INTRAVENOUS; SUBCUTANEOUS at 06:41

## 2022-01-01 RX ADMIN — DOBUTAMINE HYDROCHLORIDE 2.5 MCG/KG/MIN: 200 INJECTION INTRAVENOUS at 18:39

## 2022-01-01 RX ADMIN — DEXMEDETOMIDINE HYDROCHLORIDE 0.2 MCG/KG/HR: 400 INJECTION INTRAVENOUS at 12:50

## 2022-01-01 RX ADMIN — Medication 0.4 MCG/KG/MIN: at 20:07

## 2022-01-01 RX ADMIN — SODIUM BICARBONATE 50 MEQ: 84 INJECTION INTRAVENOUS at 14:03

## 2022-01-01 RX ADMIN — SODIUM CHLORIDE: 9 INJECTION, SOLUTION INTRAVENOUS at 10:48

## 2022-01-01 RX ADMIN — TAMSULOSIN HYDROCHLORIDE 0.4 MG: 0.4 CAPSULE ORAL at 08:41

## 2022-01-01 RX ADMIN — PHENYLEPHRINE HYDROCHLORIDE 6 MCG/KG/MIN: 10 INJECTION INTRAVENOUS at 00:28

## 2022-01-01 RX ADMIN — HEPARIN SODIUM 5000 UNITS: 5000 INJECTION, SOLUTION INTRAVENOUS; SUBCUTANEOUS at 00:39

## 2022-01-01 RX ADMIN — HEPARIN SODIUM 5000 UNITS: 5000 INJECTION, SOLUTION INTRAVENOUS; SUBCUTANEOUS at 08:39

## 2022-01-01 RX ADMIN — Medication 1 UNITS: at 15:32

## 2022-01-01 RX ADMIN — HEPARIN SODIUM 5000 UNITS: 5000 INJECTION, SOLUTION INTRAVENOUS; SUBCUTANEOUS at 23:43

## 2022-01-01 RX ADMIN — HYDROMORPHONE HYDROCHLORIDE 0.5 MG: 1 INJECTION, SOLUTION INTRAMUSCULAR; INTRAVENOUS; SUBCUTANEOUS at 20:43

## 2022-01-01 RX ADMIN — SODIUM CHLORIDE 250 ML: 9 INJECTION, SOLUTION INTRAVENOUS at 06:31

## 2022-01-01 RX ADMIN — PROCHLORPERAZINE EDISYLATE 5 MG: 5 INJECTION INTRAMUSCULAR; INTRAVENOUS at 18:11

## 2022-01-01 RX ADMIN — HYDROMORPHONE HYDROCHLORIDE 2 MG: 2 TABLET ORAL at 18:11

## 2022-01-01 RX ADMIN — PIPERACILLIN AND TAZOBACTAM 3.38 G: 3; .375 INJECTION, POWDER, FOR SOLUTION INTRAVENOUS at 23:24

## 2022-01-01 RX ADMIN — CHLORHEXIDINE GLUCONATE 15 ML: 1.2 SOLUTION ORAL at 00:15

## 2022-01-01 RX ADMIN — SODIUM CHLORIDE, POTASSIUM CHLORIDE, SODIUM LACTATE AND CALCIUM CHLORIDE: 600; 310; 30; 20 INJECTION, SOLUTION INTRAVENOUS at 11:37

## 2022-01-01 RX ADMIN — AMIODARONE HYDROCHLORIDE 150 MG: 1.5 INJECTION, SOLUTION INTRAVENOUS at 08:53

## 2022-01-01 RX ADMIN — IOPAMIDOL 106 ML: 755 INJECTION, SOLUTION INTRAVENOUS at 10:36

## 2022-01-01 RX ADMIN — FENTANYL CITRATE 50 MCG: 50 INJECTION, SOLUTION INTRAMUSCULAR; INTRAVENOUS at 15:18

## 2022-01-01 RX ADMIN — ONDANSETRON 4 MG: 2 INJECTION INTRAMUSCULAR; INTRAVENOUS at 14:51

## 2022-01-01 RX ADMIN — PROCHLORPERAZINE EDISYLATE 5 MG: 5 INJECTION INTRAMUSCULAR; INTRAVENOUS at 16:29

## 2022-01-01 RX ADMIN — PHENYLEPHRINE HYDROCHLORIDE 100 MCG: 10 INJECTION INTRAVENOUS at 07:59

## 2022-01-01 RX ADMIN — HYDROMORPHONE HYDROCHLORIDE 2 MG: 2 TABLET ORAL at 09:21

## 2022-01-01 RX ADMIN — SODIUM CHLORIDE 250 ML: 9 INJECTION, SOLUTION INTRAVENOUS at 16:21

## 2022-01-01 RX ADMIN — ROCURONIUM BROMIDE 50 MG: 50 INJECTION, SOLUTION INTRAVENOUS at 10:00

## 2022-01-01 RX ADMIN — MIDAZOLAM HYDROCHLORIDE 2 MG: 1 INJECTION, SOLUTION INTRAMUSCULAR; INTRAVENOUS at 10:58

## 2022-01-01 RX ADMIN — Medication 100 MCG/HR: at 22:43

## 2022-01-01 RX ADMIN — HYDROCODONE BITARTRATE AND ACETAMINOPHEN 1 TABLET: 5; 325 TABLET ORAL at 23:51

## 2022-01-01 RX ADMIN — VASOPRESSIN 2.4 UNITS/HR: 20 INJECTION INTRAVENOUS at 02:06

## 2022-01-01 RX ADMIN — Medication 0.2 MCG/KG/MIN: at 17:18

## 2022-01-01 RX ADMIN — LIDOCAINE 1 PATCH: 560 PATCH PERCUTANEOUS; TOPICAL; TRANSDERMAL at 10:47

## 2022-01-01 RX ADMIN — FENTANYL CITRATE 25 MCG: 50 INJECTION, SOLUTION INTRAMUSCULAR; INTRAVENOUS at 07:52

## 2022-01-01 RX ADMIN — AMIODARONE HYDROCHLORIDE 1 MG/MIN: 50 INJECTION, SOLUTION INTRAVENOUS at 08:59

## 2022-01-01 RX ADMIN — ACETAMINOPHEN 650 MG: 650 SUPPOSITORY RECTAL at 02:12

## 2022-01-01 RX ADMIN — SODIUM CHLORIDE 2 UNITS/HR: 9 INJECTION, SOLUTION INTRAVENOUS at 22:40

## 2022-01-01 RX ADMIN — TRAMADOL HYDROCHLORIDE 50 MG: 50 TABLET, COATED ORAL at 04:37

## 2022-01-01 RX ADMIN — PHENYLEPHRINE HYDROCHLORIDE 1 MCG/KG/MIN: 10 INJECTION INTRAVENOUS at 00:14

## 2022-01-01 RX ADMIN — Medication 0.22 MCG/KG/MIN: at 13:16

## 2022-01-01 RX ADMIN — HEPARIN SODIUM 5000 UNITS: 5000 INJECTION, SOLUTION INTRAVENOUS; SUBCUTANEOUS at 16:40

## 2022-01-01 RX ADMIN — HEPARIN SODIUM 5000 UNITS: 5000 INJECTION, SOLUTION INTRAVENOUS; SUBCUTANEOUS at 07:51

## 2022-01-01 RX ADMIN — HEPARIN SODIUM 5000 UNITS: 5000 INJECTION, SOLUTION INTRAVENOUS; SUBCUTANEOUS at 03:31

## 2022-01-01 RX ADMIN — Medication 20 MG: at 15:35

## 2022-01-01 RX ADMIN — Medication 25 MCG/HR: at 12:49

## 2022-01-01 RX ADMIN — HYDROMORPHONE HYDROCHLORIDE 0.5 MG: 1 INJECTION, SOLUTION INTRAMUSCULAR; INTRAVENOUS; SUBCUTANEOUS at 16:27

## 2022-01-01 RX ADMIN — HYDROMORPHONE HYDROCHLORIDE 2 MG: 2 TABLET ORAL at 12:52

## 2022-01-01 RX ADMIN — HYDROMORPHONE HYDROCHLORIDE 2 MG: 2 TABLET ORAL at 19:38

## 2022-01-01 RX ADMIN — FUROSEMIDE 80 MG: 10 INJECTION, SOLUTION INTRAMUSCULAR; INTRAVENOUS at 14:03

## 2022-01-01 RX ADMIN — VASOPRESSIN 0.5 UNITS/HR: 20 INJECTION INTRAVENOUS at 04:09

## 2022-01-01 RX ADMIN — LIDOCAINE HYDROCHLORIDE 100 MG: 20 INJECTION, SOLUTION INFILTRATION; PERINEURAL at 07:59

## 2022-01-01 RX ADMIN — SODIUM CHLORIDE 4.5 UNITS/HR: 9 INJECTION, SOLUTION INTRAVENOUS at 00:07

## 2022-01-01 RX ADMIN — PHENYLEPHRINE HYDROCHLORIDE 100 MCG: 10 INJECTION INTRAVENOUS at 08:22

## 2022-01-01 RX ADMIN — VASOPRESSIN 2.4 UNITS/HR: 20 INJECTION INTRAVENOUS at 16:24

## 2022-01-01 RX ADMIN — Medication 0.22 MCG/KG/MIN: at 02:26

## 2022-01-01 RX ADMIN — ATORVASTATIN CALCIUM 40 MG: 40 TABLET, FILM COATED ORAL at 19:50

## 2022-01-01 RX ADMIN — I.V. FAT EMULSION 250 ML: 20 EMULSION INTRAVENOUS at 20:53

## 2022-01-01 RX ADMIN — LIDOCAINE 2 PATCH: 560 PATCH PERCUTANEOUS; TOPICAL; TRANSDERMAL at 07:08

## 2022-01-01 RX ADMIN — ACETAMINOPHEN 650 MG: 325 TABLET, FILM COATED ORAL at 18:50

## 2022-01-01 RX ADMIN — Medication 0.6 MCG/KG/MIN: at 03:17

## 2022-01-01 RX ADMIN — SODIUM CHLORIDE, POTASSIUM CHLORIDE, SODIUM LACTATE AND CALCIUM CHLORIDE 500 ML: 600; 310; 30; 20 INJECTION, SOLUTION INTRAVENOUS at 04:54

## 2022-01-01 RX ADMIN — ROCURONIUM BROMIDE 10 MG: 50 INJECTION, SOLUTION INTRAVENOUS at 09:31

## 2022-01-01 RX ADMIN — PHENYLEPHRINE HYDROCHLORIDE 6 MCG/KG/MIN: 10 INJECTION INTRAVENOUS at 01:42

## 2022-01-01 RX ADMIN — AMIODARONE HYDROCHLORIDE 0.5 MG/MIN: 50 INJECTION, SOLUTION INTRAVENOUS at 18:35

## 2022-01-01 RX ADMIN — ONDANSETRON 4 MG: 2 INJECTION INTRAMUSCULAR; INTRAVENOUS at 10:09

## 2022-01-01 RX ADMIN — ASPIRIN 81 MG: 81 TABLET, COATED ORAL at 20:44

## 2022-01-01 RX ADMIN — LIDOCAINE HYDROCHLORIDE: 20 JELLY TOPICAL at 11:07

## 2022-01-01 RX ADMIN — ACETAMINOPHEN 650 MG: 650 SUPPOSITORY RECTAL at 02:26

## 2022-01-01 RX ADMIN — HUMAN ALBUMIN MICROSPHERES AND PERFLUTREN 9 ML: 10; .22 INJECTION, SOLUTION INTRAVENOUS at 13:25

## 2022-01-01 RX ADMIN — AMIODARONE HYDROCHLORIDE 0.5 MG/MIN: 50 INJECTION, SOLUTION INTRAVENOUS at 08:47

## 2022-01-01 RX ADMIN — SODIUM CHLORIDE 8 UNITS/HR: 9 INJECTION, SOLUTION INTRAVENOUS at 18:59

## 2022-01-01 RX ADMIN — ALUMINUM HYDROXIDE, MAGNESIUM HYDROXIDE, AND SIMETHICONE 30 ML: 200; 200; 20 SUSPENSION ORAL at 21:42

## 2022-01-01 RX ADMIN — DEXMEDETOMIDINE HYDROCHLORIDE 0.3 MCG/KG/HR: 400 INJECTION INTRAVENOUS at 03:39

## 2022-01-01 RX ADMIN — ROCURONIUM BROMIDE 20 MG: 50 INJECTION, SOLUTION INTRAVENOUS at 15:17

## 2022-01-01 RX ADMIN — MIDAZOLAM HYDROCHLORIDE 2 MG/HR: 1 INJECTION, SOLUTION INTRAVENOUS at 12:44

## 2022-01-01 RX ADMIN — PHENYLEPHRINE HYDROCHLORIDE 5.5 MCG/KG/MIN: 10 INJECTION INTRAVENOUS at 07:39

## 2022-01-01 RX ADMIN — ACETAMINOPHEN 650 MG: 650 SUPPOSITORY RECTAL at 13:48

## 2022-01-01 RX ADMIN — Medication 0.25 MCG/KG/MIN: at 09:36

## 2022-01-01 RX ADMIN — INSULIN ASPART 1 UNITS: 100 INJECTION, SOLUTION INTRAVENOUS; SUBCUTANEOUS at 17:58

## 2022-01-01 RX ADMIN — LIDOCAINE 1 PATCH: 560 PATCH PERCUTANEOUS; TOPICAL; TRANSDERMAL at 07:58

## 2022-01-01 RX ADMIN — INSULIN ASPART 1 UNITS: 100 INJECTION, SOLUTION INTRAVENOUS; SUBCUTANEOUS at 13:59

## 2022-01-01 RX ADMIN — HYDROMORPHONE HYDROCHLORIDE 2 MG: 2 TABLET ORAL at 16:35

## 2022-01-01 RX ADMIN — PHENYLEPHRINE HYDROCHLORIDE 5 MCG/KG/MIN: 10 INJECTION INTRAVENOUS at 01:17

## 2022-01-01 RX ADMIN — PANTOPRAZOLE SODIUM 40 MG: 40 INJECTION, POWDER, FOR SOLUTION INTRAVENOUS at 07:51

## 2022-01-01 RX ADMIN — PHENYLEPHRINE HYDROCHLORIDE 5.5 MCG/KG/MIN: 10 INJECTION INTRAVENOUS at 07:29

## 2022-01-01 RX ADMIN — INSULIN GLARGINE 11 UNITS: 100 INJECTION, SOLUTION SUBCUTANEOUS at 10:01

## 2022-01-01 RX ADMIN — ALBUMIN HUMAN 12.5 G: 0.05 INJECTION, SOLUTION INTRAVENOUS at 13:56

## 2022-01-01 RX ADMIN — SODIUM CHLORIDE, POTASSIUM CHLORIDE, SODIUM LACTATE AND CALCIUM CHLORIDE: 600; 310; 30; 20 INJECTION, SOLUTION INTRAVENOUS at 00:38

## 2022-01-01 RX ADMIN — PHENYLEPHRINE HYDROCHLORIDE 0.3 MCG/KG/MIN: 10 INJECTION INTRAVENOUS at 08:28

## 2022-01-01 RX ADMIN — PIPERACILLIN AND TAZOBACTAM 3.38 G: 3; .375 INJECTION, POWDER, FOR SOLUTION INTRAVENOUS at 06:34

## 2022-01-01 RX ADMIN — ASPIRIN 81 MG: 81 TABLET, COATED ORAL at 19:38

## 2022-01-01 RX ADMIN — HYDROMORPHONE HYDROCHLORIDE 4 MG: 2 TABLET ORAL at 17:02

## 2022-01-01 RX ADMIN — INSULIN ASPART 2 UNITS: 100 INJECTION, SOLUTION INTRAVENOUS; SUBCUTANEOUS at 21:55

## 2022-01-01 RX ADMIN — PHENYLEPHRINE HYDROCHLORIDE 6 MCG/KG/MIN: 10 INJECTION INTRAVENOUS at 14:30

## 2022-01-01 RX ADMIN — PROPOFOL 20 MG: 10 INJECTION, EMULSION INTRAVENOUS at 10:22

## 2022-01-01 RX ADMIN — INSULIN ASPART 1 UNITS: 100 INJECTION, SOLUTION INTRAVENOUS; SUBCUTANEOUS at 09:58

## 2022-01-01 RX ADMIN — MIDAZOLAM HYDROCHLORIDE 2 MG: 1 INJECTION, SOLUTION INTRAMUSCULAR; INTRAVENOUS at 10:00

## 2022-01-01 RX ADMIN — FENTANYL CITRATE 25 MCG: 50 INJECTION, SOLUTION INTRAMUSCULAR; INTRAVENOUS at 11:30

## 2022-01-01 RX ADMIN — HEPARIN SODIUM 5000 UNITS: 5000 INJECTION, SOLUTION INTRAVENOUS; SUBCUTANEOUS at 09:02

## 2022-01-01 RX ADMIN — PHENYLEPHRINE HYDROCHLORIDE 5 MCG/KG/MIN: 10 INJECTION INTRAVENOUS at 02:19

## 2022-01-01 RX ADMIN — DEXMEDETOMIDINE HYDROCHLORIDE 0.3 MCG/KG/HR: 400 INJECTION INTRAVENOUS at 02:06

## 2022-01-01 RX ADMIN — FENTANYL CITRATE 50 MCG: 50 INJECTION, SOLUTION INTRAMUSCULAR; INTRAVENOUS at 10:04

## 2022-01-01 RX ADMIN — ROCURONIUM BROMIDE 20 MG: 50 INJECTION, SOLUTION INTRAVENOUS at 08:23

## 2022-01-01 RX ADMIN — ONDANSETRON 4 MG: 2 INJECTION INTRAMUSCULAR; INTRAVENOUS at 08:41

## 2022-01-01 RX ADMIN — HYDROMORPHONE HYDROCHLORIDE 2 MG: 2 TABLET ORAL at 05:29

## 2022-01-01 RX ADMIN — PHENYLEPHRINE HYDROCHLORIDE 6 MCG/KG/MIN: 10 INJECTION INTRAVENOUS at 05:56

## 2022-01-01 RX ADMIN — ASPIRIN 81 MG: 81 TABLET, COATED ORAL at 19:50

## 2022-01-01 RX ADMIN — ROCURONIUM BROMIDE 50 MG: 50 INJECTION, SOLUTION INTRAVENOUS at 07:59

## 2022-01-01 RX ADMIN — SODIUM CHLORIDE 8.5 UNITS/HR: 9 INJECTION, SOLUTION INTRAVENOUS at 16:14

## 2022-01-01 RX ADMIN — Medication 1 UNITS: at 15:16

## 2022-01-01 RX ADMIN — ONDANSETRON 4 MG: 2 INJECTION INTRAMUSCULAR; INTRAVENOUS at 21:11

## 2022-01-01 RX ADMIN — PHENYLEPHRINE HYDROCHLORIDE 6 MCG/KG/MIN: 10 INJECTION INTRAVENOUS at 21:50

## 2022-01-01 RX ADMIN — CHLORHEXIDINE GLUCONATE 15 ML: 1.2 SOLUTION ORAL at 07:51

## 2022-01-01 RX ADMIN — GLYCOPYRROLATE 0.8 MG: 0.2 INJECTION, SOLUTION INTRAMUSCULAR; INTRAVENOUS at 10:09

## 2022-01-01 RX ADMIN — HYDROMORPHONE HYDROCHLORIDE 2 MG: 2 TABLET ORAL at 01:10

## 2022-01-01 RX ADMIN — ANGIOTENSIN II 20 NG/KG/MIN: 2.5 INJECTION INTRAVENOUS at 03:42

## 2022-01-01 RX ADMIN — HEPARIN SODIUM 5000 UNITS: 5000 INJECTION, SOLUTION INTRAVENOUS; SUBCUTANEOUS at 16:08

## 2022-01-01 RX ADMIN — Medication 0.4 MCG/KG/MIN: at 18:41

## 2022-01-01 RX ADMIN — FUROSEMIDE 40 MG: 10 INJECTION, SOLUTION INTRAMUSCULAR; INTRAVENOUS at 08:59

## 2022-01-01 RX ADMIN — PHENYLEPHRINE HYDROCHLORIDE 100 MCG: 10 INJECTION INTRAVENOUS at 10:00

## 2022-01-01 RX ADMIN — DEXMEDETOMIDINE HYDROCHLORIDE 0.3 MCG/KG/HR: 400 INJECTION INTRAVENOUS at 12:22

## 2022-01-01 RX ADMIN — LIDOCAINE 1 PATCH: 560 PATCH PERCUTANEOUS; TOPICAL; TRANSDERMAL at 08:00

## 2022-01-01 RX ADMIN — ACETAMINOPHEN 650 MG: 325 TABLET, FILM COATED ORAL at 07:08

## 2022-01-01 RX ADMIN — SODIUM CHLORIDE 250 ML: 9 INJECTION, SOLUTION INTRAVENOUS at 20:49

## 2022-01-01 RX ADMIN — ACETAMINOPHEN 650 MG: 650 SUPPOSITORY RECTAL at 20:15

## 2022-01-01 RX ADMIN — ROCURONIUM BROMIDE 20 MG: 50 INJECTION, SOLUTION INTRAVENOUS at 08:47

## 2022-01-01 RX ADMIN — AMIODARONE HYDROCHLORIDE 150 MG: 1.5 INJECTION, SOLUTION INTRAVENOUS at 05:01

## 2022-01-01 RX ADMIN — FENTANYL CITRATE 50 MCG: 50 INJECTION, SOLUTION INTRAMUSCULAR; INTRAVENOUS at 15:06

## 2022-01-01 RX ADMIN — DEXMEDETOMIDINE HYDROCHLORIDE 0.3 MCG/KG/HR: 400 INJECTION INTRAVENOUS at 01:06

## 2022-01-01 RX ADMIN — HEPARIN SODIUM 5000 UNITS: 5000 INJECTION, SOLUTION INTRAVENOUS; SUBCUTANEOUS at 08:59

## 2022-01-01 RX ADMIN — PHENYLEPHRINE HYDROCHLORIDE 100 MCG: 10 INJECTION INTRAVENOUS at 08:18

## 2022-01-01 RX ADMIN — PIPERACILLIN AND TAZOBACTAM 3.38 G: 3; .375 INJECTION, POWDER, FOR SOLUTION INTRAVENOUS at 17:51

## 2022-01-01 RX ADMIN — FENTANYL CITRATE 25 MCG: 50 INJECTION, SOLUTION INTRAMUSCULAR; INTRAVENOUS at 11:16

## 2022-01-01 RX ADMIN — VASOPRESSIN 1 UNITS/HR: 20 INJECTION INTRAVENOUS at 19:50

## 2022-01-01 RX ADMIN — SODIUM CHLORIDE, POTASSIUM CHLORIDE, SODIUM LACTATE AND CALCIUM CHLORIDE: 600; 310; 30; 20 INJECTION, SOLUTION INTRAVENOUS at 10:12

## 2022-01-01 RX ADMIN — VASOPRESSIN 2.4 UNITS/HR: 20 INJECTION INTRAVENOUS at 09:32

## 2022-01-01 RX ADMIN — VASOPRESSIN 2.4 UNITS/HR: 20 INJECTION INTRAVENOUS at 21:57

## 2022-01-01 RX ADMIN — IOPAMIDOL 115 ML: 755 INJECTION, SOLUTION INTRAVENOUS at 15:48

## 2022-01-01 RX ADMIN — PIPERACILLIN AND TAZOBACTAM 3.38 G: 3; .375 INJECTION, POWDER, FOR SOLUTION INTRAVENOUS at 17:09

## 2022-01-01 RX ADMIN — LIDOCAINE: 50 OINTMENT TOPICAL at 09:30

## 2022-01-01 RX ADMIN — Medication 100 MCG/HR: at 23:59

## 2022-01-01 RX ADMIN — SODIUM CHLORIDE 4 UNITS/HR: 9 INJECTION, SOLUTION INTRAVENOUS at 06:25

## 2022-01-01 RX ADMIN — PIPERACILLIN AND TAZOBACTAM 3.38 G: 3; .375 INJECTION, POWDER, FOR SOLUTION INTRAVENOUS at 11:35

## 2022-01-01 RX ADMIN — INSULIN ASPART 6 UNITS: 100 INJECTION, SOLUTION INTRAVENOUS; SUBCUTANEOUS at 05:56

## 2022-01-01 RX ADMIN — HEPARIN SODIUM 5000 UNITS: 5000 INJECTION, SOLUTION INTRAVENOUS; SUBCUTANEOUS at 00:15

## 2022-01-01 RX ADMIN — HYDROMORPHONE HYDROCHLORIDE 0.2 MG: 0.2 INJECTION, SOLUTION INTRAMUSCULAR; INTRAVENOUS; SUBCUTANEOUS at 12:42

## 2022-01-01 RX ADMIN — ALBUMIN HUMAN 250 ML: 0.05 INJECTION, SOLUTION INTRAVENOUS at 12:51

## 2022-01-01 RX ADMIN — CHLORHEXIDINE GLUCONATE 15 ML: 1.2 SOLUTION ORAL at 07:58

## 2022-01-01 RX ADMIN — ACETAMINOPHEN 650 MG: 650 SUPPOSITORY RECTAL at 20:36

## 2022-01-01 RX ADMIN — HUMAN ALBUMIN MICROSPHERES AND PERFLUTREN 3 ML: 10; .22 INJECTION, SOLUTION INTRAVENOUS at 10:22

## 2022-01-01 RX ADMIN — DEXMEDETOMIDINE HYDROCHLORIDE 0.3 MCG/KG/HR: 400 INJECTION INTRAVENOUS at 23:05

## 2022-01-01 RX ADMIN — DEXMEDETOMIDINE HYDROCHLORIDE 0.3 MCG/KG/HR: 400 INJECTION INTRAVENOUS at 12:53

## 2022-01-01 RX ADMIN — LIDOCAINE 1 PATCH: 560 PATCH PERCUTANEOUS; TOPICAL; TRANSDERMAL at 12:42

## 2022-01-01 RX ADMIN — OXYCODONE HYDROCHLORIDE 5 MG: 5 TABLET ORAL at 11:34

## 2022-01-01 RX ADMIN — PHENYLEPHRINE HYDROCHLORIDE 6 MCG/KG/MIN: 10 INJECTION INTRAVENOUS at 06:51

## 2022-01-01 RX ADMIN — INSULIN ASPART 1 UNITS: 100 INJECTION, SOLUTION INTRAVENOUS; SUBCUTANEOUS at 13:57

## 2022-01-01 RX ADMIN — PHENYLEPHRINE HYDROCHLORIDE 5.5 MCG/KG/MIN: 10 INJECTION INTRAVENOUS at 09:13

## 2022-01-01 RX ADMIN — ALBUMIN HUMAN: 0.05 INJECTION, SOLUTION INTRAVENOUS at 16:00

## 2022-01-01 RX ADMIN — ASPIRIN 81 MG: 81 TABLET, COATED ORAL at 21:09

## 2022-01-01 RX ADMIN — PIPERACILLIN AND TAZOBACTAM 3.38 G: 3; .375 INJECTION, POWDER, FOR SOLUTION INTRAVENOUS at 23:59

## 2022-01-01 RX ADMIN — VASOPRESSIN 2.4 UNITS/HR: 20 INJECTION INTRAVENOUS at 07:28

## 2022-01-01 RX ADMIN — HEPARIN SODIUM 5000 UNITS: 5000 INJECTION, SOLUTION INTRAVENOUS; SUBCUTANEOUS at 15:34

## 2022-01-01 RX ADMIN — PANTOPRAZOLE SODIUM 40 MG: 40 INJECTION, POWDER, FOR SOLUTION INTRAVENOUS at 06:38

## 2022-01-01 RX ADMIN — ACETAMINOPHEN 650 MG: 650 SUPPOSITORY RECTAL at 07:51

## 2022-01-01 RX ADMIN — PIPERACILLIN AND TAZOBACTAM 3.38 G: 3; .375 INJECTION, POWDER, FOR SOLUTION INTRAVENOUS at 23:05

## 2022-01-01 RX ADMIN — Medication 0.08 MCG/KG/MIN: at 12:46

## 2022-01-01 RX ADMIN — CALCIUM GLUCONATE: 98 INJECTION, SOLUTION INTRAVENOUS at 20:52

## 2022-01-01 RX ADMIN — NEOSTIGMINE METHYLSULFATE 5 MG: 1 INJECTION, SOLUTION INTRAVENOUS at 10:09

## 2022-01-01 RX ADMIN — PHENYLEPHRINE HYDROCHLORIDE 6 MCG/KG/MIN: 10 INJECTION INTRAVENOUS at 10:27

## 2022-01-01 RX ADMIN — INSULIN ASPART 3 UNITS: 100 INJECTION, SOLUTION INTRAVENOUS; SUBCUTANEOUS at 22:04

## 2022-01-01 RX ADMIN — PHENYLEPHRINE HYDROCHLORIDE 6 MCG/KG/MIN: 10 INJECTION INTRAVENOUS at 04:34

## 2022-01-01 RX ADMIN — PHENYLEPHRINE HYDROCHLORIDE 6 MCG/KG/MIN: 10 INJECTION INTRAVENOUS at 18:35

## 2022-01-01 RX ADMIN — FENTANYL CITRATE 25 MCG: 50 INJECTION, SOLUTION INTRAMUSCULAR; INTRAVENOUS at 07:59

## 2022-01-01 RX ADMIN — LIDOCAINE 1 PATCH: 560 PATCH PERCUTANEOUS; TOPICAL; TRANSDERMAL at 07:51

## 2022-01-01 RX ADMIN — DOBUTAMINE HYDROCHLORIDE 2.5 MCG/KG/MIN: 200 INJECTION INTRAVENOUS at 15:44

## 2022-01-01 RX ADMIN — PHENYLEPHRINE HYDROCHLORIDE 100 MCG: 10 INJECTION INTRAVENOUS at 08:20

## 2022-01-01 RX ADMIN — VASOPRESSIN 2.4 UNITS/HR: 20 INJECTION INTRAVENOUS at 13:30

## 2022-01-01 RX ADMIN — PHENYLEPHRINE HYDROCHLORIDE 6 MCG/KG/MIN: 10 INJECTION INTRAVENOUS at 12:37

## 2022-01-01 RX ADMIN — ACETAMINOPHEN 650 MG: 650 SUPPOSITORY RECTAL at 14:48

## 2022-01-01 RX ADMIN — VASOPRESSIN 2.4 UNITS/HR: 20 INJECTION INTRAVENOUS at 21:55

## 2022-01-01 RX ADMIN — I.V. FAT EMULSION 250 ML: 20 EMULSION INTRAVENOUS at 20:15

## 2022-01-01 RX ADMIN — ACETAMINOPHEN 650 MG: 325 SUSPENSION ORAL at 03:24

## 2022-01-01 RX ADMIN — HEPARIN SODIUM 5000 UNITS: 5000 INJECTION, SOLUTION INTRAVENOUS; SUBCUTANEOUS at 23:59

## 2022-01-01 RX ADMIN — ATORVASTATIN CALCIUM 40 MG: 40 TABLET, FILM COATED ORAL at 21:08

## 2022-01-01 RX ADMIN — CHLORHEXIDINE GLUCONATE 15 ML: 1.2 SOLUTION ORAL at 20:15

## 2022-01-01 RX ADMIN — DEXAMETHASONE SODIUM PHOSPHATE 4 MG: 4 INJECTION, SOLUTION INTRA-ARTICULAR; INTRALESIONAL; INTRAMUSCULAR; INTRAVENOUS; SOFT TISSUE at 08:18

## 2022-01-01 RX ADMIN — PROPOFOL 150 MG: 10 INJECTION, EMULSION INTRAVENOUS at 07:59

## 2022-01-01 RX ADMIN — CALCIUM GLUCONATE 1 G: 20 INJECTION, SOLUTION INTRAVENOUS at 02:11

## 2022-01-01 RX ADMIN — INSULIN ASPART 6 UNITS: 100 INJECTION, SOLUTION INTRAVENOUS; SUBCUTANEOUS at 02:17

## 2022-01-01 RX ADMIN — ACETAMINOPHEN 650 MG: 325 TABLET, FILM COATED ORAL at 08:41

## 2022-01-01 RX ADMIN — PIPERACILLIN AND TAZOBACTAM 3.38 G: 3; .375 INJECTION, POWDER, FOR SOLUTION INTRAVENOUS at 11:20

## 2022-01-01 RX ADMIN — DIGOXIN 250 MCG: 0.25 INJECTION INTRAMUSCULAR; INTRAVENOUS at 05:37

## 2022-01-01 RX ADMIN — CALCIUM GLUCONATE 1 G: 20 INJECTION, SOLUTION INTRAVENOUS at 14:22

## 2022-01-01 RX ADMIN — HUMAN ALBUMIN MICROSPHERES AND PERFLUTREN 6 ML: 10; .22 INJECTION, SOLUTION INTRAVENOUS at 15:43

## 2022-01-01 RX ADMIN — Medication 0.25 MCG/KG/MIN: at 00:47

## 2022-01-01 RX ADMIN — Medication 10 MG: at 16:01

## 2022-01-01 RX ADMIN — ALBUMIN HUMAN 12.5 G: 0.05 INJECTION, SOLUTION INTRAVENOUS at 17:20

## 2022-01-01 RX ADMIN — PHENYLEPHRINE HYDROCHLORIDE 100 MCG: 10 INJECTION INTRAVENOUS at 08:15

## 2022-01-01 RX ADMIN — ONDANSETRON 4 MG: 2 INJECTION INTRAMUSCULAR; INTRAVENOUS at 15:54

## 2022-01-01 RX ADMIN — EPINEPHRINE 10 MCG: 1 INJECTION INTRAMUSCULAR; INTRAVENOUS; SUBCUTANEOUS at 11:03

## 2022-01-01 RX ADMIN — HYDROCODONE BITARTRATE AND ACETAMINOPHEN 1 TABLET: 5; 325 TABLET ORAL at 15:31

## 2022-01-01 RX ADMIN — ACETAMINOPHEN 650 MG: 650 SUPPOSITORY RECTAL at 08:50

## 2022-01-01 RX ADMIN — TRAMADOL HYDROCHLORIDE 50 MG: 50 TABLET, COATED ORAL at 18:50

## 2022-01-01 RX ADMIN — HYDROMORPHONE HYDROCHLORIDE 0.5 MG: 1 INJECTION, SOLUTION INTRAMUSCULAR; INTRAVENOUS; SUBCUTANEOUS at 08:41

## 2022-01-01 RX ADMIN — CHLORHEXIDINE GLUCONATE 15 ML: 1.2 SOLUTION ORAL at 20:52

## 2022-01-01 RX ADMIN — Medication 2 G: at 07:51

## 2022-01-01 ASSESSMENT — ENCOUNTER SYMPTOMS
DYSRHYTHMIAS: 1
CHILLS: 0
LIGHT-HEADEDNESS: 1
SEIZURES: 0
DYSRHYTHMIAS: 1
FEVER: 0
ORTHOPNEA: 1
DIZZINESS: 1
ORTHOPNEA: 1
SEIZURES: 0
SEIZURES: 0
SHORTNESS OF BREATH: 1
COUGH: 0

## 2022-01-01 ASSESSMENT — ACTIVITIES OF DAILY LIVING (ADL)
ADLS_ACUITY_SCORE: 28
ADLS_ACUITY_SCORE: 38
ADLS_ACUITY_SCORE: 28
ADLS_ACUITY_SCORE: 22
ADLS_ACUITY_SCORE: 38
ADLS_ACUITY_SCORE: 28
ADLS_ACUITY_SCORE: 25
ADLS_ACUITY_SCORE: 22
ADLS_ACUITY_SCORE: 22
ADLS_ACUITY_SCORE: 25
ADLS_ACUITY_SCORE: 20
ADLS_ACUITY_SCORE: 28
ADLS_ACUITY_SCORE: 28
ADLS_ACUITY_SCORE: 20
ADLS_ACUITY_SCORE: 34
ADLS_ACUITY_SCORE: 30
ADLS_ACUITY_SCORE: 22
ADLS_ACUITY_SCORE: 36
ADLS_ACUITY_SCORE: 36
ADLS_ACUITY_SCORE: 34
ADLS_ACUITY_SCORE: 38
ADLS_ACUITY_SCORE: 25
ADLS_ACUITY_SCORE: 34
ADLS_ACUITY_SCORE: 20
ADLS_ACUITY_SCORE: 22
ADLS_ACUITY_SCORE: 36
ADLS_ACUITY_SCORE: 28
ADLS_ACUITY_SCORE: 28
ADLS_ACUITY_SCORE: 38
ADLS_ACUITY_SCORE: 28
ADLS_ACUITY_SCORE: 38
ADLS_ACUITY_SCORE: 36
ADLS_ACUITY_SCORE: 30
ADLS_ACUITY_SCORE: 34
ADLS_ACUITY_SCORE: 28
ADLS_ACUITY_SCORE: 36
ADLS_ACUITY_SCORE: 36
ADLS_ACUITY_SCORE: 20
ADLS_ACUITY_SCORE: 36
ADLS_ACUITY_SCORE: 22
ADLS_ACUITY_SCORE: 36
ADLS_ACUITY_SCORE: 25
ADLS_ACUITY_SCORE: 25
ADLS_ACUITY_SCORE: 20
ADLS_ACUITY_SCORE: 34
ADLS_ACUITY_SCORE: 25
ADLS_ACUITY_SCORE: 30
ADLS_ACUITY_SCORE: 22
ADLS_ACUITY_SCORE: 20
ADLS_ACUITY_SCORE: 38
ADLS_ACUITY_SCORE: 20
ADLS_ACUITY_SCORE: 28
ADLS_ACUITY_SCORE: 34
ADLS_ACUITY_SCORE: 28
ADLS_ACUITY_SCORE: 22
ADLS_ACUITY_SCORE: 22
ADLS_ACUITY_SCORE: 25
ADLS_ACUITY_SCORE: 38
ADLS_ACUITY_SCORE: 25
ADLS_ACUITY_SCORE: 22
ADLS_ACUITY_SCORE: 20
ADLS_ACUITY_SCORE: 34
ADLS_ACUITY_SCORE: 34
ADLS_ACUITY_SCORE: 38
ADLS_ACUITY_SCORE: 22
ADLS_ACUITY_SCORE: 25
ADLS_ACUITY_SCORE: 25
ADLS_ACUITY_SCORE: 36
ADLS_ACUITY_SCORE: 22
ADLS_ACUITY_SCORE: 20
ADLS_ACUITY_SCORE: 20
ADLS_ACUITY_SCORE: 25
ADLS_ACUITY_SCORE: 36
ADLS_ACUITY_SCORE: 28
ADLS_ACUITY_SCORE: 22
ADLS_ACUITY_SCORE: 34
ADLS_ACUITY_SCORE: 35
ADLS_ACUITY_SCORE: 34
ADLS_ACUITY_SCORE: 38
ADLS_ACUITY_SCORE: 36
ADLS_ACUITY_SCORE: 28
ADLS_ACUITY_SCORE: 34

## 2022-01-01 ASSESSMENT — EJECTION FRACTION: EF_VALUE: .35

## 2022-01-01 ASSESSMENT — MIFFLIN-ST. JEOR: SCORE: 1743.19

## 2022-02-02 NOTE — DISCHARGE INSTRUCTIONS
Same Day Surgery Discharge Instructions for  Sedation and General Anesthesia       It's not unusual to feel dizzy, light-headed or faint for up to 24 hours after surgery or while taking pain medication.  If you have these symptoms: sit for a few minutes before standing and have someone assist you when you get up to walk or use the bathroom.      You should rest and relax for the next 24 hours. We recommend you make arrangements to have an adult stay with you for at least 24 hours after your discharge.  Avoid hazardous and strenuous activity.      DO NOT DRIVE any vehicle or operate mechanical equipment for 24 hours following the end of your surgery.  Even though you may feel normal, your reactions may be affected by the medication you have received.      Do not drink alcoholic beverages for 24 hours following surgery.       Slowly progress to your regular diet as you feel able. It's not unusual to feel nauseated and/or vomit after receiving anesthesia.  If you develop these symptoms, drink clear liquids (apple juice, ginger ale, broth, 7-up, etc. ) until you feel better.  If your nausea and vomiting persists for 24 hours, please notify your surgeon.        All narcotic pain medications, along with inactivity and anesthesia, can cause constipation. Drinking plenty of liquids and increasing fiber intake will help.      For any questions of a medical nature, call your surgeon.      Do not make important decisions for 24 hours.      If you had general anesthesia, you may have a sore throat for a couple of days related to the breathing tube used during surgery.  You may use Cepacol lozenges to help with this discomfort.  If it worsens or if you develop a fever, contact your surgeon.       If you feel your pain is not well managed with the pain medications prescribed by your surgeon, please contact your surgeon's office to let them know so they can address your concerns.       CoVid 19 Information    We want to give you  information regarding Covid. Please consult your primary care provider with any questions you might have.     Patient who have symptoms (cough, fever, or shortness of breath), need to isolate for 7 days from when symptoms started OR 72 hours after fever resolves (without fever reducing medications) AND improvement of respiratory symptoms (whichever is longer).      Isolate yourself at home (in own room/own bathroom if possible)    Do Not allow any visitors    Do Not go to work or school    Do Not go to [employer],  centers, shopping, or other public places.    Do Not shake hands.    Avoid close and intimate contact with others (hugging, kissing).    Follow CDC recommendations for household cleaning of frequently touched services.     After the initial 7 days, continue to isolate yourself from household members as much as possible. To continue decrease the risk of community spread and exposure, you and any members of your household should limit activities in public for 14 days after starting home isolation.     You can reference the following CDC link for helpful home isolation/care tips:  https://www.cdc.gov/coronavirus/2019-ncov/downloads/10Things.pdf    Protect Others:    Cover Your Mouth and Nose with a mask, disposable tissue or wash cloth to avoid spreading germs to others.    Wash your hands and face frequently with soap and water    Call Your Primary Doctor If: Breathing difficulty develops or you become worse.    For more information about COVID19 and options for caring for yourself at home, please visit the CDC website at https://www.cdc.gov/coronavirus/2019-ncov/about/steps-when-sick.html  For more options for care at [facility], please visit our website at [email or web address]    [facility]  Discharge Instructions: Post-Operative Robotic Laparoscopic Inguinal Hernia Repair    ACTIVITY    Take frequent, short walks and increase your  activity gradually.      Avoid strenuous physical activity or heavy lifting greater than 15 lbs. for 3-4 weeks.  You may climb stairs.    You may drive without restrictions when you are not using any prescription pain medication and feel comfortable in a car.    You may return to work/school when you are comfortable without any prescription pain medication.    WOUND CARE    You may remove your outer dressing or Band-Aids and shower 48 hours after the surgery.  Pat your incisions dry and leave them open to air.  Re-apply dressing (Band-Aids or gauze/tape) as needed for comfort or drainage.    You may have steri-strips (looks like white tape) on your incision.  You may peel off the steri-strips 2 weeks after your surgery if they have not peeled off on their own.     Do not soak your incisions in a tub or pool for 2 weeks.     Do not apply any lotions, creams, or ointments to your incisions.    A ridge under your incisions is normal and will gradually resolve.    DIET    Start with liquids, then gradually resume your regular diet as tolerated.     Drink plenty of fluids to stay hydrated.    PAIN    Expect some tenderness and discomfort at the incision site(s).  Use the prescribed pain medication at your discretion.  Expect gradual resolution of your pain over several days.    You may take ibuprofen with food (unless you have been told not to) or acetaminophen/Tylenol instead of or in addition to your prescribed pain medication.  However, if you are taking Norco or Percocet, do not take any additional acetaminophen/Tylenol.    Do not drink alcohol or drive while you are taking pain medications.    You may apply ice to your incisions in 20 minute intervals as needed for the next 48 hours.  After that time, consider switching to heat if you prefer.    EXPECTATIONS    You may notice air in your scrotum and/or penis after the surgery.  This is due to the gas used during the surgery.  You can expect some swelling and  bruising involving the scrotum and/or penis as well as numbness.  These symptoms are expected and should gradually resolve in the next few days.  You may use ice to help with the swelling and try placing a rolled hand towel below your scrotum to help alleviate scrotal discomfort.  If you develop significant testicular or penile pain, please call our office and speak with a nurse.    Pain medications can cause constipation.  Limit use when possible.  Take an over the counter or prescribed stool softener/stimulant, such as Colace or Senna, 1-2 times a day with plenty of water.  You may take a mild over the counter laxative, such as Miralax or a suppository, as needed.  You may take 1 oz. (2 tablespoons) Milk of Magnesia the evening following surgery to encourage bowel movement.  You may discontinue these medications once you are having regular bowel movements and/or are no longer taking your narcotic pain medication.     You may have shoulder or upper back discomfort due to the gas used in surgery.  This is temporary and should resolve in 48-72 hours.  Short, frequent walks may help with this.    If you are unable to urinate for 8 hours or feel as though you are not emptying your bladder adequately, we recommend you seek care at an ER or Urgent Care facility for possible catheter placement.     FOLLOW UP    Our office will contact you in approximately 2-3 weeks to check on your progress and answer any questions you may have.  If you are doing well, you will not need to return for a follow up appointment.  If any concerns are identified over the phone, we will help you make an appointment to see a provider.     If you have not received a phone call, have any questions or concerns, or would like to be seen, please call us at [phone] and ask to speak with our nurse.  We are located at [address] [place] [address].    CALL OUR OFFICE -[phone] IF YOU HAVE:     Chills or fever above  101 F.    Increased redness, warmth, or drainage at your incisions.    Significant bleeding.    Pain not relieved by your pain medication or rest.    Increasing pain after the first 48 hours.    Any other concerns or questions.           Revised December 2021

## 2022-02-02 NOTE — OP NOTE
General Surgery Operative Note    PREOPERATIVE DIAGNOSIS:  Bilateral inguinal hernia [K40.90]  Umbilical hernia without obstruction and without gangrene [K42.9]    POSTOPERATIVE DIAGNOSIS:  Bilateral Inguinal Hernia, Umbilical Hernia    PROCEDURE:   Procedure(s):  ROBOT-ASSISTED BILATERAL INGUINAL HERNIA REPAIR WITH MESH  OPEN UMBILICAL HERNIA REPAIR    ANESTHESIA:  General.    PREOPERATIVE MEDICATIONS:  Ancef    SURGEON:  Rene Murray MD    ASSISTANT:  Oj Harris PA-C.  Assistant was required owing to challenging exposure and need for retraction.    INDICATIONS:  Patient was seen in clinic with a symptomatic RIH.  Also noted was a LIH and UH.    PROCEDURE:  The patient was taken to the operating suite and uneventfully endotracheally intubated.  The abdomen was prepped and draped in a sterile fashion.  Surgeon initiated timeout was acknowledged. We began with the umbilical hernia. I made a curvilinear incision below the umbilicus and took this down through skin and subcutaneous tissue. I dissected down to the level of the fascia. I was able to get around the umbilical stalk and carefully dissected the herniated contents off of the umbilical skin. Herniated contents contained preperitoneal fat which was amputated. Once we had removed the hernia sac the hernia defect appeared to be no bigger than 6 or 7 mm. I elected to leave the hernia for now and placed our first 8 mm trocar through the umbilical hernia defect. This was then insufflated. Two 8 mm reusable trochars were placed across the upper abdominal midline.  The robot was docked and targeted toward the symphysis pubis.  A grasper and monopolar scissors were inserted into the abdomen and the abdomen was insufflated.    We began our dissection on the right side.  We identified an area at the midpoint between the symphysis pubis and the umbilicus and began creating a plane in the preperitoneal space.  This was taken in continuity with the posterior rectus  medially out toward the anterior superior iliac crest.  Care was taken not to injure the inferior epigastric vessels.  We continued this dissection down toward the symphysis pubis and exposed Cristian's ligament.  We encountered the hernia sac entering the hernia defect and were able to get around this.  We carefully began reducing the hernia sac and  it from the vas deferens and the gonadal vessels.  Once this was reduced we continued opening up the space until we had appropriate room for our mesh.    We then turned our attention toward the contralateral side.  We again created a plane in the preperitoneal space and took this down toward the symphysis pubis.  The hernia defect and hernia sac were encircled and reduced taking care to preserve the structures of the spermatic cord.  All preperitoneal fat was reduced.    We then set about placing our mesh.  A 10 x 15 cm piece of progrip mesh was inserted into our preperitoneal space.  This was deployed from top to bottom such that the medial corner overlapped Cristian's ligament and crossed over midline.  This was specifically placed so as to cover the direct, indirect, and femoral hernia sites.  The mesh laid flat and we made sure that peritoneal edge was not close to the inferior edge of the mesh.  We then set about closing our preperitoneal flap.  This was accomplished with a running 2-0 Stratafix suture.  Once the flap was reapproximated, we used a 2-0 Stratafix to close 2 sizable defects along the peritoneal flap.    We placed a 10 x 15 cm piece of ProGrip mesh on the contralateral side as well.  This was positioned in a similar manner, overlapping the other side and covering Cristian's ligament for approximately 2 cm.  Again, a 2-0 Stratafix was used to close the flap and 3-0 Vicryl was used to repair any holes in the peritoneum.    At the completion of the mesh positioning and flap closure, robotic instruments were removed and the trochars were removed from  the abdomen under laparoscopic visualization.  The robot was undocked. We closed the umbilical hernia primarily using several interrupted 0 Vicryl sutures. Umbilical skin was tacked down to the fascia with 3-0 Vicryl.  The skin edges were reapproximated with 4-0 Vicryl and Steri-Strips.  The patient was uneventfully extubated, awakened and taken to the PACU in stable condition.  At the conclusion of the case, all lap and needle counts were correct.      ESTIMATED BLOOD LOSS: 10 mL    INTRAOPERATIVE FINDINGS: 1 cm umbilical hernia closed primarily. Bilateral indirect inguinal hernias repaired with ProGrip mesh.    Rene Murray MD, MD

## 2022-02-02 NOTE — ANESTHESIA POSTPROCEDURE EVALUATION
Patient: Bryant Mckenzie    Procedure: Procedure(s):  ROBOT-ASSISTED BILATERAL INGUINAL HERNIA REPAIR WITH MESH  OPEN UMBILICAL HERNIA REPAIR       Diagnosis:Right inguinal hernia [K40.90]  Umbilical hernia without obstruction and without gangrene [K42.9]  Diagnosis Additional Information: No value filed.    Anesthesia Type:  General    Note:     Postop Pain Control: Uneventful            Sign Out: Well controlled pain   PONV: No   Neuro/Psych: Uneventful            Sign Out: Acceptable/Baseline neuro status   Airway/Respiratory: Uneventful            Sign Out: Acceptable/Baseline resp. status   CV/Hemodynamics: Uneventful            Sign Out: Acceptable CV status; No obvious hypovolemia; No obvious fluid overload   Other NRE: NONE   DID A NON-ROUTINE EVENT OCCUR? No           Last vitals:  Vitals Value Taken Time   /64 02/02/22 1145   Temp 36.6  C (97.8  F) 02/02/22 1130   Pulse 68 02/02/22 1146   Resp 23 02/02/22 1146   SpO2 96 % 02/02/22 1146   Vitals shown include unvalidated device data.    Electronically Signed By: Rene Zepeda MD  February 2, 2022  4:59 PM

## 2022-02-02 NOTE — ANESTHESIA PREPROCEDURE EVALUATION
Anesthesia Pre-Procedure Evaluation    Patient: Bryant Mckenzie   MRN: [id number] : 1940        Preoperative Diagnosis: Right inguinal hernia [K40.90]  Umbilical hernia without obstruction and without gangrene [K42.9]    Procedure : Procedure(s):  ROBOT-ASSISTED BILATERAL INGUINAL HERNIA REPAIR  ROBOT-ASSISTED UMBILICAL HERNIA REPAIR          Past Medical History:   Diagnosis Date     Aortic stenosis 2016    Echo 2016 Mild       Atrophic gastritis 2016     Benign non-nodular prostatic hyperplasia 2016     CAD (coronary artery disease)      Hyperlipidemia 2016     Ischemic cardiomyopathy 2019     Nonischemic cardiomyopathy (H) 10/30/2019     Obstructive sleep apnea syndrome 2016     Type 2 diabetes mellitus 2016     Vitamin B12 deficiency (non anemic) 2016      Past Surgical History:   Procedure Laterality Date     CV CORONARY ANGIOGRAM N/A 10/24/2019    Procedure: Coronary Angiogram;  Surgeon: Lupe Posada MD;  Location:  [facility]     CV HEART CATHETERIZATION WITH POSSIBLE INTERVENTION N/A 2019    Procedure: Heart Catheterization with Possible Intervention;  Surgeon: Alex Bazan MD;  Location:  [facility]     CV PCI CHRONIC TOTAL OCCLUSION N/A 10/24/2019    Procedure: Coronary Total Occlusion;  Surgeon: Lupe Posada MD;  Location:  [facility]     CV PCI STENT DRUG ELUTING N/A 10/24/2019    Procedure: PCI Stent Drug Eluting;  Surgeon: Lupe Posada MD;  Location:  [facility]     ESOPHAGOSCOPY, GASTROSCOPY, DUODENOSCOPY (EGD), COMBINED N/A 2015    Procedure: COMBINED ESOPHAGOSCOPY, GASTROSCOPY, DUODENOSCOPY (EGD), BIOPSY SINGLE OR MULTIPLE;  Surgeon: Allan Marroquin MD;  Location:  GI     EXCISE LESION TRUNK N/A 2019    Procedure: EXCISE CYST UPPER BACK;  Surgeon: Rene Murray MD;  Location:  OR     HERNIA REPAIR       HYDROCELECTOMY SCROTAL        JOINT REPLACEMENT (aka KNEE) NOS Bilateral       Allergies   Allergen Reactions     No Known Allergies       Social History     Tobacco Use     Smoking status: Never Smoker     Smokeless tobacco: Never Used   Substance Use Topics     Alcohol use: Not Currently     Alcohol/week: 0.0 standard drinks      Wt Readings from Last 1 Encounters:   12/23/21 102.5 kg (226 lb)      Echo 12/15/2021  Interpretation Summary     The left ventricle is mildly dilated.  The visual ejection fraction is 40-45%.  The right ventricle is normal in structure, function and size.  There is mild biatrial enlargement.  Moderate to severe valvular aortic stenosis.  Mild aortic root dilatation.  Compared to prior study, changes are noted.  ______________________________________________________________________________  Left Ventricle  The left ventricle is mildly dilated. There is normal left ventricular wall  thickness. The visual ejection fraction is 40-45%. Grade I or early diastolic  dysfunction. There is mild-moderate global hypokinesia of the left ventricle.     Right Ventricle  The right ventricle is normal in structure, function and size.     Atria  There is mild biatrial enlargement. There is no color Doppler evidence of an  atrial shunt.     Mitral Valve  The mitral valve is normal in structure and function. There is trace mitral  regurgitation.     Tricuspid Valve  The tricuspid valve is normal in structure and function. There is trace  tricuspid regurgitation. The right ventricular systolic pressure is  approximated at 21.6 mmHg plus the right atrial pressure.     Aortic Valve  The mean AoV pressure gradient is 36.8 mmHg. The calculated aortic valve are  is 1.4 cm^2. Moderate to severe valvular aortic stenosis.     Pulmonic Valve  The pulmonic valve is not well seen, but is grossly normal.     Vessels  Mild aortic root dilatation. The ascending aorta is Borderline dilated.     Pericardium  There is no pericardial effusion.      Rhythm  Sinus rhythm was noted.  ______________________________________________________________________________    EKG - SR    Cardiac Cath  Conclusion    1. Proximal-mid LCx  with L-L epicardial collaterals from diagonal branches.  2.  Successful -PCI of the proximal-mid LCx with a single 2.5 x 22 Resolute Jairo LEVI, post-dilated to 3.0 proximally  3.  Successful closure of the RFA access site with an 8F Angioseal closure device        Plan    * Remove radial band per protocol  * Bedrest per protocol  * Aspirin 81 lifelong  * Dual anti-platelet therapy for at least 1 year, and potentially longer if tolerated  * High-intensity statin  * Cardiac rehab referral     Coronary Findings      Diagnostic  Dominance: Right    Left Main   The vessel is angiographically normal.   Left Anterior Descending   Ost LAD to Prox LAD lesion is 10% stenosed.   Left Circumflex   Prox Cx lesion is 100% stenosed. The lesion is located at the major branch and bifurcated. The lesion was not previously treated.   Prox Cx to Mid Cx lesion is 60% stenosed.   First Obtuse Marginal Branch   The vessel is small.   Ost 1st Mrg lesion is 70% stenosed.   Second Obtuse Marginal Branch   Collaterals   2nd Mrg filled by collaterals from 2nd Diag.      Ost 2nd Mrg-1 lesion is 50% stenosed.   Ost 2nd Mrg-2 lesion is 50% stenosed.   Lateral Second Obtuse Marginal Branch   Collaterals   Lat 2nd Mrg filled by collaterals from 3rd Diag.      Right Coronary Artery   Prox RCA to Mid RCA lesion is 50% stenosed. Not the culprit lesion. The lesion is type B1 - medium risk and eccentric. Pressure wire/iFR used. 0.91       Intervention        Anesthesia Evaluation   Pt has had prior anesthetic.     No history of anesthetic complications       ROS/MED HX  ENT/Pulmonary:     (+) sleep apnea (patient denies), doesn't use CPAP,  (-) recent URI   Neurologic:    (-) no seizures, no CVA and migraines   Cardiovascular: Comment: CHF - mixed ischemic/ idiopathic - EF  45%  Chronic diuretic    Fair exercise tolerance     (+) Dyslipidemia --CAD --stent-2019. Drug Eluting Stent. Taking blood thinners CHF Last EF: 40 - 45 valvular problems/murmurs type: AS     METS/Exercise Tolerance:     Hematologic:  - neg hematologic  ROS     Musculoskeletal: Comment: Back pain  (+) arthritis,     GI/Hepatic: Comment: Bilateral inguinal hernias   (-) GERD   Renal/Genitourinary:     (+) BPH,     Endo:     (+) type II DM, Using insulin,  (-) thyroid disease   Psychiatric/Substance Use:  - neg psychiatric ROS     Infectious Disease:       Malignancy:       Other:            Physical Exam    Airway  airway exam normal      Mallampati: II   TM distance: > 3 FB   Neck ROM: full   Mouth opening: > 3 cm    Respiratory Devices and Support         Dental       (+) lower dentures      Cardiovascular          Rhythm and rate: regular and normal   (+) murmur (RUSB)       Pulmonary   pulmonary exam normal        breath sounds clear to auscultation           OUTSIDE LABS:  CBC:   Lab Results   Component Value Date    WBC 9.3 01/03/2020    WBC 5.5 10/24/2019    HGB 13.4 01/03/2020    HGB 13.3 10/24/2019    HCT 43.4 01/03/2020    HCT 40.4 10/24/2019     01/03/2020     10/24/2019     BMP:   Lab Results   Component Value Date     06/04/2021     01/03/2020    POTASSIUM 4.2 06/04/2021    POTASSIUM 4.1 01/03/2020    CHLORIDE 108 06/04/2021    CHLORIDE 105 01/03/2020    CO2 25 06/04/2021    CO2 27 01/03/2020    BUN 16 06/04/2021    BUN 14 01/03/2020    CR 0.97 06/04/2021    CR 0.93 01/03/2020     (H) 06/04/2021     (H) 01/03/2020     COAGS:   Lab Results   Component Value Date    PTT 34 10/24/2019    INR 1.19 (H) 10/24/2019     POC:   Lab Results   Component Value Date     (H) 01/03/2020     HEPATIC: No results found for: ALBUMIN, PROTTOTAL, ALT, AST, GGT, ALKPHOS, BILITOTAL, BILIDIRECT, NIKOLE  OTHER:   Lab Results   Component Value Date    A1C 6.7 (H) 06/12/2017    FATMATA 9.3  06/04/2021    TSH 3.25 12/05/2016       Anesthesia Plan    ASA Status:  3   NPO Status:  NPO Appropriate    Anesthesia Type: General.     - Airway: ETT   Induction: Propofol.   Maintenance: Balanced.        Consents    Anesthesia Plan(s) and associated risks, benefits, and realistic alternatives discussed. Questions answered and patient/representative(s) expressed understanding.    - Discussed:     - Discussed with:  Patient         Postoperative Care    Pain management: Multi-modal analgesia.   PONV prophylaxis: Ondansetron (or other 5HT-3), Dexamethasone or Solumedrol     Comments:                Rene Zepeda MD

## 2022-02-02 NOTE — ANESTHESIA CARE TRANSFER NOTE
Patient: Bryant Mckenzie    Procedure: Procedure(s):  ROBOT-ASSISTED BILATERAL INGUINAL HERNIA REPAIR WITH MESH  OPEN UMBILICAL HERNIA REPAIR       Diagnosis: Right inguinal hernia [K40.90]  Umbilical hernia without obstruction and without gangrene [K42.9]  Diagnosis Additional Information: No value filed.    Anesthesia Type:   General     Note:    Oropharynx: oropharynx clear of all foreign objects and spontaneously breathing  Level of Consciousness: awake  Oxygen Supplementation: face mask  Level of Supplemental Oxygen (L/min / FiO2): 8  Independent Airway: airway patency satisfactory and stable  Dentition: dentition unchanged  Vital Signs Stable: post-procedure vital signs reviewed and stable  Report to RN Given: handoff report given  Patient transferred to: PACU    Handoff Report: Identifed the Patient, Identified the Reponsible Provider, Reviewed the pertinent medical history, Discussed the surgical course, Reviewed Intra-OP anesthesia mangement and issues during anesthesia, Set expectations for post-procedure period and Allowed opportunity for questions and acknowledgement of understanding      Vitals:  Vitals Value Taken Time   BP     Temp     Pulse 67 02/02/22 1036   Resp 12 02/02/22 1036   SpO2 98 % 02/02/22 1036   Vitals shown include unvalidated device data.    Electronically Signed By: JENNIFER Abdullahi CRNA  February 2, 2022  10:37 AM

## 2022-02-02 NOTE — BRIEF OP NOTE
"[facility]    Brief Operative Note    Pre-operative diagnosis: Right inguinal hernia [K40.90]  Umbilical hernia without obstruction and without gangrene [K42.9]  Post-operative diagnosis Bilateral Inguinal Hernia, Umbilical Hernia    Procedure: Procedure(s):  ROBOT-ASSISTED BILATERAL INGUINAL HERNIA REPAIR WITH MESH  OPEN UMBILICAL HERNIA REPAIR     Surgeon: Surgeon(s) and Role:     * Rene Murray MD - Primary     * Oj Harris PA-C - Assisting     Anesthesia: General   Estimated Blood Loss: Less than 10 ml    Drains: None  Specimens:   ID Type Source Tests Collected by Time Destination   1 : UMBILICAL HERNIA Tissue Umbilicus SURGICAL PATHOLOGY EXAM Rene Murray MD 2/2/2022  8:33 AM      Findings:   None.  Umbilicus high enough that we could use this for the camera port.  Then fixed umbilical hernia primarily through this incision.  Bilateral inguinal hernias, with Rt bigger than Lt.    Complications: None.  Implants:   Implant Name Type Inv. Item Serial No.  Lot No. LRB No. Used Action   MESH PROGRIP LAPAROSCOPIC 5.9X3.9\" PAR[employer]EX SELF-FIX [id number] - [id number] Mesh MESH PROGRIP LAPAROSCOPIC 5.9X3.9\" PARIETEX SELF-FIX [id number]  [employer] [id number] Right 1 Implanted   MESH PROGRIP LAPAROSCOPIC 5.9X3.9\" PARIETEX SELF-FIX [id number] - [id number] Mesh MESH PROGRIP LAPAROSCOPIC 5.9X3.9\" PARI[employer]EX SELF-FIX [id number]  [employer] [id number] Left 1 Implanted           "

## 2022-02-02 NOTE — ANESTHESIA PROCEDURE NOTES
Airway       Patient location during procedure: OR       Procedure Start/Stop Times: 2/2/2022 8:05 AM  Staff -        Anesthesiologist:  Rene Zepeda MD       CRNA: Hamida Vences APRN CRNA       Other Anesthesia Staff: Shu Vivas       Performed By: NE and with CRNAs       Procedure performed by resident/fellow/CRNA in presence of a teaching physician.  Indications and Patient Condition       Indications for airway management: anne-procedural       Induction type:intravenous       Mask difficulty assessment: 1 - vent by mask    Final Airway Details       Final airway type: endotracheal airway       Successful airway: ETT - single and Oral  Endotracheal Airway Details        ETT size (mm): 8.0       Cuffed: yes       Successful intubation technique: direct laryngoscopy       DL Blade Type: MAC 4       Grade View of Cords: 1       Adjucts: stylet       Position: Right       Measured from: gums/teeth       Secured at (cm): 23       Bite block used: None    Post intubation assessment        Placement verified by: capnometry, equal breath sounds and chest rise        Number of attempts at approach: 1       Secured with: pink tape       Ease of procedure: easy       Dentition: Intact and Unchanged

## 2022-02-02 NOTE — PLAN OF CARE
Patient is adequate for discharge to home from Phase 2. Ox4 and AVSS. Tolerating PO, denies nausea. Voided. Pain well controlled. Discharge instructions completed with wife via phone per COVID guidelines. Discharge medications and all belongings returned to patient and sent home.

## 2022-02-22 NOTE — TELEPHONE ENCOUNTER
[facility]  Post op call note - No Answer    Bryant Mckenzie was called for an update regarding his recovery.  Spoke to patient's wife yesterday.  She reports he was doing well and was at the dentist.  Offered to call him back today but there was no answer when I did so.  A detailed message was left instructing the patient to call the office with any questions or concerns.     Oj Harris PA-C

## 2022-06-06 NOTE — TELEPHONE ENCOUNTER
1442:  Scheduled apmnt for this week, 6\9 with JACKIE, [place], to work with Marshall on symptoms.      Received phone message from Marshall, who is calling about continual increasing VALERA, increased swelling in LE and feet.  Weight has gone up about 5 Lb in past 3 weeks.  He is now having difficulty keeping up with performing his ADLs as usual.      He has Echo scheduled for this Friday 6\10.    He needs to have an apmnt to deal with these change in symptoms, per Dr. Phillips's last office visit note.      The apmnt to follow up on the Echocardiogram and valve situation is a separate issue, at this point we want to help him get Sx under control so he can get improvement in ability to do his ADLs.   Writer will work with  team to get him an apmnt either [place] or [place] location in the next several days.    Writer will call patient back this afternoon.   Marshall expresses understanding.

## 2022-06-09 NOTE — LETTER
2022    Merlin Emery Brown, MD  [facility] [address]  [facility] [address]    RE: Bryant Mckenzie       Dear Colleague,     I had the pleasure of seeing Bryant Mckenzie in the [facility].      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      In the spring of 2019 he presented with shortness of breath symptoms and was found to have a severe cardiomyopathy with a LVEF of around 30%. His CT calcium score was abnormal and he went on to have coronary angiography. There was a mid to distal LCx , a proximal to mid 50% RCA stenosis, and a proximal LAD stenosis of 10%. No intervention was done at that time. He had a follow up cardiac MRI in 2019. His LV remained severely depressed at 21% with mild to moderate RV dysfunction. There were findings consistent with a small prior infarct in the circumflex distribution. Ultimately, given the global nature of his LV dysfunction he was felt to have a mixed nonischemic and ischemia cardiomyopathy. He eventually underwent intervention to the circumflex  on 10/24/19 with Dr. Rivera with a single LEVI and completed a year of DAPT.     He had a follow up echocardiogram in 2020 which showed improvement in his LVEF with an EF of 40 - 45%. He had moderate aortic stenosis.     He had a follow up echocardiogram in 2020 which unfortunately was a very poor study. No EF was noted but his LV function overall appeared mildly reduced. The peak AoV pressure gradient 18.8 mmHg but notably the doppler envelopes were poor thus the gradients may be underestimated.       Echo 12/15/21 shows mean gradient of 37  mmHg and aortic valve area of 1.0 cm .  Dr. Phillips recommended a repeat echo in 6 months.        He is scheduled for this echo tomorrow actually, but in the interim he developed increasing SOB, fatigue, weight gain, and LE edema and so I am meeting him today.   Over the past 3 weeks he has had increasing SOB, fatigue.   He weighs daily and had 8 lbs weight gain from 215 lbs to 223 lbs, new pitting edema.    Over the weekend, he started taking Lasix 20 mg. He had some old pills at home. The swelling did seem to improve some and he lost a lb.  No chest pain.   No syncope. But has had some LH, dizziness.   [facility]ed [facility] and [facility].         ROS:  Skin:        Eyes:       ENT:       Respiratory:  Positive for dyspnea on exertion  Cardiovascular:    Positive for;edema;fatigue  Gastroenterology:      Genitourinary:       Musculoskeletal:       Neurologic:       Psychiatric:       Heme/Lymph/Imm:       Endocrine:         PAST MEDICAL HISTORY:  Past Medical History:   Diagnosis Date     Aortic stenosis 9/23/2016    Echo 9/2016 Mild       Atrophic gastritis 9/8/2016     Benign non-nodular prostatic hyperplasia 09/08/2016     CAD (coronary artery disease)      Hyperlipidemia 09/08/2016     Ischemic cardiomyopathy 6/12/2019     Nonischemic cardiomyopathy (H) 10/30/2019     Obstructive sleep apnea syndrome 9/8/2016     Type 2 diabetes mellitus 09/06/2016     Vitamin B12 deficiency (non anemic) 9/8/2016       PAST SURGICAL HISTORY:  Past Surgical History:   Procedure Laterality Date     CV CORONARY ANGIOGRAM N/A 10/24/2019    Procedure: Coronary Angiogram;  Surgeon: Lupe Posada MD;  Location:  HEART CARDIAC CATH LAB     CV HEART CATHETERIZATION WITH POSSIBLE INTERVENTION N/A 6/4/2019    Procedure: Heart Catheterization with Possible Intervention;  Surgeon: Alex Bazan MD;  Location:  HEART CARDIAC CATH LAB     CV PCI CHRONIC TOTAL OCCLUSION N/A 10/24/2019     Procedure: Coronary Total Occlusion;  Surgeon: Lupe Posada MD;  Location:  HEART CARDIAC CATH LAB     CV PCI STENT DRUG ELUTING N/A 10/24/2019    Procedure: PCI Stent Drug Eluting;  Surgeon: Lupe Posada MD;  Location:  HEART CARDIAC CATH LAB     DAVINCI HERNIORRHAPHY INGUINAL Bilateral 2/2/2022    Procedure: ROBOT-ASSISTED BILATERAL INGUINAL HERNIA REPAIR WITH MESH;  Surgeon: Rene Murray MD;  Location:  OR     ESOPHAGOSCOPY, GASTROSCOPY, DUODENOSCOPY (EGD), COMBINED N/A 1/20/2015    Procedure: COMBINED ESOPHAGOSCOPY, GASTROSCOPY, DUODENOSCOPY (EGD), BIOPSY SINGLE OR MULTIPLE;  Surgeon: Allan Marroquin MD;  Location:  GI     EXCISE LESION TRUNK N/A 4/17/2019    Procedure: EXCISE CYST UPPER BACK;  Surgeon: Rene Murray MD;  Location:  OR     HERNIA REPAIR       HERNIORRHAPHY UMBILICAL N/A 2/2/2022    Procedure: OPEN UMBILICAL HERNIA REPAIR;  Surgeon: Rene Murray MD;  Location:  OR     HYDROCELECTOMY SCROTAL       JOINT REPLACEMENT (aka KNEE) NOS Bilateral        SOCIAL HISTORY:  Social History     Socioeconomic History     Marital status:    Tobacco Use     Smoking status: Never Smoker     Smokeless tobacco: Never Used   Vaping Use     Vaping Use: Never used   Substance and Sexual Activity     Alcohol use: Not Currently     Alcohol/week: 0.0 standard drinks     Drug use: No     Sexual activity: Never   Social History Narrative    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; walks dog for exercise        FAMILY HISTORY:  Family History   Problem Relation Age of Onset     Coronary Artery Disease Father 53     Cerebrovascular Disease Father      Diabetes Mother      Diabetes Brother        MEDS: ASPIRIN 81 PO, Take by mouth daily   atorvastatin (LIPITOR) 40 MG tablet, Take 1 tablet (40 mg) by mouth daily  carvedilol (COREG) 3.125 MG tablet, Take 3 tablets (9.375 mg) by mouth 2 times daily (with meals)  empagliflozin (JARDIANCE) 25 MG TABS tablet, Take 25 mg  by mouth daily  insulin glargine (LANTUS) 100 UNIT/ML injection, Inject 13 Units Subcutaneous At Bedtime   insulin lispro (HUMALOG KWIKPEN) 100 UNIT/ML injection, 11 units with breakfast,15 units at noon, and 14 units before evening meal (Patient taking differently: Depending on glucose reading)  metFORMIN (GLUCOPHAGE) 500 MG tablet, TAKE 2 TABLETS (1,000 MG) BY MOUTH TWICE A DAY WITH MEALS  Multiple Vitamins-Minerals (MULTIVITAL PO), Take 1 tablet by mouth daily  sacubitril-valsartan (ENTRESTO) 24-26 MG per tablet, Take 1 tablet by mouth 2 times daily  tamsulosin (FLOMAX) 0.4 MG capsule, Take 0.4 mg by mouth daily  vitamin C (ASCORBIC ACID) 1000 MG TABS, Take 1,000 mg by mouth daily  Vitamin D, Cholecalciferol, 25 MCG (1000 UT) CAPS, Take 4,000 Units by mouth daily  zinc gluconate 50 MG tablet, Take 50 mg by mouth daily    No current facility-administered medications on file prior to visit.      ALLERGIES:   Allergies   Allergen Reactions     No Known Allergies        PHYSICAL EXAM:  Vitals: BP 90/56 (BP Location: Right arm, Patient Position: Sitting, Cuff Size: Adult Regular)   Pulse 56   Ht 1.829 m (6')   Wt 104 kg (229 lb 4.8 oz)   SpO2 99%   BMI 31.10 kg/m    Constitutional:  cooperative, alert and oriented, well developed, well nourished, in no acute distress        Skin:  warm and dry to the touch, no apparent skin lesions or masses noted        Head:  normocephalic, no masses or lesions        Eyes:  pupils equal and round;conjunctivae and lids unremarkable;sclera white        ENT:  no pallor or cyanosis        Neck:  JVP normal transmitted murmur      Respiratory:  normal breath sounds, clear to auscultation, normal A-P diameter, normal symmetry, normal respiratory excursion, no use of accessory muscles        Cardiac: regular rhythm   soft or inaudible A2   systolic murmur systolic ejection murmur;RUSB;LLSB;grade 2;grade 3        GI:  abdomen soft;BS normoactive        Vascular: pulses full and equal                                       Extremities and Musculoskeletal:  no deformities, clubbing, cyanosis, erythema observed        Neurological:  no gross motor deficits;affect appropriate            LABS/DATA:  I reviewed the following:  Echo 12/15/21:  Interpretation Summary  The left ventricle is mildly dilated.  The visual ejection fraction is 40-45%.  The right ventricle is normal in structure, function and size.  There is mild biatrial enlargement.  Moderate to severe valvular aortic stenosis.  Mild aortic root dilatation.  Compared to prior study, changes are noted.          ASSESSMENT/PLAN:  1.  Mixed cardiomyopathy, ejection fraction 40-45%. Stable on echo 12/2021. Continue BB, Entresto, Jardiance.     2.  Coronary disease status post PCI of circumflex  10/2019. Continue ASA, BB, statin.    3.  Moderate disease in RCA- medically managed. No angina but does have new SOB and edema.     4.  Moderate-severe aortic stenosis with aortic valve area of 1.0 cm  and mean gradient of 37 mmHg on echo 12/2021. Now with new SOB and edema.  Repeat echo is scheduled tomorrow. We can review results at his follow up appt with me next week.  We will likely refer him to TAVR.   We are cautiously diuresing him as noted below.     5.  Acute CHF, likely systolic and diastolic.  We are getting an echo tomorrow. But for now, he clearly has fluid retention so continue Lasix but increase to 40 mg x 3 days then go back to 20 mg.  See me back in clinic in 1 week with BMP prior.   We may need to lower Coreg to allow for Lasix.     6.  Hyperlipidemia. Continue statin.         Follow up:  Echo tomorrow  6/17/22 with BMP      Winsome Wills PA-C      Thank you for allowing me to participate in the care of your patient.      Sincerely,     Winsome Wills PA-C     [facility]  cc:   Mejia Phillips MD  [address]  [place] [address]

## 2022-06-09 NOTE — PATIENT INSTRUCTIONS
You clearly have some fluid retention on exam.   Continue the Lasix, but increase to 40 mg (2 tablets) daily for 3 days.  Then go back to 20 mg daily.   If the swelling goes away before the 3 days, you can go back to 20 mg sooner.     Get the echo tomorrow.  We will call you with results.     See me back in clinic on Friday 6/17/22.  You will have a lab test prior. We can also discuss the echo results more in depth this day.

## 2022-06-09 NOTE — PROGRESS NOTES
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      In the spring of 2019 he presented with shortness of breath symptoms and was found to have a severe cardiomyopathy with a LVEF of around 30%. His CT calcium score was abnormal and he went on to have coronary angiography. There was a mid to distal LCx , a proximal to mid 50% RCA stenosis, and a proximal LAD stenosis of 10%. No intervention was done at that time. He had a follow up cardiac MRI in 2019. His LV remained severely depressed at 21% with mild to moderate RV dysfunction. There were findings consistent with a small prior infarct in the circumflex distribution. Ultimately, given the global nature of his LV dysfunction he was felt to have a mixed nonischemic and ischemia cardiomyopathy. He eventually underwent intervention to the circumflex  on 10/24/19 with Dr. Rivera with a single LEVI and completed a year of DAPT.     He had a follow up echocardiogram in 2020 which showed improvement in his LVEF with an EF of 40 - 45%. He had moderate aortic stenosis.     He had a follow up echocardiogram in 2020 which unfortunately was a very poor study. No EF was noted but his LV function overall appeared mildly reduced. The peak AoV pressure gradient 18.8 mmHg but notably the doppler envelopes were poor thus the gradients may be underestimated.       Echo 12/15/21 shows mean gradient of 37 mmHg and aortic valve area of 1.0 cm .  Dr. Phillips recommended a repeat echo in 6 months.        He is scheduled for this echo tomorrow actually, but in the interim he developed increasing SOB, fatigue, weight gain, and LE edema and so I  am meeting him today.   Over the past 3 weeks he has had increasing SOB, fatigue.   He weighs daily and had 8 lbs weight gain from 215 lbs to 223 lbs, new pitting edema.    Over the weekend, he started taking Lasix 20 mg. He had some old pills at home. The swelling did seem to improve some and he lost a lb.  No chest pain.   No syncope. But has had some LH, dizziness.   Noted [facility] and [facility] was [facility].         ROS:  Skin:        Eyes:       ENT:       Respiratory:  Positive for dyspnea on exertion  Cardiovascular:    Positive for;edema;fatigue  Gastroenterology:      Genitourinary:       Musculoskeletal:       Neurologic:       Psychiatric:       Heme/Lymph/Imm:       Endocrine:         PAST MEDICAL HISTORY:  Past Medical History:   Diagnosis Date     Aortic stenosis 9/23/2016    Echo 9/2016 Mild       Atrophic gastritis 9/8/2016     Benign non-nodular prostatic hyperplasia 09/08/2016     CAD (coronary artery disease)      Hyperlipidemia 09/08/2016     Ischemic cardiomyopathy 6/12/2019     Nonischemic cardiomyopathy (H) 10/30/2019     Obstructive sleep apnea syndrome 9/8/2016     Type 2 diabetes mellitus 09/06/2016     Vitamin B12 deficiency (non anemic) 9/8/2016       PAST SURGICAL HISTORY:  Past Surgical History:   Procedure Laterality Date     CV CORONARY ANGIOGRAM N/A 10/24/2019    Procedure: Coronary Angiogram;  Surgeon: Lupe Posada MD;  Location: [facility] CARDIAC CATH LAB     CV HEART CATHETERIZATION WITH POSSIBLE INTERVENTION N/A 6/4/2019    Procedure: Heart Catheterization with Possible Intervention;  Surgeon: Alex Bazan MD;  Location: [facility] CARDIAC CATH LAB     CV PCI CHRONIC TOTAL OCCLUSION N/A 10/24/2019    Procedure: Coronary Total Occlusion;  Surgeon: Lupe Posada MD;  Location: [facility] CARDIAC CATH LAB     CV PCI STENT DRUG ELUTING N/A 10/24/2019    Procedure: PCI Stent Drug Eluting;  Surgeon: Lupe Posada MD;  Location:   HEART CARDIAC CATH LAB     DAVINCI HERNIORRHAPHY INGUINAL Bilateral 2/2/2022    Procedure: ROBOT-ASSISTED BILATERAL INGUINAL HERNIA REPAIR WITH MESH;  Surgeon: Rnee Murray MD;  Location:  OR     ESOPHAGOSCOPY, GASTROSCOPY, DUODENOSCOPY (EGD), COMBINED N/A 1/20/2015    Procedure: COMBINED ESOPHAGOSCOPY, GASTROSCOPY, DUODENOSCOPY (EGD), BIOPSY SINGLE OR MULTIPLE;  Surgeon: Allan Marroquin MD;  Location:  GI     EXCISE LESION TRUNK N/A 4/17/2019    Procedure: EXCISE CYST UPPER BACK;  Surgeon: Rene Murray MD;  Location:  OR     HERNIA REPAIR       HERNIORRHAPHY UMBILICAL N/A 2/2/2022    Procedure: OPEN UMBILICAL HERNIA REPAIR;  Surgeon: Rene Murray MD;  Location:  OR     HYDROCELECTOMY SCROTAL       JOINT REPLACEMENT (aka KNEE) NOS Bilateral        SOCIAL HISTORY:  Social History     Socioeconomic History     Marital status:    Tobacco Use     Smoking status: Never Smoker     Smokeless tobacco: Never Used   Vaping Use     Vaping Use: Never used   Substance and Sexual Activity     Alcohol use: Not Currently     Alcohol/week: 0.0 standard drinks     Drug use: No     Sexual activity: Never   Social History Narrative    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; walks dog for exercise        FAMILY HISTORY:  Family History   Problem Relation Age of Onset     Coronary Artery Disease Father 53     Cerebrovascular Disease Father      Diabetes Mother      Diabetes Brother        MEDS: ASPIRIN 81 PO, Take by mouth daily   atorvastatin (LIPITOR) 40 MG tablet, Take 1 tablet (40 mg) by mouth daily  carvedilol (COREG) 3.125 MG tablet, Take 3 tablets (9.375 mg) by mouth 2 times daily (with meals)  empagliflozin (JARDIANCE) 25 MG TABS tablet, Take 25 mg by mouth daily  insulin glargine (LANTUS) 100 UNIT/ML injection, Inject 13 Units Subcutaneous At Bedtime   insulin lispro (HUMALOG KWIKPEN) 100 UNIT/ML injection, 11 units with breakfast,15 units at noon, and 14 units before evening meal  (Patient taking differently: Depending on glucose reading)  metFORMIN (GLUCOPHAGE) 500 MG tablet, TAKE 2 TABLETS (1,000 MG) BY MOUTH TWICE A DAY WITH MEALS  Multiple Vitamins-Minerals (MULTIVITAL PO), Take 1 tablet by mouth daily  sacubitril-valsartan (ENTRESTO) 24-26 MG per tablet, Take 1 tablet by mouth 2 times daily  tamsulosin (FLOMAX) 0.4 MG capsule, Take 0.4 mg by mouth daily  vitamin C (ASCORBIC ACID) 1000 MG TABS, Take 1,000 mg by mouth daily  Vitamin D, Cholecalciferol, 25 MCG (1000 UT) CAPS, Take 4,000 Units by mouth daily  zinc gluconate 50 MG tablet, Take 50 mg by mouth daily    No current facility-administered medications on file prior to visit.      ALLERGIES:   Allergies   Allergen Reactions     No Known Allergies        PHYSICAL EXAM:  Vitals: BP 90/56 (BP Location: Right arm, Patient Position: Sitting, Cuff Size: Adult Regular)   Pulse 56   Ht 1.829 m (6')   Wt 104 kg (229 lb 4.8 oz)   SpO2 99%   BMI 31.10 kg/m    Constitutional:  cooperative, alert and oriented, well developed, well nourished, in no acute distress        Skin:  warm and dry to the touch, no apparent skin lesions or masses noted        Head:  normocephalic, no masses or lesions        Eyes:  pupils equal and round;conjunctivae and lids unremarkable;sclera white        ENT:  no pallor or cyanosis        Neck:  JVP normal transmitted murmur      Respiratory:  normal breath sounds, clear to auscultation, normal A-P diameter, normal symmetry, normal respiratory excursion, no use of accessory muscles        Cardiac: regular rhythm   soft or inaudible A2   systolic murmur systolic ejection murmur;RUSB;LLSB;grade 2;grade 3        GI:  abdomen soft;BS normoactive        Vascular: pulses full and equal                                      Extremities and Musculoskeletal:  no deformities, clubbing, cyanosis, erythema observed        Neurological:  no gross motor deficits;affect appropriate            LABS/DATA:  I reviewed the  following:  Echo 12/15/21:  Interpretation Summary  The left ventricle is mildly dilated.  The visual ejection fraction is 40-45%.  The right ventricle is normal in structure, function and size.  There is mild biatrial enlargement.  Moderate to severe valvular aortic stenosis.  Mild aortic root dilatation.  Compared to prior study, changes are noted.          ASSESSMENT/PLAN:  1.  Mixed cardiomyopathy, ejection fraction 40-45%. Stable on echo 12/2021. Continue BB, Entresto, Jardiance.     2.  Coronary disease status post PCI of circumflex  10/2019. Continue ASA, BB, statin.    3.  Moderate disease in RCA- medically managed. No angina but does have new SOB and edema.     4.  Moderate-severe aortic stenosis with aortic valve area of 1.0 cm  and mean gradient of 37 mmHg on echo 12/2021. Now with new SOB and edema.  Repeat echo is scheduled tomorrow. We can review results at his follow up appt with me next week.  We will likely refer him to TAVR.   We are cautiously diuresing him as noted below.     5.  Acute CHF, likely systolic and diastolic.  We are getting an echo tomorrow. But for now, he clearly has fluid retention so continue Lasix but increase to 40 mg x 3 days then go back to 20 mg.  See me back in clinic in 1 week with BMP prior.   We may need to lower Coreg to allow for Lasix.     6.  Hyperlipidemia. Continue statin.         Follow up:  Echo tomorrow  6/17/22 with BMP      Winsome Wills PA-C

## 2022-06-10 NOTE — TELEPHONE ENCOUNTER
I called patient after having Dr. Phillips personally review his echo from today that showed a drop in his EF to 25-30% and a worsening of his aortic stenosis.    Per Dr. Phillips the aortic stenosis is still moderate and really no change. However, with patient's current symptoms of fatigue, shortness of breath he is recommending a coronary angiogram to be done early next week to look at his coronaries and to look at his aortic valve.    Patient is agreeable to seeing [facility] this afternoon at 4:00 for R/B and his angio will be 6/15 6:30 for 8:30. We will have him do a at home COVID test.  We will keep the 6/17 appt with [facility] as a post procedure.    I sent [facility] a staff message about the add on and patient and spouse were very agreeable with the above plan.

## 2022-06-10 NOTE — PROGRESS NOTES
CARDIOLOGY CLINIC TELEPHONE VISIT  This visit is being conducted as a virtual visit due to the emphasis on mitigation of the COVID-19 virus pandemic. The clinician has decided that the risk of an in-office visit outweighs the benefit for this patient. The rest of the comprehensive physical examination is deferred due to public health emergency video visit restrictions.      Bryant Mckenzie is a 82 year old male who is being evaluated via a billable telephone visit.      The patient has been notified of following:     This video visit will be conducted via a call between you and your physician/provider. We have found that certain health care needs can be provided without the need for an in-person physical exam.  This service lets us provide the care you need with a video conversation.  If a prescription is necessary we can send it directly to your pharmacy.  If lab work is needed we can place an order for that and you can then stop by our lab to have the test done at a later time.    Virtual visits are billed at different rates depending on your insurance coverage. During this emergency period, for some insurers they may be billed the same as an in-person visit.  Please reach out to your insurance provider with any questions.    If during the course of the call the physician/provider feels a telephone visit is not appropriate, you will not be charged for this service.      Physician has received verbal consent for a Telephone Visit from the patient? Yes    Patient would like a phone call to: [phone]      EMT ROS:  Respiratory: Dyspnea on exertion with stairs  Cardiovascular: Fatigue, Edema      Self reported vitals:  Weight: 219 lbs  BP N/A  HR N/A        Primary Cardiologist: Dr. 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      In the spring of 2019 he presented with shortness of breath symptoms and was found to have a severe cardiomyopathy with a LVEF of around 30%. His CT calcium score was abnormal and he went on to have coronary angiography. There was a mid to distal LCx , a proximal to mid 50% RCA stenosis, and a proximal LAD stenosis of 10%. No intervention was done at that time. He had a follow up cardiac MRI in July of 2019. His LV remained severely depressed at 21% with mild to moderate RV dysfunction. There were findings consistent with a small prior infarct in the circumflex distribution. Ultimately, given the global nature of his LV dysfunction he was felt to have a mixed nonischemic and ischemia cardiomyopathy. He eventually underwent intervention to the circumflex  on 10/24/19 with Dr. Rivera with a single LEVI and completed a year of DAPT.     He had a follow up echocardiogram in June of 2020 which showed improvement in his LVEF with an EF of 40 - 45%. He had moderate aortic stenosis.      He had a follow up echocardiogram in December of 2020 which unfortunately was a very poor study. No EF was noted but his LV function overall appeared mildly reduced. The peak AoV pressure gradient 18.8 mmHg but notably the doppler envelopes were poor thus the gradients may be underestimated.       Echo 12/15/21 shows mean gradient of 37 mmHg and aortic valve area of 1.0 cm .  Dr. Phillips recommended a repeat echo in 6 months.        I saw Marshall yesterday due to increased weights, SOB, fatigue and edema.  He had taken some old Lasix 20 mg for a few days with some improvement.   I recommended he increase Lasix to 40 mg for a few days.   Echo today shows drop in LVEF to 20-25%. Aortic stenosis read as severe, but Dr. Phillips feels it looks similar to prior.    He recommends heart cath, so I am talking to Marshall today for H and P.     He tells me that with the increased dose of Lasix, he has definitely had more urine output.  His weight is down to 219 lbs from 223 lbs yesterday.  His baseline is more like 214-215 lbs.   No chest pain.   No syncope. But has had some LH, dizziness.   [facility] and [facility].       I have reviewed and updated the patient's Past Medical History, Social History, Family History and Medication List.    PROBLEM LIST:  Patient Active Problem List   Diagnosis     Pain in thoracic spine     Type 2 diabetes mellitus without complication (H)     Hyperlipidemia LDL goal <100     Atrophic gastritis     Vitamin B12 deficiency (non anemic)     Obstructive sleep apnea syndrome     Benign non-nodular prostatic hyperplasia with lower urinary tract symptoms     Aortic stenosis     Lump of skin of back     SOB (shortness of breath)     Abnormal cardiac CT angiography     Status post coronary angiogram     Ischemic cardiomyopathy     Coronary artery chronic total occlusion     Nonischemic cardiomyopathy (H)     Right inguinal hernia     Umbilical hernia without obstruction and without gangrene       MEDICATIONS:  Current Outpatient Medications   Medication Sig Dispense Refill     ASPIRIN 81 PO Take by mouth daily        atorvastatin (LIPITOR) 40 MG tablet Take 1 tablet (40 mg) by mouth daily 90 tablet 3     carvedilol (COREG) 3.125 MG tablet Take 3 tablets (9.375 mg) by mouth 2 times daily (with meals) 540 tablet 1     empagliflozin (JARDIANCE) 25 MG TABS tablet Take 25 mg by mouth daily       furosemide (LASIX) 20 MG tablet Take 1 tablet (20 mg) by mouth daily 30 tablet 3     insulin glargine (LANTUS) 100 UNIT/ML injection Inject 13 Units Subcutaneous At Bedtime  15 mL 3     insulin lispro (HUMALOG KWIKPEN) 100 UNIT/ML injection 11 units with breakfast,15 units at noon, and 14 units before evening meal (Patient taking differently: Depending on glucose reading) 36 mL 3     metFORMIN (GLUCOPHAGE) 500 MG tablet TAKE 2 TABLETS (1,000 MG) BY MOUTH TWICE A DAY WITH MEALS 360 tablet 0      Multiple Vitamins-Minerals (MULTIVITAL PO) Take 1 tablet by mouth daily       sacubitril-valsartan (ENTRESTO) 24-26 MG per tablet Take 1 tablet by mouth 2 times daily 30 tablet 3     tamsulosin (FLOMAX) 0.4 MG capsule Take 0.4 mg by mouth daily       vitamin C (ASCORBIC ACID) 1000 MG TABS Take 1,000 mg by mouth daily       Vitamin D, Cholecalciferol, 25 MCG (1000 UT) CAPS Take 4,000 Units by mouth daily       zinc gluconate 50 MG tablet Take 50 mg by mouth daily           ALLERGIES:     Allergies   Allergen Reactions     No Known Allergies            DIAGNOSTICS:  Echo 6/10/22:  Interpretation Summary  The patient exhibited frequent PVCs.  Severe valvular aortic stenosis.  The left ventricle is moderately dilated.  The visual ejection fraction is 25-30%.  The left atrium is moderate to severely dilated.  Both LV function and AS appear worse compared with most recent study.      Echo 12/15/21:  Interpretation Summary   The left ventricle is mildly dilated.  The visual ejection fraction is 40-45%.  The right ventricle is normal in structure, function and size.  There is mild biatrial enlargement.  Moderate to severe valvular aortic stenosis.  Mild aortic root dilatation.  Compared to prior study, changes are noted.            ASSESSMENT/PLAN:  Mixed cardiomyopathy.   Acute systolic CHF.    - Ejection fraction has been around 40-45%, stable on last echo 12/2021.   - Now with acute CHF sxs.   Echo 6/10/2022 shows drop in LVEF to 20-25%.   - Dr. Phillips recommends cardiac cath, LHC, aortic valve study, possible intervention. This is already scheduled 6/15/[facility] at [facility] in the morning with Dr. Devi.    * Discussed with Dr. Phillips, if he needs PCI, he should have PCI and then refer to TAVR. Unless he has severe LM/pLAD disease, then would need to consider AVR + CABG. Dr. Phillips will be on hospital service this day and Dr. Devi can call him during the procedure.    * Risks and benefits of coronary angiogram discussed  today including, bleeding, bruising, infection, allergic reaction, kidney damage (including need for dialysis), stroke, heart attack, vascular damage, emergency open heart surgery, up to and including death.  Patient indicates understanding and is agreeable to proceed.    Contrast allergy: no  Anticoagulation: not on   Metformin: yes, hold per protocol  Oral DM meds: yes, Jardiance. This should be held AM of procedure.   Insulin: Yes, hold per protocol  Diuretic: Yes. I have asked him to continue Lasix 40 mg until his weight comes down to 215 lbs.  Then hold AM of procedure. He is also on Entresto  Use of phosphodiesterase type 5 inhibitor: No  Aspirin: Yes, continue 81 mg daily  Renal issues: no   Pt informed to be NPO at midnight  Pt has transportation and 24 hours post procedure monitoring set up.   Pt aware of no driving for 24 hours post procedure.   - Continue BB, Entresto, Jardiance.   - Further recs pending results       Coronary disease status post PCI of circumflex  10/2019 and known moderate disease in RCA- medically managed.  - Now with drop in LVEF.  Cardiac cath as noted above.   - Continue ASA, BB, statin.        Aortic stenosis.   - Moderate-severe aortic stenosis with aortic valve area of 1.4 cm  and mean gradient of 37 mmHg on echo 12/2021.   - Now new SOB and edema.  Repeat echo 6/10/2022 shows severe aortic stenosis  With MYRANDA 1.1 cm2, mean gradient 34 mmHg. Dr. Phillips notes this looks similar to prior.   - Aortic valve study as noted above  - then will refer him to TAVR.   - We are cautiously diuresing him as noted above        Hyperlipidemia.   - Continue statin.           Follow up:   Cath 6/15/22  Follow up scheduled 6/17/22        I have reviewed the note as documented above.  This accurately captures the substance of my conversation with the patient.      Phone call contact time  Call Started at 1604  Call Ended at 1615           RANDA Small [facility]  Clinic

## 2022-06-10 NOTE — LETTER
6/10/2022    Merlin Emery Brown, MD  [facility] [address]  [facility] [address]    RE: Bryant Mckenzie       Dear Colleague,     I had the [facility] of seeing [facility] in the [facility].  CARDIOLOGY CLINIC TELEPHONE VISIT  This visit is being conducted as a virtual visit due to the emphasis on mitigation of the COVID-19 virus pandemic. The clinician has decided that the risk of an in-office visit outweighs the benefit for this patient. The rest of the comprehensive physical examination is deferred due to public health emergency video visit restrictions.      Bryant Mckenzie is a 82 year old male who is being evaluated via a billable telephone visit.      The patient has been notified of following:     This video visit will be conducted via a call between you and your physician/provider. We have found that certain health care needs can be provided without the need for an in-person physical exam.  This service lets us provide the care you need with a video conversation.  If a prescription is necessary we can send it directly to your pharmacy.  If lab work is needed we can place an order for that and you can then stop by our lab to have the test done at a later time.    Virtual visits are billed at different rates depending on your insurance coverage. During this emergency period, for some insurers they may be billed the same as an in-person visit.  Please reach out to your insurance provider with any questions.    If during the course of the call the physician/provider feels a telephone visit is not appropriate, you will not be charged for this service.      Physician has received verbal consent for a Telephone Visit from the patient? Yes    Patient would like a phone call to: [phone]      EMT ROS:  Respiratory: Dyspnea on exertion with stairs  Cardiovascular: Fatigue, Edema      Self reported vitals:  Weight: 219 lbs  BP N/A  HR N/A        Primary Cardiologist:  Dr. 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      In the spring of 2019 he presented with shortness of breath symptoms and was found to have a severe cardiomyopathy with a LVEF of around 30%. His CT calcium score was abnormal and he went on to have coronary angiography. There was a mid to distal LCx , a proximal to mid 50% RCA stenosis, and a proximal LAD stenosis of 10%. No intervention was done at that time. He had a follow up cardiac MRI in July of 2019. His LV remained severely depressed at 21% with mild to moderate RV dysfunction. There were findings consistent with a small prior infarct in the circumflex distribution. Ultimately, given the global nature of his LV dysfunction he was felt to have a mixed nonischemic and ischemia cardiomyopathy. He eventually underwent intervention to the circumflex  on 10/24/19 with Dr. Rivera with a single LEVI and completed a year of DAPT.     He had a follow up echocardiogram in June of 2020 which showed improvement in his LVEF with an EF of 40 - 45%. He had moderate aortic stenosis.      He had a follow up echocardiogram in December of 2020 which unfortunately was a very poor study. No EF was noted but his LV function overall appeared mildly reduced. The peak AoV pressure gradient 18.8 mmHg but notably the doppler envelopes were poor thus the gradients may be underestimated.       Echo 12/15/21 shows mean gradient of 37 mmHg and aortic valve area of 1.0 cm .  Dr. Phillpis recommended a repeat echo in 6 months.        I saw Marshall yesterday due to increased weights, SOB, fatigue and edema.  He had taken some old Lasix 20 mg for a few days with some improvement.   I recommended he increase Lasix to 40 mg for a few days.   Echo today shows drop in LVEF to 20-25%. Aortic  stenosis read as severe, but Dr. Phillips feels it looks similar to prior.    He recommends heart cath, so I am talking to Marshall today for H and P.     He tells me that with the increased dose of Lasix, he has definitely had more urine output. His weight is down to 219 lbs from 223 lbs yesterday.  His baseline is more like 214-215 lbs.   No chest pain.   No syncope. But has had some LH, dizziness.   [facility].       I have reviewed and updated the patient's Past Medical History, Social History, Family History and Medication List.    PROBLEM LIST:  Patient Active Problem List   Diagnosis     Pain in thoracic spine     Type 2 diabetes mellitus without complication (H)     Hyperlipidemia LDL goal <100     Atrophic gastritis     Vitamin B12 deficiency (non anemic)     Obstructive sleep apnea syndrome     Benign non-nodular prostatic hyperplasia with lower urinary tract symptoms     Aortic stenosis     Lump of skin of back     SOB (shortness of breath)     Abnormal cardiac CT angiography     Status post coronary angiogram     Ischemic cardiomyopathy     Coronary artery chronic total occlusion     Nonischemic cardiomyopathy (H)     Right inguinal hernia     Umbilical hernia without obstruction and without gangrene       MEDICATIONS:  Current Outpatient Medications   Medication Sig Dispense Refill     ASPIRIN 81 PO Take by mouth daily        atorvastatin (LIPITOR) 40 MG tablet Take 1 tablet (40 mg) by mouth daily 90 tablet 3     carvedilol (COREG) 3.125 MG tablet Take 3 tablets (9.375 mg) by mouth 2 times daily (with meals) 540 tablet 1     empagliflozin (JARDIANCE) 25 MG TABS tablet Take 25 mg by mouth daily       furosemide (LASIX) 20 MG tablet Take 1 tablet (20 mg) by mouth daily 30 tablet 3     insulin glargine (LANTUS) 100 UNIT/ML injection Inject 13 Units Subcutaneous At Bedtime  15 mL 3     insulin lispro (HUMALOG KWIKPEN) 100 UNIT/ML injection 11 units with  breakfast,15 units at noon, and 14 units before evening meal (Patient taking differently: Depending on glucose reading) 36 mL 3     metFORMIN (GLUCOPHAGE) 500 MG tablet TAKE 2 TABLETS (1,000 MG) BY MOUTH TWICE A DAY WITH MEALS 360 tablet 0     Multiple Vitamins-Minerals (MULTIVITAL PO) Take 1 tablet by mouth daily       sacubitril-valsartan (ENTRESTO) 24-26 MG per tablet Take 1 tablet by mouth 2 times daily 30 tablet 3     tamsulosin (FLOMAX) 0.4 MG capsule Take 0.4 mg by mouth daily       vitamin C (ASCORBIC ACID) 1000 MG TABS Take 1,000 mg by mouth daily       Vitamin D, Cholecalciferol, 25 MCG (1000 UT) CAPS Take 4,000 Units by mouth daily       zinc gluconate 50 MG tablet Take 50 mg by mouth daily           ALLERGIES:     Allergies   Allergen Reactions     No Known Allergies            DIAGNOSTICS:  Echo 6/10/22:  Interpretation Summary  The patient exhibited frequent PVCs.  Severe valvular aortic stenosis.  The left ventricle is moderately dilated.  The visual ejection fraction is 25-30%.  The left atrium is moderate to severely dilated.  Both LV function and AS appear worse compared with most recent study.      Echo 12/15/21:  Interpretation Summary   The left ventricle is mildly dilated.  The visual ejection fraction is 40-45%.  The right ventricle is normal in structure, function and size.  There is mild biatrial enlargement.  Moderate to severe valvular aortic stenosis.  Mild aortic root dilatation.  Compared to prior study, changes are noted.            ASSESSMENT/PLAN:  Mixed cardiomyopathy.   Acute systolic CHF.    - Ejection fraction has been around 40-45%, stable on last echo 12/2021.   - Now with acute CHF sxs.   Echo 6/10/2022 shows drop in LVEF to 20-25%.   - Dr. Phillips recommends cardiac cath, LHC, aortic valve study, possible intervention. This is already scheduled 6/15/22 at [facility] in the morning with Dr. Devi.    * Discussed with Dr. Phillips, if he needs PCI, he should have PCI and then  refer to TAVR. Unless he has severe LM/pLAD disease, then would need to consider AVR + CABG. Dr. Phillips will be on hospital service this day and Dr. Devi can call him during the procedure.    * Risks and benefits of coronary angiogram discussed today including, bleeding, bruising, infection, allergic reaction, kidney damage (including need for dialysis), stroke, heart attack, vascular damage, emergency open heart surgery, up to and including death.  Patient indicates understanding and is agreeable to proceed.    Contrast allergy: no  Anticoagulation: not on   Metformin: yes, hold per protocol  Oral DM meds: yes, Jardiance. This should be held AM of procedure.   Insulin: Yes, hold per protocol  Diuretic: Yes. I have asked him to continue Lasix 40 mg until his weight comes down to 215 lbs.  Then hold AM of procedure. He is also on Entresto  Use of phosphodiesterase type 5 inhibitor: No  Aspirin: Yes, continue 81 mg daily  Renal issues: no   Pt informed to be NPO at midnight  Pt has transportation and 24 hours post procedure monitoring set up.   Pt aware of no driving for 24 hours post procedure.   - Continue BB, Entresto, Jardiance.   - Further recs pending results       Coronary disease status post PCI of circumflex  10/2019 and known moderate disease in RCA- medically managed.  - Now with drop in LVEF.  Cardiac cath as noted above.   - Continue ASA, BB, statin.        Aortic stenosis.   - Moderate-severe aortic stenosis with aortic valve area of 1.4 cm  and mean gradient of 37 mmHg on echo 12/2021.   - Now new SOB and edema.  Repeat echo 6/10/2022 shows severe aortic stenosis  With MYRANDA 1.1 cm2, mean gradient 34 mmHg. Dr. Phillips notes this looks similar to prior.   - Aortic valve study as noted above  - then will refer him to TAVR.   - We are cautiously diuresing him as noted above        Hyperlipidemia.   - Continue statin.           Follow up:   Cath 6/15/22  Follow up scheduled 6/17/22        I have reviewed  the note as documented above.  This accurately captures the substance of my conversation with the patient.      Phone call contact time  Call Started at 1604  Call Ended at 1615     RANDA Small [facility]    Thank you for allowing me to participate in the care of your patient.      Sincerely,     RANDA [facility]  cc:   No referring provider defined for this encounter.

## 2022-06-14 NOTE — TELEPHONE ENCOUNTER
I called patient to review coronary angiogram prep for tomorrow's procedure at [facility] with a 6:30 arrival time.    1. Patient's spouse will be  and care giver    2. Patient had two COVID home tests that were negative from 6/11 and 6/13-he took photos and will bring in.    3. He will take temp tomorrow morning.    4.He will be NPO after midnight tonight and hold tomorrow morning: lasix, metformin and Jardiance. [facility] Lantus and Humalog insulins. He will resume metformin on 6/17 after his BMP and visit with Winsome Wills.    5. Instructed to take 3-81 ASA tonight and tomorrow morning.    6. Denies any side effects from contrast dye    7. Has lost about 5 # since last Friday and is less short of breath on Lasix 40 MG daily.      All questions and concerns he had were addressed to his satisfaction.

## 2022-06-15 NOTE — PROGRESS NOTES
Care Suites Admission Nursing Note    Patient Information  Name: Bryant Mckenzie  Age: 82 year old  Reason for admission: heart cath  Care Suites arrival time: 0630    Visitor Information  Name: [phone] kirk-spouse  Informed of visitor restrictions: Yes  1 visitor allowed per patient   Visitor must screen negative for COVID symptoms   Visitor must wear a mask  Waiting rooms closed to visitors    Patient Admission/Assessment   Pre-procedure assessment complete: Yes  If abnormal assessment/labs, provider notified: N/A  NPO: Yes  Medications held per instructions/orders: Yes  Consent: obtained  If applicable, pregnancy test status: n/a  Patient oriented to room: Yes  Education/questions answered: Yes  Plan/other: proceed    Discharge Planning  Discharge name/phone number: [phone] kirk-spouse  Overnight post sedation caregiver: spouse  Discharge location: home  PATIENT/VISITOR WELLNESS SCREENING    Step 1 Patient Screening    1. In the last month, have you been in contact with someone who was confirmed or suspected to have Coronavirus/COVID-19? No    2. Do you have the following symptoms?  Fever/Chills? No   Cough? No   Shortness of breath? No   New loss of taste or smell? No  Sore throat? No  Muscle or body aches? No  Headaches? No  Fatigue? No  Vomiting or diarrhea? No    Step 2 Visitor Screening    1. Name of Visitor (1 visitor per patient): [phone] kirk-spouse    2. In the last month, have you been in contact with someone who was confirmed or suspected to have Coronavirus/COVID-19? No    3. Do you have the following symptoms?  Fever/Chills? No   Cough? No   Shortness of breath? No   Skin rash? No   Loss of taste or smell? No  Sore throat? No  Runny or stuffy nose? No  Muscle or body aches? No  Headaches? No  Fatigue? No  Vomiting or diarrhea? No    If the visitor has positive symptoms, notify supervisor/manger  Per policy, the visitor will need to leave the facility     Step 3 Refer to logic grid  below for actions    NO SYMPTOM(S)    ACTIONS:  1. Standard rooming process  2. Provider to assess per normal protocol  3. Implement precautions as needed and per guidelines     POSITIVE SYMPTOM(S)  If positive for ANY of the following symptoms: fever, cough, shortness of breath, rash    ACTION:  1. Continue to have the patient wear a mask   2. Room patient as soon as possible  3. Don appropriate PPE when entering room  4. Provider evaluation    Chris Truong RN

## 2022-06-15 NOTE — PRE-PROCEDURE
GENERAL PRE-PROCEDURE:   Procedure:  Heart catheterization  possible intervention  Date/Time:  6/15/2022 7:59 AM    Written consent obtained?: Yes    Risks and benefits: Risks, benefits and alternatives were discussed    Consent given by:  Patient  Patient states understanding of procedure being performed: Yes    Patient's understanding of procedure matches consent: Yes    Procedure consent matches procedure scheduled: Yes    Expected level of sedation:  Moderate  Appropriately NPO:  Yes  Mallampati  :  Grade 2- soft palate, base of uvula, tonsillar pillars, and portion of posterior pharyngeal wall visible  Lungs:  Lungs clear with good breath sounds bilaterally  Heart:  Normal heart sounds and rate  History & Physical reviewed:  History and physical reviewed and no updates needed  Statement of review:  I have reviewed the lab findings, diagnostic data, medications, and the plan for sedation  pt has had pci before  Discussed risk benefit indication for heart cath poss intervention  Discussed dapt if needed  All questions answered    He wishes to proceed

## 2022-06-15 NOTE — Clinical Note
Hemodynamic equipment used: 5 lead ECG, Trip4realK With 3 Leads, Machine BP Cuff and pulse oximeter probe.

## 2022-06-15 NOTE — PROGRESS NOTES
1000: Pt returned from Cath Lab. Right groin dry and intact with no bleeding/oozing/hematoma noted. Area soft & flat. Pt denies pain. Pt instructed on activity restrictions. Verbal understanding received from pt. Pt's family at bedside. Detailed update given.

## 2022-06-15 NOTE — DISCHARGE INSTRUCTIONS
Cardiac Angiogram Discharge Instructions - Femoral    After you go home:    Have an adult stay with you until tomorrow.  Drink extra fluids for 2 days.  You may resume your normal diet.  No smoking       For 24 hours - due to the sedation you received:  Relax and take it easy.  Do NOT make any important or legal decisions.  Do NOT drive or operate machines at home or at work.  Do NOT drink alcohol.    Care of Groin Puncture Site:    For the first 24 hrs - check the puncture site every 1-2 hours while awake.  For 2 days, when you cough, sneeze, laugh or move your bowels, hold your hand over the puncture site and press firmly.  Remove the bandaid after 24 hours. If there is minor oozing, apply another bandaid and remove it after 12 hours.  It is normal to have a small bruise or pea size lump at the site.  You may shower tomorrow. Do NOT take a bath, or use a hot tub or pool for at least 3 days. Do NOT scrub the site. Do not use lotion or powder near the puncture site.    Activity:            For 2 days:  No stooping or squatting  Do NOT do any heavy activity such as exercise, lifting, or straining.   No housework, yard work or any activity that make you sweat  Do NOT lift more than 10 pounds    Bleeding:    If you start bleeding from the site in your groin, lie down flat and press firmly on/above the site for 10 minutes.   Once bleeding stops, lay flat for 2 hours.   Call [facility] as soon as you can.       Call 911 right away if you have heavy bleeding or bleeding that does not stop.      Medicines:    If you are taking an antiplatelet medication such as Plavix, Brilinta or Effient, do not stop taking it until you talk to your cardiologist.    If you are on Metformin (Glucophage), do not restart it until you have blood tests (within 2 to 3 days after discharge).  After you have your blood drawn, you may restart the Metformin.   Take your medications, including blood thinners, unless your provider tells you not to.     If you take Coumadin (Warfarin), have your INR checked by your provider in  3-5 days. Call your clinic to schedule this.  If you have stopped any medicines, check with your provider about when to restart them.    Follow Up Appointments:    Follow up with [facility] Nurse Practitioner at [facility] of patient preference in 7-10 days.    Call the clinic if:    You have increased pain or a large or growing hard lump around the site.  The site is red, swollen, hot or tender.  Blood or fluid is draining from the site.  You have chills or a fever greater than 101 F (38 C).  Your leg feels numb, cool or changes color.  You have hives, a rash or unusual itching.  New pain in the back or belly that you cannot control with Tylenol.  Any questions or concerns.          [facility] [place]:    [phone] [facility] (7 days a week)

## 2022-06-17 NOTE — TELEPHONE ENCOUNTER
Received a message from Winsome DAVIS, to contact patient regarding medication changes.  She was reviewing the big picture for Marshall.  She has considered to change the dose of Carvedilol to 6.25 (2 tabs) twice a day instead of 9.375 (3 tabs).          Writer called and spoke with Marshall.  Marshall verbalized understanding.  He has a daily log where he records blood sugars, insulin, weight, BP, and medications.  He is going to make note of the med changes from today here on this tablet's page for 6\17.    He will contact us if BP is too low or with any questions\concerns.  He has direct nurse line phone for Dr. Phillips.  Josi Watson RN on 6/17/2022 at 3:47 PM

## 2022-06-17 NOTE — PATIENT INSTRUCTIONS
The heart cath results show that the prior stent is wide open, there is not new significant disease.   The aortic valve looks to be more moderately narrowed.   The pressures in the heart are elevated, telling us you have a little extra fluid still on.     Increase the Lasix to 40 mg in AM and 20 mg in afternoon.   Continue to weigh daily.   I would like to see your weight coming back down to around 215 lbs.  If it is still going up despite this increase in Lasix, please call the heart clinic.     Also try to wear compression stockings.     You can resume your metformin.     See me back on July 15 with a lab prior.     We will also have you repeat an echo in 6 months and see the Structural Heart team.     If you have concerns or questions prior, please call the heart clinic.

## 2022-06-17 NOTE — LETTER
2022    Merlin Emery Brown, MD  [facility] [address]  [facility] [address]    RE: Bryant Mckenzie       Dear Colleague,     I had the pleasure of seeing Bryant Mckenzie in the [facility].      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      In the spring of 2019 he presented with shortness of breath symptoms and was found to have a severe cardiomyopathy with a LVEF of around 30%. His CT calcium score was abnormal and he went on to have coronary angiography. There was a mid to distal LCx , a proximal to mid 50% RCA stenosis, and a proximal LAD stenosis of 10%. No intervention was done at that time. He had a follow up cardiac MRI in 2019. His LV remained severely depressed at 21% with mild to moderate RV dysfunction. There were findings consistent with a small prior infarct in the circumflex distribution. Ultimately, given the global nature of his LV dysfunction he was felt to have a mixed nonischemic and ischemia cardiomyopathy. He eventually underwent intervention to the circumflex  on 10/24/19 with Dr. Rivera with a single LEVI and completed a year of DAPT.     He had a follow up echocardiogram in 2020 which showed improvement in his LVEF with an EF of 40 - 45%. He had moderate aortic stenosis.     He had a follow up echocardiogram in 2020 which unfortunately was a very poor study. No EF was noted but his LV function overall appeared mildly reduced. The peak AoV pressure gradient 18.8 mmHg but notably the doppler envelopes were poor thus the gradients may be underestimated.      Echo 12/15/21 shows mean gradient of 37  mmHg and aortic valve area of 1.0 cm .  Dr. Phillips recommended a repeat echo in 6 months.      Echo 6/10/22: EF 25-30%, MYRANDA 1.1 cm2, MG 34.6 mmHg     Cardiac cath done 6/15/22 to evaluate the drop in EF.  No severe CAD and prior stent patent.  Aortic valve is moderate/severe MYRANDA 1.1 cm2, MG 32 mmHg, LVEDP 21 mmHg    We have increased Lasix for CHF.     He presents today for follow up.   Weight did come down to 217 lbs on increased Lasix 40 mg, but he is still retaining fluid and weight is starting to trend back up 220 lbs today.   No chest pain.   No syncope. But has had some LH, dizziness.         ROS:  Skin:        Eyes:       ENT:       Respiratory:  Positive for shortness of breath  Cardiovascular:    edema;Positive for  Gastroenterology:      Genitourinary:       Musculoskeletal:       Neurologic:       Psychiatric:       Heme/Lymph/Imm:       Endocrine:         PAST MEDICAL HISTORY:  Past Medical History:   Diagnosis Date     Aortic stenosis 9/23/2016    Echo 9/2016 Mild       Atrophic gastritis 9/8/2016     Benign non-nodular prostatic hyperplasia 09/08/2016     CAD (coronary artery disease)      Hyperlipidemia 09/08/2016     Ischemic cardiomyopathy 6/12/2019     Nonischemic cardiomyopathy (H) 10/30/2019     Obstructive sleep apnea syndrome 9/8/2016     Type 2 diabetes mellitus 09/06/2016     Vitamin B12 deficiency (non anemic) 9/8/2016       PAST SURGICAL HISTORY:  Past Surgical History:   Procedure Laterality Date     CV CORONARY ANGIOGRAM N/A 10/24/2019    Procedure: Coronary Angiogram;  Surgeon: Lupe Posada MD;  Location: [facility] CARDIAC CATH LAB     CV CORONARY ANGIOGRAM N/A 6/15/2022    Procedure: Coronary Angiogram;  Surgeon: Murali Devi MD;  Location: [facility] CARDIAC CATH LAB     CV HEART CATHETERIZATION WITH POSSIBLE INTERVENTION N/A 6/4/2019    Procedure: Heart Catheterization with Possible Intervention;  Surgeon: Alex Bazan MD;  Location:  HEART CARDIAC CATH LAB      CV INSTANTANEOUS WAVE-FREE RATIO N/A 6/15/2022    Procedure: Instantaneous Wave-Free Ratio;  Surgeon: Murali Devi MD;  Location:  HEART CARDIAC CATH LAB     CV LEFT VENTRICULOGRAM N/A 6/15/2022    Procedure: Left Ventriculogram;  Surgeon: Murali Devi MD;  Location:  HEART CARDIAC CATH LAB     CV PCI CHRONIC TOTAL OCCLUSION N/A 10/24/2019    Procedure: Coronary Total Occlusion;  Surgeon: Lupe Posada MD;  Location:  HEART CARDIAC CATH LAB     CV PCI STENT DRUG ELUTING N/A 10/24/2019    Procedure: PCI Stent Drug Eluting;  Surgeon: Lupe Posada MD;  Location:  HEART CARDIAC CATH LAB     CV RIGHT HEART CATH MEASUREMENTS RECORDED N/A 6/15/2022    Procedure: Right Heart Catheterization;  Surgeon: Murali Devi MD;  Location:  HEART CARDIAC CATH LAB     DAVINCI HERNIORRHAPHY INGUINAL Bilateral 2/2/2022    Procedure: ROBOT-ASSISTED BILATERAL INGUINAL HERNIA REPAIR WITH MESH;  Surgeon: Rene Murray MD;  Location:  OR     ESOPHAGOSCOPY, GASTROSCOPY, DUODENOSCOPY (EGD), COMBINED N/A 1/20/2015    Procedure: COMBINED ESOPHAGOSCOPY, GASTROSCOPY, DUODENOSCOPY (EGD), BIOPSY SINGLE OR MULTIPLE;  Surgeon: Allan Marroquin MD;  Location:  GI     EXCISE LESION TRUNK N/A 4/17/2019    Procedure: EXCISE CYST UPPER BACK;  Surgeon: Rene Murray MD;  Location:  OR     HERNIA REPAIR       HERNIORRHAPHY UMBILICAL N/A 2/2/2022    Procedure: OPEN UMBILICAL HERNIA REPAIR;  Surgeon: Rene Murray MD;  Location:  OR     HYDROCELECTOMY SCROTAL       JOINT REPLACEMENT (aka KNEE) NOS Bilateral        SOCIAL HISTORY:  Social History     Socioeconomic History     Marital status:    Tobacco Use     Smoking status: Never Smoker     Smokeless tobacco: Never Used   Vaping Use     Vaping Use: Never used   Substance and Sexual Activity     Alcohol use: Not Currently     Alcohol/week: 0.0 standard drinks     Drug use: No     Sexual activity: Never   Social History  Narrative    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; walks dog for exercise        FAMILY HISTORY:  Family History   Problem Relation Age of Onset     Coronary Artery Disease Father 53     Cerebrovascular Disease Father      Diabetes Mother      Diabetes Brother        MEDS: ASPIRIN 81 PO, Take by mouth daily   atorvastatin (LIPITOR) 40 MG tablet, Take 1 tablet (40 mg) by mouth daily  dulaglutide (TRULICITY) 0.75 MG/0.5ML pen, Inject 0.75 mg Subcutaneous every 7 days  empagliflozin (JARDIANCE) 25 MG TABS tablet, Take 25 mg by mouth daily  insulin glargine (LANTUS) 100 UNIT/ML injection, Inject 13 Units Subcutaneous At Bedtime   insulin lispro (HUMALOG KWIKPEN) 100 UNIT/ML injection, 11 units with breakfast,15 units at noon, and 14 units before evening meal (Patient taking differently: Depending on glucose reading)  metFORMIN (GLUCOPHAGE) 500 MG tablet, TAKE 2 TABLETS (1,000 MG) BY MOUTH TWICE A DAY WITH MEALS  Multiple Vitamins-Minerals (MULTIVITAL PO), Take 1 tablet by mouth daily  sacubitril-valsartan (ENTRESTO) 24-26 MG per tablet, Take 1 tablet by mouth 2 times daily  tamsulosin (FLOMAX) 0.4 MG capsule, Take 0.4 mg by mouth daily  vitamin C (ASCORBIC ACID) 1000 MG TABS, Take 1,000 mg by mouth daily  Vitamin D, Cholecalciferol, 25 MCG (1000 UT) CAPS, Take 4,000 Units by mouth daily  zinc gluconate 50 MG tablet, Take 50 mg by mouth daily    No current facility-administered medications on file prior to visit.      ALLERGIES:   Allergies   Allergen Reactions     Semaglutide Fatigue and GI Disturbance       PHYSICAL EXAM:  Vitals: BP 92/62 (BP Location: Right arm, Patient Position: Sitting, Cuff Size: Adult Large)   Pulse (!) 47   Ht 1.829 m (6')   Wt 101.2 kg (223 lb)   SpO2 97%   BMI 30.24 kg/m    Constitutional:  cooperative, alert and oriented, well developed, well nourished, in no acute distress        Skin:  warm and dry to the touch, no apparent skin lesions or masses noted        Head:  normocephalic, no masses  or lesions        Eyes:  pupils equal and round;conjunctivae and lids unremarkable;sclera white        ENT:  no pallor or cyanosis        Neck:  JVP normal transmitted murmur      Respiratory:  normal breath sounds, clear to auscultation, normal A-P diameter, normal symmetry, normal respiratory excursion, no use of accessory muscles        Cardiac: regular rhythm bradycardic soft or inaudible A2   systolic murmur systolic ejection murmur;RUSB;LLSB;grade 2;grade 3        GI:  abdomen soft;BS normoactive        Vascular: pulses full and equal                                      Extremities and Musculoskeletal:  no deformities, clubbing, cyanosis, erythema observed        Neurological:  no gross motor deficits;affect appropriate            LABS/DATA:  I reviewed the following:  Echo 12/15/21:  Interpretation Summary  The left ventricle is mildly dilated.  The visual ejection fraction is 40-45%.  The right ventricle is normal in structure, function and size.  There is mild biatrial enlargement.  Moderate to severe valvular aortic stenosis.  Mild aortic root dilatation.  Compared to prior study, changes are noted.      Echo 6/10/22:  Interpretation Summary  The patient exhibited frequent PVCs.  Severe valvular aortic stenosis.  The left ventricle is moderately dilated.  The visual ejection fraction is 25-30%.  The left atrium is moderate to severely dilated.  Both LV function and AS appear worse compared with most recent study.        Cardiac cath 6/15/22:  Conclusion    The ejection fraction is 20-30% by visual estimate.    Mid LAD lesion is 40% stenosed.    Mid Cx lesion is 35% stenosed.    Mid RCA lesion is 60% stenosed.    Moderately elevated pulmonary artery hypertension.    Left sided filling pressures are moderately elevated.    Left ventricular filling pressures are moderately elevated.    Right sided filling pressures are mildly elevated.    Normal cardiac output level.     1. Moderate RCA lesion with preserved  "flow reserve. Prior Lcx stent widely patent, no significant LCA disease  2. Severe decrease in LVEF  3. Mod/severe aortic stenosis  4. Pulm HTN  Mod/severe suspect mostly \"post capillary\"        RHC:  /3/26  Ao 87/47/65  Pull back time corrected LV/Ao  Aortic valve area 1.1   Mean gradient 32mmHg  RA 12/7/7  RV 51/2/11  PA  54/26/38   PCW--surrogate LVDP used for this value  21  Tarsha CO/CI   6.97/ 3.12  SVR 7.46 arreguin, PVR 2.44 arreguin  DFG 5   TPG 17            ASSESSMENT/PLAN:  H/o mixed cardiomyopathy  Acute CHF, combined systolic and diastolic.    - Echo 12/2021: EF 40-45%  - Echo 6/2022: EF 25-30%, MYRANDA 1.1 cm2, MG 34.6 mmHg   - Cardiac cath done 6/15/22 to evaluate the drop in EF.  No severe CAD and prior stent patent.  Aortic valve is moderate/severe MYRANDA 1.1 cm2, MG 32 mmHg, LVEDP 21 mmHg  - Still volume up with LVEDP 21 mmHg and LE edema. Increase Lasix to 40 mg in AM and 20 mg in PM. Continue daily weights. If weight going up despite this, he will call. We may need to increase to 40 mg BID.   - Coreg 9.375 mg BID. Will lower Coreg to 6.25 mg BID due to lower BP today and increasing Lasix  - Entresto 24-26 mg BID  - Jardiance        Coronary disease status post PCI of circumflex  10/2019.   - Cardiac cath 6/15/22 done due to drop in LVEF, moderate RCA, patent prior circ stent, other mild disease  - Continue ASA, BB, statin      Moderate-severe aortic stenosis.   - Echo 12/2021: EF 40-45%, MYRANDA 1.0 cm , MG 37 mmHg  - Echo 6/2022: EF 25-30%, MYRANDA 1.1 cm2, MG 34.6 mmHg  - Cath 6/15/22: MYRANDA 1.1 cm2, MG 32 mmHg  - Per Dr. Phillips, repeat echo in 6 months  - I recommended that he follow with structural clinic to review the echo        Hyperlipidemia. Continue statin.          DM  - BMP today ok, he can resume metformin.  I told him this today           Follow up:  1 month with BMP  Echo in 6 months with structural  Sooner if any concerns      >45 minutes spent on the date of the encounter doing chart review, review " of test results, patient visit, documentation, discussion with other provider(s) and discussion with family  I reviewed with Dr. Phillips and also discussed with patient's wife and son    Winsome Wills PA-C      Thank you for allowing me to participate in the care of your patient.      Sincerely,     Winsome Wills PA-C     [facility]  cc:   Winsome Wills PA-C  [address] [place] [address]

## 2022-06-17 NOTE — PROGRESS NOTES
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      In the spring of 2019 he presented with shortness of breath symptoms and was found to have a severe cardiomyopathy with a LVEF of around 30%. His CT calcium score was abnormal and he went on to have coronary angiography. There was a mid to distal LCx , a proximal to mid 50% RCA stenosis, and a proximal LAD stenosis of 10%. No intervention was done at that time. He had a follow up cardiac MRI in 2019. His LV remained severely depressed at 21% with mild to moderate RV dysfunction. There were findings consistent with a small prior infarct in the circumflex distribution. Ultimately, given the global nature of his LV dysfunction he was felt to have a mixed nonischemic and ischemia cardiomyopathy. He eventually underwent intervention to the circumflex  on 10/24/19 with Dr. Rivera with a single LEVI and completed a year of DAPT.     He had a follow up echocardiogram in 2020 which showed improvement in his LVEF with an EF of 40 - 45%. He had moderate aortic stenosis.     He had a follow up echocardiogram in 2020 which unfortunately was a very poor study. No EF was noted but his LV function overall appeared mildly reduced. The peak AoV pressure gradient 18.8 mmHg but notably the doppler envelopes were poor thus the gradients may be underestimated.      Echo 12/15/21 shows mean gradient of 37 mmHg and aortic valve area of 1.0 cm .  Dr. Phillips recommended a repeat echo in 6 months.      Echo 6/10/22: EF 25-30%, MYRANDA 1.1 cm2, MG 34.6 mmHg     Cardiac cath done 6/15/22 to evaluate the drop in EF.  No severe CAD and prior stent  patent.  Aortic valve is moderate/severe MYRANDA 1.1 cm2, MG 32 mmHg, LVEDP 21 mmHg    We have increased Lasix for CHF.     He presents today for follow up.   Weight did come down to 217 lbs on increased Lasix 40 mg, but he is still retaining fluid and weight is starting to trend back up 220 lbs today.   No chest pain.   No syncope. But has had some LH, dizziness.         ROS:  Skin:        Eyes:       ENT:       Respiratory:  Positive for shortness of breath  Cardiovascular:    edema;Positive for  Gastroenterology:      Genitourinary:       Musculoskeletal:       Neurologic:       Psychiatric:       Heme/Lymph/Imm:       Endocrine:         PAST MEDICAL HISTORY:  Past Medical History:   Diagnosis Date     Aortic stenosis 9/23/2016    Echo 9/2016 Mild       Atrophic gastritis 9/8/2016     Benign non-nodular prostatic hyperplasia 09/08/2016     CAD (coronary artery disease)      Hyperlipidemia 09/08/2016     Ischemic cardiomyopathy 6/12/2019     Nonischemic cardiomyopathy (H) 10/30/2019     Obstructive sleep apnea syndrome 9/8/2016     Type 2 diabetes mellitus 09/06/2016     Vitamin B12 deficiency (non anemic) 9/8/2016       PAST SURGICAL HISTORY:  Past Surgical History:   Procedure Laterality Date     CV CORONARY ANGIOGRAM N/A 10/24/2019    Procedure: Coronary Angiogram;  Surgeon: Lupe Posada MD;  Location: [facility] CARDIAC CATH LAB     CV CORONARY ANGIOGRAM N/A 6/15/2022    Procedure: Coronary Angiogram;  Surgeon: Murali Devi MD;  Location: [facility] CARDIAC CATH LAB     CV HEART CATHETERIZATION WITH POSSIBLE INTERVENTION N/A 6/4/2019    Procedure: Heart Catheterization with Possible Intervention;  Surgeon: Alex Bazan MD;  Location: [facility] CARDIAC CATH LAB     CV INSTANTANEOUS WAVE-FREE RATIO N/A 6/15/2022    Procedure: Instantaneous Wave-Free Ratio;  Surgeon: Murali Devi MD;  Location: [facility] CARDIAC CATH LAB     CV LEFT VENTRICULOGRAM N/A 6/15/2022    Procedure: Left  Ventriculogram;  Surgeon: Murali Devi MD;  Location:  HEART CARDIAC CATH LAB     CV PCI CHRONIC TOTAL OCCLUSION N/A 10/24/2019    Procedure: Coronary Total Occlusion;  Surgeon: Lupe Posada MD;  Location:  HEART CARDIAC CATH LAB     CV PCI STENT DRUG ELUTING N/A 10/24/2019    Procedure: PCI Stent Drug Eluting;  Surgeon: Lupe Posada MD;  Location:  HEART CARDIAC CATH LAB     CV RIGHT HEART CATH MEASUREMENTS RECORDED N/A 6/15/2022    Procedure: Right Heart Catheterization;  Surgeon: Murali Devi MD;  Location:  HEART CARDIAC CATH LAB     DAVINCI HERNIORRHAPHY INGUINAL Bilateral 2/2/2022    Procedure: ROBOT-ASSISTED BILATERAL INGUINAL HERNIA REPAIR WITH MESH;  Surgeon: Rene Murray MD;  Location:  OR     ESOPHAGOSCOPY, GASTROSCOPY, DUODENOSCOPY (EGD), COMBINED N/A 1/20/2015    Procedure: COMBINED ESOPHAGOSCOPY, GASTROSCOPY, DUODENOSCOPY (EGD), BIOPSY SINGLE OR MULTIPLE;  Surgeon: Allan Marroquin MD;  Location:  GI     EXCISE LESION TRUNK N/A 4/17/2019    Procedure: EXCISE CYST UPPER BACK;  Surgeon: Rene Murray MD;  Location:  OR     HERNIA REPAIR       HERNIORRHAPHY UMBILICAL N/A 2/2/2022    Procedure: OPEN UMBILICAL HERNIA REPAIR;  Surgeon: Rene Murray MD;  Location:  OR     HYDROCELECTOMY SCROTAL       JOINT REPLACEMENT (aka KNEE) NOS Bilateral        SOCIAL HISTORY:  Social History     Socioeconomic History     Marital status:    Tobacco Use     Smoking status: Never Smoker     Smokeless tobacco: Never Used   Vaping Use     Vaping Use: Never used   Substance and Sexual Activity     Alcohol use: Not Currently     Alcohol/week: 0.0 standard drinks     Drug use: No     Sexual activity: Never   Social History Narrative    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; walks dog for exercise        FAMILY HISTORY:  Family History   Problem Relation Age of Onset     Coronary Artery Disease Father 53     Cerebrovascular Disease Father       Diabetes Mother      Diabetes Brother        MEDS: ASPIRIN 81 PO, Take by mouth daily   atorvastatin (LIPITOR) 40 MG tablet, Take 1 tablet (40 mg) by mouth daily  dulaglutide (TRULICITY) 0.75 MG/0.5ML pen, Inject 0.75 mg Subcutaneous every 7 days  empagliflozin (JARDIANCE) 25 MG TABS tablet, Take 25 mg by mouth daily  insulin glargine (LANTUS) 100 UNIT/ML injection, Inject 13 Units Subcutaneous At Bedtime   insulin lispro (HUMALOG KWIKPEN) 100 UNIT/ML injection, 11 units with breakfast,15 units at noon, and 14 units before evening meal (Patient taking differently: Depending on glucose reading)  metFORMIN (GLUCOPHAGE) 500 MG tablet, TAKE 2 TABLETS (1,000 MG) BY MOUTH TWICE A DAY WITH MEALS  Multiple Vitamins-Minerals (MULTIVITAL PO), Take 1 tablet by mouth daily  sacubitril-valsartan (ENTRESTO) 24-26 MG per tablet, Take 1 tablet by mouth 2 times daily  tamsulosin (FLOMAX) 0.4 MG capsule, Take 0.4 mg by mouth daily  vitamin C (ASCORBIC ACID) 1000 MG TABS, Take 1,000 mg by mouth daily  Vitamin D, Cholecalciferol, 25 MCG (1000 UT) CAPS, Take 4,000 Units by mouth daily  zinc gluconate 50 MG tablet, Take 50 mg by mouth daily    No current facility-administered medications on file prior to visit.      ALLERGIES:   Allergies   Allergen Reactions     Semaglutide Fatigue and GI Disturbance       PHYSICAL EXAM:  Vitals: BP 92/62 (BP Location: Right arm, Patient Position: Sitting, Cuff Size: Adult Large)   Pulse (!) 47   Ht 1.829 m (6')   Wt 101.2 kg (223 lb)   SpO2 97%   BMI 30.24 kg/m    Constitutional:  cooperative, alert and oriented, well developed, well nourished, in no acute distress        Skin:  warm and dry to the touch, no apparent skin lesions or masses noted        Head:  normocephalic, no masses or lesions        Eyes:  pupils equal and round;conjunctivae and lids unremarkable;sclera white        ENT:  no pallor or cyanosis        Neck:  JVP normal transmitted murmur      Respiratory:  normal breath sounds,  "clear to auscultation, normal A-P diameter, normal symmetry, normal respiratory excursion, no use of accessory muscles        Cardiac: regular rhythm bradycardic soft or inaudible A2   systolic murmur systolic ejection murmur;RUSB;LLSB;grade 2;grade 3        GI:  abdomen soft;BS normoactive        Vascular: pulses full and equal                                      Extremities and Musculoskeletal:  no deformities, clubbing, cyanosis, erythema observed        Neurological:  no gross motor deficits;affect appropriate            LABS/DATA:  I reviewed the following:  Echo 12/15/21:  Interpretation Summary  The left ventricle is mildly dilated.  The visual ejection fraction is 40-45%.  The right ventricle is normal in structure, function and size.  There is mild biatrial enlargement.  Moderate to severe valvular aortic stenosis.  Mild aortic root dilatation.  Compared to prior study, changes are noted.      Echo 6/10/22:  Interpretation Summary  The patient exhibited frequent PVCs.  Severe valvular aortic stenosis.  The left ventricle is moderately dilated.  The visual ejection fraction is 25-30%.  The left atrium is moderate to severely dilated.  Both LV function and AS appear worse compared with most recent study.        Cardiac cath 6/15/22:  Conclusion    The ejection fraction is 20-30% by visual estimate.    Mid LAD lesion is 40% stenosed.    Mid Cx lesion is 35% stenosed.    Mid RCA lesion is 60% stenosed.    Moderately elevated pulmonary artery hypertension.    Left sided filling pressures are moderately elevated.    Left ventricular filling pressures are moderately elevated.    Right sided filling pressures are mildly elevated.    Normal cardiac output level.     1. Moderate RCA lesion with preserved flow reserve. Prior Lcx stent widely patent, no significant LCA disease  2. Severe decrease in LVEF  3. Mod/severe aortic stenosis  4. Pulm HTN  Mod/severe suspect mostly \"post capillary\"        RHC:  /3/26  " Ao 87/47/65  Pull back time corrected LV/Ao  Aortic valve area 1.1   Mean gradient 32mmHg  RA 12/7/7  RV 51/2/11  PA  54/26/38   PCW--surrogate LVDP used for this value  21  Tarsha CO/CI   6.97/ 3.12  SVR 7.46 arreguin, PVR 2.44 arreguin  DFG 5   TPG 17            ASSESSMENT/PLAN:  H/o mixed cardiomyopathy  Acute CHF, combined systolic and diastolic.    - Echo 12/2021: EF 40-45%  - Echo 6/2022: EF 25-30%, MYRANDA 1.1 cm2, MG 34.6 mmHg   - Cardiac cath done 6/15/22 to evaluate the drop in EF.  No severe CAD and prior stent patent.  Aortic valve is moderate/severe MYRANDA 1.1 cm2, MG 32 mmHg, LVEDP 21 mmHg  - Still volume up with LVEDP 21 mmHg and LE edema. Increase Lasix to 40 mg in AM and 20 mg in PM. Continue daily weights. If weight going up despite this, he will call. We may need to increase to 40 mg BID.   - Coreg 9.375 mg BID. Will lower Coreg to 6.25 mg BID due to lower BP today and increasing Lasix  - Entresto 24-26 mg BID  - Jardiance        Coronary disease status post PCI of circumflex  10/2019.   - Cardiac cath 6/15/22 done due to drop in LVEF, moderate RCA, patent prior circ stent, other mild disease  - Continue ASA, BB, statin      Moderate-severe aortic stenosis.   - Echo 12/2021: EF 40-45%, MYRANDA 1.0 cm , MG 37 mmHg  - Echo 6/2022: EF 25-30%, MYRANDA 1.1 cm2, MG 34.6 mmHg  - Cath 6/15/22: MYRANDA 1.1 cm2, MG 32 mmHg  - Per Dr. Phillips, repeat echo in 6 months  - I recommended that he follow with structural clinic to review the echo        Hyperlipidemia. Continue statin.          DM  - BMP today ok, he can resume metformin.  I told him this today           Follow up:  1 month with BMP  Echo in 6 months with structural  Sooner if any concerns      >45 minutes spent on the date of the encounter doing chart review, review of test results, patient visit, documentation, discussion with other provider(s) and discussion with family  I reviewed with Dr. Phillips and also discussed with patient's wife and son    Winsome Wills PA-C

## 2022-07-21 NOTE — PROGRESS NOTES
"  Cardiology Clinic Progress Note    Service Date: July 22, 2022    Primary Cardiologist: Dr. Phillips      Reason for Visit: 1 month follow up of medication changes    HPI:   I had the pleasure of seeing Mr. Bryant Spicer \"Marshall\" Jonah in the clinic today. 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    He presented initially in spring 2019 with shortness of breath and was found to have a severe cardiomyopathy with an LVEF around 30%.  He had a markedly abnormal CT calcium score and went on to have coronary angiography. This showed a 10% proximal LAD stenosis. The mid to distal circumflex was 100% stenosed.  He had a proximal to mid RCA lesion which was 50% stenosed.  Aggressive medical management was recommended with possible consideration for intervention of the  of the left circumflex. Cardiac MRI in July 2019 showed a severely dilated left ventricle with an LVEF of 21%. There was mild to moderate RV dysfunction. Late gadolinium enhancement was consistent with a prior small infarction in the circumflex distribution. Given the amount of enhancement in the global nature of his LV dysfunction, the cardiomyopathy was felt to likely be mixed ischemic and nonischemic. He ultimately underwent successful intervention to the circumflex  on 10/24/2019 by Dr. Rivera with placement of a single drug-eluting stent.    He had a follow up echocardiogram in June of 2020 which showed improvement in his LVEF with an EF of 40-45% with moderate aortic stenosis at that time.    Most recent echocardiogram on 6/10/2022 unfortunately again showed a drop in EF to 25-30% and severe aortic valve stenosis with MYRANDA 1.1 cm2 and mean gradient of 34.6 mmHg. Both his ejection fraction and aortic stenosis looked worse on the study in " comparison to prior study on 12/2021 per report.     Patient was subsequently sent for coronary angiography on 6/15/22 to evaluate the drop in EF. No severe or flow-limiting CAD warranting PCI was noted and the previously circumflex stent appeared patent. Aortic stenosis appeared moderate/severe with MYRANDA 1.1 cm2, MG 32 mmHg, LVEDP 21 mmHg    The patient was seen by my colleague Winsome Wills PA-C on 6/17/2022 in follow-up of the angiogram. He was noted to be volume overloaded with weight trending back up to 220 pounds in the clinic, so it was recommended that he increase the dose of Lasix to 40 mg in AM and 20 mg in PM. The dose of carvedilol was decreased slightly from 9.375 mg BID to 6.25 mg BID due to lower BP at that time and since Lasix was increased. He was continued on Entresto and Jardiance. Follow-up with the structural heart clinic was recommended for further evaluation of his aortic stenosis and consideration of possible TAVR.    Labs were completed prior to the visit today with a basic metabolic panel showing stable electrolytes and renal function with potassium level of 4.2 and creatinine at 0.87.    Today, Mr. Mckenzie presents to clinic accompanied by his wife in follow-up of his recent diuretic adjustments. He brings in a calendar with his daily weights and recent blood pressure readings. Weight gradually trended down to around 213 lbs with reported improvement in his exertional dyspnea. He has still be getting winded at times and continues to have some fatigue but overall feels improved. He has been adjusting medications on his own a bit and once his weight came down he notes that he has been back on 40 mg once daily for lasix for the past week or so. He also increased the dose of his carvedilol back to 9.375 mg BID after decreasing the lasix. He has had mild lightheadedness at times if he gets up quickly. He otherwise denies symptoms of chest pain, palpitations, orthopnea, PND, dizziness,  presyncope or syncope.    ASSESSMENT AND PLAN:  1. Mixed ischemic and nonischemic cardiomyopathy  - EF at 25-30% on most recent TTE in June 2022 as noted above. NYHA class III symptoms. Appears well compensated and near euvolemic on exam.  - We will continue what he is currently taking for lasix at 2 tablets (40 mg) once daily. Recommend he take an extra 20 mg tablet once daily in the afternoon on days if his weight at home is over 215 lbs or if he has worsening lower extremity edema or shortness of breath.   - Given borderline low BP and adding the PRN dose of lasix, recommend continguing carvedilol at the lower dose of 6.25 mg twice daily. Will also continue Entresto 24-26 mg twice daily and Jardiance 25 mg once daily without changes.  - Reviewed to continue to monitor his weights daily and stick to a low sodium diet (under 2,000 mg/day).    2. Coronary artery disease  - History of a  of the circumflex artery status post PCI and placement of a single LEVI in 10/2019. Stable without anginal symptoms.  - Continue aspirin 81 mg daily, atorvastatin 40 mg once daily, and beta-blocker as above.    3. Moderate to severe aortic stenosis  - Most recent echocardiograms show some progression in his aortic stenosis. Suspect his recent symptoms of dyspnea and increased diuretic needs may be at least in part secondary to his aortic stenosis.  - Recommend he meet with the structural heart clinic sometime in the next couple of months for review of his recent echos and heart catheterization for consideration for TAVR.    4. Type 2 diabetes mellitus    5.  Obstructive sleep apnea    Thank you for the opportunity to participate in this delightful patient's care. As noted above, I will plan to have him see the structural heart clinic for evaluation in the next few months. Pending their evaluation and recommendations, I will tentatively have him see Dr. Phillips in about 6 months with a repeat echocardiogram beforehand. We would be  happy to see him sooner if needed for any concerns in the meantime.    50 total minutes was spent today including chart review, precharting, history and exam, post visit documentation, and reviewing studies as outlined above.     JENNIFER Li, CNP   Nurse Practitioner  [facility]  Pager: [phone]  Text Page  (8am - 5pm, M-F)    Orders this Visit:  Orders Placed This Encounter   Procedures     Follow-Up with Cardiology     Orders Placed This Encounter   Medications     furosemide (LASIX) 20 MG tablet     Sig: Take 40 mg (2 tabs) in the morning. You can take an extra tablet (20 mg) once daily in the afternoon as needed if your weight is over 125 pounds or you have worsening swelling or shortness of breath.     Dispense:  270 tablet     Refill:  1     Medications Discontinued During This Encounter   Medication Reason     furosemide (LASIX) 20 MG tablet      Encounter Diagnoses   Name Primary?     Aortic valve stenosis, etiology of cardiac valve disease unspecified      Acute systolic congestive heart failure (H)      Ischemic cardiomyopathy Yes     Type 2 diabetes mellitus without complication, without long-term current use of insulin (H)      Nonischemic cardiomyopathy (H)      CURRENT MEDICATIONS:  Current Outpatient Medications   Medication Sig Dispense Refill     ASPIRIN 81 PO Take by mouth daily        atorvastatin (LIPITOR) 40 MG tablet Take 1 tablet (40 mg) by mouth daily 90 tablet 3     carvedilol (COREG) 3.125 MG tablet Take 2 tablets (6.25 mg) by mouth 2 times daily (with meals) 360 tablet 1     dulaglutide (TRULICITY) 0.75 MG/0.5ML pen Inject 0.75 mg Subcutaneous every 7 days       empagliflozin (JARDIANCE) 25 MG TABS tablet Take 25 mg by mouth daily       furosemide (LASIX) 20 MG tablet Take 40 mg (2 tabs) in the morning. You can take an extra tablet (20 mg) once daily in the afternoon as needed if your weight is over 125 pounds or you have worsening swelling or shortness of  breath. 270 tablet 1     insulin glargine (LANTUS) 100 UNIT/ML injection Inject 13 Units Subcutaneous At Bedtime  15 mL 3     metFORMIN (GLUCOPHAGE) 500 MG tablet TAKE 2 TABLETS (1,000 MG) BY MOUTH TWICE A DAY WITH MEALS 360 tablet 0     Multiple Vitamins-Minerals (MULTIVITAL PO) Take 1 tablet by mouth daily       sacubitril-valsartan (ENTRESTO) 24-26 MG per tablet Take 1 tablet by mouth 2 times daily 30 tablet 3     tamsulosin (FLOMAX) 0.4 MG capsule Take 0.4 mg by mouth daily       vitamin C (ASCORBIC ACID) 1000 MG TABS Take 1,000 mg by mouth daily       Vitamin D, Cholecalciferol, 25 MCG (1000 UT) CAPS Take 4,000 Units by mouth daily       zinc gluconate 50 MG tablet Take 50 mg by mouth daily       insulin lispro (HUMALOG KWIKPEN) 100 UNIT/ML injection 11 units with breakfast,15 units at noon, and 14 units before evening meal (Patient taking differently: Depending on glucose reading) 36 mL 3     ALLERGIES  Allergies   Allergen Reactions     Semaglutide Fatigue and GI Disturbance     PAST MEDICAL, SURGICAL, FAMILY HISTORY:  History was reviewed and updated as needed, see medical record.    SOCIAL HISTORY:  Social History     Socioeconomic History     Marital status:      Spouse name: Not on file     Number of children: Not on file     Years of education: Not on file     Highest education level: Not on file   Occupational History     Not on file   Tobacco Use     Smoking status: Never Smoker     Smokeless tobacco: Never Used   Vaping Use     Vaping Use: Never used   Substance and Sexual Activity     Alcohol use: Not Currently     Alcohol/week: 0.0 standard drinks     Drug use: No     Sexual activity: Never   Other Topics Concern     Parent/sibling w/ CABG, MI or angioplasty before 65F 55M? 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; walks dog for exercise      Social Determinants of Health     Financial Resource Strain: Not on file   Food Insecurity: Not on file   Transportation  Needs: Not on file   Physical Activity: Not on file   Stress: Not on file   Social Connections: Not on file   Intimate Partner Violence: Not on file   Housing Stability: Not on file     Review of Systems:  Skin:        Eyes:       ENT:       Respiratory:  Positive for shortness of breath;dyspnea on exertion  Cardiovascular:       Gastroenterology:      Genitourinary:       Musculoskeletal:       Neurologic:       Psychiatric:       Heme/Lymph/Imm:       Endocrine:          Physical Exam:  Vitals: BP 92/52 (BP Location: Right arm, Patient Position: Sitting, Cuff Size: Adult Regular)   Pulse 64   Ht 1.829 m (6')   Wt 99.1 kg (218 lb 6.4 oz)   SpO2 99%   BMI 29.62 kg/m     Wt Readings from Last 4 Encounters:   07/22/22 99.1 kg (218 lb 6.4 oz)   06/17/22 101.2 kg (223 lb)   06/15/22 101.6 kg (224 lb)   06/09/22 104 kg (229 lb 4.8 oz)     CONSTITUTIONAL: Appears his stated age, well nourished, and in no acute distress.  HEENT: Pupils equal, round. Sclerae nonicteric.    NECK: Supple, no masses or JVD appreciated.   C/V:  Regular rate and rhythm, normal S1 and S2, no S3 or S4, grade 2/6 systolic ejection murmur noted at the RUSB and LUSB. No rub or gallop.  RESP: Respirations are unlabored. Lungs are clear to auscultation bilaterally without wheezing, rales, or rhonchi.  EXTREM: No clubbing, cyanosis, or lower extremity edema bilaterally.   NEURO: Alert and oriented, cooperative. Gait steady. No gross focal deficits.   PSYCH: Affect appropriate, bright. Mentation normal. Responds to questions appropriately.  SKIN: Warm and dry. No apparent rashes or bruising.    Recent Lab Results:  LIPID RESULTS:  Lab Results   Component Value Date    CHOL 123 12/05/2016    HDL 50 12/05/2016    LDL 51 12/05/2016    TRIG 108 12/05/2016     LIVER ENZYME RESULTS:  No results found for: AST, ALT  CBC RESULTS:  Lab Results   Component Value Date    WBC 5.6 06/15/2022    WBC 9.3 01/03/2020    RBC 4.78 06/15/2022    RBC 4.69 01/03/2020     HGB 13.2 (L) 06/15/2022    HGB 13.4 01/03/2020    HCT 42.3 06/15/2022    HCT 43.4 01/03/2020    MCV 89 06/15/2022    MCV 93 01/03/2020    MCH 27.6 06/15/2022    MCH 28.6 01/03/2020    MCHC 31.2 (L) 06/15/2022    MCHC 30.9 (L) 01/03/2020    RDW 16.9 (H) 06/15/2022    RDW 13.7 01/03/2020     06/15/2022     01/03/2020     BMP RESULTS:  Lab Results   Component Value Date     07/22/2022     06/04/2021    POTASSIUM 4.2 07/22/2022    POTASSIUM 4.2 06/04/2021    CHLORIDE 107 07/22/2022    CHLORIDE 108 06/04/2021    CO2 24 07/22/2022    CO2 25 06/04/2021    ANIONGAP 5 07/22/2022    ANIONGAP 6 06/04/2021     (H) 07/22/2022     (H) 06/04/2021    BUN 17 07/22/2022    BUN 16 06/04/2021    CR 0.87 07/22/2022    CR 0.97 06/04/2021    GFRESTIMATED 87 07/22/2022    GFRESTIMATED 74 06/04/2021    GFRESTBLACK 85 06/04/2021    FATMATA 8.7 07/22/2022    FATMATA 9.3 06/04/2021      A1C RESULTS:  Lab Results   Component Value Date    A1C 6.7 (H) 06/12/2017     This note was completed in part using Dragon voice recognition software. Although reviewed after completion, some word and grammatical errors may occur.

## 2022-07-21 NOTE — PATIENT INSTRUCTIONS
Thank you for your visit with the [facility] today.    Today's plan:   Continue with Lasix at 40 mg (2 tablets) once daily in the morning. You can take an extra 20 mg dose of lasix (1 tablet) in the afternoon if your weight is over 215 pounds or you have worsening swelling or shortness of breath.  Stay on the lower dose of carvedilol at 2 tablets (6.25 mg) twice daily.  Follow up with the structural heart clinic in the next couple of months to review your aortic valve issue and options for management for this.  Follow up with Dr. Phillips in about 6 months.  Continue to check your weights daily and try to stick to a low sodium diet (under 2,000 mg/day).  Call the clinic if you have signs concerning for fluid retention, including weight gain of over 3 pounds in 1 night or over 5 pounds in 1 week, worsening shortness of breath, or worsening swelling in the legs or abdomen.     Results:   Your lab results today look great with stable electrolytes and kidney function.  Component      Latest Ref Rng & Units 6/17/2022 7/22/2022   Sodium      133 - 144 mmol/L 136 136   Potassium      3.4 - 5.3 mmol/L 4.2 4.2   Chloride      94 - 109 mmol/L 102 107   Carbon Dioxide      20 - 32 mmol/L 26 24   Anion Gap      3 - 14 mmol/L 8 5   Urea Nitrogen      7 - 30 mg/dL 20 17   Creatinine      0.66 - 1.25 mg/dL 1.00 0.87   Calcium      8.5 - 10.1 mg/dL 9.0 8.7   Glucose      70 - 99 mg/dL 120 (H) 169 (H)   GFR Estimate      >60 mL/min/1.73m2 76 87       If you have questions or concerns, please do not hesitate to call my nurse team at [phone].     Scheduling phone number: [phone]    It was a pleasure seeing you today!     JENNIFER Li, CNP  Nurse Practitioner  [facility]  July 22, 2022  ________________________________________________________

## 2022-07-22 NOTE — LETTER
"7/22/2022    Merlin Emery Brown, MD  [facility] [address]  [place] [address]    RE: Bryant Mckenzie       Dear Colleague,     I had the pleasure of seeing Bryant Mckenzie in the [facility].    Cardiology Clinic Progress Note    Service Date: July 22, 2022    Primary Cardiologist: Dr. Phillips      Reason for Visit: 1 month follow up of medication changes    HPI:   I had the pleasure of seeing Mr. Bryant Spicer \"Marshall\" Jonah in the clinic today. 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    He presented initially in spring 2019 with shortness of breath and was found to have a severe cardiomyopathy with an LVEF around 30%.  He had a markedly abnormal CT calcium score and went on to have coronary angiography. This showed a 10% proximal LAD stenosis. The mid to distal circumflex was 100% stenosed.  He had a proximal to mid RCA lesion which was 50% stenosed.  Aggressive medical management was recommended with possible consideration for intervention of the  of the left circumflex. Cardiac MRI in July 2019 showed a severely dilated left ventricle with an LVEF of 21%. There was mild to moderate RV dysfunction. Late gadolinium enhancement was consistent with a prior small infarction in the circumflex distribution. Given the amount of enhancement in the global nature of his LV dysfunction, the cardiomyopathy was felt to likely be mixed ischemic and nonischemic. He ultimately underwent successful intervention to the circumflex  on 10/24/2019 by Dr. Rivera with placement of a single drug-eluting stent.    He had a follow up echocardiogram in June of 2020 which showed improvement in his LVEF with an EF of 40-45% with moderate aortic stenosis at that time.    Most recent " echocardiogram on 6/10/2022 unfortunately again showed a drop in EF to 25-30% and severe aortic valve stenosis with MYRANDA 1.1 cm2 and mean gradient of 34.6 mmHg. Both his ejection fraction and aortic stenosis looked worse on the study in comparison to prior study on 12/2021 per report.     Patient was subsequently sent for coronary angiography on 6/15/22 to evaluate the drop in EF. No severe or flow-limiting CAD warranting PCI was noted and the previously circumflex stent appeared patent. Aortic stenosis appeared moderate/severe with MYRANDA 1.1 cm2, MG 32 mmHg, LVEDP 21 mmHg    The patient was seen by my colleague Winsome Wills PA-C on 6/17/2022 in follow-up of the angiogram. He was noted to be volume overloaded with weight trending back up to 220 pounds in the clinic, so it was recommended that he increase the dose of Lasix to 40 mg in AM and 20 mg in PM. The dose of carvedilol was decreased slightly from 9.375 mg BID to 6.25 mg BID due to lower BP at that time and since Lasix was increased. He was continued on Entresto and Jardiance. Follow-up with the structural heart clinic was recommended for further evaluation of his aortic stenosis and consideration of possible TAVR.    Labs were completed prior to the visit today with a basic metabolic panel showing stable electrolytes and renal function with potassium level of 4.2 and creatinine at 0.87.    Today, Mr. Mckenzie presents to clinic accompanied by his wife in follow-up of his recent diuretic adjustments. He brings in a calendar with his daily weights and recent blood pressure readings. Weight gradually trended down to around 213 lbs with reported improvement in his exertional dyspnea. He has still be getting winded at times and continues to have some fatigue but overall feels improved. He has been adjusting medications on his own a bit and once his weight came down he notes that he has been back on 40 mg once daily for lasix for the past week or so. He also  increased the dose of his carvedilol back to 9.375 mg BID after decreasing the lasix. He has had mild lightheadedness at times if he gets up quickly. He otherwise denies symptoms of chest pain, palpitations, orthopnea, PND, dizziness, presyncope or syncope.    ASSESSMENT AND PLAN:  1. Mixed ischemic and nonischemic cardiomyopathy  - EF at 25-30% on most recent TTE in June 2022 as noted above. NYHA class III symptoms. Appears well compensated and near euvolemic on exam.  - We will continue what he is currently taking for lasix at 2 tablets (40 mg) once daily. Recommend he take an extra 20 mg tablet once daily in the afternoon on days if his weight at home is over 215 lbs or if he has worsening lower extremity edema or shortness of breath.   - Given borderline low BP and adding the PRN dose of lasix, recommend continguing carvedilol at the lower dose of 6.25 mg twice daily. Will also continue Entresto 24-26 mg twice daily and Jardiance 25 mg once daily without changes.  - Reviewed to continue to monitor his weights daily and stick to a low sodium diet (under 2,000 mg/day).    2. Coronary artery disease  - History of a  of the circumflex artery status post PCI and placement of a single LEVI in 10/2019. Stable without anginal symptoms.  - Continue aspirin 81 mg daily, atorvastatin 40 mg once daily, and beta-blocker as above.    3. Moderate to severe aortic stenosis  - Most recent echocardiograms show some progression in his aortic stenosis. Suspect his recent symptoms of dyspnea and increased diuretic needs may be at least in part secondary to his aortic stenosis.  - Recommend he meet with the structural heart clinic sometime in the next couple of months for review of his recent echos and heart catheterization for consideration for TAVR.    4. Type 2 diabetes mellitus    5.  Obstructive sleep apnea    Thank you for the opportunity to participate in this delightful patient's care. As noted above, I will plan to have  him see the structural heart clinic for evaluation in the next few months. Pending their evaluation and recommendations, I will tentatively have him see Dr. Phillips in about 6 months with a repeat echocardiogram beforehand. We would be happy to see him sooner if needed for any concerns in the meantime.    50 total minutes was spent today including chart review, precharting, history and exam, post visit documentation, and reviewing studies as outlined above.     JENNIFER Li, CNP   Nurse Practitioner  [facility]  Pager: [phone]  Text Page  (8am - 5pm, M-F)    Orders this Visit:  Orders Placed This Encounter   Procedures     Follow-Up with Cardiology     Orders Placed This Encounter   Medications     furosemide (LASIX) 20 MG tablet     Sig: Take 40 mg (2 tabs) in the morning. You can take an extra tablet (20 mg) once daily in the afternoon as needed if your weight is over 125 pounds or you have worsening swelling or shortness of breath.     Dispense:  270 tablet     Refill:  1     Medications Discontinued During This Encounter   Medication Reason     furosemide (LASIX) 20 MG tablet      Encounter Diagnoses   Name Primary?     Aortic valve stenosis, etiology of cardiac valve disease unspecified      Acute systolic congestive heart failure (H)      Ischemic cardiomyopathy Yes     Type 2 diabetes mellitus without complication, without long-term current use of insulin (H)      Nonischemic cardiomyopathy (H)      CURRENT MEDICATIONS:  Current Outpatient Medications   Medication Sig Dispense Refill     ASPIRIN 81 PO Take by mouth daily        atorvastatin (LIPITOR) 40 MG tablet Take 1 tablet (40 mg) by mouth daily 90 tablet 3     carvedilol (COREG) 3.125 MG tablet Take 2 tablets (6.25 mg) by mouth 2 times daily (with meals) 360 tablet 1     dulaglutide (TRULICITY) 0.75 MG/0.5ML pen Inject 0.75 mg Subcutaneous every 7 days       empagliflozin (JARDIANCE) 25 MG TABS tablet Take 25 mg by mouth  daily       furosemide (LASIX) 20 MG tablet Take 40 mg (2 tabs) in the morning. You can take an extra tablet (20 mg) once daily in the afternoon as needed if your weight is over 125 pounds or you have worsening swelling or shortness of breath. 270 tablet 1     insulin glargine (LANTUS) 100 UNIT/ML injection Inject 13 Units Subcutaneous At Bedtime  15 mL 3     metFORMIN (GLUCOPHAGE) 500 MG tablet TAKE 2 TABLETS (1,000 MG) BY MOUTH TWICE A DAY WITH MEALS 360 tablet 0     Multiple Vitamins-Minerals (MULTIVITAL PO) Take 1 tablet by mouth daily       sacubitril-valsartan (ENTRESTO) 24-26 MG per tablet Take 1 tablet by mouth 2 times daily 30 tablet 3     tamsulosin (FLOMAX) 0.4 MG capsule Take 0.4 mg by mouth daily       vitamin C (ASCORBIC ACID) 1000 MG TABS Take 1,000 mg by mouth daily       Vitamin D, Cholecalciferol, 25 MCG (1000 UT) CAPS Take 4,000 Units by mouth daily       zinc gluconate 50 MG tablet Take 50 mg by mouth daily       insulin lispro (HUMALOG KWIKPEN) 100 UNIT/ML injection 11 units with breakfast,15 units at noon, and 14 units before evening meal (Patient taking differently: Depending on glucose reading) 36 mL 3     ALLERGIES  Allergies   Allergen Reactions     Semaglutide Fatigue and GI Disturbance     PAST MEDICAL, SURGICAL, FAMILY HISTORY:  History was reviewed and updated as needed, see medical record.    SOCIAL HISTORY:  Social History     Socioeconomic History     Marital status:      Spouse name: Not on file     Number of children: Not on file     Years of education: Not on file     Highest education level: Not on file   Occupational History     Not on file   Tobacco Use     Smoking status: Never Smoker     Smokeless tobacco: Never Used   Vaping Use     Vaping Use: Never used   Substance and Sexual Activity     Alcohol use: Not Currently     Alcohol/week: 0.0 standard drinks     Drug use: No     Sexual activity: Never   Other Topics Concern     Parent/sibling w/ CABG, MI or angioplasty  before 65F 55M? 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; walks dog for exercise      Social Determinants of Health     Financial Resource Strain: Not on file   Food Insecurity: Not on file   Transportation Needs: Not on file   Physical Activity: Not on file   Stress: Not on file   Social Connections: Not on file   Intimate Partner Violence: Not on file   Housing Stability: Not on file     Review of Systems:  Skin:        Eyes:       ENT:       Respiratory:  Positive for shortness of breath;dyspnea on exertion  Cardiovascular:       Gastroenterology:      Genitourinary:       Musculoskeletal:       Neurologic:       Psychiatric:       Heme/Lymph/Imm:       Endocrine:          Physical Exam:  Vitals: BP 92/52 (BP Location: Right arm, Patient Position: Sitting, Cuff Size: Adult Regular)   Pulse 64   Ht 1.829 m (6')   Wt 99.1 kg (218 lb 6.4 oz)   SpO2 99%   BMI 29.62 kg/m     Wt Readings from Last 4 Encounters:   07/22/22 99.1 kg (218 lb 6.4 oz)   06/17/22 101.2 kg (223 lb)   06/15/22 101.6 kg (224 lb)   06/09/22 104 kg (229 lb 4.8 oz)     CONSTITUTIONAL: Appears his stated age, well nourished, and in no acute distress.  HEENT: Pupils equal, round. Sclerae nonicteric.    NECK: Supple, no masses or JVD appreciated.   C/V:  Regular rate and rhythm, normal S1 and S2, no S3 or S4, grade 2/6 systolic ejection murmur noted at the RUSB and LUSB. No rub or gallop.  RESP: Respirations are unlabored. Lungs are clear to auscultation bilaterally without wheezing, rales, or rhonchi.  EXTREM: No clubbing, cyanosis, or lower extremity edema bilaterally.   NEURO: Alert and oriented, cooperative. Gait steady. No gross focal deficits.   PSYCH: Affect appropriate, bright. Mentation normal. Responds to questions appropriately.  SKIN: Warm and dry. No apparent rashes or bruising.    Recent Lab Results:  LIPID RESULTS:  Lab Results   Component Value Date    CHOL 123 12/05/2016    HDL 50 12/05/2016    LDL  51 12/05/2016    TRIG 108 12/05/2016     LIVER ENZYME RESULTS:  No results found for: AST, ALT  CBC RESULTS:  Lab Results   Component Value Date    WBC 5.6 06/15/2022    WBC 9.3 01/03/2020    RBC 4.78 06/15/2022    RBC 4.69 01/03/2020    HGB 13.2 (L) 06/15/2022    HGB 13.4 01/03/2020    HCT 42.3 06/15/2022    HCT 43.4 01/03/2020    MCV 89 06/15/2022    MCV 93 01/03/2020    MCH 27.6 06/15/2022    MCH 28.6 01/03/2020    MCHC 31.2 (L) 06/15/2022    MCHC 30.9 (L) 01/03/2020    RDW 16.9 (H) 06/15/2022    RDW 13.7 01/03/2020     06/15/2022     01/03/2020     BMP RESULTS:  Lab Results   Component Value Date     07/22/2022     06/04/2021    POTASSIUM 4.2 07/22/2022    POTASSIUM 4.2 06/04/2021    CHLORIDE 107 07/22/2022    CHLORIDE 108 06/04/2021    CO2 24 07/22/2022    CO2 25 06/04/2021    ANIONGAP 5 07/22/2022    ANIONGAP 6 06/04/2021     (H) 07/22/2022     (H) 06/04/2021    BUN 17 07/22/2022    BUN 16 06/04/2021    CR 0.87 07/22/2022    CR 0.97 06/04/2021    GFRESTIMATED 87 07/22/2022    GFRESTIMATED 74 06/04/2021    GFRESTBLACK 85 06/04/2021    FATMATA 8.7 07/22/2022    FATMATA 9.3 06/04/2021      A1C RESULTS:  Lab Results   Component Value Date    A1C 6.7 (H) 06/12/2017     This note was completed in part using Dragon voice recognition software. Although reviewed after completion, some word and grammatical errors may occur.      Thank you for allowing me to participate in the care of your patient.      Sincerely,     Castillo Mcgee NP     [facility]  cc:   Winsome Wills PA-C  [address] [place] [address]

## 2022-10-05 NOTE — PROGRESS NOTES
TAVR referral received by: Winsome Wills PA-C & Dr. Phillips    Chart reviewed, recent Cr/GFR noted:  Recent Labs   Lab Test 07/22/22  0937 06/17/22  1259 06/15/22  0653    136 137   POTASSIUM 4.2 4.2 4.4   CHLORIDE 107 102 104   CO2 24 26 27   BUN 17 20 21   CR 0.87 1.00 0.95   ANIONGAP 5 8 6   FATMATA 8.7 9.0 8.5   * 120* 144*     Contrast allergy: no   Telephoned patient to introduce self, provide education on aortic stenosis. No answer.    LVM indicating we will provide new patient packet. Provided patient direct contact information.    Future Appointments   Date Time Provider Department Center   10/13/2022 11:15 AM Alex Bazan MD [facility]     Letitia Walsh RN  Structural Heart Coordinator  [facility]

## 2022-10-13 NOTE — PROGRESS NOTES
HPI and Plan:     Marshall Mckenzie is a delightful 82-year-old with history of both ischemic and nonischemic cardiomyopathy decreased ejection fraction followed by general cardiology Dr. Phillips from our clinic.  He has had previous PCI with coronary intervention 2019 to  for the circumflex where he got some improvement in his ejection fraction of 40 to 45% but is now declined again to 25% in June with now severe aortic valve stenosis and marked increase in his symptoms over the past month with fatigue and shortness of breath with minimal activity.  He has never had any chest pain.  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    Interestingly his son who was present works for the neuro division of [employer] however he is willing to have either valve placed.     He has had no syncope or near syncope.  He is active going to the gym daily on a recumbent bicycle.  He has other risk factors including obstructive sleep apnea diabetes.    I reviewed his echocardiogram with him showing clearly calcific's severe chronic leaflet aortic valve stenosis.  Decreased ejection fraction with a dimensionless index of 0.24.  Recommendation for aortic valve replacement preferably transcatheter valve replacement however he is not an open surgical prohibitive risk and would be willing to do so if necessary.  Risk and benefits of all of this have been explained to the patient.  He does not need a repeat angiogram but will need a CT scan and a repeat echocardiogram prior to valve replacement.    Today's clinic visit entailed:  Review of the result(s) of each unique test - echocardiogram, angiogram, cardiac MRI  Ordering of each unique test  No LOS data to display   Time spent doing chart review, history and exam, documentation and further activities per the note  Provider  Link  to MDM Help Grid     The level of medical decision making during this visit was of high complexity.      Orders Placed This Encounter   Procedures     EKG 12-lead complete w/read - Clinics (performed today)     No orders of the defined types were placed in this encounter.    There are no discontinued medications.      Encounter Diagnoses   Name Primary?     Aortic valve stenosis, etiology of cardiac valve disease unspecified Yes     Acute systolic congestive heart failure (H)        CURRENT MEDICATIONS:  Current Outpatient Medications   Medication Sig Dispense Refill     ASPIRIN 81 PO Take by mouth daily        atorvastatin (LIPITOR) 40 MG tablet Take 1 tablet (40 mg) by mouth daily 90 tablet 3     carvedilol (COREG) 3.125 MG tablet Take 2 tablets (6.25 mg) by mouth 2 times daily (with meals) 360 tablet 1     dulaglutide (TRULICITY) 0.75 MG/0.5ML pen Inject 0.75 mg Subcutaneous every 7 days       empagliflozin (JARDIANCE) 25 MG TABS tablet Take 25 mg by mouth daily       furosemide (LASIX) 20 MG tablet Take 40 mg (2 tabs) in the morning. You can take an extra tablet (20 mg) once daily in the afternoon as needed if your weight is over 215 pounds or you have worsening swelling or shortness of breath. 270 tablet 1     insulin glargine (LANTUS) 100 UNIT/ML injection Inject 13 Units Subcutaneous At Bedtime  15 mL 3     insulin lispro (HUMALOG KWIKPEN) 100 UNIT/ML injection 11 units with breakfast,15 units at noon, and 14 units before evening meal (Patient taking differently: Depending on glucose reading) 36 mL 3     metFORMIN (GLUCOPHAGE) 500 MG tablet TAKE 2 TABLETS (1,000 MG) BY MOUTH TWICE A DAY WITH MEALS 360 tablet 0     Multiple Vitamins-Minerals (MULTIVITAL PO) Take 1 tablet by mouth daily       sacubitril-valsartan (ENTRESTO) 24-26 MG per tablet Take 1 tablet by mouth 2 times daily 30 tablet 3     tamsulosin (FLOMAX) 0.4 MG capsule Take 0.4 mg by mouth daily       vitamin C (ASCORBIC ACID) 1000 MG TABS Take 1,000  mg by mouth daily       Vitamin D, Cholecalciferol, 25 MCG (1000 UT) CAPS Take 4,000 Units by mouth daily       zinc gluconate 50 MG tablet Take 50 mg by mouth daily         ALLERGIES     Allergies   Allergen Reactions     Semaglutide Fatigue and GI Disturbance       PAST MEDICAL HISTORY:  Past Medical History:   Diagnosis Date     Aortic stenosis 9/23/2016    Echo 9/2016 Mild       Atrophic gastritis 9/8/2016     Benign non-nodular prostatic hyperplasia 09/08/2016     CAD (coronary artery disease)      Hyperlipidemia 09/08/2016     Ischemic cardiomyopathy 6/12/2019     Nonischemic cardiomyopathy (H) 10/30/2019     Obstructive sleep apnea syndrome 9/8/2016     Type 2 diabetes mellitus 09/06/2016     Vitamin B12 deficiency (non anemic) 9/8/2016       PAST SURGICAL HISTORY:  Past Surgical History:   Procedure Laterality Date     CV CORONARY ANGIOGRAM N/A 10/24/2019    Procedure: Coronary Angiogram;  Surgeon: Lupe Posada MD;  Location:  HEART CARDIAC CATH LAB     CV CORONARY ANGIOGRAM N/A 6/15/2022    Procedure: Coronary Angiogram;  Surgeon: Murali Devi MD;  Location:  HEART CARDIAC CATH LAB     CV HEART CATHETERIZATION WITH POSSIBLE INTERVENTION N/A 6/4/2019    Procedure: Heart Catheterization with Possible Intervention;  Surgeon: Alex Bazan MD;  Location:  HEART CARDIAC CATH LAB     CV INSTANTANEOUS WAVE-FREE RATIO N/A 6/15/2022    Procedure: Instantaneous Wave-Free Ratio;  Surgeon: Murali Devi MD;  Location:  HEART CARDIAC CATH LAB     CV LEFT VENTRICULOGRAM N/A 6/15/2022    Procedure: Left Ventriculogram;  Surgeon: Murali Devi MD;  Location:  HEART CARDIAC CATH LAB     CV PCI CHRONIC TOTAL OCCLUSION N/A 10/24/2019    Procedure: Coronary Total Occlusion;  Surgeon: Lupe Posada MD;  Location: [facility] CARDIAC CATH LAB     CV PCI STENT DRUG ELUTING N/A 10/24/2019    Procedure: PCI Stent Drug Eluting;  Surgeon: Lupe Posada MD;  Location:   HEART CARDIAC CATH LAB     CV RIGHT HEART CATH MEASUREMENTS RECORDED N/A 6/15/2022    Procedure: Right Heart Catheterization;  Surgeon: Murali Devi MD;  Location:  HEART CARDIAC CATH LAB     DAVINCI HERNIORRHAPHY INGUINAL Bilateral 2/2/2022    Procedure: ROBOT-ASSISTED BILATERAL INGUINAL HERNIA REPAIR WITH MESH;  Surgeon: Rene Murray MD;  Location:  OR     ESOPHAGOSCOPY, GASTROSCOPY, DUODENOSCOPY (EGD), COMBINED N/A 1/20/2015    Procedure: COMBINED ESOPHAGOSCOPY, GASTROSCOPY, DUODENOSCOPY (EGD), BIOPSY SINGLE OR MULTIPLE;  Surgeon: Allan Marroquin MD;  Location:  GI     EXCISE LESION TRUNK N/A 4/17/2019    Procedure: EXCISE CYST UPPER BACK;  Surgeon: Rene Murray MD;  Location:  OR     HERNIA REPAIR       HERNIORRHAPHY UMBILICAL N/A 2/2/2022    Procedure: OPEN UMBILICAL HERNIA REPAIR;  Surgeon: Rene Murray MD;  Location:  OR     HYDROCELECTOMY SCROTAL       JOINT REPLACEMENT (aka KNEE) NOS Bilateral        FAMILY HISTORY:  Family History   Problem Relation Age of Onset     Coronary Artery Disease Father 53     Cerebrovascular Disease Father      Diabetes Mother      Diabetes Brother        SOCIAL HISTORY:  Social History     Socioeconomic History     Marital status:      Spouse name: None     Number of children: None     Years of education: None     Highest education level: None   Tobacco Use     Smoking status: Never     Smokeless tobacco: Never   Vaping Use     Vaping Use: Never used   Substance and Sexual Activity     Alcohol use: Not Currently     Alcohol/week: 0.0 standard drinks     Drug use: No     Sexual activity: Never   Social History Narrative    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; walks dog for exercise        Review of Systems:  Skin:  not assessed     Eyes:  not assessed    ENT:  not assessed    Respiratory:  Positive for dyspnea on exertion  Cardiovascular:    Positive for;lightheadedness  Gastroenterology: not assessed    Genitourinary:  not  assessed    Musculoskeletal:  not assessed    Neurologic:  not assessed    Psychiatric:  not assessed    Heme/Lymph/Imm:  Positive for allergies  Endocrine:  Positive for diabetes    Physical Exam:  Vitals: BP (!) 84/56 (BP Location: Left arm, Cuff Size: Adult Large)   Pulse 61   Ht 1.829 m (6')   Wt 97.1 kg (214 lb)   BMI 29.02 kg/m      Constitutional:           Skin:             Head:           Eyes:           Lymph:      ENT:           Neck:           Respiratory:            Cardiac:                                                           GI:           Extremities and Muscular Skeletal:                 Neurological:           Psych:         Recent Lab Results:  LIPID RESULTS:  Lab Results   Component Value Date    CHOL 123 12/05/2016    HDL 50 12/05/2016    LDL 51 12/05/2016    TRIG 108 12/05/2016       LIVER ENZYME RESULTS:  No results found for: AST, ALT    CBC RESULTS:  Lab Results   Component Value Date    WBC 5.6 06/15/2022    WBC 9.3 01/03/2020    RBC 4.78 06/15/2022    RBC 4.69 01/03/2020    HGB 13.2 (L) 06/15/2022    HGB 13.4 01/03/2020    HCT 42.3 06/15/2022    HCT 43.4 01/03/2020    MCV 89 06/15/2022    MCV 93 01/03/2020    MCH 27.6 06/15/2022    MCH 28.6 01/03/2020    MCHC 31.2 (L) 06/15/2022    MCHC 30.9 (L) 01/03/2020    RDW 16.9 (H) 06/15/2022    RDW 13.7 01/03/2020     06/15/2022     01/03/2020       BMP RESULTS:  Lab Results   Component Value Date     07/22/2022     06/04/2021    POTASSIUM 4.2 07/22/2022    POTASSIUM 4.2 06/04/2021    CHLORIDE 107 07/22/2022    CHLORIDE 108 06/04/2021    CO2 24 07/22/2022    CO2 25 06/04/2021    ANIONGAP 5 07/22/2022    ANIONGAP 6 06/04/2021     (H) 07/22/2022     (H) 06/04/2021    BUN 17 07/22/2022    BUN 16 06/04/2021    CR 0.87 07/22/2022    CR 0.97 06/04/2021    GFRESTIMATED 87 07/22/2022    GFRESTIMATED 74 06/04/2021    GFRESTBLACK 85 06/04/2021    FATMATA 8.7 07/22/2022    FATMATA 9.3 06/04/2021        A1C RESULTS:  Lab  Results   Component Value Date    A1C 6.7 (H) 06/12/2017       INR RESULTS:  Lab Results   Component Value Date    INR 1.17 (H) 06/15/2022    INR 1.19 (H) 10/24/2019    INR 1.15 (H) 06/04/2019           CC  Winsome Wills, PAEMY  [address] [place] [address]

## 2022-10-13 NOTE — PROGRESS NOTES
TAVR Coordinator visit:  Provided additional education regarding TAVR procedure, after being present for discussion with physician. Explained the work-up process and next steps for patient. Patient provided our direct contact number and instructed to call with any questions.     Completed frailty testing and KCCQ.   5 meter walk: 4 seconds  Frailty score: 0/5    KCCQ Results:   1a. 5  1b. 4  1c. 2  2. 5  3. 3  4. 2  5. 1  6. 2  7. 1  8a. 2  8b. 2  8c. 3    Preliminary STS Risk Score: 1.63%  NYHA Class: II    Will plan to repeat echo (scheduled tomorrow). TAVR CT scheduled 10/25/22. Patient does not need to repeat angiogram per Dr. Bazan.     Caitlyn Tijerina, RN  Structural Heart Coordinator  [facility]

## 2022-10-13 NOTE — LETTER
10/13/2022    Merlin Emery Brown, MD  [facility] [address]  [place] [address]    RE: Bryant MCKEON Jonah       Dear Colleague,     I had the pleasure of seeing Bryant Mckenzie in the [facility].  HPI and Plan:     Marshall Mckenzie is a delightful 82-year-old with history of both ischemic and nonischemic cardiomyopathy decreased ejection fraction followed by general cardiology Dr. Phillips from our clinic.  He has had previous PCI with coronary intervention 2019 to  for the circumflex where he got some improvement in his ejection fraction of 40 to 45% but is now declined again to 25% in June with now severe aortic valve stenosis and marked increase in his symptoms over the past month with fatigue and shortness of breath with minimal activity.  He has never had any chest pain.  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    Interestingly his son who was present works for the neuro division of [employer] however he is willing to have either valve placed.     He has had no syncope or near syncope.  He is active going to the gym daily on a recumbent bicycle.  He has other risk factors including obstructive sleep apnea diabetes.    I reviewed his echocardiogram with him showing clearly calcific's severe chronic leaflet aortic valve stenosis.  Decreased ejection fraction with a dimensionless index of 0.24.  Recommendation for aortic valve replacement preferably transcatheter valve replacement however he is not an open surgical prohibitive risk and would be willing to do so if necessary.  Risk and benefits of all of this have been explained to the patient.  He does not need a repeat angiogram but will need a CT scan and a repeat echocardiogram prior to valve replacement.    Today's clinic visit entailed:  Review of the  result(s) of each unique test - echocardiogram, angiogram, cardiac MRI  Ordering of each unique test  No LOS data to display   Time spent doing chart review, history and exam, documentation and further activities per the note  Provider  Link to MDM Help Grid     The level of medical decision making during this visit was of high complexity.      Orders Placed This Encounter   Procedures     EKG 12-lead complete w/read - Clinics (performed today)     No orders of the defined types were placed in this encounter.    There are no discontinued medications.      Encounter Diagnoses   Name Primary?     Aortic valve stenosis, etiology of cardiac valve disease unspecified Yes     Acute systolic congestive heart failure (H)        CURRENT MEDICATIONS:  Current Outpatient Medications   Medication Sig Dispense Refill     ASPIRIN 81 PO Take by mouth daily        atorvastatin (LIPITOR) 40 MG tablet Take 1 tablet (40 mg) by mouth daily 90 tablet 3     carvedilol (COREG) 3.125 MG tablet Take 2 tablets (6.25 mg) by mouth 2 times daily (with meals) 360 tablet 1     dulaglutide (TRULICITY) 0.75 MG/0.5ML pen Inject 0.75 mg Subcutaneous every 7 days       empagliflozin (JARDIANCE) 25 MG TABS tablet Take 25 mg by mouth daily       furosemide (LASIX) 20 MG tablet Take 40 mg (2 tabs) in the morning. You can take an extra tablet (20 mg) once daily in the afternoon as needed if your weight is over 215 pounds or you have worsening swelling or shortness of breath. 270 tablet 1     insulin glargine (LANTUS) 100 UNIT/ML injection Inject 13 Units Subcutaneous At Bedtime  15 mL 3     insulin lispro (HUMALOG KWIKPEN) 100 UNIT/ML injection 11 units with breakfast,15 units at noon, and 14 units before evening meal (Patient taking differently: Depending on glucose reading) 36 mL 3     metFORMIN (GLUCOPHAGE) 500 MG tablet TAKE 2 TABLETS (1,000 MG) BY MOUTH TWICE A DAY WITH MEALS 360 tablet 0     Multiple Vitamins-Minerals (MULTIVITAL PO) Take 1 tablet  by mouth daily       sacubitril-valsartan (ENTRESTO) 24-26 MG per tablet Take 1 tablet by mouth 2 times daily 30 tablet 3     tamsulosin (FLOMAX) 0.4 MG capsule Take 0.4 mg by mouth daily       vitamin C (ASCORBIC ACID) 1000 MG TABS Take 1,000 mg by mouth daily       Vitamin D, Cholecalciferol, 25 MCG (1000 UT) CAPS Take 4,000 Units by mouth daily       zinc gluconate 50 MG tablet Take 50 mg by mouth daily         ALLERGIES     Allergies   Allergen Reactions     Semaglutide Fatigue and GI Disturbance       PAST MEDICAL HISTORY:  Past Medical History:   Diagnosis Date     Aortic stenosis 9/23/2016    Echo 9/2016 Mild       Atrophic gastritis 9/8/2016     Benign non-nodular prostatic hyperplasia 09/08/2016     CAD (coronary artery disease)      Hyperlipidemia 09/08/2016     Ischemic cardiomyopathy 6/12/2019     Nonischemic cardiomyopathy (H) 10/30/2019     Obstructive sleep apnea syndrome 9/8/2016     Type 2 diabetes mellitus 09/06/2016     Vitamin B12 deficiency (non anemic) 9/8/2016       PAST SURGICAL HISTORY:  Past Surgical History:   Procedure Laterality Date     CV CORONARY ANGIOGRAM N/A 10/24/2019    Procedure: Coronary Angiogram;  Surgeon: Lupe Posada MD;  Location: [facility] CARDIAC CATH LAB     CV CORONARY ANGIOGRAM N/A 6/15/2022    Procedure: Coronary Angiogram;  Surgeon: Murali Devi MD;  Location: [facility] CARDIAC CATH LAB     CV HEART CATHETERIZATION WITH POSSIBLE INTERVENTION N/A 6/4/2019    Procedure: Heart Catheterization with Possible Intervention;  Surgeon: Alex Bazan MD;  Location:  HEART CARDIAC CATH LAB     CV INSTANTANEOUS WAVE-FREE RATIO N/A 6/15/2022    Procedure: Instantaneous Wave-Free Ratio;  Surgeon: Murali Devi MD;  Location:  HEART CARDIAC CATH LAB     CV LEFT VENTRICULOGRAM N/A 6/15/2022    Procedure: Left Ventriculogram;  Surgeon: Murali Devi MD;  Location: [facility] CARDIAC CATH LAB     CV PCI CHRONIC TOTAL OCCLUSION N/A 10/24/2019     Procedure: Coronary Total Occlusion;  Surgeon: Lupe Posada MD;  Location:  HEART CARDIAC CATH LAB     CV PCI STENT DRUG ELUTING N/A 10/24/2019    Procedure: PCI Stent Drug Eluting;  Surgeon: Lupe Posada MD;  Location:  HEART CARDIAC CATH LAB     CV RIGHT HEART CATH MEASUREMENTS RECORDED N/A 6/15/2022    Procedure: Right Heart Catheterization;  Surgeon: Murali Devi MD;  Location:  HEART CARDIAC CATH LAB     DAVINCI HERNIORRHAPHY INGUINAL Bilateral 2/2/2022    Procedure: ROBOT-ASSISTED BILATERAL INGUINAL HERNIA REPAIR WITH MESH;  Surgeon: Rene Murray MD;  Location:  OR     ESOPHAGOSCOPY, GASTROSCOPY, DUODENOSCOPY (EGD), COMBINED N/A 1/20/2015    Procedure: COMBINED ESOPHAGOSCOPY, GASTROSCOPY, DUODENOSCOPY (EGD), BIOPSY SINGLE OR MULTIPLE;  Surgeon: Allan Marroquin MD;  Location:  GI     EXCISE LESION TRUNK N/A 4/17/2019    Procedure: EXCISE CYST UPPER BACK;  Surgeon: Rene Murray MD;  Location:  OR     HERNIA REPAIR       HERNIORRHAPHY UMBILICAL N/A 2/2/2022    Procedure: OPEN UMBILICAL HERNIA REPAIR;  Surgeon: Rene Murray MD;  Location:  OR     HYDROCELECTOMY SCROTAL       JOINT REPLACEMENT (aka KNEE) NOS Bilateral        FAMILY HISTORY:  Family History   Problem Relation Age of Onset     Coronary Artery Disease Father 53     Cerebrovascular Disease Father      Diabetes Mother      Diabetes Brother        SOCIAL HISTORY:  Social History     Socioeconomic History     Marital status:      Spouse name: None     Number of children: None     Years of education: None     Highest education level: None   Tobacco Use     Smoking status: Never     Smokeless tobacco: Never   Vaping Use     Vaping Use: Never used   Substance and Sexual Activity     Alcohol use: Not Currently     Alcohol/week: 0.0 standard drinks     Drug use: No     Sexual activity: Never   Social History Narrative    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; walks dog for exercise         Review of Systems:  Skin:  not assessed     Eyes:  not assessed    ENT:  not assessed    Respiratory:  Positive for dyspnea on exertion  Cardiovascular:    Positive for;lightheadedness  Gastroenterology: not assessed    Genitourinary:  not assessed    Musculoskeletal:  not assessed    Neurologic:  not assessed    Psychiatric:  not assessed    Heme/Lymph/Imm:  Positive for allergies  Endocrine:  Positive for diabetes    Physical Exam:  Vitals: BP (!) 84/56 (BP Location: Left arm, Cuff Size: Adult Large)   Pulse 61   Ht 1.829 m (6')   Wt 97.1 kg (214 lb)   BMI 29.02 kg/m      Constitutional:           Skin:             Head:           Eyes:           Lymph:      ENT:           Neck:           Respiratory:            Cardiac:                                                           GI:           Extremities and Muscular Skeletal:                 Neurological:           Psych:         Recent Lab Results:  LIPID RESULTS:  Lab Results   Component Value Date    CHOL 123 12/05/2016    HDL 50 12/05/2016    LDL 51 12/05/2016    TRIG 108 12/05/2016       LIVER ENZYME RESULTS:  No results found for: AST, ALT    CBC RESULTS:  Lab Results   Component Value Date    WBC 5.6 06/15/2022    WBC 9.3 01/03/2020    RBC 4.78 06/15/2022    RBC 4.69 01/03/2020    HGB 13.2 (L) 06/15/2022    HGB 13.4 01/03/2020    HCT 42.3 06/15/2022    HCT 43.4 01/03/2020    MCV 89 06/15/2022    MCV 93 01/03/2020    MCH 27.6 06/15/2022    MCH 28.6 01/03/2020    MCHC 31.2 (L) 06/15/2022    MCHC 30.9 (L) 01/03/2020    RDW 16.9 (H) 06/15/2022    RDW 13.7 01/03/2020     06/15/2022     01/03/2020       BMP RESULTS:  Lab Results   Component Value Date     07/22/2022     06/04/2021    POTASSIUM 4.2 07/22/2022    POTASSIUM 4.2 06/04/2021    CHLORIDE 107 07/22/2022    CHLORIDE 108 06/04/2021    CO2 24 07/22/2022    CO2 25 06/04/2021    ANIONGAP 5 07/22/2022    ANIONGAP 6 06/04/2021     (H) 07/22/2022     (H)  06/04/2021    BUN 17 07/22/2022    BUN 16 06/04/2021    CR 0.87 07/22/2022    CR 0.97 06/04/2021    GFRESTIMATED 87 07/22/2022    GFRESTIMATED 74 06/04/2021    GFRESTBLACK 85 06/04/2021    FATMATA 8.7 07/22/2022    FATMATA 9.3 06/04/2021        A1C RESULTS:  Lab Results   Component Value Date    A1C 6.7 (H) 06/12/2017       INR RESULTS:  Lab Results   Component Value Date    INR 1.17 (H) 06/15/2022    INR 1.19 (H) 10/24/2019    INR 1.15 (H) 06/04/2019           CC  Winsome Wills PA-C  [address] [id number]                  TAVR Coordinator visit:  Provided additional education regarding TAVR procedure, after being present for discussion with physician. Explained the work-up process and next steps for patient. Patient provided our direct contact number and instructed to call with any questions.     Completed frailty testing and KCCQ.   5 meter walk: 4 seconds  Frailty score: 0/5    KCCQ Results:   1a. 5  1b. 4  1c. 2  2. 5  3. 3  4. 2  5. 1  6. 2  7. 1  8a. 2  8b. 2  8c. 3    Preliminary STS Risk Score: 1.63%  NYHA Class: II    Will plan to repeat echo (scheduled tomorrow). TAVR CT scheduled 10/25/22. Patient does not need to repeat angiogram per Dr. Coronel.     Caitlyn Tijerina RN  Structural Heart Coordinator  [facility]    Thank you for allowing me to participate in the care of your patient.      Sincerely,     DANIA CORONEL MD     [facility]  cc:   Winsome Wills PA-C  [id number] [id number]

## 2022-10-17 NOTE — TELEPHONE ENCOUNTER
Per patient's request spoke with his son Josh today to briefly review his echo results. We discussed upcoming work up and that patient should avoid strenuous activity until his procedure is done. He states understanding.

## 2022-10-17 NOTE — TELEPHONE ENCOUNTER
Received results of patient's echocardiogram 10/14/2022 which shows that his aortic stenosis has progressed.         Called patient and reviewed results of his echocardiogram. We discussed that his aortic stenosis has progressed and his EF is now at <20%. He has TAVR CT scheduled 10/25/22 and visit with CV surgery 11/1/22. Informed patient that Dr. Bazan's next TAVR day is 11/15/22. Patient reiterated that he wants Dr. Bazan to do his procedure.   Asked that patient call if his symptoms get worse. We also discussed refraining from strenuous activity until after his TAVR. Patient states understanding.  Will have Dr. Bazan review echo.

## 2022-10-19 NOTE — TELEPHONE ENCOUNTER
Mejia Phillips MD  You 18 minutes ago (1:06 PM)       Yes, have him increase Lasix to 40 twice daily.  Check BMP in a week.  If things do not improve have him come back and see me or an JACKIE.        Since he called Monday he has lost the 4 lbs again. Swelling in his legs  has gone down as well. Pt is feeling well. Pt stated that [facility] mentioned if he got down to 215 lb to go back to just 40 mg Lasix daily. Pt stated that tomorrow if he is 215 lb again (he was today) he will just take the 40 mg in the AM. Pt will continue to monitor his weight daily and call if he has any questions. Pt has BMP and OV with [facility] set up for 7/15/22  
Pt called stating that right after he increased his lasix he lost 4 lbs but since then the 4 lbs have come back. Leg swelling is slightly better but still there. Still feeling fatigue and slightly lightheaded.   Also wanted to update that he is taking tramadol for back pain and wondering if that could be affecting him.   Confirmed patient is currently taking Lasix 40 mg in the AM and 20 mg in the PM.     6/17/22 OV with [facility]  H/o mixed cardiomyopathy  Acute CHF, combined systolic and diastolic.    - Echo 12/2021: EF 40-45%  - Echo 6/2022: EF 25-30%, MYRANDA 1.1 cm2, MG 34.6 mmHg   - Cardiac cath done 6/15/22 to evaluate the drop in EF.  No severe CAD and prior stent patent.  Aortic valve is moderate/severe MYRANDA 1.1 cm2, MG 32 mmHg, LVEDP 21 mmHg  - Still volume up with LVEDP 21 mmHg and LE edema. Increase Lasix to 40 mg in AM and 20 mg in PM. Continue daily weights. If weight going up despite this, he will call. We may need to increase to 40 mg BID.   - Coreg 9.375 mg BID. Will lower Coreg to 6.25 mg BID due to lower BP today and increasing Lasix  - Entresto 24-26 mg BID  - Edinson Schumacher is off this week so will route to Dr. Phillips to review and advise.   
82

## 2022-11-01 NOTE — PROGRESS NOTES
Cardiothoracic Surgery Consult    Date of Service: 11/1/2022      REASON FOR CONSULTATION: severe symptomatic aortic valve stenosis       HISTORY OF PRESENT ILLNESS: Mr. Bryant Mckenzie is a 82 year old who presents with progression of shortness of breath with daily activities like climbing stairs. He presented to clinic with his son and wife. Both state he is progressively more tired. His aortic valve stenosis progressed from moderate to severe.     He also has mild coronary disease with open stent in the obtuse marginal    LVEF 25-30%  MYRANDA 1.1cm2  LVOT 2.5 cm       IMPRESSION AND PLAN: Mr. Bryant Mckenzie is a 82 year old with severe symptomatic aortic stenosis.     Echocardiography demonstrates LVEF of 25-30%, and coronary angiography demonstrates mid lad 40%, mid circ 35%, mid rca 60%.      I discussed the technical details of the open surgical AVR with the patient, as well as the expected postoperative course and recovery. The risks include but are not limited to bleeding, infection, stroke, heart or graft failure, dysrhythmia, respiratory failure, kidney or liver injury, bowel or limb ischemia, and death.     Calculated STS risk for mortality with isolated aortic valve replacement is 1.63%.     The patient is a reasonable candidate for either TAVR or SAVR. I think his recovery will be easier with TAVR. He prefers TAVR from recovery standpoint. I discussed both procedures and the technical details. Patient understands that there is a risk of bleeding, infection, stroke, heart block requiring permanent pacemaker, cardiac perforation, aortic root rupture and aortic dissection, in addition to risk of death.     We also discussed the rare but life threatening complications that can occur during TAVR such as aortic rupture or dissection. These require emergency conversion to surgery, which carried a high risk of mortality (up to 50%) and morbidity, prolonged hospital stay, as well as potential loss of  independence.     1) Recommend TAVR, to be scheduled by the valve clinic  2) CPB standby: full carmelo Rodgers MD  Cardiothoracic Surgery  P: [phone]      PAST MEDICAL HISTORY:   Past Medical History:   Diagnosis Date     Aortic stenosis 9/23/2016    Echo 9/2016 Mild       Atrophic gastritis 9/8/2016     Benign non-nodular prostatic hyperplasia 09/08/2016     CAD (coronary artery disease)      Hyperlipidemia 09/08/2016     Ischemic cardiomyopathy 6/12/2019     Nonischemic cardiomyopathy (H) 10/30/2019     Obstructive sleep apnea syndrome 9/8/2016     Type 2 diabetes mellitus 09/06/2016     Vitamin B12 deficiency (non anemic) 9/8/2016         PAST SURGICAL HISTORY:   Past Surgical History:   Procedure Laterality Date     CV CORONARY ANGIOGRAM N/A 10/24/2019    Procedure: Coronary Angiogram;  Surgeon: Lupe Posada MD;  Location: [facility] CARDIAC CATH LAB     CV CORONARY ANGIOGRAM N/A 6/15/2022    Procedure: Coronary Angiogram;  Surgeon: Murali Devi MD;  Location: [facility] CARDIAC CATH LAB     CV HEART CATHETERIZATION WITH POSSIBLE INTERVENTION N/A 6/4/2019    Procedure: Heart Catheterization with Possible Intervention;  Surgeon: Alex Bazan MD;  Location: [facility] CARDIAC CATH LAB     CV INSTANTANEOUS WAVE-FREE RATIO N/A 6/15/2022    Procedure: Instantaneous Wave-Free Ratio;  Surgeon: Murali Devi MD;  Location: [facility] CARDIAC CATH LAB     CV LEFT VENTRICULOGRAM N/A 6/15/2022    Procedure: Left Ventriculogram;  Surgeon: Murali Devi MD;  Location: [facility] CARDIAC CATH LAB     CV PCI CHRONIC TOTAL OCCLUSION N/A 10/24/2019    Procedure: Coronary Total Occlusion;  Surgeon: Lupe Posada MD;  Location: [facility] CARDIAC CATH LAB     CV PCI STENT DRUG ELUTING N/A 10/24/2019    Procedure: PCI Stent Drug Eluting;  Surgeon: Lupe Posada MD;  Location: [facility] CARDIAC CATH LAB     CV RIGHT HEART CATH MEASUREMENTS RECORDED N/A 6/15/2022     Procedure: Right Heart Catheterization;  Surgeon: Murali Devi MD;  Location:  HEART CARDIAC CATH LAB     DAVINCI HERNIORRHAPHY INGUINAL Bilateral 2/2/2022    Procedure: ROBOT-ASSISTED BILATERAL INGUINAL HERNIA REPAIR WITH MESH;  Surgeon: Rene Murray MD;  Location:  OR     ESOPHAGOSCOPY, GASTROSCOPY, DUODENOSCOPY (EGD), COMBINED N/A 1/20/2015    Procedure: COMBINED ESOPHAGOSCOPY, GASTROSCOPY, DUODENOSCOPY (EGD), BIOPSY SINGLE OR MULTIPLE;  Surgeon: Allan Marroquin MD;  Location:  GI     EXCISE LESION TRUNK N/A 4/17/2019    Procedure: EXCISE CYST UPPER BACK;  Surgeon: Rene Murray MD;  Location:  OR     HERNIA REPAIR       HERNIORRHAPHY UMBILICAL N/A 2/2/2022    Procedure: OPEN UMBILICAL HERNIA REPAIR;  Surgeon: Rene Murray MD;  Location:  OR     HYDROCELECTOMY SCROTAL       JOINT REPLACEMENT (aka KNEE) NOS Bilateral          ALLERGIES:   Allergies   Allergen Reactions     Semaglutide Fatigue and GI Disturbance         CURRENT MEDICATIONS:  Current Outpatient Medications   Medication     ASPIRIN 81 PO     atorvastatin (LIPITOR) 40 MG tablet     carvedilol (COREG) 3.125 MG tablet     dulaglutide (TRULICITY) 0.75 MG/0.5ML pen     empagliflozin (JARDIANCE) 25 MG TABS tablet     furosemide (LASIX) 20 MG tablet     insulin glargine (LANTUS) 100 UNIT/ML injection     insulin lispro (HUMALOG KWIKPEN) 100 UNIT/ML injection     metFORMIN (GLUCOPHAGE) 500 MG tablet     Multiple Vitamins-Minerals (MULTIVITAL PO)     sacubitril-valsartan (ENTRESTO) 24-26 MG per tablet     tamsulosin (FLOMAX) 0.4 MG capsule     vitamin C (ASCORBIC ACID) 1000 MG TABS     Vitamin D, Cholecalciferol, 25 MCG (1000 UT) CAPS     zinc gluconate 50 MG tablet     No current facility-administered medications for this visit.         FAMILY HISTORY:   Family History   Problem Relation Age of Onset     Coronary Artery Disease Father 53     Cerebrovascular Disease Father      Diabetes Mother      Diabetes Brother       The family history was reviewed and is not pertinent to the patient's disease/illness.      SOCIAL HISTORY:   Social History     Socioeconomic History     Marital status:      Spouse name: Not on file     Number of children: Not on file     Years of education: Not on file     Highest education level: Not on file   Occupational History     Not on file   Tobacco Use     Smoking status: Never     Smokeless tobacco: Never   Vaping Use     Vaping Use: Never used   Substance and Sexual Activity     Alcohol use: Not Currently     Alcohol/week: 0.0 standard drinks     Drug use: No     Sexual activity: Never   Other Topics Concern     Parent/sibling w/ CABG, MI or angioplasty before 65F 55M? 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; walks dog for exercise      Social Determinants of Health     Financial Resource Strain: Not on file   Food Insecurity: Not on file   Transportation Needs: Not on file   Physical Activity: Not on file   Stress: Not on file   Social Connections: Not on file   Intimate Partner Violence: Not on file   Housing Stability: Not on file         REVIEW OF SYSTEMS:  Review of Systems - Negative except shortness of breath  A complete 10 point review of systems was obtained and is negative other than the above stated complaints      PHYSICAL EXAMINATION:   Vitals: BP 92/58   Pulse 67   Ht 1.829 m (6')   Wt 100.1 kg (220 lb 9.6 oz)   SpO2 97%   BMI 29.92 kg/m    GENERAL: well developed and well nourished   HEENT: Conjunctiva anicteric and sclera clear   RESPIRATIONS: breathing comfortably, no audible wheezing  ABDOMEN: non distended  EXTREMITIES: no deformity or swelling   NEUROLOGIC: intact and symmetric with no focal deficits.   PSYCHIATRIC: alert and oriented, pleasant       LABORATORY STUDIES:   Lab Results   Component Value Date    WBC 5.6 06/15/2022    HGB 13.2 (L) 06/15/2022    HCT 42.3 06/15/2022    MCV 89 06/15/2022     06/15/2022      Lab  Results   Component Value Date    BUN 17 07/22/2022     07/22/2022    CO2 24 07/22/2022       CARDIAC CATHETERIZATION: This study was personally reviewed by me.    TRANSTHORACIC ECHOCARDIOGRAM: This study was personally reviewed by me.

## 2022-11-04 PROBLEM — I95.9 HYPOTENSION, UNSPECIFIED HYPOTENSION TYPE: Status: ACTIVE | Noted: 2022-01-01

## 2022-11-04 PROBLEM — I35.0 SYMPTOMATIC SEVERE AORTIC STENOSIS WITH LOW EJECTION FRACTION: Status: ACTIVE | Noted: 2022-01-01

## 2022-11-04 PROBLEM — I35.0 SEVERE AORTIC STENOSIS: Status: ACTIVE | Noted: 2022-01-01

## 2022-11-04 NOTE — CONSULTS
[facility]    Cardiology Consultation     Date of Admission:  11/4/2022    Assessment & Plan   Bryant Mckenzie is a 82 year old male who was admitted on 11/4/2022.  Severe LV dysfunction  Hypotension  Severe aortic stenosis    Observe 2-3 days  Discontinue coreg  Keep patient scheduled for TAVR 11/15 at this point.  Lasix X 1       Moderate complexity     DANIA CORONEL MD, MD    Primary Care Physician   Merlin Emery Brown    Reason for Consult   Reason for consult: I was asked by Dr. Burk  to evaluate this patient for hypotension.    History of Present Illness   Bryant Mckenzie is a 82 year old male who presents with episodes of lightheadedness dizziness low blood pressure systolic blood pressure in the 70s and some shortness of breath.  He denies any chest pain chest pressure or syncope.  He has been compliant with his medications.  Denies any history of fever chills cough melena.  He comes in with his wife through the emergency room visit.    Upon my seeing the patient he is resting comfortably with systolic blood pressure of 104 and in normal sinus rhythm.  EKG showed nonspecific ST abnormalities and occasional PVC but no acute changes.    Chest x-ray shows mild congestion otherwise unchanged.  Laboratory testing is abnormal for BNP at 2400.  Electrolytes CBC are within normal limits.  Patient with severe arctic valve stenosis plan for transcatheter aortic valve replacement on November 15.  This patient is well-known to me.    Past Medical History   Past Medical History:   Diagnosis Date     Aortic stenosis 9/23/2016    Echo 9/2016 Mild       Atrophic gastritis 9/8/2016     Benign non-nodular prostatic hyperplasia 09/08/2016     CAD (coronary artery disease)      Hyperlipidemia 09/08/2016     Ischemic cardiomyopathy 6/12/2019     Nonischemic cardiomyopathy (H) 10/30/2019     Obstructive sleep apnea syndrome 9/8/2016     Type 2 diabetes mellitus 09/06/2016     Vitamin B12  deficiency (non anemic) 9/8/2016     No significant medical history, including no history of: Previously noted.      Past Surgical History   Past Surgical History:   Procedure Laterality Date     CV CORONARY ANGIOGRAM N/A 10/24/2019    Procedure: Coronary Angiogram;  Surgeon: Lupe Posada MD;  Location:  HEART CARDIAC CATH LAB     CV CORONARY ANGIOGRAM N/A 6/15/2022    Procedure: Coronary Angiogram;  Surgeon: Murali Devi MD;  Location:  HEART CARDIAC CATH LAB     CV HEART CATHETERIZATION WITH POSSIBLE INTERVENTION N/A 6/4/2019    Procedure: Heart Catheterization with Possible Intervention;  Surgeon: Alex Bazan MD;  Location:  HEART CARDIAC CATH LAB     CV INSTANTANEOUS WAVE-FREE RATIO N/A 6/15/2022    Procedure: Instantaneous Wave-Free Ratio;  Surgeon: Murali Devi MD;  Location:  HEART CARDIAC CATH LAB     CV LEFT VENTRICULOGRAM N/A 6/15/2022    Procedure: Left Ventriculogram;  Surgeon: Murali Devi MD;  Location:  HEART CARDIAC CATH LAB     CV PCI CHRONIC TOTAL OCCLUSION N/A 10/24/2019    Procedure: Coronary Total Occlusion;  Surgeon: Lupe Posada MD;  Location:  HEART CARDIAC CATH LAB     CV PCI STENT DRUG ELUTING N/A 10/24/2019    Procedure: PCI Stent Drug Eluting;  Surgeon: Lupe Posada MD;  Location:  HEART CARDIAC CATH LAB     CV RIGHT HEART CATH MEASUREMENTS RECORDED N/A 6/15/2022    Procedure: Right Heart Catheterization;  Surgeon: Murali Devi MD;  Location:  HEART CARDIAC CATH LAB     DAVINCI HERNIORRHAPHY INGUINAL Bilateral 2/2/2022    Procedure: ROBOT-ASSISTED BILATERAL INGUINAL HERNIA REPAIR WITH MESH;  Surgeon: Rene Murray MD;  Location:  OR     ESOPHAGOSCOPY, GASTROSCOPY, DUODENOSCOPY (EGD), COMBINED N/A 1/20/2015    Procedure: COMBINED ESOPHAGOSCOPY, GASTROSCOPY, DUODENOSCOPY (EGD), BIOPSY SINGLE OR MULTIPLE;  Surgeon: Allan Marroquin MD;  Location:  GI     EXCISE LESION TRUNK N/A 4/17/2019     Procedure: EXCISE CYST UPPER BACK;  Surgeon: Rene Murray MD;  Location: [facility] OR     HERNIA REPAIR       HERNIORRHAPHY UMBILICAL N/A 2/2/2022    Procedure: OPEN UMBILICAL HERNIA REPAIR;  Surgeon: Rene Murray MD;  Location: [facility] OR     HYDROCELECTOMY SCROTAL       JOINT REPLACEMENT (aka KNEE) NOS Bilateral      No significant surgical history, including no history of: CABG    Prior to Admission Medications   Prior to Admission Medications   Prescriptions Last Dose Informant Patient Reported? Taking?   HYDROcodone-acetaminophen (NORCO) 5-325 MG tablet 11/4/2022 at AM Self Yes Yes   Sig: Take 1 tablet by mouth every 8 hours as needed for severe pain   Multiple Vitamins-Minerals (MULTIVITAL PO) 11/4/2022 at AM Self Yes Yes   Sig: Take 1 tablet by mouth daily   Vitamin D, Cholecalciferol, 25 MCG (1000 UT) CAPS 11/3/2022 at PM Self Yes Yes   Sig: Take 4,000 Units by mouth daily   aspirin (ASA) 81 MG EC tablet 11/3/2022 at PM Self Yes Yes   Sig: Take 81 mg by mouth daily   atorvastatin (LIPITOR) 40 MG tablet 11/3/2022 at PM Self No Yes   Sig: Take 1 tablet (40 mg) by mouth daily   carvedilol (COREG) 3.125 MG tablet 11/4/2022 at AM Self No Yes   Sig: Take 2 tablets (6.25 mg) by mouth 2 times daily (with meals)   celecoxib (CELEBREX) 100 MG capsule 11/4/2022 at AM Self Yes Yes   Sig: Take 100 mg by mouth daily   dulaglutide (TRULICITY) 0.75 MG/0.5ML pen 11/2/2022 Self Yes Yes   Sig: Inject 0.75 mg Subcutaneous every 7 days   empagliflozin (JARDIANCE) 25 MG TABS tablet 11/4/2022 at AM Self Yes Yes   Sig: Take 25 mg by mouth daily   furosemide (LASIX) 20 MG tablet 11/4/2022 at AM Self No Yes   Sig: Take 40 mg (2 tabs) in the morning. You can take an extra tablet (20 mg) once daily in the afternoon as needed if your weight is over 215 pounds or you have worsening swelling or shortness of breath.   insulin glargine (LANTUS) 100 UNIT/ML injection 11/4/2022 at AM Self Yes Yes   Sig: Inject 11 Units Subcutaneous  every morning   insulin lispro (HUMALOG KWIKPEN) 100 UNIT/ML injection 2022 at AM Self No Yes   Si units with breakfast,15 units at noon, and 14 units before evening meal   Patient taking differently: Depending on glucose reading, ranges from 5-7 units with each meal   metFORMIN (GLUCOPHAGE) 500 MG tablet 2022 at AM Self No Yes   Sig: TAKE 2 TABLETS (1,000 MG) BY MOUTH TWICE A DAY WITH MEALS   sacubitril-valsartan (ENTRESTO) 24-26 MG per tablet 2022 at AM Self No Yes   Sig: Take 1 tablet by mouth 2 times daily   tamsulosin (FLOMAX) 0.4 MG capsule 2022 at AM Self Yes Yes   Sig: Take 0.4 mg by mouth daily   tiZANidine (ZANAFLEX) 4 MG tablet 2022 at AM Self Yes Yes   Sig: Take 4 mg by mouth 3 times daily as needed for muscle spasms   traMADol (ULTRAM) 50 MG tablet 11/3/2022 at PRN Self Yes Yes   Sig: Take 50 mg by mouth every 6 hours as needed for moderate pain   vitamin C (ASCORBIC ACID) 1000 MG TABS 11/3/2022 Self Yes Yes   Sig: Take 1,000 mg by mouth daily   zinc gluconate 50 MG tablet 11/3/2022 Self Yes Yes   Sig: Take 50 mg by mouth daily      Facility-Administered Medications: None         Allergies   Allergies   Allergen Reactions     Semaglutide Fatigue and GI Disturbance       Social History    reports that he has never smoked. He has never used smokeless tobacco. He reports that he does not currently use alcohol. He reports that he does not use drugs.  No significant social history, including no history of: Tabacco abuse or alcohol    Family History   I have reviewed this patient's family history and updated it with pertinent information if needed.  Family History   Problem Relation Age of Onset     Coronary Artery Disease Father 53     Cerebrovascular Disease Father      Diabetes Mother      Diabetes Brother           Review of Systems   A comprehensive review of system was performed and is negative other than that noted in the HPI or here.     Physical Exam   Vital Signs with  Ranges  Temp:  [97  F (36.1  C)] 97  F (36.1  C)  Pulse:  [63-69] 63  Resp:  [16] 16  BP: (104-105)/(68) 104/68  SpO2:  [99 %-100 %] 100 %  Wt Readings from Last 4 Encounters:   11/04/22 95.3 kg (210 lb)   11/01/22 100.1 kg (220 lb 9.6 oz)   10/13/22 97.1 kg (214 lb)   07/22/22 99.1 kg (218 lb 6.4 oz)     No intake/output data recorded.      Vitals: /68   Pulse 63   Temp 97  F (36.1  C) (Temporal)   Resp 16   Ht 1.829 m (6')   Wt 95.3 kg (210 lb)   SpO2 100%   BMI 28.48 kg/m      Physical Exam:   General - Alert and oriented to time place and person in no acute distress  Eyes - No scleral icterus  HEENT - Neck supple, moist mucous membranes  Cardiovascular - 3/6 systolic murmur radiation to carotids  Extremities - There is no edema  Respiratory - minimal crackles at bases  Skin - No pallor or cyanosis  Gastrointestinal - Non tender and non distended without rebound or guarding  Psych - Appropriate affect   Neurological - No gross motor neurological focal deficits    No lab results found in last 7 days.    Invalid input(s): TROPONINIES    Recent Labs   Lab 11/04/22  1051   WBC 5.2   HGB 12.8*   MCV 86         POTASSIUM 4.7   CHLORIDE 103   CO2 26   BUN 23   CR 1.08   GFRESTIMATED 69   ANIONGAP 5   FATMATA 9.0   GLC 77     Recent Labs   Lab Test 12/05/16  1200 11/21/14  0000   CHOL 123 137   HDL 50 48   LDL 51 77   TRIG 108 58     Recent Labs   Lab 11/04/22  1051   WBC 5.2   HGB 12.8*   HCT 41.1   MCV 86        No results for input(s): PH, PHV, PO2, PO2V, SAT, PCO2, PCO2V, HCO3, HCO3V in the last 168 hours.  Recent Labs   Lab 11/04/22  1051   NTBNPI 2,445*     No results for input(s): DD in the last 168 hours.  No results for input(s): SED, CRP in the last 168 hours.  Recent Labs   Lab 11/04/22  1051        No results for input(s): TSH in the last 168 hours.  No results for input(s): COLOR, APPEARANCE, URINEGLC, URINEBILI, URINEKETONE, SG, UBLD, URINEPH, PROTEIN, UROBILINOGEN,  NITRITE, LEUKEST, RBCU, WBCU in the last 168 hours.    Imaging:  Recent Results (from the past 48 hour(s))   XR Chest 2 Views    Narrative    CHEST TWO VIEWS November 4, 2022 11:46 AM     HISTORY: Shortness of breath.    COMPARISON: None.      Impression    IMPRESSION: Mild cardiac enlargement and pulmonary venous congestion.  Mild interstitial prominence in both lower lungs suggest pulmonary  edema, although an infectious process could also have this appearance.  No pleural effusions. No pneumothorax. Aortic calcification.       Echo:  No results found for this or any previous visit (from the past 4320 hour(s)).    Clinically Significant Risk Factors Present on Admission        # Hyponatremia: Lowest Na = 134 mmol/L (Ref range: 136-145) in last 2 days, will monitor as appropriate           # Overweight: Estimated body mass index is 28.48 kg/m  as calculated from the following:    Height as of this encounter: 1.829 m (6').    Weight as of this encounter: 95.3 kg (210 lb).    Cardiovascular: Shock: Cardiogenic shock    Fluid & Electrolyte Disorders: Fluid overload, unspecified

## 2022-11-04 NOTE — PLAN OF CARE
VSS, pain and nausea treated with PRNs. Diabetic, checks own sugars with continuous glucose monitor. Cards following. CV surgery consult for pre TAVR work up. Cpap with sleep. Wife involved and suportive.

## 2022-11-04 NOTE — PHARMACY-ADMISSION MEDICATION HISTORY
Pharmacy Medication History  Admission medication history interview status for the 11/4/2022  admission is complete. See EPIC admission navigator for prior to admission medications     Location of Interview: Patient room  Medication history sources: Patient, Patient's family/friend (Spouse) and Surescripts    Significant changes made to the medication list:  Added: Celecoxib, Norco, Tizanidine, Tramadol    In the past week, patient estimated taking medication this percent of the time: greater than 90%      Medication reconciliation completed by provider prior to medication history? No    Time spent in this activity: 20 minutes    Prior to Admission medications    Medication Sig Last Dose Taking? Auth Provider Long Term End Date   aspirin (ASA) 81 MG EC tablet Take 81 mg by mouth daily 11/3/2022 at PM Yes Reported, Patient     atorvastatin (LIPITOR) 40 MG tablet Take 1 tablet (40 mg) by mouth daily 11/3/2022 at PM Yes Mejia Phillips MD Yes    carvedilol (COREG) 3.125 MG tablet Take 2 tablets (6.25 mg) by mouth 2 times daily (with meals) 11/4/2022 at AM Yes Winsome Wills PA-C Yes    celecoxib (CELEBREX) 100 MG capsule Take 100 mg by mouth daily 11/4/2022 at AM Yes Unknown, Entered By History Yes    dulaglutide (TRULICITY) 0.75 MG/0.5ML pen Inject 0.75 mg Subcutaneous every 7 days 11/2/2022 Yes Reported, Patient     empagliflozin (JARDIANCE) 25 MG TABS tablet Take 25 mg by mouth daily 11/4/2022 at AM Yes Reported, Patient     furosemide (LASIX) 20 MG tablet Take 40 mg (2 tabs) in the morning. You can take an extra tablet (20 mg) once daily in the afternoon as needed if your weight is over 215 pounds or you have worsening swelling or shortness of breath. 11/4/2022 at AM Yes Castillo Mcgee NP Yes    HYDROcodone-acetaminophen (NORCO) 5-325 MG tablet Take 1 tablet by mouth every 8 hours as needed for severe pain 11/4/2022 at AM Yes Unknown, Entered By History     insulin glargine (LANTUS) 100 UNIT/ML injection  Inject 11 Units Subcutaneous every morning 11/4/2022 at AM Yes Marquis Caldwell MD Yes    insulin lispro (HUMALOG KWIKPEN) 100 UNIT/ML injection 11 units with breakfast,15 units at noon, and 14 units before evening meal  Patient taking differently: Depending on glucose reading, ranges from 5-7 units with each meal 11/4/2022 at AM Yes Marquis Caldwell MD Yes    metFORMIN (GLUCOPHAGE) 500 MG tablet TAKE 2 TABLETS (1,000 MG) BY MOUTH TWICE A DAY WITH MEALS 11/4/2022 at AM Yes Marquis Caldwell MD Yes    Multiple Vitamins-Minerals (MULTIVITAL PO) Take 1 tablet by mouth daily 11/4/2022 at AM Yes Reported, Patient     sacubitril-valsartan (ENTRESTO) 24-26 MG per tablet Take 1 tablet by mouth 2 times daily 11/4/2022 at AM Yes Stella Elliott PA-C Yes    tamsulosin (FLOMAX) 0.4 MG capsule Take 0.4 mg by mouth daily 11/4/2022 at AM Yes Reported, Patient     tiZANidine (ZANAFLEX) 4 MG tablet Take 4 mg by mouth 3 times daily as needed for muscle spasms 11/4/2022 at AM Yes Unknown, Entered By History     traMADol (ULTRAM) 50 MG tablet Take 50 mg by mouth every 6 hours as needed for moderate pain 11/3/2022 at PRN Yes Unknown, Entered By History     vitamin C (ASCORBIC ACID) 1000 MG TABS Take 1,000 mg by mouth daily 11/3/2022 Yes Reported, Patient     Vitamin D, Cholecalciferol, 25 MCG (1000 UT) CAPS Take 4,000 Units by mouth daily 11/3/2022 at PM Yes Reported, Patient     zinc gluconate 50 MG tablet Take 50 mg by mouth daily 11/3/2022 Yes Reported, Patient         The information provided in this note is only as accurate as the sources available at the time of update(s)

## 2022-11-04 NOTE — H&P
[facility]    History and Physical - Hospitalist Service       Date of Admission:  11/4/2022    Assessment & Plan      Bryant Mckenzie is a 82 year old male with a history of severe aortic stenosis, coronary artery disease, cardiomyopathy, hyperlipidemia, diabetes mellitus type 2, sleep apnea, and BPH who presented to the ER due to shortness of breath and low blood pressure.  After evaluation in the ER the hospitalist service was contacted to admit him for further evaluation management.    Shortness of breath  Hypotension  Severe aortic stenosis  Significant mixed cardiomyopathy (LVEF less than 20% on 10/14/2022)  Followed by TAVR team with plan for TAVR on 11/15/2022.  Woke up with shortness of breath and subsequently developed lightheadedness.  Had blood pressures in the 70s to 80s systolic at home.  Chest x-ray shows mild pulmonary vascular congestion.  BNP elevated.    Admit to inpatient.    Consult cardiology, appreciate their assistance.    Symptoms and vitals improved currently.  Consider diuresis, however will defer to cardiology.    Continue PTA Entresto.    Monitor intake and output and daily weights.    Recheck labs in AM.    Coronary artery disease  Drug Induced Platelet Defect due to Aspirin  Currently asymptomatic.    Continue PTA aspirin and atorvastatin.    Carvedilol discontinued by cardiology.    Cardiology consulted as noted above.    Hyperlipidemia    Continue PTA atorvastatin.    Diabetes mellitus type 2  Hemoglobin A1c 6.8 on 11/4/2022.    Continue PTA Lantus 11 units every morning.    Hold PTA lispro, metformin, Jardiance, and Trulicity.    Monitor blood sugars before meals and at bedtime and use medium dose sliding scale NovoLog as indicated.    Diabetic diet.    Monitor for hypoglycemia.    BPH    Continue PTA tamsulosin.    Coagulopathy, unspecified    INR 1.21 in the ER.    COVID-19 PCR negative    Routine admission COVID-19 PCR testing was negative on  11/4/2022.       Diet: Regular Diet Adult    DVT Prophylaxis: Pneumatic Compression Devices  Landeros Catheter: Not present  Central Lines: None  Cardiac Monitoring: None  Code Status:   FULL CODE    Clinically Significant Risk Factors Present on Admission         # Hyponatremia: Lowest Na = 134 mmol/L (Ref range: 136-145) in last 2 days, will monitor as appropriate              # Overweight: Estimated body mass index is 28.48 kg/m  as calculated from the following:    Height as of this encounter: 1.829 m (6').    Weight as of this encounter: 95.3 kg (210 lb).           Disposition Plan      Expected Discharge Date: 11/06/2022                The patient's care was discussed with the Bedside Nurse, Patient, Patient's Family and ER Provider.    Timmy Nash MD  Hospitalist Service  [facility]  Securely message with the Vocera Web Console (learn more here)  Text page via [facility] Paging/Directory         ______________________________________________________________________    Chief Complaint   Shortness of breath, low blood pressure, lightheadedness    History is obtained from the patient    History of Present Illness   Bryant Mckenzie is a 82 year old male with a history of severe aortic stenosis, coronary artery disease, cardiomyopathy, hyperlipidemia, diabetes mellitus type 2, sleep apnea, and BPH who presented to the ER due to shortness of breath and low blood pressure.  He has been following with cardiology and the TAVR team regarding aortic stenosis.  He is on the schedule for a TAVR on November 15, 2022.  This morning he woke up more short of breath than usual.  He normally has some dyspnea on exertion but today had it at rest when he woke up.  He thought he got up and started moving it might help, although he continued to have some shortness of breath.  He also developed some lightheadedness as the day went on.  He checked his blood pressure and it was in the 70s systolic initially  and then up to about 80 upon recheck a half an hour later.  He called his TAVR team and it was recommended that he come into the ER for evaluation and admission.  He denies any fevers, chest pain, palpitations, nausea, abdominal pain, change in bowel habits, urinary symptoms.  No recent changes to his medications.  Denies any recent sick contacts.  No change in weight.  Wears compression stockings chronically, and no recent change in lower extremity edema.    In the ER he was evaluated by Dr. Burk.  Blood pressure had improved.  Shortness of breath also was better.  Labs showed an elevated BNP.  Chest x-ray showed mild pulmonary vascular congestion.  EKG showed no acute ischemic changes.  The hospitalist service was contacted to admit him for further evaluation management.    Review of Systems    The 10 point Review of Systems is negative other than noted in the HPI or here.    Past Medical History    I have reviewed this patient's medical history and updated it with pertinent information if needed.   Past Medical History:   Diagnosis Date     Aortic stenosis 9/23/2016    Echo 9/2016 Mild       Atrophic gastritis 9/8/2016     Benign non-nodular prostatic hyperplasia 09/08/2016     CAD (coronary artery disease)      Hyperlipidemia 09/08/2016     Ischemic cardiomyopathy 6/12/2019     Nonischemic cardiomyopathy (H) 10/30/2019     Obstructive sleep apnea syndrome 9/8/2016     Type 2 diabetes mellitus 09/06/2016     Vitamin B12 deficiency (non anemic) 9/8/2016     Past Surgical History   I have reviewed this patient's surgical history and updated it with pertinent information if needed.  Past Surgical History:   Procedure Laterality Date     CV CORONARY ANGIOGRAM N/A 10/24/2019    Procedure: Coronary Angiogram;  Surgeon: Lupe Posada MD;  Location: [facility] CARDIAC CATH LAB     CV CORONARY ANGIOGRAM N/A 6/15/2022    Procedure: Coronary Angiogram;  Surgeon: Murali Devi MD;  Location: [facility] CARDIAC  CATH LAB     CV HEART CATHETERIZATION WITH POSSIBLE INTERVENTION N/A 6/4/2019    Procedure: Heart Catheterization with Possible Intervention;  Surgeon: Alex Bazan MD;  Location:  HEART CARDIAC CATH LAB     CV INSTANTANEOUS WAVE-FREE RATIO N/A 6/15/2022    Procedure: Instantaneous Wave-Free Ratio;  Surgeon: Murali Devi MD;  Location:  HEART CARDIAC CATH LAB     CV LEFT VENTRICULOGRAM N/A 6/15/2022    Procedure: Left Ventriculogram;  Surgeon: Murali Devi MD;  Location:  HEART CARDIAC CATH LAB     CV PCI CHRONIC TOTAL OCCLUSION N/A 10/24/2019    Procedure: Coronary Total Occlusion;  Surgeon: Lupe Posada MD;  Location:  HEART CARDIAC CATH LAB     CV PCI STENT DRUG ELUTING N/A 10/24/2019    Procedure: PCI Stent Drug Eluting;  Surgeon: Lupe Posada MD;  Location:  HEART CARDIAC CATH LAB     CV RIGHT HEART CATH MEASUREMENTS RECORDED N/A 6/15/2022    Procedure: Right Heart Catheterization;  Surgeon: Murali Devi MD;  Location:  HEART CARDIAC CATH LAB     DAVINCI HERNIORRHAPHY INGUINAL Bilateral 2/2/2022    Procedure: ROBOT-ASSISTED BILATERAL INGUINAL HERNIA REPAIR WITH MESH;  Surgeon: Rene Murray MD;  Location:  OR     ESOPHAGOSCOPY, GASTROSCOPY, DUODENOSCOPY (EGD), COMBINED N/A 1/20/2015    Procedure: COMBINED ESOPHAGOSCOPY, GASTROSCOPY, DUODENOSCOPY (EGD), BIOPSY SINGLE OR MULTIPLE;  Surgeon: Allan Marroquin MD;  Location:  GI     EXCISE LESION TRUNK N/A 4/17/2019    Procedure: EXCISE CYST UPPER BACK;  Surgeon: Rene Murray MD;  Location:  OR     HERNIA REPAIR       HERNIORRHAPHY UMBILICAL N/A 2/2/2022    Procedure: OPEN UMBILICAL HERNIA REPAIR;  Surgeon: Rene Murray MD;  Location:  OR     HYDROCELECTOMY SCROTAL       JOINT REPLACEMENT (aka KNEE) NOS Bilateral      Social History   I have reviewed this patient's social history and updated it with pertinent information if needed.  Social History     Tobacco Use      Smoking status: Never     Smokeless tobacco: Never   Vaping Use     Vaping Use: Never used   Substance Use Topics     Alcohol use: Not Currently     Alcohol/week: 0.0 standard drinks     Drug use: No     Family History   I have reviewed this patient's family history and updated it with pertinent information if needed.  Family History   Problem Relation Age of Onset     Coronary Artery Disease Father 53     Cerebrovascular Disease Father      Diabetes Mother      Diabetes Brother      Prior to Admission Medications   Prior to Admission Medications   Prescriptions Last Dose Informant Patient Reported? Taking?   HYDROcodone-acetaminophen (NORCO) 5-325 MG tablet 11/4/2022 at AM Self Yes Yes   Sig: Take 1 tablet by mouth every 8 hours as needed for severe pain   Multiple Vitamins-Minerals (MULTIVITAL PO) 11/4/2022 at AM Self Yes Yes   Sig: Take 1 tablet by mouth daily   Vitamin D, Cholecalciferol, 25 MCG (1000 UT) CAPS 11/3/2022 at PM Self Yes Yes   Sig: Take 4,000 Units by mouth daily   aspirin (ASA) 81 MG EC tablet 11/3/2022 at PM Self Yes Yes   Sig: Take 81 mg by mouth daily   atorvastatin (LIPITOR) 40 MG tablet 11/3/2022 at PM Self No Yes   Sig: Take 1 tablet (40 mg) by mouth daily   carvedilol (COREG) 3.125 MG tablet 11/4/2022 at AM Self No Yes   Sig: Take 2 tablets (6.25 mg) by mouth 2 times daily (with meals)   celecoxib (CELEBREX) 100 MG capsule 11/4/2022 at AM Self Yes Yes   Sig: Take 100 mg by mouth daily   dulaglutide (TRULICITY) 0.75 MG/0.5ML pen 11/2/2022 Self Yes Yes   Sig: Inject 0.75 mg Subcutaneous every 7 days   empagliflozin (JARDIANCE) 25 MG TABS tablet 11/4/2022 at AM Self Yes Yes   Sig: Take 25 mg by mouth daily   furosemide (LASIX) 20 MG tablet 11/4/2022 at AM Self No Yes   Sig: Take 40 mg (2 tabs) in the morning. You can take an extra tablet (20 mg) once daily in the afternoon as needed if your weight is over 215 pounds or you have worsening swelling or shortness of breath.   insulin glargine  (LANTUS) 100 UNIT/ML injection 2022 at AM Self Yes Yes   Sig: Inject 11 Units Subcutaneous every morning   insulin lispro (HUMALOG KWIKPEN) 100 UNIT/ML injection 2022 at AM Self No Yes   Si units with breakfast,15 units at noon, and 14 units before evening meal   Patient taking differently: Depending on glucose reading, ranges from 5-7 units with each meal   metFORMIN (GLUCOPHAGE) 500 MG tablet 2022 at AM Self No Yes   Sig: TAKE 2 TABLETS (1,000 MG) BY MOUTH TWICE A DAY WITH MEALS   sacubitril-valsartan (ENTRESTO) 24-26 MG per tablet 2022 at AM Self No Yes   Sig: Take 1 tablet by mouth 2 times daily   tamsulosin (FLOMAX) 0.4 MG capsule 2022 at AM Self Yes Yes   Sig: Take 0.4 mg by mouth daily   tiZANidine (ZANAFLEX) 4 MG tablet 2022 at AM Self Yes Yes   Sig: Take 4 mg by mouth 3 times daily as needed for muscle spasms   traMADol (ULTRAM) 50 MG tablet 11/3/2022 at PRN Self Yes Yes   Sig: Take 50 mg by mouth every 6 hours as needed for moderate pain   vitamin C (ASCORBIC ACID) 1000 MG TABS 11/3/2022 Self Yes Yes   Sig: Take 1,000 mg by mouth daily   zinc gluconate 50 MG tablet 11/3/2022 Self Yes Yes   Sig: Take 50 mg by mouth daily      Facility-Administered Medications: None     Allergies   Allergies   Allergen Reactions     Semaglutide Fatigue and GI Disturbance     Physical Exam   Vital Signs: Temp: 97  F (36.1  C) Temp src: Temporal BP: 104/68 Pulse: 63   Resp: 16 SpO2: 100 % O2 Device: None (Room air)    Weight: 210 lbs 0 oz    Constitutional: awake, alert, cooperative, no apparent distress, laying in the ER bed with the head of the bed elevated  Eyes: sclera clear, conjunctiva normal  ENT: oral pharynx with moist mucous membranes  Respiratory: no increased work of breathing, few crackles at the bases  Cardiovascular: regular rate and rhythm, normal S1 and S2, systolic murmur noted  GI: normal bowel sounds, soft, non-distended, non-tender  Skin: warm, dry  Musculoskeletal: no  lower extremity pitting edema present  Neurologic: awake, alert, answers questions appropriately, moves all extremities    Data   Data reviewed today: I reviewed all medications, new labs and imaging results over the last 24 hours. I personally reviewed the EKG tracing showing sinus rhythm with 1st degree AV block and the chest x-ray image(s) showing ?? mild pulmonary vascular congestion.    Recent Labs   Lab 11/04/22  1441 11/04/22  1051   WBC  --  5.2   HGB  --  12.8*   MCV  --  86   PLT  --  182   NA  --  134   POTASSIUM  --  4.7   CHLORIDE  --  103   CO2  --  26   BUN  --  23   CR  --  1.08   ANIONGAP  --  5   FATMATA  --  9.0   GLC 77 77     Recent Results (from the past 24 hour(s))   XR Chest 2 Views    Narrative    CHEST TWO VIEWS November 4, 2022 11:46 AM     HISTORY: Shortness of breath.    COMPARISON: None.      Impression    IMPRESSION: Mild cardiac enlargement and pulmonary venous congestion.  Mild interstitial prominence in both lower lungs suggest pulmonary  edema, although an infectious process could also have this appearance.  No pleural effusions. No pneumothorax. Aortic calcification.    ELIZABETH JANE MD         SYSTEM ID:  [id number]

## 2022-11-04 NOTE — ED NOTES
[facility]  ED Nurse Handoff Report    ED Chief complaint: Hypotension and Dizziness      ED Diagnosis:   Final diagnoses:   Severe aortic stenosis   Symptomatic severe aortic stenosis with low ejection fraction   Hypotension, unspecified hypotension type       Code Status: Full Code    Allergies:   Allergies   Allergen Reactions    Semaglutide Fatigue and GI Disturbance       Patient Story: pt woke up feeling dizzy; took his b/p was low, called cardiology, pt encouraged to come in ED   Focused Assessment: has aortic stenosis      Treatments and/or interventions provided: admission  Patient's response to treatments and/or interventions:     To be done/followed up on inpatient unit:      Does this patient have any cognitive concerns?:  alert    Activity level - Baseline/Home:  Independent  Activity Level - Current:   Stand with Assist    Patient's Preferred language: English   Needed?: No    Isolation: None  Infection: Not Applicable  Patient tested for COVID 19 prior to admission: YES  Bariatric?: No    Vital Signs:   Vitals:    11/04/22 1046   BP: 105/68   Pulse: 69   Resp: 16   Temp: 97  F (36.1  C)   TempSrc: Temporal   SpO2: 99%   Weight: 95.3 kg (210 lb)   Height: 1.829 m (6')       Cardiac Rhythm:     Was the PSS-3 completed:   Yes  What interventions are required if any?               Family Comments: n/a   OBS brochure/video discussed/provided to patient/family: No              Name of person given brochure if not patient:               Relationship to patient:     For the majority of the shift this patient's behavior was Green.   Behavioral interventions performed were .    ED NURSE PHONE NUMBER: *[phone]

## 2022-11-04 NOTE — ED TRIAGE NOTES
Aortic valve replacement planned for 11/15, last night started to feel shortness of breath while lying in bed, woke up feeling lightheaded and dizzy felt like passing out, had blood pressure in 80s systolic, cardiology recommended to come to ED.      Triage Assessment     Row Name 11/04/22 1048       Triage Assessment (Adult)    Airway WDL WDL       Respiratory WDL    Respiratory WDL WDL       Skin Circulation/Temperature WDL    Skin Circulation/Temperature WDL WDL       Cardiac WDL    Cardiac WDL X       Peripheral/Neurovascular WDL    Peripheral Neurovascular WDL WDL       Cognitive/Neuro/Behavioral WDL    Cognitive/Neuro/Behavioral WDL WDL

## 2022-11-04 NOTE — ED PROVIDER NOTES
"  History   Chief Complaint:  Hypotension and Dizziness    The history is provided by the patient.      Bryant Mckenzie is a 82 year old male with history of type 2 diabetes who presents with onset of hypotension, dizziness, lightheadedness, and shortness of breath. The patient states that he woke up \"gasping for breath\" this morning and notes that his bed is typically propped up a bit. After getting dressed, eating, taking his morning medications, and urinating, the patient sat down at his computer desk to check his emails when he felt a sudden \"hot flash\" with subsequent lightheadedness. The patient checked his blood pressure and noted 73/67 with a heart rate of 64 (Patient notes baseline blood pressure of 105-110/65). He notes that he checked it later and had a blood pressure of 80/54. Patient also takes a daily weight and is typically in the 210-215lbs range; today he is at 212lbs. Currently the patient is feeling \"the best\" he's felt today. He denies any chest pain, general pain, fever, chills, cough, or recent illnesses. He denies any syncope. He also denies any urge to urinate or have a bowel movement at the time of his onset of hot flashes. The patient had a stent placed with full blockage and states that he \"must have had a slight heart attack\" but does not know for sure. He is not anticoagulated but takes a baby Aspirin. There have been no notable changes to his medications. The patient has an aortic valve replacement planned for 11/15/22.    Review of Systems   Constitutional: Negative for chills and fever.   Respiratory: Positive for shortness of breath. Negative for cough.    Cardiovascular: Negative for chest pain.   Neurological: Positive for dizziness and light-headedness. Negative for syncope.   All other systems reviewed and are negative.     Allergies:  Semaglutide    Medications:  Aspirin  Atorvastatin  Carvedilol  Dulaglutide  Empagliflozin  Furosemide  Insuline glargine  Insulin " lispro  Metformin  Multivitamins-minerals  Sacubitril-valsartan  Tamsulosin  Vitamin C  Vitamin D  Zinc Gluconate    Past Medical History:     Type 2 diabetes mellitus  Atrophic gastritis  Vitamin B12 deficiency, non-anemic  KENIA syndrome  Benign non-nodular prostatic hyperplasia with lower urinary tract symptoms  Aortic stenosis  Ischemic cardiomyopathy  Chronic total occlusion of coronary artery  Right inguinal hernia  Umbilical hernia without obstruction and without gangrene    Past Surgical History:    Coronary angiogram  Heart catheterization with possible intervention  Instantaneous wave-free ratio  Left ventriculogram  Chronic total occlusion  PCI stent drug eluting  DaVinci herniorrhaphy inguinal  Esophagogastroduodenoscopy  Excision of cyst on upper back  Hernia repair  Hydrocelectomy scrotal  Bilateral joint replacement of knee    Family History:    CAD  Cerebrovascular disease  Diabetes    Social History:  The patient presents to the ED with his wife.  PCP: Brown, Merlin Emery     Physical Exam     Patient Vitals for the past 24 hrs:   BP Temp Temp src Pulse Resp SpO2 Height Weight   11/04/22 1315 -- -- -- -- -- 100 % -- --   11/04/22 1313 104/68 -- -- 63 -- -- -- --   11/04/22 1046 105/68 97  F (36.1  C) Temporal 69 16 99 % 1.829 m (6') 95.3 kg (210 lb)       Physical Exam  GENERAL: well developed, pleasant  HEAD: atraumatic  EYES: pupils reactive, extraocular muscles intact, conjunctivae normal  ENT:  mucus membranes moist  NECK:  trachea midline, normal range of motion  RESPIRATORY: no tachypnea, breath sounds clear to auscultation   CVS: normal S1/S2, 3/6 systolic murmur, intact distal pulses  ABDOMEN: soft, nontender, nondistention  MUSCULOSKELETAL: no deformities  SKIN: warm and dry, no acute rashes or ulceration  NEURO: GCS 15, cranial nerves intact, alert and oriented x3  PSYCH:  Mood/affect normal    Emergency Department Course   ECG  ECG results from 11/04/22   EKG 12-lead, tracing only     Value     Systolic Blood Pressure     Diastolic Blood Pressure     Ventricular Rate 64    Atrial Rate 64    FL Interval 248    QRS Duration 110        QTc 412    P Axis 80    R AXIS -9    T Axis 114    Interpretation ECG      Sinus rhythm with 1st degree A-V block with occasional Premature ventricular complexes  Low voltage QRS  Cannot rule out Anterior infarct (cited on or before 15-LINDA-2022)  Abnormal ECG  When compared with ECG of 15-LINDA-2022 07:24,  Premature ventricular complexes are now Present  Confirmed by GENERATED REPORT, COMPUTER ([id number]),  Marlena Faria ([id number]) on 11/4/2022 1:07:08 PM         Imaging:  XR Chest 2 Views   Preliminary Result   IMPRESSION: Mild cardiac enlargement and pulmonary venous congestion.   Mild interstitial prominence in both lower lungs suggest pulmonary   edema, although an infectious process could also have this appearance.   No pleural effusions. No pneumothorax. Aortic calcification.        Report per radiology    Laboratory:  Labs Ordered and Resulted from Time of ED Arrival to Time of ED Departure   NT PROBNP INPATIENT - Abnormal       Result Value    N terminal Pro BNP Inpatient 2,445 (*)    CBC WITH PLATELETS AND DIFFERENTIAL - Abnormal    WBC Count 5.2      RBC Count 4.76      Hemoglobin 12.8 (*)     Hematocrit 41.1      MCV 86      MCH 26.9      MCHC 31.1 (*)     RDW 17.0 (*)     Platelet Count 182      % Neutrophils 62      % Lymphocytes 23      % Monocytes 10      % Eosinophils 4      % Basophils 1      % Immature Granulocytes 0      NRBCs per 100 WBC 0      Absolute Neutrophils 3.2      Absolute Lymphocytes 1.2      Absolute Monocytes 0.5      Absolute Eosinophils 0.2      Absolute Basophils 0.1      Absolute Immature Granulocytes 0.0      Absolute NRBCs 0.0     BASIC METABOLIC PANEL - Normal    Sodium 134      Potassium 4.7      Chloride 103      Carbon Dioxide (CO2) 26      Anion Gap 5      Urea Nitrogen 23      Creatinine 1.08      Calcium 9.0      Glucose 77       GFR Estimate 69     TROPONIN I - Normal    Troponin I High Sensitivity 63     COVID-19 VIRUS (CORONAVIRUS) BY PCR - Normal    SARS CoV2 PCR Negative        Emergency Department Course:     Reviewed:  I reviewed nursing notes, vitals, past medical history and Care Everywhere    Assessments:  1114 I obtained history and examined the patient as noted above.    Consults:  1208 I consulted with Dr. Valladares, hospitalist, regarding the patient's history and presentation here in the emergency department who accepted the patient for admission.    Disposition:  The patient was admitted to the hospital under the care of Dr. Valladares.     Impression & Plan     Medical Decision Making:    Patient presents with increasing shortness of breath and hypotension.  Currently his vital signs have improved.  He looks to have mild CHF.  He looks to require admission and plans for surgery of aortic valve earlier than planned.  Patient also has history of low EF.       Diagnosis:    ICD-10-CM    1. Severe aortic stenosis  I35.0       2. Symptomatic severe aortic stenosis with low ejection fraction  I35.0       3. Hypotension, unspecified hypotension type  I95.9           Discharge Medications:  New Prescriptions    No medications on file       Scribe Disclosure:  Richard CRUMP Hired, am serving as a scribe at 11:11 AM on 11/4/2022 to document services personally performed by Donta Burk MD based on my observations and the provider's statements to me.        Donta Burk MD  11/04/22 2052

## 2022-11-04 NOTE — PROGRESS NOTES
Pt arrived to room on admit with severe back pain. Pt states this pain is chronic and he has been seeing physicians for it, takes norco PRN at home. Repositioning and heat ineffective. Norco ineffective. Pt nauseated and vomitted x1 after norco given. Paged MD for IV pain meds. Compazine and dilaudid given. Pt appears more restful, meds seem to be helping. Continue close monitoring.

## 2022-11-04 NOTE — ED NOTES
Rapid Assessment Note    History:   Bryant Mckenzie is a 82 year old male with a history of aortic stenosis scheduled for aortic valve placement here at [facility] on 15 November who presents with waking up feeling short of breath while he was laying down is orthopneic.  When he sat up he felt better but then he developed some mild dizziness and lightheadedness when he tried to get up and around.  He sat down at his computer and all of a sudden felt like he may be could pass out and felt very warm.  Checked his blood pressure and it was in the 70s systolic initially in the low but later he took it again and it was in the 80s.  He feels much better now.  No chest pain this morning.  He did take his medicines and ate his breakfast this morning.  No new leg swelling or calf tenderness or history of PEs.  He is not short of breath.  No cough.  He decided come to ER for further evaluation.    Exam:   General:  Alert, interactive  Cardiovascular:  Well perfused.  Systolic murmur heard.  Lungs:  No respiratory distress, no accessory muscle use.  Lungs are clear without basilar rales.  Neuro:  Moving all 4 extremities  Skin:  Warm, dry  Psych:  Normal affect      Plan of Care: EKG, labs, chest x-ray ordered.  Blood pressure normal here.  I evaluated the patient and developed an initial plan of care. I discussed this plan and explained that I, or one of my partners, would be returning to complete the evaluation.         I, Ivon hCristianson MD, am serving as a scribe to document services personally performed by No att. providers found , based on my observations and the provider's statements to me.    11/4/2022  [employer]    Portions of this medical record were completed by a scribe. UPON MY REVIEW AND AUTHENTICATION BY ELECTRONIC SIGNATURE, this confirms (a) I performed the applicable clinical services, and (b) the record is accurate.        Ivon Christianson MD  11/04/22 [id number]

## 2022-11-04 NOTE — PROGRESS NOTES
RECEIVING UNIT ED HANDOFF REVIEW    ED Nurse Handoff Report was reviewed by: Zoya Pineda RN on November 4, 2022 at 1:04 PM

## 2022-11-04 NOTE — TELEPHONE ENCOUNTER
"Received call from patient. He states that he is not feeling well this morning. He states that he is feeling lightheaded this morning and has had some \"hot flashes\". He states that he checked his BP this morning and it was 73/47. It is now up to 84/59. Informed patient that he needs to present to the ED now. He is aware that he cannot drive. He will come to the [facility] ED now for admission. His TAVR procedure was planned for 11/15/22 but this will likely need to be expedited.   Discussed plan with Danica Nicole NP who is in agreement. Informed patient that we will discuss his TAVR procedure further once he is here and we can ensure that he is stable.     Patient was presented yesterday morning at the multidisciplinary valve conference. Plan is to proceed with TAVR procedure.    Valve: [employer]  Approach: TF  Sedation: MAC    Possible balloon pump needed for support during his case. His EF is <20%.     Caitlyn Tijerina RN  Structural Heart Coordinator  [facility]    "

## 2022-11-05 NOTE — PLAN OF CARE
Goal Outcome Evaluation:        1900-0730    A&O x 4. Pt reported severe back pain. PRN dilaudid and Norco given with some relief. Has been intermittently nauseous - given X1 zofran with relief. Pt will hiccup/burp with sitting up/,movement -paged PRN Maalox given with some relief. Has had minimal appetite this shift. Has done own sugars; however at HS his monitor read 109 while finger prick showed 88. Refused 0200 check prick & his monitor was 119. VSS, on RA-declined CPAP overnight. Up w/ SBA. Tele SR 1st deg AVB /BBB.  Continue to monitor.

## 2022-11-05 NOTE — PROVIDER NOTIFICATION
MD Notification    Notified Person: MD    Notified Person Name: Gray Gupta     Notification Date/Time: 11/4 2050    Notification Interaction: page    Purpose of Notification: pt burping/hiccupping, PRN maalox?     Orders Received: PRN Maalox

## 2022-11-05 NOTE — PROGRESS NOTES
[facility]    Cardiology Progress     Date of Admission:  11/4/2022  Reason for Consult:  Severe aortic stenosis    Impression/plan:    1. Critical aortic stenosis  2. Severe (<20%) LV dysfunction  3. Hypotension  4. NSVT x 10 beats, symptomatic    Plan  Would favor trying to see if we can advance TAVR given NSVT this am.   Needs dental eval + repeat coronary angiogram (60% RCA lesion on 6/15/2022 angio). Keep NPO after Sunday night  Consider antiarrhythmic therapy if NSVT is more frequent.   Defer further lasix today due to soft SBP. He does not appear to be grossly volume overloaded.   Will avoid inotropic agents right now due to NSVT.   Will need to observe over weekend     Ra Maldonado M.D.    Interval History/Subjective:    Has near syncope with NSVT x 10 beats on monitor this am . Doing well this am otherwise. Has SOB with minimal exertion.         Physical Exam   Temp: 97.6  F (36.4  C) Temp src: Oral BP: 97/63 Pulse: 82   Resp: 20 SpO2: 97 % O2 Device: None (Room air)    Vital Signs with Ranges  Temp:  [97  F (36.1  C)-97.6  F (36.4  C)] 97.6  F (36.4  C)  Pulse:  [] 82  Resp:  [18-20] 20  BP: ()/(49-74) 97/63  SpO2:  [95 %-100 %] 97 %  213 lbs 0 oz    Constitutional: Awake, alert, cooperative, no apparent distress.   Respiratory: Clear to auscultation bilaterally, no crackles or wheezing.  Neck: No JVD  Cardiovascular: Regular rate and rhythm, normal S1 and S2, 3/6 BREN in RUSB with radiation to rest of precordium  Extremities: No lower or upper extremity edema , warm and well perfused upper and lower extremities  Vascular: Radial pulses soft    Data   Most Recent 3 CBC's:  Recent Labs   Lab Test 11/05/22  0553 11/04/22  1051 06/15/22  0653   WBC 10.4 5.2 5.6   HGB 12.2* 12.8* 13.2*   MCV 85 86 89    182 255     Most Recent 3 BMP's:  Recent Labs   Lab Test 11/05/22  0553 11/04/22  2104 11/04/22  1441 11/04/22  1051 10/25/22  1510 07/22/22  0937      --   --  134  --  136   POTASSIUM 4.6  --   --  4.7  --  4.2   CHLORIDE 101  --   --  103  --  107   CO2 20  --   --  26  --  24   BUN 25  --   --  23  --  17   CR 0.92  --   --  1.08 1.0 0.87   ANIONGAP 12  --   --  5  --  5   FATMATA 9.1  --   --  9.0  --  8.7   * 88 77 77  --  169*     Most Recent 3 Troponin's:  Recent Labs   Lab Test 01/03/20  0034   TROPI 0.019     Most Recent 3 BNP's:  Recent Labs   Lab Test 11/04/22  1051   NTBNPI 2,445*     Most Recent Cholesterol Panel:  Recent Labs   Lab Test 12/05/16  1200   CHOL 123   LDL 51   HDL 50   TRIG 108

## 2022-11-05 NOTE — PROGRESS NOTES
[facility]  Hospitalist Progress Note    Admit Date:  11/4/2022  Date of Service (when I saw the patient): 11/05/2022   Provider:  Laura Traore, DO    Assessment & Plan   Bryant Mckenzie is a 82 year old male who was admitted on 11/4/2022. He has a PMH  severe aortic stenosis (TAVR scheduled 11/15/22), worsening  Chronic low back a pain,coronary artery disease, cardiomyopathy, hyperlipidemia, diabetes mellitus type 2, sleep apnea, and BPH who presented to the ER due to shortness of breath and low blood pressure.  Also was c/o increased back pain.       Problem List:    1. Shortness of breath  Hypotension  Severe aortic stenosis  Significant mixed cardiomyopathy (LVEF less than 20% on 10/14/2022)  Followed by TAVR team with plan for TAVR on 11/15/2022.  Woke up with shortness of breath and subsequently developed lightheadedness.  Had blood pressures in the 70s to 80s systolic at home.  Chest x-ray shows mild pulmonary vascular congestion.  BNP elevated.    Cardiology consulted - pt seen by Dr. Bazan in the ED 11/4/22  Was given one IV dose of  Lasix 20mg  Recommended d/cing coreg  Awaiting cardiology rounds today    Still hypotensive but sbp now improved to the 90's  Holding BP parameters on entresto    Addendum -1130  Pt lightheaded and near-syncopal when up to the bathroon  sbp again in the 80's when up out of bed  NS bolus 250cc x 1/2hours with close monitoring of respiratory status    Will also adjust dose of narcotics available (see below)    2.  Chronic left-sided mid back pain    He reports that he struggles with chronic low back pain  At home -  Takes 2  Norco a  Day, celebrex an d tizanidine prn  Last night and this am - was  Given 0.5mg IV dilaudid x 2 as pt was vomiting    Tells me that he has had injections in his back  2 months ago - ?[facility] (I see encounter listed from 9/22/22)   trying to see if we can obtain any recent imaging - ?last  MRI/plain films  May  "need to see if we can obtain injection this week?    Will discontinue norco and tramadol for now  Change IV dilaudid to 0.2mg IV q2hr prn  With close monitoring of his blood pressure  Start po prn dilaudid  lidoderm patches and aqua-K pad     3. Coronary artery disease  Currently asymptomatic.    Continue PTA aspirin and atorvastatin.    Carvedilol discontinued by cardiology -  Awaiting cardiology rounds today     4. Nausea/vomiting  Unclear etiology  Having BM and no clear clinical s/s of ileus or SBO at this time  ? Nausea from pain and/or gastroenteritis?  No diarrhea  No leukocytosis or fever  covid negative    Will monitor symptoms closely - encouraged him to go slowly with po intake this am    5. Hyperlipidemia    Continue PTA atorvastatin.     6. Diabetes mellitus type 2  Hemoglobin A1c 6.8 on 11/4/2022.    Continue PTA Lantus 11 units every morning.    Hold PTA lispro, metformin, Jardiance, and Trulicity.    Monitor blood sugars before meals and at bedtime and use medium dose sliding scale NovoLog as indicated.    Diabetic diet.    Monitor for hypoglycemia.     7. BPH    Continue PTA flomax but  Will place holding  Parameters on med this am     8. COVID-19 PCR negative    Routine admission COVID-19 PCR testing was negative on 11/4/2022.         Diet: Regular Diet Adult    DVT Prophylaxis: Enoxaparin (Lovenox) SQ  Landeros Catheter: Not present  Code Status: Full Code      Disposition Plan   Needing to remain in the hospital for ongoing inpatient medical treatment today     Entered: Laura Traore,  11/05/2022, 8:26 AM       The patient's care was discussed with the Patient and Patient's Family.    Interval History     \"My back hurts\".  Was scheduled to see Spine clinic for consideration of another injection 11/4/22 but needed to come to the ED instead.  Pain is \"about the same\" as yesterday.  No change in bowel or bladder  Function.  Able to ambulate up to the bathroom with assistance; just feeling " lightheaded and dizzy.       No CP, HA or SOB currently at rest.  Just doesn't feel well, in general.    -Data reviewed today: I reviewed all new labs and imaging results over the last 24 hours. I personally reviewed no images or EKG's today.    Physical Exam   Temp: 97.6  F (36.4  C) Temp src: Oral BP: 97/62 Pulse: 99   Resp: 20 SpO2: 97 % O2 Device: None (Room air)    Vitals:    11/04/22 1046 11/04/22 1400 11/05/22 0106   Weight: 95.3 kg (210 lb) 98.8 kg (217 lb 12.8 oz) 96.6 kg (213 lb)     Vital Signs with Ranges  Temp:  [97  F (36.1  C)-97.6  F (36.4  C)] 97.6  F (36.4  C)  Pulse:  [51-99] 99  Resp:  [16-20] 20  BP: ()/(49-70) 97/62  SpO2:  [95 %-100 %] 97 %  I/O last 3 completed shifts:  In: 325 [P.O.:325]  Out: 650 [Urine:650]    GEN:  Alert, appears moderately uncomfortable in bed from his back pain.    HEENT:  Normocephalic/atraumatic, no scleral icterus, no nasal discharge, mouth and membranes appear fairly moist.  NECK:  No clear thyromegaly or JVD  CV:  Regular rate and rhythm, sys murmur to ausc.  S1 + S2 noted, no S3 or S4.  LUNGS:  Clear to auscultation ant/lat bilaterally; limited post lung exam this am.   No rales/rhonchi/wheezing auscultated bilaterally.  No costal retractions bilaterally.  Symmetric chest rise on inhalation noted.  ABD:  Present bowel sounds, soft, no tenderness ilicited throughout,  non-distended.  No rebound/guarding/rigidity.  No masses palpated.  No obvious HSM to exam.  EXT:  No significant pretibial edema or cyanosis bilaterally. No joint synovitis noted.  No calf-tenderness or asymmetry noted.  SKIN:  Dry to touch, no rashes or jaundice noted.  PSYCH:  Mood appropriate, Not tearful or depressed.  Maintains direct eye contact.  NEURO:  No tremors at rest, speech is clear and appropriate. Able to wiggle all toes without difficulty this am.   CN 2-12 intact to gross testing bilaterally    Data   Labs:  Recent Labs   Lab 11/05/22  0553 11/04/22  2103 11/04/22  1829  11/04/22  1051     --   --  134   POTASSIUM 4.6  --   --  4.7   CHLORIDE 101  --   --  103   CO2 20  --   --  26   ANIONGAP 12  --   --  5   * 88 77 77   BUN 25  --   --  23   CR 0.92  --   --  1.08   GFRESTIMATED 84  --   --  69   FATMATA 9.1  --   --  9.0     Recent Labs   Lab 11/04/22  1051   NTBNPI 2,445*     Recent Labs   Lab 11/05/22  0553 11/04/22  1051   WBC 10.4 5.2   HGB 12.2* 12.8*   HCT 38.5* 41.1   MCV 85 86    182     Recent Labs   Lab 11/05/22  0553 11/04/22  2104 11/04/22  1441 11/04/22  1051     --   --  134   POTASSIUM 4.6  --   --  4.7   CHLORIDE 101  --   --  103   CO2 20  --   --  26   ANIONGAP 12  --   --  5   * 88 77 77   BUN 25  --   --  23   CR 0.92  --   --  1.08   GFRESTIMATED 84  --   --  69   FATMATA 9.1  --   --  9.0     Recent Labs   Lab 11/04/22  1702   INR 1.21*     No results for input(s): TROPONIN, TROPI, TROPR in the last 168 hours.    Invalid input(s): TROP, TROPONINIES  No results for input(s): COLOR, APPEARANCE, URINEGLC, URINEBILI, URINEKETONE, SG, UBLD, URINEPH, PROTEIN, UROBILINOGEN, NITRITE, LEUKEST, RBCU, WBCU in the last 168 hours.   Recent Imaging:   Recent Results (from the past 24 hour(s))   XR Chest 2 Views    Narrative    CHEST TWO VIEWS November 4, 2022 11:46 AM     HISTORY: Shortness of breath.    COMPARISON: None.      Impression    IMPRESSION: Mild cardiac enlargement and pulmonary venous congestion.  Mild interstitial prominence in both lower lungs suggest pulmonary  edema, although an infectious process could also have this appearance.  No pleural effusions. No pneumothorax. Aortic calcification.    ELIZABETH JANE MD         SYSTEM ID:  [id number]       Medications       aspirin  81 mg Oral QPM     atorvastatin  40 mg Oral QPM     furosemide  20 mg Intravenous Once     insulin aspart  1-7 Units Subcutaneous TID AC     insulin aspart  1-5 Units Subcutaneous At Bedtime     insulin glargine  11 Units Subcutaneous QAM AC      sacubitril-valsartan  1 tablet Oral BID     sodium chloride (PF)  3 mL Intracatheter Q8H     tamsulosin  0.4 mg Oral Daily

## 2022-11-05 NOTE — PROGRESS NOTES
Tele tech alerted RN to 10 Beat run VT. Pt denies any symptoms. BP stable. Pt asked to walk to restroom and was almost syncopal on the toilet-reported significant dizziness. No other rhythm abnormalities. RN assisted pt back to bed rapidly. SBP back in bed was mid 80's. Dizziness resolved a minute after pt was layed back down. Pt has had dilaudid this morning but coreg and entresto were held. Cardiology updated on events. Pt was educated that he needs to be bedrest with commode only. Bed alarm is active.

## 2022-11-05 NOTE — PLAN OF CARE
BP low, orthostatic this morning. BP trending lower since afternoon. Denies lightheadedness while supine. Fluid bolus (250 mls over 2 hours) ordered by cardiology. EF <20, nursing to watch for signs of overload. 10 beat run VT this morning, occasional PVCs since. Pt has continuous back pain, chronic but worse. IV pain meds are effective but with BP <100 MD wants to use oral. PO dilaudid given-monitor effectiveness. Spine xrays completed, results pending. Nausea- zofran and compazine given. Frequent hiccups. No vomitting since yesterday but has had very little oral input. Blood sugar 175 on CGM. No sliding scale insulin given but lantus was given this morning. Pt is alert/oriented but keeps eyes closed and feels quite unwell with pain and nausea.

## 2022-11-05 NOTE — UTILIZATION REVIEW
"  Admission Status; Secondary Review Determination     Admission Date: 11/4/2022 11:05 AM      Under the authority of the Utilization Management Committee, the utilization review process indicated a secondary review on the above patient.  The review outcome is based on review of the medical records, discussions with staff, and applying clinical experience noted on the date of the review.        (x)      Inpatient Status Appropriate - This patient's medical care is consistent with medical management for inpatient care and reasonable inpatient medical practice.      () Observation Status Appropriate - This patient does not meet hospital inpatient criteria and is placed in observation status. If this patient's primary payer is Medicare and was admitted as an inpatient, Condition Code 44 should be used and patient status changed to \"observation\".   () Admission Status NOT Appropriate - This patient's medical care is not consistent with medical management for Inpatient or Observation Status.          RATIONALE FOR DETERMINATION   Bryant Mckenzie is a 82 year old male with a history of severe aortic stenosis, coronary artery disease, cardiomyopathy, hyperlipidemia, diabetes mellitus type 2, sleep apnea, and BPH.  He presented to the emergency room with shortness of breath and low blood pressures.  Found to have intermittent hypotension.  Symptoms concerning for severe symptomatic aortic stenosis.  Is also noted to have significant mixed cardiomyopathy.  He has required consultation by cardiology.  Previous carvedilol has been stopped.  He has required IV furosemide for diuresis.  He has continued to have episodes of hypotension during hospitalization.  He is expected to require a prolonged hospital stay.  Due to this patient's advanced age, complex past medical history, severe symptomatic aortic stenosis, need for IV furosemide, need for other medication adjustments, and expectation of a prolonged hospital stay, it is " appropriate to have this patient admitted to the hospital as an inpatient.      The severity of illness, intensity of service provided, expected LOS and risk for adverse outcome make the care complex, high risk and appropriate for hospital admission.        The information on this document is developed by the utilization review team in order for the business office to ensure compliance.  This only denotes the appropriateness of proper admission status and does not reflect the quality of care rendered.         The definitions of Inpatient Status and Observation Status used in making the determination above are those provided in the CMS Coverage Manual, Chapter 1 and Chapter 6, section 70.4.      Sincerely,     Hu Cordova D.O.  Utilization Review/ Case Management  [facility].

## 2022-11-05 NOTE — PROVIDER NOTIFICATION
BP trending lower-80's/50s. Called cardiology, ordered 250 mls NS over 2 hours. Pt reports feeling hot but otherwise asymptomatic while laying.

## 2022-11-06 NOTE — PROGRESS NOTES
Brief Cardiology Note  Pt: Bryant Mckenzie    1940      Bryant Mckenzie is a 82 year old with PMH significant for critical aortic stenosis and severe LV dysfunction with EF < 20% who presented with hypotension in the setting of acute on chronic heart failure exacerbation. He is scheduled to undergo TAVR on 11/15 with Dr. Bazan, though, given his decompensated state, I would favor inpatient TAVR this week.     A/P  - Plan for TAVR on Tuesday, , risks/benefits discussed.  - Patient underwent pre-TAVR coronary angiogram in 2022, no need to repeat.   - No need for dental eval, he has regular dental cleanings every 6 months.   - Consider inotropic agent if MAP <65, will monitor for arrhythmia closely given NSVT.   - Will continue to defer diuresis given low blood pressures.   - Obtain LFTs, lactic acid, and BMP.   - Consider CT abdomen for ongoing abdominal discomfort.   - NPO at midnight for RHC tomorrow, .     Please don't hesitate to reach out with any questions or concerns.     Danica Nicole CNP  11:16 AM 2022   [facility]  Pager: [phone]

## 2022-11-06 NOTE — PROGRESS NOTES
[facility]  Hospitalist Progress Note    Admit Date:  11/4/2022  Date of Service (when I saw the patient): 11/06/2022   Provider:  Laura Traore, DO    Assessment & Plan   Bryant Mckenzie is a 82 year old male who was admitted on 11/4/2022. He has a PMH  severe aortic stenosis (TAVR scheduled 11/15/22), worsening  Chronic low back a pain,coronary artery disease, cardiomyopathy, hyperlipidemia, diabetes mellitus type 2, sleep apnea, and BPH who presented to the ER due to shortness of breath and low blood pressure.  Also was c/o increased back pain.     Problem List:    1. Shortness of breath  Hypotension  Critical aortic stenosis  Significant mixed cardiomyopathy (LVEF less than 20% on 10/14/2022)  Followed by TAVR team with plan for TAVR on 11/15/2022.  Woke up with shortness of breath and subsequently developed lightheadedness.  Had blood pressures in the 70s to 80s systolic at home.  Chest x-ray shows mild pulmonary vascular congestion.  BNP elevated.       Was transferred to CCU last evening d/t decreased BP    Cardiology consulted - pt seen by Dr. Bazan in the ED 11/4/22  Was given one IV dose of  Lasix 20mg  -  Further diuretics held d/t his softer BP  Coreg  D/c'ed by  cardiology    SBP this am in the 100's at rest.    2.  Chronic left-sided mid back pain    He reports that he struggles with chronic low back pain  At home -  Takes 2 Norco a Day, celebrex an d tizanidine prn  Last night and this am - was  Given 0.5mg IV dilaudid x 2 as pt was vomiting    Tells me that he has had injections in his back  2 months ago - ?  [facility] (I see encounter listed from 9/22/22)   trying to see if we can obtain any recent imaging - ?last  MRI/plain films  May need to see if we can obtain injection this week?    Plain films of lumbar and thoracic spine 11/5/22 reviewed  -  No acute fractures identified  Will discontinue norco and tramadol for  now    Discontinue IV dilaudid  Continue with prn po dilaudid  Continue with lidoderm patches and aqua-K pad     3. Coronary artery disease  Currently asymptomatic.    Continue PTA aspirin and atorvastatin.    Carvedilol discontinued by cardiology -  Awaiting cardiology rounds today     4. Nausea/vomiting  Unclear etiology  No further emesis but  Still mild  Anorexia with occasional nausea  ? Nausea from pain and/or gastroenteritis?  No diarrhea  No leukocytosis or fever  covid negative    Addendum  - 1200  RN let me know that he  Is having more discomfort with epigastric pain with coughing;  No emesis but still anorexia and nausea  LFTs,  Lipase pending  Will consider plain xray of  abd vs.  CT scan pending on clinical  Course  Clear liquids, for now    1435  -   Now with Acute renal failure and hyperkalemia  I have  Asked for bladder scan from  RN  To r/o obstructive contribution  Pt will be started on  Dobutamine which should improve profusion  I talked to Dr. Maldonado (cardiology ) -  Plan to place PICC, start dobutamine and and recheck BMP in  6 hours  May benefit from diuresis but currently BP too low    5. Hyperlipidemia    Continue PTA atorvastatin.     6. Diabetes mellitus type 2  Hemoglobin A1c 6.8 on 11/4/2022.    Blood sugars in the  80's last evening  Po Intake  Has  Been  Less  Will decrease lantus to 8 units qam  (from 11 units)    Hold PTA lispro, metformin, Jardiance, and Trulicity.      Monitor blood sugars before meals and at bedtime and use medium dose sliding scale NovoLog as indicated.    Diabetic diet.    Monitor for hypoglycemia.     7. BPH    Continue PTA flomax but  Will place holding  Parameters on med this am     8. COVID-19 PCR negative    Routine admission COVID-19 PCR testing was negative on 11/4/2022.         Diet: Low Saturated Fat Na <2400 mg    DVT Prophylaxis: Enoxaparin (Lovenox) SQ  Landeros Catheter: Not present  Code Status: Full Code      Disposition Plan   Needing to  "remain in the hospital for ongoing inpatient medical treatment today      TAVR  Now being considered this week  Entered: Laura ColonJulisaKeshav,  11/06/2022, 6:41 AM       The patient's care was discussed with the Patient and Patient's Family.    Interval History     Back pain is  \"better\" with po dilaudid overnight and this am.  No CP, SOB, no  C/o of   abd pain or  Emesis  for me this am.   No F/C.    Has been lightheaded and dizzy  Last evening when up to try to use the restroom.  No palpitations.    -Data reviewed today: I reviewed all new labs and imaging results over the last 24 hours. I personally reviewed no images or EKG's today.      Physical Exam   Temp: 97.8  F (36.6  C) Temp src: Oral BP: 105/63 Pulse: 91   Resp: 20 SpO2: 92 % O2 Device: None (Room air)    Vitals:    11/04/22 1400 11/05/22 0106 11/06/22 0347   Weight: 98.8 kg (217 lb 12.8 oz) 96.6 kg (213 lb) 93.9 kg (207 lb)     Vital Signs with Ranges  Temp:  [97.6  F (36.4  C)-98  F (36.7  C)] 97.8  F (36.6  C)  Pulse:  [] 91  Resp:  [16-22] 20  BP: ()/(51-74) 105/63  SpO2:  [90 %-98 %] 92 %  I/O last 3 completed shifts:  In: 50 [P.O.:50]  Out: 850 [Urine:850]    GEN:  Resting comfortably in bed but easily arousable to my voice.  Fatigued appearing, not dyspneic and  Not  diaphoretic  HEENT:  Normocephalic/atraumatic, no scleral icterus, no nasal discharge, mouth and membranes appear fairly moist.  NECK:  No clear thyromegaly or JVD  CV:  Regular rate and rhythm, sys murmur to ausc.  S1 + S2 noted, no S3 or S4.  LUNGS:  Clear to auscultation ant/lat bilaterally; limited post lung exam this am.   No rales/rhonchi/wheezing auscultated bilaterally.  No costal retractions bilaterally.  Symmetric chest rise on inhalation noted.  ABD:  Present bowel sounds, soft, no tenderness ilicited throughout,  Some mild distension noted throughout without clear  Rebound/guarding/rigidity.  No masses palpated.  No obvious HSM to exam.  EXT:  No " significant pretibial edema or cyanosis bilaterally. No joint synovitis noted.  No calf-tenderness or asymmetry noted.  SKIN:  Dry to touch, no rashes or jaundice noted.  PSYCH:  Mood calm appropriate, Not tearful or depressed.  Maintains direct eye contact.  NEURO:  No tremors at rest, speech is clear and appropriate.  CN 2-12 intact to gross testing bilaterally    Data   Labs:  Recent Labs   Lab 11/05/22  0553 11/04/22 2104 11/04/22  1441 11/04/22  1051     --   --  134   POTASSIUM 4.6  --   --  4.7   CHLORIDE 101  --   --  103   CO2 20  --   --  26   ANIONGAP 12  --   --  5   * 88 77 77   BUN 25  --   --  23   CR 0.92  --   --  1.08   GFRESTIMATED 84  --   --  69   FATMATA 9.1  --   --  9.0     Recent Labs   Lab 11/04/22  1051   NTBNPI 2,445*     Recent Labs   Lab 11/05/22 0553 11/04/22  1051   WBC 10.4 5.2   HGB 12.2* 12.8*   HCT 38.5* 41.1   MCV 85 86    182     Recent Labs   Lab 11/05/22 0553 11/04/22 2104 11/04/22  1441 11/04/22  1051     --   --  134   POTASSIUM 4.6  --   --  4.7   CHLORIDE 101  --   --  103   CO2 20  --   --  26   ANIONGAP 12  --   --  5   * 88 77 77   BUN 25  --   --  23   CR 0.92  --   --  1.08   GFRESTIMATED 84  --   --  69   FATMATA 9.1  --   --  9.0     Recent Labs   Lab 11/04/22  1702   INR 1.21*     No results for input(s): TROPONIN, TROPI, TROPR in the last 168 hours.    Invalid input(s): TROP, TROPONINIES  No results for input(s): COLOR, APPEARANCE, URINEGLC, URINEBILI, URINEKETONE, SG, UBLD, URINEPH, PROTEIN, UROBILINOGEN, NITRITE, LEUKEST, RBCU, WBCU in the last 168 hours.   Recent Imaging:   Recent Results (from the past 24 hour(s))   XR Thoracic Spine 2 Views    Narrative    EXAM: XR THORACIC SPINE 2 VIEWS  LOCATION: [facility]  DATE/TIME: 11/5/2022 5:54 PM    INDICATION: Back pain.  COMPARISON: None.  TECHNIQUE: CR Thoracic Spine.      Impression    IMPRESSION: No definite fracture. Obscuration of the upper thoracic spine.  Moderate degenerative change. Vascular calcifications. Presumed pulmonary atelectasis.   XR Lumbar Spine 2/3 Views    Narrative    EXAM: XR LUMBAR SPINE 2/3 VIEWS  LOCATION: [facility]  DATE/TIME: 11/5/2022 5:55 PM    INDICATION: back pain  COMPARISON: None.  TECHNIQUE: CR Lumbar Spine.      Impression    IMPRESSION: No fracture. Normal vertebral heights and alignment. There is mild-to-moderate multilevel disc height loss. Scattered anterior and lateral osteophytes are present throughout. There is facet hypertrophy at L5-S1, greatest on the left. Aortic   atherosclerosis is noted.       Medications       aspirin  81 mg Oral QPM     atorvastatin  40 mg Oral QPM     furosemide  20 mg Intravenous Once     insulin aspart  1-7 Units Subcutaneous TID AC     insulin aspart  1-5 Units Subcutaneous At Bedtime     insulin glargine  8 Units Subcutaneous QAM AC     lidocaine  1-3 patch Transdermal Q24H     lidocaine   Transdermal Q8H JASON     sacubitril-valsartan  1 tablet Oral BID     sodium chloride (PF)  3 mL Intracatheter Q8H     tamsulosin  0.4 mg Oral Daily

## 2022-11-06 NOTE — CONSULTS
Consult Date: 11/05/2022    REFERRING CARDIOLOGIST:  Garrick Bazan M.D.    REASON FOR CONSULTATION:  Surgical evaluation for potential bailout options during TAVR.    HISTORY OF PRESENT ILLNESS:  Mr. Mckenzie is a very pleasant 82-year-old gentleman who lives independently and is quite functional.  He was recently found to have severe symptomatic aortic stenosis with severely diminished LV function from likely mixed cardiomyopathy with LVEF of around 20% on the most recent echocardiogram.  He was seen in the [facility] by my colleagues and the decision was made for TAVR.  The question was regarding surgical bailout options given his severe LV dysfunction with a LVEF of 20% with a dilated ventricle.  He was admitted to the hospital with worsening heart failure symptoms.  His TAVR is scheduled on 11/15/2022.    PAST MEDICAL HISTORY:  Nonischemic cardiomyopathy, aortic stenosis as described above, ischemic and nonischemic cardiomyopathy, hyperlipidemia, history of atrophic gastritis, coronary artery disease, and type 2 diabetes.    PAST SURGICAL HISTORY:  Includes previous PCI for his coronary artery disease, scrotal hydrocelectomy, bilateral knee replacements, cyst excision from the upper back, robotic-assisted inguinal hernia repair with mesh.    ALLERGIES:  SEMAGLUTIDE.    CURRENT OUTPATIENT MEDICATIONS:  Reviewed in [facility] chart.    FAMILY HISTORY:  Significant for coronary artery disease, stroke, diabetes.    SOCIAL HISTORY:  He has never smoked.  He does not drink alcohol currently.    REVIEW OF SYSTEMS:  As per HPI.  He complains of significantly worsened shortness of breath and fatigue prior to admission.  All other 10-point review of systems are completed and were otherwise negative unless stated above.    PHYSICAL EXAMINATION:    VITAL SIGNS:  All vital signs are stable.  GENERAL:  He appears well, in no acute distress.  He is on room air.  CARDIOVASCULAR:  Regular rate and rhythm, normal S1, S2.   Grade 3/6 systolic murmur at the right upper sternal border.  LUNGS:  Clear bilaterally.  ABDOMEN:  Soft, nontender, nondistended.  EXTREMITIES:  Negative for edema, cyanosis or clubbing.  NEUROLOGIC:  A and O x 3.  No focal deficits.    PERTINENT LABORATORY STUDIES:  His most recent transthoracic echo from 10/14/2022 demonstrated moderately dilated left ventricle with severely decreased LV function with EF less than 20% with global hypokinesia, normal RV systolic size and function, severe aortic stenosis with a mean gradient of 26 mmHg and a calculated valve area of 0.5 cm2.  There is mild aortic valve insufficiency.  Mild MR and mild TR.  His TAVR protocol CT scan performed 10/25/2022 demonstrates no acute aortic pathology and his anatomy amenable for percutaneous transfemoral approach.  His most recent coronary angiogram was from 06/15/2022 that demonstrated moderate RCA lesion with a preserved flow reserve, patent previous circumflex stent, moderate severe pulmonary hypertension on the right heart cath with cardiac output of almost 7 with an index of 3.    IMPRESSION AND PLAN:  Mr. Mckenzie is an 82-year-old gentleman who is scheduled for a TAVR on 11/15/2022.  He was admitted to the hospital with acute on chronic congestive heart failure symptoms.  He is getting an echocardiogram, which is scheduled.  I think that given the severity of his LV dysfunction with EF of less than 20%, with moderately dilated ventricle, a surgical bailout for catastrophic procedural complications such as an aortic dissection or aortic root rupture would be prohibitively high risk and potentially even futile and I would not recommend it.  I think complications such as wire perforation, or device migration could potentially be intervened surgically given the circumstances.  Ultimately, we will have to make an intraoperative decision based on the circumstances should any of these very unlikely situations arise.  The patient is  completely agreeable to this and looks forward to the TAVR, which at the moment is scheduled for 11/15/2022.    Thank you very much for this referral.    Cortes Jansen MD        D: 2022   T: 2022   MT: LATONIA    Name:     VERN GONZALES  MRN:      [id number]        Account:      [id number]   :      1940           Consult Date: 2022     Document: [id number]

## 2022-11-06 NOTE — PROVIDER NOTIFICATION
MD Notification    Notified Person: MD    Notified Person Name: Dr Isaiah Parr    Notification Date/Time: 11/6/22 4707    Notification Interaction: Vocera page    Purpose of Notification: Bladder scan is 393cc    Orders Received:    Comments:

## 2022-11-06 NOTE — PROVIDER NOTIFICATION
MD Notification    Notified Person: MD    Notified Person Name: Danica ANTUNEZ, Dr Isaiah Parr and Dr Mancera    Notification Date/Time:11/6/22 1330    Notification Interaction: paged    Purpose of Notification: pt K+ 6, lactic 2.3, Cr 2.12. BPs MAP <60 a few times    Orders Received: per Dr Maldonado- He will order Dobutamine gtt     Comments:

## 2022-11-06 NOTE — PLAN OF CARE
Goal Outcome Evaluation:  Patient transferred from [facility] for hypotension, asymptomatic this shift. BP mid 80's to 90's, SR occ tachy with activity. VALERA with exertion, sitting up in bed Using urinal while sitting on edge of bed. Continues to have back pain with improvement with dilaudid 2mg, able to sleep. Plan to possibly move up date of TAVR after completing work up.      Plan of Care Reviewed With: patient

## 2022-11-06 NOTE — PROGRESS NOTES
[facility]    Cardiology Progress     Date of Admission:  11/4/2022  Reason for Consult:  Severe aortic stenosis    Impression/plan:    1. Critical aortic stenosis  2. Severe (<20%) LV dysfunction  3. Hypotension  4. NSVT x 10 beats, symptomatic  5. Cardiogenic shock with renal and peripheral hypoperfusion (lactic acidosis and MISTI/hyperkalemia)  6. Anemia    Plan  Discussed with TAVR JACKIE, will advance TAVR to this Tuesday. Confirmed workup complete and will not need to repeat angiogram (60% RCA lesion on 6/15/2022 angio).   MAPs occasionally <60 and concern for renal hypoperfusion with lactic acidosis and new MISTI/hyper K. Will start IV dobutamine gtt at low dose to help bridge him to TAVR. Will need to repeat electrolytes including lactate, creat, K after 4-6 hours and possibly increase dobutamine carefully watching for ectopy. If too hypotensive will switch to low dose dopamine vs add vaso.   Insert PICC line for continued inotrope use. Ok to run through PIV initially per CCU routine.   Defer further lasix today due to soft SBP. He does not appear to be grossly volume overloaded.   Will need to trend WBC and Hgb given abnormalities today. Consider sepsis workup including Bcx and CT abdomen given abdominal symptoms.     Critically ill patient will need close monitoring.     Ra Maldonado M.D.    Interval History/Subjective:    Has near syncope with NSVT x 10 beats on monitor this am . Doing well this am otherwise. Has SOB with minimal exertion.         Physical Exam   Temp: 98  F (36.7  C) Temp src: Oral BP: (!) 82/52 Pulse: 89   Resp: 20 SpO2: 94 % O2 Device: None (Room air)    Vital Signs with Ranges  Temp:  [97.8  F (36.6  C)-98  F (36.7  C)] 98  F (36.7  C)  Pulse:  [] 89  Resp:  [16-22] 20  BP: ()/(51-95) 82/52  SpO2:  [90 %-98 %] 94 %  207 lbs 0 oz    Constitutional: Awake, alert, cooperative, no apparent distress.   Respiratory: Clear to auscultation  bilaterally, no crackles or wheezing.  Neck: No JVD  Cardiovascular: Regular rate and rhythm, normal S1 and S2, 3/6 BREN in RUSB with radiation to rest of precordium  Extremities: No lower or upper extremity edema  Vascular: Radial pulses soft    Data   Most Recent 3 CBC's:  Recent Labs   Lab Test 11/06/22  1433 11/05/22  0553 11/04/22  1051   WBC 12.7* 10.4 5.2   HGB 9.8* 12.2* 12.8*   MCV 84 85 86    198 182     Most Recent 3 BMP's:  Recent Labs   Lab Test 11/06/22  1232 11/06/22  0810 11/05/22  0553 11/04/22  1441 11/04/22  1051     --  133  --  134   POTASSIUM 6.0*  --  4.6  --  4.7   CHLORIDE 100  --  101  --  103   CO2 21  --  20  --  26   BUN 63*  --  25  --  23   CR 2.12*  --  0.92  --  1.08   ANIONGAP 12  --  12  --  5   FATMATA 8.9  --  9.1  --  9.0   * 277* 118*   < > 77    < > = values in this interval not displayed.     Most Recent 3 Troponin's:  Recent Labs   Lab Test 01/03/20  0034   TROPI 0.019     Most Recent 3 BNP's:  Recent Labs   Lab Test 11/04/22  1051   NTBNPI 2,445*     Most Recent Cholesterol Panel:  Recent Labs   Lab Test 12/05/16  1200   CHOL 123   LDL 51   HDL 50   TRIG 108

## 2022-11-06 NOTE — PROGRESS NOTES
Order placed for PICC, plan is to begin dobutamine.  Confirm with pharmacy that dobutamine can begin peripherally, and transition to central line after 24 hours.  Primary nursing staff updated, will order PICC again if needed after 24 hours.  Patient with large-bore access, 18ga, and easily visible vasculature.

## 2022-11-06 NOTE — PROGRESS NOTES
Maverick's test performed prior to ABG draw. Collateral circulation confirmed. Sample collected from patients left radial artery while on RA.    Rico Poole, RT

## 2022-11-07 PROBLEM — E87.1 HYPONATREMIA: Status: ACTIVE | Noted: 2022-01-01

## 2022-11-07 PROBLEM — E87.20 LACTIC ACIDOSIS: Status: ACTIVE | Noted: 2022-01-01

## 2022-11-07 PROBLEM — I13.10 CARDIORENAL SYNDROME: Status: ACTIVE | Noted: 2022-01-01

## 2022-11-07 PROBLEM — E87.20 LACTIC ACIDOSIS: Status: RESOLVED | Noted: 2022-01-01 | Resolved: 2022-01-01

## 2022-11-07 PROBLEM — E87.5 HYPERKALEMIA: Status: ACTIVE | Noted: 2022-01-01

## 2022-11-07 PROBLEM — R57.0 CARDIOGENIC SHOCK (H): Status: ACTIVE | Noted: 2022-01-01

## 2022-11-07 NOTE — PROCEDURES
Bedside POCUS: cardiac    Indications: persistent hypotension in the setting of cardiogenic shock 2/2 to severe aortic stenosis; volume status assessment    Views: parasternal long, parasternal short, apical 4 chamber; attempted IVC and subxiphoid    Findings: unable to obtain IVC and subxiphoid views due to overlying bowel gas although views were attempted. Globally abnormal LV wall function with decreased EF consistent with known cardiomyopathy and reduced EF from formal echo. LV cavity was not collapsed to suggest extreme intravascular volume depletion. Irregular heart rate appreciated. Mitral valve does not open normally and aortic valve is severely calcified with minimal opening consistent with preexisting diagnosis of severe aortic stenosis. No significant pericardial fluid. Appreciated intermittent B lines from overlying pulmonary parenchyma.   Interpretation.     Interpretation: Globally abnormal and reduced EF function. Abnormal mitral and aortic valve. Unable to fully assess volume status due to inability to obtain IVC view.     MD Dr. Lilo Soto was present during examination bediside.

## 2022-11-07 NOTE — PROGRESS NOTES
Dobutamine decreased due to increased pvc's, increased vasopressin. bp responding positively. B/p continues to be labile. Mental status intact, a/ox4,

## 2022-11-07 NOTE — PROGRESS NOTES
Notified provider about indwelling cook catheter discussed removal or continued need.    Did provider choose to remove indwelling cook catheter? No    Provider's cook indication for keeping indwelling cook catheter: Strict 1-2 Hour I & O if external catheters are not an option.    Is there an order for indwelling cook catheter? Yes    *If there is a plan to keep cook catheter in place at discharge daily notification with provider is not necessary.

## 2022-11-07 NOTE — PLAN OF CARE
Goal Outcome Evaluation:      Plan of Care Reviewed With: patient, spouse      A+Ox4, slept most of the day. Some abdominal pain this am, denies nausea. Pain in abdomen with coughing. - MD aware. CT cancelled due to Cr. SBP 80/90 MAP 60-65. Dobutamine gtt started with not much change. Dr Lee to re evaluate after labs result. Pt dizzy/light headed with standing. Refused cook today but is now agreeable, retaining urine. Feels SOB at times. Doesn't want to turn on side, declined repo. Left heel reddened, elevated on pillows. NSR, 8 beats NSVT this am non since. Monitoring. Will need PICC tomorrow if dobutamine gtt still running after 1500. R AC IV working well, frequent checks. BM 11/5 per chart.

## 2022-11-07 NOTE — PROGRESS NOTES
Arrived at 2300 to icu with wife, kirk. MD at bedside. Consents signed for arterial line, central line. Placed central line and arterial line. A/ox4. Went for abdominal ct d/t tenderness and intermittent nausea at 0200. K+ 6.2-nayla gluconate administered per md. On insulin gtt for BG >300 on transfer. Communicated with wife, kirk multiple times through night. Encouraged rest and self care. Wife supportive to patient and attentive.     Neuro: Alert, oriented x 4  CV: on vaso, ana and dubutamine. Keeping dobutamine lower due to ectopy increasing and per dr. romero recommendations. MAP's labile, bp does not tolerate PVC's and decreases with ectopy. Occasional pvc's and PAC's. maxed on neoisynephrine. 500 ml bolus x1  Resp: on RA, no dyspnea  GI: intermittent, rare c/o nausea and abdominal tenderness. No prns administered and no emesis. Insulin gtt started and on algorithm 2-titrating hourly per protocol.    cook in for retention, urine sample sent per orders.   Skin-red, blanchable coccyx-mepilex placed and turning q 2 hours. Pt does not like to reposition d/t back pain but is agreeable when rationale explained. Heels elevated for risk of pressure injury in heel boots.

## 2022-11-07 NOTE — CONSULTS
[facility]    RENAL CONSULTATION NOTE    REFERRING MD:  Dr. Soto    REASON FOR CONSULTATION:  MISTI    DATE OF CONSULTATION: 11/07/22    SHORTHAND KEY FOR MY NOTES:  c = with, s = without, p = after, a = before, x = except, asx = asymptomatic, tx = transplant or treatment, sx = symptoms or symptomatic, cx = canceled or culture, rxn = reaction, yday = yesterday, nl = normal, abx = antibiotics, fxn = function, dx = diagnosis, dz = disease, m/h = melena/hematochezia, c/d/l/ha = cramping/dizziness/lightheadedness/headache, d/c = discharge or diarrhea/constipation, f/c/n/v = fevers/chills/nausea/vomiting, cp/sob = chest pain/shortness of breath, tbv = total body volume, rxn = reaction, tdc = tunneled dialysis catheter, pta = prior to admission, hd = hemodialysis, pd = peritoneal dialysis, hhd = home hemodialysis, edw = estimated dry wt    HPI: Bryant Mckenzie is a 82 year old male c severe aortic stenosis who was admitted on 11/4/2022 c d/l/hypotension and now has MISTI.    Pt was at home and woke up feeling sob.  He did his AM routine and when he sat down at his computer, he felt lightheaded.  His BP was in the 70s and repeat was in the 80s.  No f/c/n/v.  No cp/abd pain.  Given the sx and persistently low BP, he presented to the ER for further eval.  In the ER, his vitals were ok, but the CXR showed some mild congestion.      On the floor, the pt was initially ok, but then he developed MISTI yday due to cardiogenic shock.  He was transferred to the ICU overnight and started on dobut, phenyl and vaso.  He was given IVF, but his uo was low and his cr cristhian again today.     Currently, he is having abd pain and is NPO.  No f/c/n/v.  No cp, but he is a bit sob.      ROS:  A complete review of systems was performed and is x as noted above.    PMH:    Past Medical History:   Diagnosis Date     Aortic stenosis 9/23/2016    Echo 9/2016 Mild       Atrophic gastritis 9/8/2016     Benign non-nodular  prostatic hyperplasia 09/08/2016     CAD (coronary artery disease)      Hyperlipidemia 09/08/2016     Ischemic cardiomyopathy 6/12/2019     Nonischemic cardiomyopathy (H) 10/30/2019     Obstructive sleep apnea syndrome 9/8/2016     Type 2 diabetes mellitus 09/06/2016     Vitamin B12 deficiency (non anemic) 9/8/2016     PSH:    Past Surgical History:   Procedure Laterality Date     CV CORONARY ANGIOGRAM N/A 10/24/2019    Procedure: Coronary Angiogram;  Surgeon: Lupe Posada MD;  Location:  HEART CARDIAC CATH LAB     CV CORONARY ANGIOGRAM N/A 6/15/2022    Procedure: Coronary Angiogram;  Surgeon: Murali Devi MD;  Location:  HEART CARDIAC CATH LAB     CV HEART CATHETERIZATION WITH POSSIBLE INTERVENTION N/A 6/4/2019    Procedure: Heart Catheterization with Possible Intervention;  Surgeon: Alex Bazan MD;  Location:  HEART CARDIAC CATH LAB     CV INSTANTANEOUS WAVE-FREE RATIO N/A 6/15/2022    Procedure: Instantaneous Wave-Free Ratio;  Surgeon: Murali Devi MD;  Location: [facility] CARDIAC CATH LAB     CV LEFT VENTRICULOGRAM N/A 6/15/2022    Procedure: Left Ventriculogram;  Surgeon: Murali Devi MD;  Location: [facility] CARDIAC CATH LAB     CV PCI CHRONIC TOTAL OCCLUSION N/A 10/24/2019    Procedure: Coronary Total Occlusion;  Surgeon: Luep Posada MD;  Location: [facility] CARDIAC CATH LAB     CV PCI STENT DRUG ELUTING N/A 10/24/2019    Procedure: PCI Stent Drug Eluting;  Surgeon: Lupe Posada MD;  Location: [facility] CARDIAC CATH LAB     CV RIGHT HEART CATH MEASUREMENTS RECORDED N/A 6/15/2022    Procedure: Right Heart Catheterization;  Surgeon: Murali Devi MD;  Location: [facility] CARDIAC CATH LAB     DAVINCI HERNIORRHAPHY INGUINAL Bilateral 2/2/2022    Procedure: ROBOT-ASSISTED BILATERAL INGUINAL HERNIA REPAIR WITH MESH;  Surgeon: Rene Murray MD;  Location:  OR     ESOPHAGOSCOPY, GASTROSCOPY, DUODENOSCOPY (EGD), COMBINED N/A 1/20/2015     Procedure: COMBINED ESOPHAGOSCOPY, GASTROSCOPY, DUODENOSCOPY (EGD), BIOPSY SINGLE OR MULTIPLE;  Surgeon: Allan Marroquin MD;  Location:  GI     EXCISE LESION TRUNK N/A 4/17/2019    Procedure: EXCISE CYST UPPER BACK;  Surgeon: Rene Murray MD;  Location:  OR     HERNIA REPAIR       HERNIORRHAPHY UMBILICAL N/A 2/2/2022    Procedure: OPEN UMBILICAL HERNIA REPAIR;  Surgeon: Rene Murray MD;  Location:  OR     HYDROCELECTOMY SCROTAL       JOINT REPLACEMENT (aka KNEE) NOS Bilateral      MEDICATIONS:      aspirin  81 mg Oral QPM     atorvastatin  40 mg Oral QPM     heparin ANTICOAGULANT  5,000 Units Subcutaneous Q8H     lidocaine  1-3 patch Transdermal Q24H     lidocaine   Transdermal Q8H JASON     sodium chloride (PF)  3 mL Intracatheter Q8H     ALLERGIES:    Allergies as of 11/04/2022 - Reviewed 11/04/2022   Allergen Reaction Noted     Semaglutide Fatigue and GI Disturbance 09/16/2021     FH:    Family History   Problem Relation Age of Onset     Coronary Artery Disease Father 53     Cerebrovascular Disease Father      Diabetes Mother      Diabetes Brother      SH:    Social History     Socioeconomic History     Marital status:      Spouse name: Not on file     Number of children: Not on file     Years of education: Not on file     Highest education level: Not on file   Occupational History     Not on file   Tobacco Use     Smoking status: Never     Smokeless tobacco: Never   Vaping Use     Vaping Use: Never used   Substance and Sexual Activity     Alcohol use: Not Currently     Alcohol/week: 0.0 standard drinks     Drug use: No     Sexual activity: Never   Other Topics Concern     Parent/sibling w/ CABG, MI or angioplasty before 65F 55M? 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; walks dog for exercise      Social Determinants of Health     Financial Resource Strain: Not on file   Food Insecurity: Not on file   Transportation Needs: Not on file   Physical  Activity: Not on file   Stress: Not on file   Social Connections: Not on file   Intimate Partner Violence: Not on file   Housing Stability: Not on file     PHYSICAL EXAM:    BP (!) 80/46   Pulse 89   Temp 99  F (37.2  C) (Oral)   Resp 28   Ht 1.829 m (6')   Wt 94.9 kg (209 lb 3.5 oz)   SpO2 95%   BMI 28.37 kg/m      GENERAL: awake, alert, NAD  HEENT:  normocephalic, no gross abnormalities; MMM, dentition is ok; pupils equal, EOMI, no scleral icterus or conj edema  CV: RRR c 3/6 m; nl S1/S2; no significant ble edema  RESP: crackles B   GI: abd tender, distended  MUSCULOSKELETAL: extremities nl - no gross deformities noted  SKIN: no suspicious lesions or rashes, dry to touch  NEURO:  strength normal and symmetric  PSYCH: mood good, affect appropriate  LYMPH: no palpable ant/post cervical and supraclavicular adenopathy  LINES:  + RIJ 3-lumen, + L axilla art line, + PIV    LABS:      CBC RESULTS:     Recent Labs   Lab 11/07/22  0404 11/06/22  1433 11/05/22  0553 11/04/22  1051   WBC 13.0* 12.7* 10.4 5.2   RBC 3.21* 3.66* 4.54 4.76   HGB 8.7* 9.8* 12.2* 12.8*   HCT 26.2* 30.7* 38.5* 41.1    180 198 182     BMP RESULTS:  Recent Labs   Lab 11/07/22  0852 11/07/22  0743 11/07/22  0623 11/07/22  0525 11/07/22  0432 11/07/22  0404 11/07/22  0010 11/07/22  0001 11/06/22  2233 11/06/22  1945 11/06/22  1232 11/06/22  0810 11/05/22  0553 11/04/22  1441 11/04/22  1051   NA  --   --   --   --   --  134  --  131*  --  133 133  --  133  --  134   POTASSIUM  --   --   --   --   --  5.3  --  6.2*  --  6.1* 6.0*  --  4.6  --  4.7   CHLORIDE  --   --   --   --   --  103  --  101  --  100 100  --  101  --  103   CO2  --   --   --   --   --  21  --  21  --  22 21  --  20  --  26   BUN  --   --   --   --   --  77*  --  75*  --  70* 63*  --  25  --  23   CR  --   --   --   --   --  2.79*  --  2.81*  --  2.60* 2.12*  --  0.92  --  1.08   * 152* 160* 204* 231* 255*   < > 340*   < > 323* 296*   < > 118*   < > 77   FATMATA  --    --   --   --   --  8.6  --  8.6  --  8.8 8.9  --  9.1  --  9.0    < > = values in this interval not displayed.     INR  Recent Labs   Lab 11/04/22  1702   INR 1.21*      DIAGNOSTICS:  Personally reviewed CXR, abd CT    A/P:  Bryant Mckenzie is a 82 year old male c MISTI 2 cardiogenic shock and hypoperfusion from severe aortic stenosis.    1.  MISTI c hyperkalemia.  He has the cardiorenal syndrome and is not making much urine since he appears to be in heart failure.  He has been getting fluids and has not been diuresed since he's pre-load dependent.  Even so, we should stop fluids and give a dose of diuretics.  He is at high risk for worsening renal failure bc of the hypoperfusion (Maryellen < 5).  Pt's K was higher overnight and is better now.  Explained to pt's wife/son that he may end up on dialysis short-term and they are agreeable to doing it if necessary.    A.  Furos 40 mg iv x 1.  B.  Follow labs, uo, sx.  C.  Will need daily assessment for HD needs.    2.  Cardiogenic shock 2 severe aortic stenosis.  Pt is on dobut + phenyl + vaso gtts.  TAVR team is involved.  He is going for a RHC today.  A.  Await results of RHC.  B.  Continue gtts and wean as able.  C.  Plans per Cards.    3.  Abd pain 2 partial SBO.  Pt is going to get an NG, which will help.    A.  Await placement of NG.    4.  Anemia.  Hb is trending lower, but no obv bleeding.  It's prob dilutional given that he's in failure and he is getting fluids.  A.  Follow hb, clinically.    5.  FEN.  Electrolytes are ok right now.  He is NPO.  Na, K better on most recent labs.  A.  Check electrolytes daily.    Thank you for this consultation. We will follow c you.  Please call if any questions.    Attestation:   I have reviewed today's relevant vital signs, notes, medications, labs and imaging.    Ismael Bruner MD  [facility] - Nephrology  [phone]

## 2022-11-07 NOTE — CONSULTS
[facility] consultation/H&P    Bryant Mckenzie MRN#: [id number]   Age: 82 year old YOB: 1940     Date of Admission:          11/4/2022  Reason for consult/H&P:  Possible bowel obstruction   Surgeon:      Allan Og MD                  Chief Complaint:   Dizziness and hypotension         History of Present Illness:   This patient is a 82 year old  male who presented to the [facility] ER with probable cardiogenic shock.  He has marked aortic stenosis and was scheduled for a TAVR.  I am asked to see him because of some bowel distention and some abdominal discomfort with touch.  He denies abdominal pain at rest.  He says that abdominal discomfort is better now that his nasogastric tube has been placed.  He still describes some global abdominal tenderness more on the periphery of the right on the left than in the center.. Denies fever, chills, nausea, vomiting, change in BM or urination.  He had an umbilical hernia repair earlier this year.  He has had bilateral inguinal hernia repairs.  He has not had any upper abdominal operations.  Today he went to the Cath Lab and had a catheter placed.  Denies having any previous episodes. History is obtained from the patient and chart.         Past Medical History:    has a past medical history of Aortic stenosis (9/23/2016), Atrophic gastritis (9/8/2016), Benign non-nodular prostatic hyperplasia (09/08/2016), CAD (coronary artery disease), Hyperlipidemia (09/08/2016), Ischemic cardiomyopathy (6/12/2019), Nonischemic cardiomyopathy (H) (10/30/2019), Obstructive sleep apnea syndrome (9/8/2016), Type 2 diabetes mellitus (09/06/2016), and Vitamin B12 deficiency (non anemic) (9/8/2016).          Past Surgical History:     Past Surgical History:   Procedure Laterality Date     CV CORONARY ANGIOGRAM N/A 10/24/2019    Procedure: Coronary Angiogram;  Surgeon: Lupe Posada MD;  Location: [facility] CARDIAC CATH LAB     CV CORONARY  ANGIOGRAM N/A 6/15/2022    Procedure: Coronary Angiogram;  Surgeon: Murali Devi MD;  Location:  HEART CARDIAC CATH LAB     CV HEART CATHETERIZATION WITH POSSIBLE INTERVENTION N/A 6/4/2019    Procedure: Heart Catheterization with Possible Intervention;  Surgeon: Alex Bazan MD;  Location:  HEART CARDIAC CATH LAB     CV INSTANTANEOUS WAVE-FREE RATIO N/A 6/15/2022    Procedure: Instantaneous Wave-Free Ratio;  Surgeon: Murali Devi MD;  Location:  HEART CARDIAC CATH LAB     CV LEFT VENTRICULOGRAM N/A 6/15/2022    Procedure: Left Ventriculogram;  Surgeon: Murali Devi MD;  Location:  HEART CARDIAC CATH LAB     CV PCI CHRONIC TOTAL OCCLUSION N/A 10/24/2019    Procedure: Coronary Total Occlusion;  Surgeon: Lupe Posada MD;  Location:  HEART CARDIAC CATH LAB     CV PCI STENT DRUG ELUTING N/A 10/24/2019    Procedure: PCI Stent Drug Eluting;  Surgeon: Lupe Posada MD;  Location:  HEART CARDIAC CATH LAB     CV RIGHT HEART CATH MEASUREMENTS RECORDED N/A 6/15/2022    Procedure: Right Heart Catheterization;  Surgeon: Murali Devi MD;  Location:  HEART CARDIAC CATH LAB     DAVINCI HERNIORRHAPHY INGUINAL Bilateral 2/2/2022    Procedure: ROBOT-ASSISTED BILATERAL INGUINAL HERNIA REPAIR WITH MESH;  Surgeon: Rene Murray MD;  Location:  OR     ESOPHAGOSCOPY, GASTROSCOPY, DUODENOSCOPY (EGD), COMBINED N/A 1/20/2015    Procedure: COMBINED ESOPHAGOSCOPY, GASTROSCOPY, DUODENOSCOPY (EGD), BIOPSY SINGLE OR MULTIPLE;  Surgeon: Allan Marroquin MD;  Location:  GI     EXCISE LESION TRUNK N/A 4/17/2019    Procedure: EXCISE CYST UPPER BACK;  Surgeon: Rene Murray MD;  Location:  OR     HERNIA REPAIR       HERNIORRHAPHY UMBILICAL N/A 2/2/2022    Procedure: OPEN UMBILICAL HERNIA REPAIR;  Surgeon: Rene Murray MD;  Location:  OR     HYDROCELECTOMY SCROTAL       JOINT REPLACEMENT (aka KNEE) NOS Bilateral             Medications:     Prior to  Admission medications    Medication Sig Start Date End Date Taking? Authorizing Provider   aspirin (ASA) 81 MG EC tablet Take 81 mg by mouth daily   Yes Reported, Patient   atorvastatin (LIPITOR) 40 MG tablet Take 1 tablet (40 mg) by mouth daily 6/3/19  Yes Mejia Phillips MD   carvedilol (COREG) 3.125 MG tablet Take 2 tablets (6.25 mg) by mouth 2 times daily (with meals) 6/17/22  Yes Winsome Wills PA-C   celecoxib (CELEBREX) 100 MG capsule Take 100 mg by mouth daily   Yes Unknown, Entered By History   dulaglutide (TRULICITY) 0.75 MG/0.5ML pen Inject 0.75 mg Subcutaneous every 7 days   Yes Reported, Patient   empagliflozin (JARDIANCE) 25 MG TABS tablet Take 25 mg by mouth daily   Yes Reported, Patient   furosemide (LASIX) 20 MG tablet Take 40 mg (2 tabs) in the morning. You can take an extra tablet (20 mg) once daily in the afternoon as needed if your weight is over 215 pounds or you have worsening swelling or shortness of breath. 7/25/22  Yes Castillo Mcgee, NP   HYDROcodone-acetaminophen (NORCO) 5-325 MG tablet Take 1 tablet by mouth every 8 hours as needed for severe pain   Yes Unknown, Entered By History   insulin glargine (LANTUS) 100 UNIT/ML injection Inject 11 Units Subcutaneous every morning 5/15/17  Yes Marquis Caldwell MD   insulin lispro (HUMALOG KWIKPEN) 100 UNIT/ML injection 11 units with breakfast,15 units at noon, and 14 units before evening meal  Patient taking differently: Depending on glucose reading, ranges from 5-7 units with each meal 6/19/17  Yes Marquis Caldwell MD   metFORMIN (GLUCOPHAGE) 500 MG tablet TAKE 2 TABLETS (1,000 MG) BY MOUTH TWICE A DAY WITH MEALS 3/5/18  Yes Marquis Caldwell MD   Multiple Vitamins-Minerals (MULTIVITAL PO) Take 1 tablet by mouth daily   Yes Reported, Patient   sacubitril-valsartan (ENTRESTO) 24-26 MG per tablet Take 1 tablet by mouth 2 times daily 1/29/20  Yes Stella Elliott PA-C   tamsulosin (FLOMAX) 0.4 MG capsule Take 0.4 mg by mouth daily    Yes Reported, Patient   tiZANidine (ZANAFLEX) 4 MG tablet Take 4 mg by mouth 3 times daily as needed for muscle spasms   Yes Unknown, Entered By History   traMADol (ULTRAM) 50 MG tablet Take 50 mg by mouth every 6 hours as needed for moderate pain   Yes Unknown, Entered By History   vitamin C (ASCORBIC ACID) 1000 MG TABS Take 1,000 mg by mouth daily   Yes Reported, Patient   Vitamin D, Cholecalciferol, 25 MCG (1000 UT) CAPS Take 4,000 Units by mouth daily   Yes Reported, Patient   zinc gluconate 50 MG tablet Take 50 mg by mouth daily   Yes Reported, Patient            Allergies:     Allergies   Allergen Reactions     Semaglutide Fatigue and GI Disturbance            Social History:     Social History     Tobacco Use     Smoking status: Never     Smokeless tobacco: Never   Substance Use Topics     Alcohol use: Not Currently     Alcohol/week: 0.0 standard drinks             Family History:    This patient has no significant relevant family history.  Family history is reviewed in detail.          Review of Systems:   Complete ROS is negative other than noted in the HPI.  C: NEGATIVE for fever, chills, change in weight  R: NEGATIVE for significant cough or SOB  CV: NEGATIVE for chest pain, palpitations or peripheral edema  GI:  NEGATIVE for dysuria, heartburn, or change in bowel habits  H: NEGATIVE for bleeding problems         Physical Exam:   Blood pressure (!) 80/46, pulse 85, temperature 99  F (37.2  C), temperature source Oral, resp. rate (!) 41, height 1.829 m (6'), weight 94.9 kg (209 lb 3.5 oz), SpO2 95 %.  I/O last 3 completed shifts:  In: 1310.92 [P.O.:180; I.V.:1130.92]  Out: 928 [Urine:928]    General - This is a well developed, well nourished male .  HEENT - Normocephalic. Atraumatic. Moist mucous membranes. Pupils equal.  No scleral icterus. Nose normal.  Neck - Supple without masses. No cervical adenopathy or thyromegaly  Lungs - Breathing not labored  Chest - Not tender. CVA's nontender  Heart - Regular  rate & rhythm   Abdomen - Soft, he has some tenderness to moderate to firm pressure throughout his abdomen.  This does not localize well.  He does not have an umbilical or groin hernia to the best exam I can do with him supine.  I did not have him stand., nondistended with +bowel sounds, no organomegaly.  Extremities - Moves all extremities. Warm without edema. Pulses noted  Neurologic - Nonfocal. Alert and oriented          Data:   Labs:  Recent Labs   Lab Test 11/07/22 0404 11/06/22  1433 11/05/22  0553   WBC 13.0* 12.7* 10.4   HGB 8.7* 9.8* 12.2*   HCT 26.2* 30.7* 38.5*    180 198     Recent Labs   Lab Test 11/07/22 0404 11/07/22  0001 11/06/22 1945   POTASSIUM 5.3 6.2* 6.1*   CHLORIDE 103 101 100   CO2 21 21 22   BUN 77* 75* 70*   CR 2.79* 2.81* 2.60*     Recent Labs   Lab Test 11/06/22  1433 11/06/22  1232   BILITOTAL 4.0* 3.8*   ALT 21 24   AST 20 18   ALKPHOS 59 59   LIPASE  --  24*     Recent Labs   Lab Test 11/04/22  1702 06/15/22  0653 10/24/19  1040 06/04/19  0930   INR 1.21* 1.17* 1.19* 1.15*   PTT  --  43* 34 40*     Recent Labs   Lab Test 11/07/22 0404 11/07/22  0001 11/06/22 1945   FATMATA 8.6 8.6 8.8   MAG  --   --  2.5*     Recent Labs   Lab Test 11/07/22 0404 11/07/22  0001 11/06/22  1945 11/06/22  1433 11/06/22  1232 06/04/21  1413 01/03/20  0042   ANIONGAP 10 9 11  --  12   < >  --    PROTEIN  --   --   --   --   --   --  10*   ALBUMIN  --   --   --  2.8* 2.9*  --   --     < > = values in this interval not displayed.       CT scan of the abdomen: Images reviewed in detail.  He does have a dilated stomach and dilated small bowel.  I do not really see a transition point and there is only a small amount of nondilated bowel.  There is no bowel trapped in the umbilicus in the area of his previous hernia and there is no inguinal hernia with trapped bowel.  Any possible transition described by the radiologist is in the upper abdomen.    Ultrasound of the abdomen: Not done                Assessment:   I do not think he has an abdominal catastrophe.  I suspect he has low flow to his gut due to his cardiac condition.  I do not think he needs an exploration for a bowel obstruction at this time.  I think proceeding with his cardiac treatment is the best plan with our follow him closely.  He should be able to have liquids today         Plan:   No operative intervention at this time       I have discussed the history, physical, and plan with the patient and his family  Allan Og M.D.

## 2022-11-07 NOTE — PROGRESS NOTES
Unsuccessful outreach:  Outreach attempted to [employer] Dukes via phone about housing application but was unsuccessful.  Left message on voice mail.  CHW next step:  Attempt contact in 1-16 days.   hospitalist addendum  Pt remains on pressors.   Chart reviewed  Discussed with intensivist  Our service with sign off.   Babak Skinner MD

## 2022-11-07 NOTE — PLAN OF CARE
Goal Outcome Evaluation:    Pt to cath lab this am for R heart cath. Pt returned with swan in place. PATIENCE C.O. 5.4; C.I. 2.5. . Pt remains on dobutamine, vasopressin and ana gtt's to keep MAP >60. DBP drops to 30's with frequent non perfusing ectopy. Dilaudid prn for back pain with some relief. Lasix given x 1 with some improvement in UOP. 5% albumin given x1. NGT placed for decompression. Plan for TAVR on 11/8. Family at bedside - updated on plan of care.

## 2022-11-07 NOTE — PRE-PROCEDURE
GENERAL PRE-PROCEDURE:     Written consent obtained?: Yes    Risks and benefits: Risks, benefits and alternatives were discussed    Consent given by:  Patient  Patient states understanding of procedure being performed: Yes    Patient's understanding of procedure matches consent: Yes    Procedure consent matches procedure scheduled: Yes    Expected level of sedation:  Moderate  Appropriately NPO:  Yes  ASA Class:  4  Mallampati  :  Grade 3- soft palate visible, posterior pharyngeal wall not visible  Lungs:  Lungs clear with good breath sounds bilaterally  Heart:  Normal heart sounds and rate  History & Physical reviewed:  History and physical reviewed and no updates needed  Statement of review:  I have reviewed the lab findings, diagnostic data, medications, and the plan for sedation

## 2022-11-07 NOTE — PROGRESS NOTES
[facility]    ICU Progress Note       Date of Admission:  11/4/2022    Assessment: Critical Care   Bryant Mckenzie is a 82 year old male admitted on 11/4/2022 with severe critical aortic stenosis, cardiogenic shock, acute renal failure and abdominal pain. He is undergoing further workup for urgent TAVR. He remains critically ill requiring inotropic and vasopressor support.     Hospital course notable for worsening renal function. Nephrology has been consulted and is considering initiation of CRRT.     Additionally he has developed severe abdominal pain. CT scan showed evidence of mesenteric inflammation, peritoneal free fluid and concern for possible bowel obstruction. General surgery consulted for further evaluation. Consideration also being given to SHEA given his persistent hypotension during admission.          Plan: Critical Care   Neuro/ pain/ sedation:  - Monitor neurological status. 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 Developed worsening dyspnea, near syncope and hypotension prompting evaluation in the ED. Admitted for urgent TAVR. Cardiac issues complicated by worsening peripheral hypotension, abdominal pain and acute renal failure requiring admission to the ICU. Currently on inotropic and vasopressor support.     - Monitor hemodynamic status  - Continue  inotropic support with dobutamine  - Continue vasopressor support with phenylephrine and vasso. Avoid epinephrine.   - Ensure adequate preload- serial bedside POCUS and cheryl trac to be set up  - right heart cath today for TAVR workup  - undergoing TAVR workup  - cardiology and CT surgery consulted  - continue ASA and statin therapy  - holding PTA BB  - avoid diuretics    GI/Nutrition:  #Abdominal pain  # atrophic gastris and associate B12 deficiency  Diffuse severe abdominal pain onset day of admission. Patient reports pain is not present unless coughing or during examination. Denies associated nausea or emesis but has not passed flatus or stool since day of admission. CT scan obtained overnight 11/7/22 shows intraperitoneal free fluid, mesenteric inflammation and dilated loops of small bowel concerning for partial mechanical small bowel obstruction from suspected adhesive disease. CT findings may also be suggestive of decreased visceral perfusion, SHEA as patients clinical exam is not congruent with SBO. General surgery consulted for further evaluation.     - Diet: NPO for Medical/Clinical Reasons Except for: Meds    - NG to LIS, assess characterization of output  - PPI for PUD ppx not indicated  - monitor serial abdominal examination  - General surgery consultation, appreciate assistance with evaluation  - continue B12 supplementation when clinically stable    Renal/ Fluid Balance:   # Acute renal failure  #hyperkalemia. 6.1 on admission, improved but still elevated.   - Will monitor intake and output  - judicious IVF given cardiac issues  - cook for strict I/O  - monitor UOP  - nephrology consulted  - ICU electrolyte replacement protocol   - hyperkalemia protocol- calcium gluconate, insuline, D50. Received 1 dose of lasix.   - ongoing evaluation for initiation of dialysis    Endocrine:  #DM, HGA1c 6.8. Acute worsening hyperglycemia present  - continue POC glucose and insulin gtt    ID:  #Leukocytosis, etiology  unclear. Abdominal pain suggestive of intraabdominal pathology with translocation due to hypoperfusion.   - monitor labs, vitals for worsening decompensation  - at risk for ischemic enterocolitis in the setting of cardiogenic shock and hypotension  - trending lactate    Hematology:  # DVT ppx  - continue subcutaneous heparin    MSK:   - PT and OT consulted. Appreciate recommendations.       Lines/ tubes/ drains:  Landeros Catheter: PRESENT, indication: Strict 1-2 Hour I&O  Central Lines: PRESENT  CVC Right Internal jugular-Site Assessment: WDL    Prophylaxis:  - DVT Prophylaxis: Heparin SQ and Pneumatic Compression Devices  - PUD Prophylaxis: not indicated    Code Status: Full Code      Disposition:  - ICU,     The patient's care was discussed with the Attending Physician, Dr. Noel, Bedside Nurse, Patient, Patient's Family, Cardiology, Neprhology Consultant and Primary team.    Critical care time exclusive of procedures: 60 minutes. Time spent reviewing medical record including CT imaging, CXR report, review of labs, review of consultation recommendation, direct conversation with the patient and family about his current diagnosis, treatment plans and initiation of goals of care discussion.     Brian Dallas MD  [facility]  Securely message with the Vocera Web Console (learn more here)  Text page via [employer] Paging/Directory         Clinically Significant Risk Factors Present on Admission               # Overweight: Estimated body mass index is 28.37 kg/m  as calculated from the following:    Height as of this encounter: 1.829 m (6').    Weight as of this encounter: 94.9 kg (209 lb 3.5 oz).        ______________________________________________________________________    Interval History   - transferred to ICU overnight  - remains on dobutamine but dose had to be decreased due to NSVT 10 beat run  - started on phenylephrine, vasopressor to maintain MAP goals, continues to be hypotension  -  remains dyspneic but no acute indications for intubation  - abdominal pain is rated as severe 8-9/10, diffuse and sharp in nature. Pain is present during coughing and while examined but otherwise he does not have pain. He has no associated nausea or emesis. No passage of BM or flatus in the last 24 hours. General surgery has been consulted but not yet evaluated the patient. CT scan obtained overnight showed inflammatory changes, intraperitoneal free fluid and dilated loops of bowel concerning for possible mechanical small bowel obstruction.   - cardiology planning for urgent right heart cath for ongoing TAVR workup    Physical Exam   Vital Signs: Temp: 99  F (37.2  C) Temp src: Oral BP: (!) 80/46 Pulse: 87   Resp: 29 SpO2: 94 %      Weight: 209 lbs 3.46 oz  General: elderly male, moderate distress  HEENT: AT, NC, PERRL, EOMI, right internal jugular CVC in place with sterile dressing, trachea midline, no significant JVD appreciated  CV: warm, non mottled appearance. Loud pan systolic cardiac murmur present, sinus rhythm on tele  Pulm: dyspnea present. Able to get a few words out at a time, tires easily while taking. No significant increased work of breathing and on room air, no accessory muscle use  Abdomen: moderately distended, diffuse tenderness with voluntary guarding present on palpation, non peritonitis, well healed supraumbilical midline incision from prior ventral hernia repair, palpable mesh subcutaneously.   Extremities: moving all four extremities  Neuro: cognition relatively intact, kelly to participate in examination, grossly normal neuro examination  Psych: calm, anxious affect, tired easily and want to rest    Data   I reviewed all medications, new labs and imaging results over the last 24 hours.  Arterial Blood Gases   Recent Labs   Lab 11/07/22  0003 11/06/22  1414   PH 7.40 7.43   PCO2 36 33*   PO2 113* 77*   HCO3 22 22       Complete Blood Count   Recent Labs   Lab 11/07/22  0404 11/06/22  1433  11/05/22  0553 11/04/22  1051   WBC 13.0* 12.7* 10.4 5.2   HGB 8.7* 9.8* 12.2* 12.8*    180 198 182       Basic Metabolic Panel  Recent Labs   Lab 11/07/22  0852 11/07/22  0743 11/07/22  0623 11/07/22  0525 11/07/22  0432 11/07/22  0404 11/07/22  0010 11/07/22  0001 11/06/22  2233 11/06/22  1945 11/06/22  1232   NA  --   --   --   --   --  134  --  131*  --  133 133   POTASSIUM  --   --   --   --   --  5.3  --  6.2*  --  6.1* 6.0*   CHLORIDE  --   --   --   --   --  103  --  101  --  100 100   CO2  --   --   --   --   --  21  --  21  --  22 21   BUN  --   --   --   --   --  77*  --  75*  --  70* 63*   CR  --   --   --   --   --  2.79*  --  2.81*  --  2.60* 2.12*   * 152* 160* 204*   < > 255*   < > 340*   < > 323* 296*    < > = values in this interval not displayed.       Liver Function Tests  Recent Labs   Lab 11/06/22  1433 11/06/22  1232 11/04/22  1702   AST 20 18  --    ALT 21 24  --    ALKPHOS 59 59  --    BILITOTAL 4.0* 3.8*  --    ALBUMIN 2.8* 2.9*  --    INR  --   --  1.21*       Pancreatic Enzymes  Recent Labs   Lab 11/06/22  1232   LIPASE 24*       Coagulation Profile  Recent Labs   Lab 11/04/22  1702   INR 1.21*       IMAGING:  Recent Results (from the past 24 hour(s))   XR Chest Port 1 View    Narrative    EXAM: XR CHEST PORT 1 VIEW  LOCATION: [facility]  DATE/TIME: 11/7/2022 12:15 AM    INDICATION: central line placement  COMPARISON: 11/04/2022      Impression    IMPRESSION: Heart size is stable. Interval placement of right-sided central venous catheter which terminates over the mid to low right atrium. If positioning at the cavoatrial junction is desired, retraction by 4 cm is suggested. Lung volumes have   diminished. There are persistent streaky parenchymal opacities, atelectasis versus infiltrate, greatest in the left lower lobe. No pneumothorax.   CT Abdomen Pelvis w/o Contrast    Narrative    EXAM: CT ABDOMEN PELVIS W/O CONTRAST  LOCATION: [facility]  [facility]  DATE/TIME: 11/7/2022 2:02 AM    INDICATION: Abdominal pain and distention.  COMPARISON: CT abdomen and pelvis without IV contrast 5/12/2022.  TECHNIQUE: CT scan of the abdomen and pelvis was performed without IV contrast. Multiplanar reformats were obtained. Dose reduction techniques were used.  CONTRAST: None.    FINDINGS:   LOWER CHEST: Interval development of small bilateral pleural effusions with associated passive atelectasis in the adjacent lower lobes. Cardiac enlargement. Dense coronary artery and aortic valvular calcifications. No pericardial effusion. Hypodensity of   the cardiac ventricles suggests anemia.    HEPATOBILIARY: No discrete lesion, calcified gallstones or biliary dilatation.    PANCREAS: Normal.    SPLEEN: Numerous calcified granulomas in the spleen.    ADRENAL GLANDS: Normal.    KIDNEYS/BLADDER: No urinary tract calculi. Both kidneys are negative for hydronephrosis or hydroureter. Partially distended urinary bladder containing a Landeros catheter.    BOWEL: Stomach is moderately distended with gas and fluid. Several asymmetric gas and fluid distended small bowel loops to the left lower quadrant with change in caliber (images 166-178, series 2, images 29-33, series 3, images 80-87, series 4),   representing partial mechanical small bowel obstruction, likely attributable to adhesions. No free gas. Small amount of abdominopelvic ascites has developed. Formed stool material within normal caliber colon.    LYMPH NODES: No suspicious abdominopelvic adenopathy.    VASCULATURE: Atherosclerotic normal caliber distal abdominal aorta measuring 2.0 x 2.0 cm (image 116, series 2). Normal caliber IVC.    PELVIC ORGANS: Vascular calcifications. Small amount of ascites. Prostatomegaly. Landeros catheter within the urinary bladder.    MUSCULOSKELETAL: Degenerative changes involving the spine and joints of the pelvis. Mild diffuse body wall edema has developed.      Impression    IMPRESSION:   1.   Asymmetric gas and fluid distended small bowel loops to the left lower quadrant with change in caliber, representing partial mechanical small bowel obstruction, likely attributable to adhesions. No free gas. Formed stool material within normal   caliber colon. Stomach is moderately distended with gas and fluid. Recommend surgical consultation.    2.  Small amount of abdominopelvic ascites has developed. Small bilateral pleural effusions have developed with associated passive atelectasis in the adjacent lower lobes. Mild diffuse body wall edema has developed.    3.  Cardiac enlargement. Dense coronary artery and aortic valvular calcifications. No pericardial effusion. Hypodensity of the cardiac ventricles suggests anemia. Aortoiliac atherosclerotic changes without aneurysm.    4.  Prostatomegaly. Landeros catheter within the urinary bladder.    NOTE: ABNORMAL REPORT    THE DICTATION ABOVE DESCRIBES AN ABNORMALITY FOR WHICH FOLLOW-UP IS NEEDED.

## 2022-11-07 NOTE — PROGRESS NOTES
Structural Critical Care Note:    Chart reviewed.  History and physical obtained.  Patient with new MISTI, tender abdomen, anemia unexplained and hypotension making critically ill patient.  Recommended RHC with leaving SG catheter in place.  Numbers reviewed.  Cardiac output reasonable however low filling pressures.  Recommended IV fluids to obtain PCW 15-18.  Discussed with intensivist and family.  Follow serial hemoglobin, renal function, potassium, and obtain GI consult.    Follow up later in the day shows improvement in renal function, less abdomenal tenderness, hgb stable.  Echocardiogram without significant changes.  Will work over night to taper off pressors to allow TAVR to be done when patient stabilized.  Complex medical decision making .  Total time spent 60 min of critical care time.

## 2022-11-07 NOTE — H&P
[facility] Intensive Care Unit  History and Physical  November 7, 2022  Bryant Mckenzie MRN# [id number]   Age: 82 year old YOB: 1940     Date of Admission: 11/4/2022    Primary care provider: Brown, Merlin Emery          Assessment and plan :   Bryant Mckenzie is a 82 year old male admitted on 11/4/2022 for   Chief Complaint   Patient presents with     Hypotension     Dizziness   . My assessment and plan for this patient is as follows:    1.Neurology:  # Pain  - PRN tylenol    2. Cardiovascular:   # Cardiogenic shock   # Critical Aortic stenosis   # Acute on chronic congestive heart failure exacerbation  # CAD  # Hyperlipidemia   TAVR scheduled for 11/8/22. Most recent LVEF was <20% on 10/14/2022.  Hypotension did not stabilize with dobutamine, and with MAPs intermittently <60 there is concern for renal hypoperfusion. BNP is elevated with evidence of pulmonary congestion on CXR, but given patient's cardiac output is ultimately preload dependent in the setting of critical aortic stenosis and significant LV dysfunction, will plan to pressure support with phenylephrine and dobutamine, avoiding diuresis at this time.  - Pressure support to a MAP goal of >60, currently on phenylephrine and dobutamine.  - Monitor I&Os  - Continue PTA lipitor and aspirin  - Holding PTA carvedilol, lasix, and entresto    3. Pulmonary/Ventilator Management:  Hx of KENIA but does not use CPAP.  - Oxygen support as needed to keep saturation > 92%    4. GI and Nutrition : Acute abdominal issue present  Yes, guarding on abdominal palpation. Active GI bleeding present: No. Liver function abnormality present No. Significant constipation issue: No. Significant diarrhea No.  Other GI problem: Yes. Reasons to postpone or  Contraindication to enteral nutrition Yes   # Abdominal Pain  Rule out bowel obstruction.  - CT abdomen pelvis  - Bowel regimen    5. Renal/Fluids/Electrolytes: .Assessment: MISTI  present (rapid recent change  in kidney function: rise in serum creatinine r?0.3 mg/dl or ? 50% or urine output <0.5 ml/kg/hr for more than 6 hours)?: YES. Is most recent renal function and/or urine output trend improving: creatinine has risen to 2.81 (baeline of ~0.9). Rare Major electrolytes or A/B abnormalities present needing immediate intervention Yes: Is U/A abnormal: N/A. Plan: monitor renal function, I/O, electrolytes, A/B status and treat/replace  electrolytes as needed.   # MISTI  # Hyperkalemia  # Hyponatremia  # Hypoalbuminemia  # Lactic Acidosis, resolved  Suspect ATN in the setting of suspected hypoperfusion, FENA and FEUrea pending. 180mL of urine output in the last 2 hours. Lactic acid elevated to 2.7, has since normalized.   - 1g Calcium gluconate  - Telemetry monitoring  - s/p 1L bolus  - 75ml/h LR mIVF at this time  - Follow BMP    6. Infectious Disease:   # Leukocytosis  Patient has remained afebrile, likely stress induced leukocytosis. Abdominal pain present on exam, and based on CT scan may be pursuing a sepsis workup.  - Follow CBC    7. Endocrine:   # Insulin Dependent Diabetes Mellitus  Most recent A1c of 6.8 on 11/4/2022. Most recent blood glucose of 340. Home regimen of 11U lantus and lispro (11U breakfast, 15 at noon, and 14 before evening meal).  - Insulin drip   - Diabetic Diet   - Holding PTA lispro, metformin, Jardiance, and Trulicity    8. Hematology/Oncology:  # Acute on Chronic Anemia  # Hyperbilirubinemia  # BPH  Per chart review patient has reported epigastric pain with coughing, total and direct bilirubin were both elevated. Concern for hemolysis in the setting of anemia with a normal AST, ALT, and alkaline phosphatase, and the presence of mik cells and polychromasia.  - hold PTA Flomax in the setting of hypotension  - Monitor hemoglobin, transfuse if <8    10. IV/Access: 1 pIV in right upper arm, left axillary arterial line, right internal jugular central line    11. Prophylaxis: subcutaneous heparin,  SCDs    12. Top 3 Key Goals for the next 24 hours : Improve renal function, Improve blood pressure, correct hyperglycemia, correct hyperkalemia    13. Billing: total time spend providing critical care was 35 min         Chief Complaint/ Reason for ICU Admission and HPI     Admitted for :   Chief Complaint   Patient presents with     Hypotension     Dizziness     History obtained from Chart review.  History of Present Illness: Patient presented to the ED on 11/4 with shortness of breath and low blood pressure. He had been following with cardiology, scheduled for a TAVR on 11/15/2022. At baseline he reports having dyspnea on exertion but awoke on 11/4 with shortness of breath at rest. This persisted throughout his morning routine, progressing to develop lightheadedness as the day went on. This prompted him to take his blood pressure, systolic was initially in the 70's and on recheck 30 minutes later it had increased to the 80's. He contacted his TAVR team and was recommended to come to the ER for further evaluation. He was subsequently admitted and developed hypotension not responsive to dobutamine prompting transfer to [facility] ICU for additional pressure support.                   Past Medical History:     Past Medical History:   Diagnosis Date     Aortic stenosis 9/23/2016    Echo 9/2016 Mild       Atrophic gastritis 9/8/2016     Benign non-nodular prostatic hyperplasia 09/08/2016     CAD (coronary artery disease)      Hyperlipidemia 09/08/2016     Ischemic cardiomyopathy 6/12/2019     Nonischemic cardiomyopathy (H) 10/30/2019     Obstructive sleep apnea syndrome 9/8/2016     Type 2 diabetes mellitus 09/06/2016     Vitamin B12 deficiency (non anemic) 9/8/2016            Past Surgical History:     Past Surgical History:   Procedure Laterality Date     CV CORONARY ANGIOGRAM N/A 10/24/2019    Procedure: Coronary Angiogram;  Surgeon: Lupe Posada MD;  Location: [facility] CARDIAC CATH LAB     CV CORONARY ANGIOGRAM  N/A 6/15/2022    Procedure: Coronary Angiogram;  Surgeon: Murali Devi MD;  Location:  HEART CARDIAC CATH LAB     CV HEART CATHETERIZATION WITH POSSIBLE INTERVENTION N/A 6/4/2019    Procedure: Heart Catheterization with Possible Intervention;  Surgeon: Alex Bazan MD;  Location:  HEART CARDIAC CATH LAB     CV INSTANTANEOUS WAVE-FREE RATIO N/A 6/15/2022    Procedure: Instantaneous Wave-Free Ratio;  Surgeon: Murali Devi MD;  Location:  HEART CARDIAC CATH LAB     CV LEFT VENTRICULOGRAM N/A 6/15/2022    Procedure: Left Ventriculogram;  Surgeon: Murali Devi MD;  Location:  HEART CARDIAC CATH LAB     CV PCI CHRONIC TOTAL OCCLUSION N/A 10/24/2019    Procedure: Coronary Total Occlusion;  Surgeon: Lupe Posada MD;  Location:  HEART CARDIAC CATH LAB     CV PCI STENT DRUG ELUTING N/A 10/24/2019    Procedure: PCI Stent Drug Eluting;  Surgeon: Lupe Posada MD;  Location:  HEART CARDIAC CATH LAB     CV RIGHT HEART CATH MEASUREMENTS RECORDED N/A 6/15/2022    Procedure: Right Heart Catheterization;  Surgeon: Murali Devi MD;  Location:  HEART CARDIAC CATH LAB     DAVINCI HERNIORRHAPHY INGUINAL Bilateral 2/2/2022    Procedure: ROBOT-ASSISTED BILATERAL INGUINAL HERNIA REPAIR WITH MESH;  Surgeon: Rene Murray MD;  Location:  OR     ESOPHAGOSCOPY, GASTROSCOPY, DUODENOSCOPY (EGD), COMBINED N/A 1/20/2015    Procedure: COMBINED ESOPHAGOSCOPY, GASTROSCOPY, DUODENOSCOPY (EGD), BIOPSY SINGLE OR MULTIPLE;  Surgeon: Allan Marroquin MD;  Location:  GI     EXCISE LESION TRUNK N/A 4/17/2019    Procedure: EXCISE CYST UPPER BACK;  Surgeon: Rene Murray MD;  Location:  OR     HERNIA REPAIR       HERNIORRHAPHY UMBILICAL N/A 2/2/2022    Procedure: OPEN UMBILICAL HERNIA REPAIR;  Surgeon: Rene Murray MD;  Location:  OR     HYDROCELECTOMY SCROTAL       JOINT REPLACEMENT (aka KNEE) NOS Bilateral             Social History:     Social History      Socioeconomic History     Marital status:      Spouse name: Not on file     Number of children: Not on file     Years of education: Not on file     Highest education level: Not on file   Occupational History     Not on file   Tobacco Use     Smoking status: Never     Smokeless tobacco: Never   Vaping Use     Vaping Use: Never used   Substance and Sexual Activity     Alcohol use: Not Currently     Alcohol/week: 0.0 standard drinks     Drug use: No     Sexual activity: Never   Other Topics Concern     Parent/sibling w/ CABG, MI or angioplasty before 65F 55M? 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; walks dog for exercise      Social Determinants of Health     Financial Resource Strain: Not on file   Food Insecurity: Not on file   Transportation Needs: Not on file   Physical Activity: Not on file   Stress: Not on file   Social Connections: Not on file   Intimate Partner Violence: Not on file   Housing Stability: Not on file     Smoking:  History   Smoking Status     Never   Smokeless Tobacco     Never     Alcohol:   Social History    Substance and Sexual Activity      Alcohol use: Not Currently        Alcohol/week: 0.0 standard drinks    Drug:  History   Drug Use No            Family History:     Family History   Problem Relation Age of Onset     Coronary Artery Disease Father 53     Cerebrovascular Disease Father      Diabetes Mother      Diabetes Brother             Allergies:   Please see allergy list which was reviewed this admission.  All allergies reviewed and addressed         Medications:     Medications Prior to Admission   Medication Sig Dispense Refill Last Dose     aspirin (ASA) 81 MG EC tablet Take 81 mg by mouth daily   11/3/2022 at PM     atorvastatin (LIPITOR) 40 MG tablet Take 1 tablet (40 mg) by mouth daily 90 tablet 3 11/3/2022 at PM     carvedilol (COREG) 3.125 MG tablet Take 2 tablets (6.25 mg) by mouth 2 times daily (with meals) 360 tablet 1 11/4/2022 at  AM     celecoxib (CELEBREX) 100 MG capsule Take 100 mg by mouth daily   11/4/2022 at AM     dulaglutide (TRULICITY) 0.75 MG/0.5ML pen Inject 0.75 mg Subcutaneous every 7 days   11/2/2022     empagliflozin (JARDIANCE) 25 MG TABS tablet Take 25 mg by mouth daily   11/4/2022 at AM     furosemide (LASIX) 20 MG tablet Take 40 mg (2 tabs) in the morning. You can take an extra tablet (20 mg) once daily in the afternoon as needed if your weight is over 215 pounds or you have worsening swelling or shortness of breath. 270 tablet 1 11/4/2022 at AM     HYDROcodone-acetaminophen (NORCO) 5-325 MG tablet Take 1 tablet by mouth every 8 hours as needed for severe pain   11/4/2022 at AM     insulin glargine (LANTUS) 100 UNIT/ML injection Inject 11 Units Subcutaneous every morning 15 mL 3 11/4/2022 at AM     insulin lispro (HUMALOG KWIKPEN) 100 UNIT/ML injection 11 units with breakfast,15 units at noon, and 14 units before evening meal (Patient taking differently: Depending on glucose reading, ranges from 5-7 units with each meal) 36 mL 3 11/4/2022 at AM     metFORMIN (GLUCOPHAGE) 500 MG tablet TAKE 2 TABLETS (1,000 MG) BY MOUTH TWICE A DAY WITH MEALS 360 tablet 0 11/4/2022 at AM     Multiple Vitamins-Minerals (MULTIVITAL PO) Take 1 tablet by mouth daily   11/4/2022 at AM     sacubitril-valsartan (ENTRESTO) 24-26 MG per tablet Take 1 tablet by mouth 2 times daily 30 tablet 3 11/4/2022 at AM     tamsulosin (FLOMAX) 0.4 MG capsule Take 0.4 mg by mouth daily   11/4/2022 at AM     tiZANidine (ZANAFLEX) 4 MG tablet Take 4 mg by mouth 3 times daily as needed for muscle spasms   11/4/2022 at AM     traMADol (ULTRAM) 50 MG tablet Take 50 mg by mouth every 6 hours as needed for moderate pain   11/3/2022 at PRN     vitamin C (ASCORBIC ACID) 1000 MG TABS Take 1,000 mg by mouth daily   11/3/2022     Vitamin D, Cholecalciferol, 25 MCG (1000 UT) CAPS Take 4,000 Units by mouth daily   11/3/2022 at PM     zinc gluconate 50 MG tablet Take 50 mg by  mouth daily   11/3/2022            Review of Systems:   CONSTITUTIONAL: negative for fever, chills, change in weight  INTEGUMENTARY/SKIN: no rash or obvious new lesions  ENT/MOUTH: no sore throat, new sinus pain or nasal drainage, no neck mass noted  RESP: Dyspnea present, No pleuretic pain, no hemoptysis  CV: negative for chest pain, palpitations or peripheral edema  GI: no vomiting, change in stools  : no dysuria or hematuria. Positive for abdominal pain.  MUSCULOSKELETAL: no myalgias, arthralgias or join efffusion  ENDOCRINE: no history of polyuria, polydyspsia or symptoms of thyroid dysfunction  PSYCHIATRIC: no change in mood stable  LYMPHATIC: no new lymphadenopathy  HEME: no bleeding or easy bruisability  NEURO: no new weakness, new numbness or acute headaches. No change in mental status         Physical Exam:   Vitals were reviewed  Temp:  [97.8  F (36.6  C)-99  F (37.2  C)] 99  F (37.2  C)  Pulse:  [] 62  Resp:  [16-22] 18  BP: ()/(46-95) 84/51  SpO2:  [90 %-99 %] 97 %    Intake/Output Summary (Last 24 hours) at 11/7/2022 0028  Last data filed at 11/6/2022 2256  Gross per 24 hour   Intake 255 ml   Output 763 ml   Net -508 ml       General:   Resting in bed comfortably, sleepy throughout exam   Neurologic:   Moves all extremities spontaneously, symmetrically and appropriately  Alert and oriented x3   HEENT:   Head is atraumatic  Trachea is midline      Lungs:   Symmetrical chest shape and movements with each tidal breath   Cardiovascular:   Normal neck veins  Normal and symmetrical radial, femoral and carotid pulses  No JVD present  Sinus tachycardia   Abdomen:   Distended:  No. Soft: Yes . Tender: Yes in LLQ Rebound: guarding present Yes in LLQ, no rebound or rigidity  Hernia present: No   Extremities:   Edema present: No,  Mottling present: No   Skin:   Warm, dry.  No rash on limited exam.    Lines/Drain:    Central line present: Yes   Arterial line present: Yes   External ventricular Drain  present: No  Chest tube present: No  LEENA present: No  PEG present: No           Ancillary Data:   All laboratory and imaging data reviewed.     ABG/vent data:   Resp: 18    Recent Labs   Lab 11/07/22  0003 11/06/22  1433 11/06/22  1414   PH 7.40  --  7.43   PCO2 36  --  33*   PO2 113*  --  77*   HCO3 22  --  22   O2PER 32 0 21        ROUTINE IP LABS (Last four results)  BMP  Recent Labs   Lab 11/07/22  0010 11/06/22  2233 11/06/22  1945 11/06/22  1232 11/06/22  0810 11/05/22  0553 11/04/22  1441 11/04/22  1051   NA  --   --  133 133  --  133  --  134   POTASSIUM  --   --  6.1* 6.0*  --  4.6  --  4.7   CHLORIDE  --   --  100 100  --  101  --  103   FATMATA  --   --  8.8 8.9  --  9.1  --  9.0   CO2  --   --  22 21  --  20  --  26   BUN  --   --  70* 63*  --  25  --  23   CR  --   --  2.60* 2.12*  --  0.92  --  1.08   * 309* 323* 296*   < > 118*   < > 77    < > = values in this interval not displayed.     CBC  Recent Labs   Lab 11/06/22  1433 11/05/22  0553 11/04/22  1051   WBC 12.7* 10.4 5.2   RBC 3.66* 4.54 4.76   HGB 9.8* 12.2* 12.8*   HCT 30.7* 38.5* 41.1   MCV 84 85 86   MCH 26.8 26.9 26.9   MCHC 31.9 31.7 31.1*   RDW 17.2* 16.8* 17.0*    198 182     INR  Recent Labs   Lab 11/04/22  1702   INR 1.21*       Other Lab Data:  Results for orders placed or performed during the hospital encounter of 11/04/22 (from the past 24 hour(s))   Glucose by meter   Result Value Ref Range    GLUCOSE BY METER POCT 277 (H) 70 - 99 mg/dL   Lactic acid whole blood   Result Value Ref Range    Lactic Acid 2.3 (H) 0.7 - 2.0 mmol/L   Hepatic panel   Result Value Ref Range    Bilirubin Total 3.8 (H) 0.2 - 1.3 mg/dL    Bilirubin Direct 0.4 (H) 0.0 - 0.2 mg/dL    Protein Total 5.7 (L) 6.8 - 8.8 g/dL    Albumin 2.9 (L) 3.4 - 5.0 g/dL    Alkaline Phosphatase 59 40 - 150 U/L    AST 18 0 - 45 U/L    ALT 24 0 - 70 U/L   Basic metabolic panel   Result Value Ref Range    Sodium 133 133 - 144 mmol/L    Potassium 6.0 (H) 3.4 - 5.3 mmol/L     Chloride 100 94 - 109 mmol/L    Carbon Dioxide (CO2) 21 20 - 32 mmol/L    Anion Gap 12 3 - 14 mmol/L    Urea Nitrogen 63 (H) 7 - 30 mg/dL    Creatinine 2.12 (H) 0.66 - 1.25 mg/dL    Calcium 8.9 8.5 - 10.1 mg/dL    Glucose 296 (H) 70 - 99 mg/dL    GFR Estimate 31 (L) >60 mL/min/1.73m2   Lipase   Result Value Ref Range    Lipase 24 (L) 73 - 393 U/L   Blood gas arterial   Result Value Ref Range    pH Arterial 7.43 7.35 - 7.45    pCO2 Arterial 33 (L) 35 - 45 mm Hg    pO2 Arterial 77 (L) 80 - 105 mm Hg    FIO2 21     Bicarbonate Arterial 22 21 - 28 mmol/L    Base Excess/Deficit (+/-) -1.7 -9.0 - 1.8 mmol/L   Hepatic panel   Result Value Ref Range    Bilirubin Total 4.0 (H) 0.2 - 1.3 mg/dL    Bilirubin Direct 0.4 (H) 0.0 - 0.2 mg/dL    Protein Total 5.7 (L) 6.8 - 8.8 g/dL    Albumin 2.8 (L) 3.4 - 5.0 g/dL    Alkaline Phosphatase 59 40 - 150 U/L    AST 20 0 - 45 U/L    ALT 21 0 - 70 U/L   CBC with Platelets & Differential    Narrative    The following orders were created for panel order CBC with Platelets & Differential.  Procedure                               Abnormality         Status                     ---------                               -----------         ------                     CBC with platelets and d...[[id number]]  Abnormal            Final result               Manual Differential[[id number]]          Abnormal            Final result                 Please view results for these tests on the individual orders.   Blood gas venous   Result Value Ref Range    pH Venous 7.39 7.32 - 7.43    pCO2 Venous 38 (L) 40 - 50 mm Hg    pO2 Venous 52 (H) 25 - 47 mm Hg    Bicarbonate Venous 23 21 - 28 mmol/L    Base Excess/Deficit (+/-) -1.7 -7.7 - 1.9 mmol/L    FIO2 0    CBC with platelets and differential   Result Value Ref Range    WBC Count 12.7 (H) 4.0 - 11.0 10e3/uL    RBC Count 3.66 (L) 4.40 - 5.90 10e6/uL    Hemoglobin 9.8 (L) 13.3 - 17.7 g/dL    Hematocrit 30.7 (L) 40.0 - 53.0 %    MCV 84 78 - 100 fL    MCH 26.8  26.5 - 33.0 pg    MCHC 31.9 31.5 - 36.5 g/dL    RDW 17.2 (H) 10.0 - 15.0 %    Platelet Count 180 150 - 450 10e3/uL   Manual Differential   Result Value Ref Range    % Neutrophils 84 %    % Lymphocytes 8 %    % Monocytes 7 %    % Eosinophils 0 %    % Basophils 0 %    % Metamyelocytes 1 %    Absolute Neutrophils 10.7 (H) 1.6 - 8.3 10e3/uL    Absolute Lymphocytes 1.0 0.8 - 5.3 10e3/uL    Absolute Monocytes 0.9 0.0 - 1.3 10e3/uL    Absolute Eosinophils 0.0 0.0 - 0.7 10e3/uL    Absolute Basophils 0.0 0.0 - 0.2 10e3/uL    Absolute Metamyelocytes 0.1 (H) <=0.0 10e3/uL    RBC Morphology Confirmed RBC Indices     Platelet Assessment  Automated Count Confirmed. Platelet morphology is normal.     Automated Count Confirmed. Platelet morphology is normal.    [place] Cells Slight (A) None Seen    Polychromasia Slight (A) None Seen   Basic metabolic panel   Result Value Ref Range    Sodium 133 133 - 144 mmol/L    Potassium 6.1 (HH) 3.4 - 5.3 mmol/L    Chloride 100 94 - 109 mmol/L    Carbon Dioxide (CO2) 22 20 - 32 mmol/L    Anion Gap 11 3 - 14 mmol/L    Urea Nitrogen 70 (H) 7 - 30 mg/dL    Creatinine 2.60 (H) 0.66 - 1.25 mg/dL    Calcium 8.8 8.5 - 10.1 mg/dL    Glucose 323 (H) 70 - 99 mg/dL    GFR Estimate 24 (L) >60 mL/min/1.73m2   Magnesium   Result Value Ref Range    Magnesium 2.5 (H) 1.6 - 2.3 mg/dL   Lactic acid whole blood   Result Value Ref Range    Lactic Acid 2.7 (H) 0.7 - 2.0 mmol/L   Glucose by meter   Result Value Ref Range    GLUCOSE BY METER POCT 309 (H) 70 - 99 mg/dL   Basic metabolic panel   Result Value Ref Range    Sodium 131 (L) 133 - 144 mmol/L    Potassium 6.2 (HH) 3.4 - 5.3 mmol/L    Chloride 101 94 - 109 mmol/L    Carbon Dioxide (CO2) 21 20 - 32 mmol/L    Anion Gap 9 3 - 14 mmol/L    Urea Nitrogen 75 (H) 7 - 30 mg/dL    Creatinine 2.81 (H) 0.66 - 1.25 mg/dL    Calcium 8.6 8.5 - 10.1 mg/dL    Glucose 340 (H) 70 - 99 mg/dL    GFR Estimate 22 (L) >60 mL/min/1.73m2   Lactic acid whole blood   Result Value Ref Range     Lactic Acid 1.8 0.7 - 2.0 mmol/L   Nt probnp inpatient   Result Value Ref Range    N terminal Pro BNP Inpatient 5,414 (H) 0 - 1,800 pg/mL   Blood gas arterial with oxyhemoglobin   Result Value Ref Range    pH Arterial 7.40 7.35 - 7.45    pCO2 Arterial 36 35 - 45 mm Hg    pO2 Arterial 113 (H) 80 - 105 mm Hg    Bicarbonate Arterial 22 21 - 28 mmol/L    Oxyhemoglobin Arterial 98 92 - 100 %    Base Excess/Deficit (+/-) -2.3 -9.0 - 1.8 mmol/L    FIO2 32    Glucose by meter   Result Value Ref Range    GLUCOSE BY METER POCT 328 (H) 70 - 99 mg/dL   XR Chest Port 1 View    Narrative    EXAM: XR CHEST PORT 1 VIEW  LOCATION: [facility]  DATE/TIME: 11/7/2022 12:15 AM    INDICATION: central line placement  COMPARISON: 11/04/2022      Impression    IMPRESSION: Heart size is stable. Interval placement of right-sided central venous catheter which terminates over the mid to low right atrium. If positioning at the cavoatrial junction is desired, retraction by 4 cm is suggested. Lung volumes have   diminished. There are persistent streaky parenchymal opacities, atelectasis versus infiltrate, greatest in the left lower lobe. No pneumothorax.   Fractional excretion of sodium    Narrative    The following orders were created for panel order Fractional excretion of sodium.  Procedure                               Abnormality         Status                     ---------                               -----------         ------                     Fractional Excretion of ...[[id number]]                                                   Please view results for these tests on the individual orders.   Glucose by meter   Result Value Ref Range    GLUCOSE BY METER POCT 291 (H) 70 - 99 mg/dL         Mike Soto DO November 7, 2022

## 2022-11-07 NOTE — PROGRESS NOTES
Interval critical care note    Discussed care with interventional cardiology team. His RHC was reassuring and CI was ~2. TAVR currently on hold due to ongoing workup for abdominal pain and acute anemia of unclear etiology. He has a swan placed but unfortunately this is not the type that would allow for CCO.     Patient denies any melena, BRBC or prior changes in BM before admission. Last colonoscopy was performed in the last few years. NG was placed with scant bilious tinged fluid removed. He states that his abdominal pain feels better now that NG is in place.     Nephrology gave patient 40 mg IV lasix with some improvement in UOP. Afterwards patient was given 250 ml albumin transfusion to ensure adequate intravascular volume as this is an important treatment tenet for his critical aortic stenosis. Management of his intravascular volume status is difficult due to competing interest.     Patient has now been evaluated by general surgery and note reviewed in the medical record- exam similar to mine earlier, recommending conservative management. No surgical intervention indicated at this time.     Given the complicated clinical picture I again reviewed the patients CT A/P non contrasted scan and discussed with Radiology. He does have perihepatic, perisplenic and pelvic free fluid but HU is not consistent with hyperdense material to suggest intraperitoneal hemorrhage. He has dilated loops of bowel with possible mesenteric swirling in the left mid abdomen and on further review there may be a distal decompressed segment of distal ileum at this location. The image is difficult to interpret due to lack of contrast and it is impossible to determine if he has a closed loop obstruction at this time. Dr. Nagy discussed  the need for possible contrast for repeat CT scan to obtain better abdominal imagingwith nephrology, the patient and the family as we continue to identify the source of his abdominal pain.     After my  discussion with radiology and review of his abdominal CT from overnight, I am concerned there may be a partial or complete small bowel obstruction including the possibility of a closed loop obstruction. We are awaiting repeat lactate and hemoglobin to trend his labs but I anticipate obtaining an additional IV contrasted CT despite the risk for worsening his kidney failure. We will plan for pre and post contrast hydration carefully.     I do not know that we have clearly identified a source of his acute on chronic anemia at this time. Bilious NG output would not suggest an upper GI etiology. Cardiology has placed a consult to GI for consideration of endoscopic evaluation.     Brina Dallas MD  Surgical Critical Care Fellow     The patients case was discussed with Dr. Nagy, cardiology, nephrology, radiology, the patient and the patient's family    Additional CC time of 40 minutes

## 2022-11-07 NOTE — PLAN OF CARE
Goal Outcome Evaluation:  Pt BP/MAP remain low. Labs including K of 6.1,creat seen by cardiologist Dr. Carlin.250 ml bolus up, F/C inserted to monitor urinary output/retention. Dobuatmine drip increased to 5mg. Request to transfer to ICU. Pt transferred to ICU with all belongings. Wife at bedside      Plan of Care Reviewed With: patient, spouse

## 2022-11-08 PROBLEM — I48.91 ATRIAL FIBRILLATION WITH RVR (H): Status: ACTIVE | Noted: 2022-01-01

## 2022-11-08 NOTE — PROGRESS NOTES
Renal Medicine Progress Note    SHORTHAND KEY FOR MY NOTES:  c = with, s = without, p = after, a = before, x = except, asx = asymptomatic, tx = transplant or treatment, sx = symptoms or symptomatic, cx = canceled or culture, rxn = reaction, yday = yesterday, nl = normal, abx = antibiotics, fxn = function, dx = diagnosis, dz = disease, m/h = melena/hematochezia, c/d/l/ha = cramping/dizziness/lightheadedness/headache, d/c = discharge or diarrhea/constipation, f/c/n/v = fevers/chills/nausea/vomiting, cp/sob = chest pain/shortness of breath, tbv = total body volume, rxn = reaction, tdc = tunneled dialysis catheter, pta = prior to admission, hd = hemodialysis, pd = peritoneal dialysis, hhd = home hemodialysis, edw = estimated dry wt         Assessment/Plan:     1.  MISTI.  Pt's cr is better today and he is making a good amt of urine.  He is not fluid overloaded.  Chemistries are ok.  He rec'd IV dye today and will likely take a second hit while in the OR.  Family aware that his renal fxn may worsen and dialysis could become necessary this admission.  A.  Follow labs, uo, sx.  B.  Avoid further nephrotoxics as able.    2.  Sepsis syndrome.  Given pt's abd exam and CT findings, it is quite possible that the source is intraabdomonial.  He is on broad abx.  Lactate is nl.  He is on multiple pressors.  A.  Continue broad abx at proper renal dose.  B.  Continue pressors.  C.  Follow clinically.    3.  Severe aortic stenosis.  Pt is not going to have a TAVR anytime soon due to the fever and abd issues.  He is at high risk for surgery given that he has such severe valvular dz.  A.  Follow clinically.    4.  Resp failure.  Pt is now intubated and sedated.  A.  Per ICU team.    5.  Anemia.  Pt's hb is stable.  A.  Follow hb, clinically.    6.  FEN.  Electrolytes are ok.    7.  Code Status.  Pt is listed as full code, but per Dr. Og's note the family does not want chest compressions if something happens in the OR.        Interval  History:     Unable.  Pt is intubated/sedated now p decompensating this afternoon.  This AM, he was awake and interactive.  He noted increased pain in the abd.  He had fevers overnight to 102F so the TAVR was cx.  He is now going to the OR for urgent surgical eval for ischemic bowel.            Medications and Allergies:       heparin ANTICOAGULANT  5,000 Units Subcutaneous Q8H     insulin aspart  1-12 Units Subcutaneous Q4H     lidocaine  1-3 patch Transdermal Q24H     lidocaine   Transdermal Q8H JASON     norepinephrine         piperacillin-tazobactam  3.375 g Intravenous Q6H     sodium chloride 0.9 %  72 mL Intravenous Once     sodium chloride (PF)  3 mL Intracatheter Q8H        Allergies   Allergen Reactions     Semaglutide Fatigue and GI Disturbance          Physical Exam:     Vitals were reviewed   , Blood pressure 93/58, pulse (!) 131, temperature (!) 100.8  F (38.2  C), temperature source Pulmonary Artery, resp. rate (!) 35, height 1.829 m (6'), weight 96.3 kg (212 lb 4.9 oz), SpO2 100 %.  Wt Readings from Last 3 Encounters:   11/08/22 96.3 kg (212 lb 4.9 oz)   11/01/22 100.1 kg (220 lb 9.6 oz)   10/13/22 97.1 kg (214 lb)     Intake/Output Summary (Last 24 hours) at 11/8/2022 1451  Last data filed at 11/8/2022 1400  Gross per 24 hour   Intake 3239.54 ml   Output 2850 ml   Net 389.54 ml     GENERAL APPEARANCE: intubated, sedated  HEENT:  eyes/ears/nose/neck grossly nl x + NG  RESP: + vent sounds  CV: irreg, tachy, nl S1/S2   ABDOMEN: distended, tender  EXTREMITIES/SKIN: no significant ble edema  NEURO:  sedated  LINES:  + cook c yellow urine; L axilla art line, + RIJ 3-lumen         Data:     CBC RESULTS:     Recent Labs   Lab 11/08/22  0834 11/08/22  0342 11/07/22  1249 11/07/22  0404 11/06/22  1433 11/05/22  0553 11/04/22  1051   WBC 11.4* 11.1*  --  13.0* 12.7* 10.4 5.2   RBC 3.45* 3.28*  --  3.21* 3.66* 4.54 4.76   HGB 9.3* 9.0* 9.1* 8.7* 9.8* 12.2* 12.8*   HCT 28.7* 27.3*  --  26.2* 30.7* 38.5* 41.1   PLT  192 191  --  172 180 198 182     Basic Metabolic Panel:  Recent Labs   Lab 11/08/22  1312 11/08/22  1140 11/08/22  0834 11/08/22  0809 11/08/22  0633 11/08/22  0344 11/08/22  0342 11/07/22  1334 11/07/22  1249 11/07/22  0432 11/07/22  0404 11/07/22  0010 11/07/22  0001 11/06/22  2233 11/06/22  1945   NA  --   --  141  --   --   --  141  --  136  --  134  --  131*  --  133   POTASSIUM  --   --  4.3  --   --   --  4.5  --  4.4  --  5.3  --  6.2*  --  6.1*   CHLORIDE  --   --  113*  --   --   --  111*  --  105  --  103  --  101  --  100   CO2  --   --  20  --   --   --  20  --  21  --  21  --  21  --  22   BUN  --   --  63*  --   --   --  69*  --  75*  --  77*  --  75*  --  70*   CR  --   --  1.32*  --   --   --  1.43*  --  2.01*  --  2.79*  --  2.81*  --  2.60*   * 110* 126* 126* 153* 128* 136*   < > 125*   < > 255*   < > 340*   < > 323*   FATMATA  --   --  8.9  --   --   --  8.6  --  8.6  --  8.6  --  8.6  --  8.8    < > = values in this interval not displayed.     INR  Recent Labs   Lab 11/04/22  1702   INR 1.21*      Attestation:   I have reviewed today's relevant vital signs, notes, medications, labs and imaging.    Ismael Bruner MD  [facility]phrology  [phone]

## 2022-11-08 NOTE — PROGRESS NOTES
Approximately 30 minutes ago patient developed acute A fib with RVR. HR in the 160s. MAPs 60. He was already on vasopressi 1.4 and Phenylephrine 5. Phenyl increased to 6 (max dose). Patient is awake on room air an asymptomatic. Amiodarone 150 mg was given. Called cardiology to discuss with cards given his complex cardiac history (Aortic Stenosis - for TAVR in AM and severely low EF). Cardiology recommended as long as clinically asymptomatic hold off on cardioversion given risk for conversion hypotension and code. Will give patient dose of digoxin. Will contact his family to update them. Electrolytes are WNL.    Lc Perdomo MD

## 2022-11-08 NOTE — PROVIDER NOTIFICATION
Pt remains in [place] with -140's and increasing pressor needs. Dr. Moya/Cardiology notified. Order received to give Amiodarone bolus and gtt.

## 2022-11-08 NOTE — PROGRESS NOTES
General Surgery - Progress Note    S/P exploratory laparotomy. Large amount of dead bowel identified and resected. Bowel left in discontinuity. Abdomen irrigated. Temporary abdominal closure with AbThera applied.    Patient transferred directly to ICU on multiple pressors. Guarded prognosis with high risk for ischemia of remaining bowel due to ongoing pressor needs. Depending on patient's clinical course, plan to return to OR in 1-3 days for second look laparotomy, possible bowel resection, possible abdominal closure.        Theresa Acosta PA-C  [facility]  [phone]

## 2022-11-08 NOTE — PROGRESS NOTES
Plan for exploration discussed further with family. Very high risk of dying and of not being extubated. Will not initiate chest compressions, per family choice. Patient appeared to understand his choices prior to being intubated.

## 2022-11-08 NOTE — PROGRESS NOTES
[facility]    Cardiology Progress Note    Date of Service (when I saw the patient): 11/08/2022            Assessment and Plan:   1.  Severe aortic stenosis with nonischemic cardiomyopathy:  EF <20%.  Low filling pressures with adequate cardiac output by [facility] on Monday. On phenylephrine/vasopressin.  Dobutamine stopped due to afib with RVR  2.  Paroxysmal atrial fibrillation with RVR:  Occurred in the setting of worsening abdominal pain, fever  3.  MISTI; improved with fluid administration  4.  Anemia  5.  Abdominal pain/probable ischemic bowel by CT today      PLAN/RECOMMENDATIONS:  -Reviewed case with surgery at bedside prior to CT scan demonstrating findings concerning for ischemic gut. Discussed with patient and family he is very high risk for complications including death with surgical procedure and anesthesia.  Unfortunately, he will certainly not survive without exploration.  Patient, family and surgery service all understand this very high surgical risk and agree to proceed.  -Continue IV amiodarone, phenylephrine, vasopressin.  Could add levophed if necessary perioperatively.      Yasmine Moya MD [facility]    Critical Care time:  I personally examined and evaluated the patient today.  Bryant Mckenzie was in (or remains in) critical condition today due to severe aortic stenosis, ischemic bowel, afib with RVR.  I personally managed pressors, amiodarone gtt and the key decisions made today include discussion with patient and family regarding surgical risk.    I spent a total of 60 minutes providing critical care services at the bedside, evaluating the patient, directing care and reviewing laboratory values and radiologic reports for Mr. Mckenzie.              Interval History:   Patient went into afib with RVR early this morning.  Fever spike last night.  Continues to have diffuse abdominal pain               Medications:       heparin ANTICOAGULANT  5,000 Units  Subcutaneous Q8H     insulin aspart  1-12 Units Subcutaneous Q4H     lidocaine  1-3 patch Transdermal Q24H     lidocaine   Transdermal Q8H JASON     piperacillin-tazobactam  3.375 g Intravenous Q6H     sodium chloride 0.9 %  72 mL Intravenous Once     sodium chloride (PF)  3 mL Intracatheter Q8H            Physical Exam:   Blood pressure (!) 89/78, pulse 111, temperature (!) 100.8  F (38.2  C), temperature source Pulmonary Artery, resp. rate 24, height 1.829 m (6'), weight 96.3 kg (212 lb 4.9 oz), SpO2 96 %.  Vitals:    22 1400 22 0106 22 0347 22 0000   Weight: 98.8 kg (217 lb 12.8 oz) 96.6 kg (213 lb) 93.9 kg (207 lb) 94.9 kg (209 lb 3.5 oz)    22 0730   Weight: 96.3 kg (212 lb 4.9 oz)       Intake/Output Summary (Last 24 hours) at 2022 1147  Last data filed at 2022 1121  Gross per 24 hour   Intake 2732.08 ml   Output 2660 ml   Net 72.08 ml           Vital Sign Ranges  Temperature Temp  Av.4  F (38  C)  Min: 98.5  F (36.9  C)  Max: 101.3  F (38.5  C)   Blood pressure Systolic (24hrs), Av , Min:82 , Max:95        Diastolic (24hrs), Av, Min:61, Max:78      Pulse Pulse  Av.2  Min: 80  Max: 161   Respirations Resp  Av.8  Min: 13  Max: 26   Pulse oximetry SpO2  Av.2 %  Min: 92 %  Max: 99 %         EXAM:    Constitutional:    in no apparent distress    Skin:    normal    Head:    Normocephalic, atramatic    Eyes:    pupils equal, round and reactive to light, extra ocular muscles intact, sclera clear, conjunctiva normal    Neck:    JVP    Lungs:    CTAB   Cardiovascular:    S1, S2 Irregularly irregular   Abdomen:    Distended, tender to palpation    Extremities and Back:    no cyanosis or clubbing and No edema    Neurological:    No gross or focal neurologic abnormalities               Data:     Recent Labs   Lab Test 22  0834 22  0342 22  0553 22  1702 22  1051 06/15/22  0653   WBC 11.4* 11.1*   < >  --    < > 5.6   HGB 9.3*  9.0*   < >  --    < > 13.2*   MCV 83 83   < >  --    < > 89    191   < >  --    < > 255   INR  --   --   --  1.21*  --  1.17*    < > = values in this interval not displayed.      Recent Labs   Lab Test 11/08/22  0834 11/08/22  0342    141   POTASSIUM 4.3 4.5   CHLORIDE 113* 111*   BUN 63* 69*   CR 1.32* 1.43*     Recent Labs   Lab 11/08/22  0834 11/08/22  0809 11/08/22  0633 11/08/22  0344   * 126* 153* 128*     Recent Labs   Lab Test 11/06/22  1433 11/06/22  1232   ALT 21 24   AST 20 18     Troponin I ES   Date Value Ref Range Status   01/03/2020 0.019 0.000 - 0.045 ug/L Final     Comment:     The 99th percentile for upper reference range is 0.045 ug/L.  Troponin values   in the range of 0.045 - 0.120 ug/L may be associated with risks of adverse   clinical events.           Recent Labs   Lab Test 11/08/22  0342 11/07/22  2334 11/06/22  1945   MAG 2.6* 2.6* 2.5*       Lab Results   Component Value Date    CHOL 123 12/05/2016     Lab Results   Component Value Date    HDL 50 12/05/2016     Lab Results   Component Value Date    LDL 51 12/05/2016     Lab Results   Component Value Date    TRIG 108 12/05/2016     No results found for: CHOLHDLRATIO       TSH   Date Value Ref Range Status   12/05/2016 3.25 0.40 - 4.00 mU/L Final           Yasmine Moya MD, FACC  Cardiology

## 2022-11-08 NOTE — PROGRESS NOTES
Pt having increased work of breathing with RR 40's. 02 sats 99% on 5L oxymask. Pt clammy. Dr. Nagy notified. Decision made to intubate.  Anesthesia called.

## 2022-11-08 NOTE — OP NOTE
General Surgery Operative Note    PREOPERATIVE DIAGNOSIS:  Ischemia, bowel (H) [K55.9]    POSTOPERATIVE DIAGNOSIS:  Same     Procedure(s):  REMOVAL OF BOWEL, POSSIBLE COLOSTOMY    ANESTHESIA:  General.      SURGEON:  Allan Og MD    ASSISTANT:  NYASIA Valencia MD.The physician assistant was medically necessary for their expertise in camera management, suctioning, and retraction    INDICATIONS: The patient has evidence of ischemic bowel.  He has significant heart disease.  The high risk of the situation was discussed with him but the obvious utility of nonoperative treatment was also reviewed.  He and his family appear to understand and wished to proceed with exploration    PROCEDURE:  The patient was taken to the operating suite.  The operative area was prepped and draped in a sterile fashion.  Surgeon initiated timeout was acknowledged.  A midline incision was made and there was foul-smelling fluid which was suctioned out.  Dead bowel was immediately encountered.  I extended the incision twice until I could get good access to the abdomen.  I suctioned out most of the foul-smelling dark red fluid.  I mobilized the ischemic bowel as well I could.  I found adhesive band in the left lower quadrant running from the left lateral pelvis up to the mesentery.  Past this the bowel was viable.  There was a large amount of very distended ischemic small bowel.  I divided this band with LigaSure.  I then found that we were in the terminal ileum.  I marched proximally using the LigaSure taking the mesentery to mobilize this bowel.  I stapled the viable bowel and the distal end using a 75 blue load.  Finally I could eviscerate all of the dead bowel.  We then made a window at the mid jejunum between viable and nonviable bowel.  I divided the mesentery stump coming down with LigaSure.  I then placed a staple line of blue staples across the viable bowel at the proximal and.  We then connected the LigaSure lines of  dissection until the bowel was free.  I remove the specimen.  I opened the distal end of the proximal viable bowel and milked this very distended bowel into a kidney basin.  The old blood and debris was then suctioned out.  Now the bowel was much less distended.  Because of the extreme precariousness of the situation I chose to not restore continuity or perform a stoma.  We irrigated thoroughly with multiple liters in all corners and placed the viable bowel ends into the abdomen.  All the bowel was then placed into the abdomen and a temporary abdominal closure was placed s.  At the conclusion of the case, all counts were correct.        INTRAOPERATIVE FINDINGS:  Dead bowel. I left 240cm of viable proximal bowel and about 20cm of distal viable bowel    Allan Og MD

## 2022-11-08 NOTE — PROGRESS NOTES
Brief Cardiology Note  Pt: Bryant Mckenzie    1940      Structural Cardiology Brief Note:     Patient became afebrile overnight with Tmax of 101. He went into Afib RVR with rates in the 130-140's s/p IV amiodarone 150 mg x 1. His renal function has improved and his hgb is stable. Though given possibility of infection, will defer TAVR for now. Recommend further abdominal work-up as possible source given significant abdominal distention and tenderness. Obtain repeat lactate, BMP, CBC, and blood cultures for further evaluation. Will defer infectious work-up to ICU team. We will continue to follow peripherally from a valve standpoint, plan for TAVR once patient is stabilized and, of course, pending infectious work-up. Plan to remove swan today as it appears to have clotted off.     Please don't hesitate to reach out with any questions or concerns.      Danica Nicole CNP  9:10 AM 2022   [facility]  Pager: [phone]

## 2022-11-08 NOTE — PROGRESS NOTES
Hemoglobin stable. No obvious source of GI blood loss.  No plans for endoscopic procedure.  GI will follow along.    Daphney Spain PA-C  [facility]  [phone]

## 2022-11-08 NOTE — ANESTHESIA PROCEDURE NOTES
Airway       Patient location during procedure: ICU       Procedure Start/Stop Times: 11/8/2022 12:27 PM  Staff -        CRNA: Matt Biggs APRN CRNA       Performed By: CRNA  Consent for Airway        Urgency: emergent  Indications and Patient Condition       Indications for airway management: airway protection, hemodynamic instability and respiratory insufficiency       Mallampati: Not Assessed     Induction type:intravenous       Mask difficulty assessment: 0 - not attempted    Final Airway Details       Final airway type: endotracheal airway       Successful airway: Single subglottic suction  Endotracheal Airway Details        ETT size (mm): 8.0       Cuffed: yes       Successful intubation technique: video laryngoscopy       VL Blade Size: Glidescope 4       Grade View of Cords: 1       Adjucts: stylet       Position: Center       Measured from: gums/teeth       Secured at (cm): 24       Bite block used: None    Post intubation assessment        ETT secured, Vent settings by primary/ICU team, Primary/ICU team to review CXR and No apparent complications       Placement verified by: capnometry and equal breath sounds        Number of attempts at approach: 1       Secured with: commercial tube hodge       Ease of procedure: easy       Dentition: Intact and Unchanged    Medication(s) Administered   Medication Administration Time: 11/8/2022 12:27 PM

## 2022-11-08 NOTE — PROGRESS NOTES
[facility]    ICU Progress Note       Date of Admission:  11/4/2022    Assessment: Critical Care   Bryant Mckenzie is a 82 year old male admitted on 11/4/2022 with severe critical aortic stenosis, cardiogenic shock, acute renal failure and abdominal pain.     He is undergoing further workup for urgent TAVR which is currently on hold due to ongoing need for infectious workup with new fever and further evaluation of his presenting abdominal pain.     Overnight he developed a-fib with RVR. Loaded with amiodarone and started on gtt per cardiology recommendations. A-fib remains persistent.     Hospital course notable for worsening renal function on admission consistent with cardiorenal syndrome. Nephrology has been consulted. No acute indication for dialysis. Responded to intermittent dosing of lasix. Creatinine is now down trending and UOP adequate. Electrolyte issues improved.     Additionally he has presented with severe abdominal pain. CT scan showed evidence of mesenteric inflammation, peritoneal free fluid and concern for possible bowel obstruction. General surgery consulted for further evaluation and recommended no surgical intervention Consideration also being given to SHEA given his persistent hypotension during admission. NG was placed to LIS with a small amount of light bilious fluid, initially patients pain improved but did not resolve. He continues to have abdominal distention and focal peritonitis in the left layla abdomen concerning for ongoing intraabdominal pathology. CT scan with IV contrast has previously been deferred due to worsening renal function.          Plan: Critical Care   Neuro/ pain/ sedation:  - Monitor neurological status. Notify provider for any acute changes in exam    Pulmonary:  #Dyspnea 2/2 to critical aortic stenosis  - avoiding intubation as able  - supplemental O2  - addressing underlying cardiac issues    Cardiovascular:  # Severe Critical aortic  stenosis  # HFREF, severe LV dysfunction 2/2 to ischemic and non ischemic cardiomyopathy (Ef <20%)  # Cardiogenic shock, peripheral hypoperfusion, hypotension  # Non sustained NSVT (while on inotropic therapy)   #CAD s/p PCI,Chronic  #HLD, chronic  #a-jose with RVR, new    Patient with known severe critical aortic stenosis and ischemic/nonischemic mixed cardiomyopathy (EF <20%) prior to admission who was scheduled for outpatient TAVR later this month. Developed worsening dyspnea, near syncope and hypotension prompting evaluation in the ED. Admitted for urgent TAVR. Cardiac issues complicated by worsening peripheral hypotension, abdominal pain and acute renal failure requiring admission to the ICU. Now with A-fib with VR on amiodarone therapy. TAVR currently on hold due to concern for intraabdominal pathology and need for further workup.     - Monitor hemodynamic status  - dobutamine stopped per cardiology as may be contributing to A-fib  - Continue vasopressor support with phenylephrine and vasso. Avoid epinephrine.   - Ensure adequate preload- serial bedside POCUS and cheryl trac to be set up  - right heart cath today for TAVR workup  - undergoing TAVR workup  - cardiology and CT surgery consulted  - continue ASA and statin therapy  - holding PTA BB  - avoid diuretics    GI/Nutrition:  #Abdominal pain  # atrophic gastris and associate B12 deficiency    Diffuse severe abdominal pain onset day of admission. Patient reports pain is not present unless coughing or during examination. Denies associated nausea or emesis but has not passed flatus or stool since day of admission. CT scan obtained overnight 11/7/22 shows intraperitoneal free fluid, mesenteric inflammation and dilated loops of small bowel concerning for partial mechanical small bowel obstruction from suspected adhesive disease. CT findings may also be suggestive of decreased visceral perfusion, SHEA as patients clinical exam is not congruent with SBO. General  surgery consulted for further evaluation and recommended non operative treatment. Patient is now febrile with low grade leukocytosis, normal lactate but persistent focal peritonitis in the L abdomen.     - Diet: Advance Diet as Tolerated: Clear Liquid Diet    - NG to LIS,  - PPI for PUD ppx not indicated  - monitor serial abdominal examination  - General surgery consultation, appreciate assistance with evaluation  - continue B12 supplementation when clinically stable  - STAT CT scan of the A/P with IV contrast    Renal/ Fluid Balance:   # Acute renal failure. Improving.   #hyperkalemia. 6.1 on admission, improved and now resolved as UOP improved.     - Will monitor intake and output  - judicious IVF given cardiac issues  - cook for strict I/O  - monitor UOP  - nephrology consulted  - ICU electrolyte replacement protocol     Endocrine:  #DM, HGA1c 6.8. Acute worsening hyperglycemia present  - continue POC glucose and insulin therapy    ID:  #Leukocytosis, etiology unclear. Abdominal pain suggestive of intraabdominal pathology with translocation due to hypoperfusion.   #Febrile episodes, new   - monitor labs, vitals for worsening decompensation  - at risk for ischemic enterocolitis in the setting of cardiogenic shock and hypotension  - trending lactate  - Blood cultures, sputum cultures and UA with reflux for infectious workup    Hematology:  # DVT ppx  - continue subcutaneous heparin    MSK:   - PT and OT consulted. Appreciate recommendations.       Lines/ tubes/ drains:  Cook Catheter: PRESENT, indication: Strict 1-2 Hour I&O  Central Lines: PRESENT  CVC Right Internal jugular-Site Assessment: WDL    Prophylaxis:  - DVT Prophylaxis: Heparin SQ and Pneumatic Compression Devices  - PUD Prophylaxis: not indicated    Code Status: Full Code      Disposition:  - ICU,     The patient's care was discussed with the Attending Physician, Dr. Noel, Bedside Nurse, Patient, Patient's Family, Cardiology, Neprhology  Consultant and Primary team.    Critical care time exclusive of procedures: 45 minutes.  Brian Dallas MD  [facility]  Securely message with the [employer] Web Console (learn more here)  Text page via [employer] Paging/Directory         Clinically Significant Risk Factors Present on Admission               # Overweight: Estimated body mass index is 28.79 kg/m  as calculated from the following:    Height as of this encounter: 1.829 m (6').    Weight as of this encounter: 96.3 kg (212 lb 4.9 oz).        ______________________________________________________________________    Interval History   - initially mild improvement in abdominal pain after NG placed. Pain is now worsening and persistent despite NG decompression. NG tube with light bilious colored fluid.   - febrile overnight. Infectious work up initiated. Sending blood, sputum and urine cultures  - TAVR on hold until infection ruled out  - afib with RVR overnight, hemodynamically stable. Cardiology stopped dobutamine as may be contributing. NO cardioversion. Loaded with amiodarone and started on gtt.     Physical Exam   Vital Signs: Temp: (!) 100.8  F (38.2  C) Temp src: Pulmonary Artery BP: (!) 89/78 Pulse: (!) 126   Resp: 24 SpO2: 96 %   Oxygen Delivery: 2 LPM  Weight: 212 lbs 4.85 oz     General: elderly male  HEENT: AT, NC, PERRL, EOMI, right internal jugular CVC in place with sterile dressing, left internal jugular with swan covered with sterile dressing. trachea midline, no significant JVD appreciated  CV: warm, non mottled appearance. Loud pan systolic cardiac murmur present, sinus rhythm on tele  Pulm: dyspnea present. Able to get a few words out at a time, tires easily while taking. No significant increased work of breathing and on room air, no accessory muscle use  Abdomen: moderately distended, diffuse tenderness with voluntary guarding present on palpation, non peritonitis, well healed supraumbilical midline incision from prior  ventral hernia repair, palpable mesh subcutaneously.   Extremities: moving all four extremities  Neuro: cognition relatively intact, kelly to participate in examination, grossly normal neuro examination  Psych: calm, anxious affect, tired easily and want to rest    Data   I reviewed all medications, new labs and imaging results over the last 24 hours.  Arterial Blood Gases   Recent Labs   Lab 11/07/22  1528 11/07/22  1110 11/07/22  0954 11/07/22  0003 11/06/22  1414   PH 7.49* 7.46* 7.42 7.40 7.43   PCO2 30* 32*  --  36 33*   PO2 63* 69*  --  113* 77*   HCO3 23 23  --  22 22       Complete Blood Count   Recent Labs   Lab 11/08/22  0834 11/08/22  0342 11/07/22  1249 11/07/22  0404 11/06/22  1433   WBC 11.4* 11.1*  --  13.0* 12.7*   HGB 9.3* 9.0* 9.1* 8.7* 9.8*    191  --  172 180       Basic Metabolic Panel  Recent Labs   Lab 11/08/22  0834 11/08/22  0809 11/08/22  0633 11/08/22  0344 11/08/22  0342 11/07/22  1334 11/07/22  1249 11/07/22  0432 11/07/22  0404     --   --   --  141  --  136  --  134   POTASSIUM 4.3  --   --   --  4.5  --  4.4  --  5.3   CHLORIDE 113*  --   --   --  111*  --  105  --  103   CO2 20  --   --   --  20  --  21  --  21   BUN 63*  --   --   --  69*  --  75*  --  77*   CR 1.32*  --   --   --  1.43*  --  2.01*  --  2.79*   * 126* 153* 128* 136*   < > 125*   < > 255*    < > = values in this interval not displayed.       Liver Function Tests  Recent Labs   Lab 11/07/22  1249 11/06/22  1433 11/06/22  1232 11/04/22  1702   AST  --  20 18  --    ALT  --  21 24  --    ALKPHOS  --  59 59  --    BILITOTAL  --  4.0* 3.8*  --    ALBUMIN 2.5* 2.8* 2.9*  --    INR  --   --   --  1.21*       Pancreatic Enzymes  Recent Labs   Lab 11/06/22  1232   LIPASE 24*       Coagulation Profile  Recent Labs   Lab 11/04/22  1702   INR 1.21*       IMAGING:  Recent Results (from the past 24 hour(s))   Cardiac Catheterization    Narrative    1.  Normal/low biventricular filling pressures.  Borderline  pulmonary   hypertension.  2.  PA saturation is severely reduced at 46%, though given hemoglobin of   8.7, cardiac index calculates as low-normal by Tarsha.  3.  LIJ sheath sutured in place.    RA: 5  PA: 35/12 (21)  PCW: 11    CO/CI (Tarsha): 4.67 / 2.15     Echocardiogram Limited   Result Value    LVEF  <20%    [facility]    [id number]  [id number]  [id number]  [id number]^LAURA^MEG^MIKE     [facility]  Echocardiography Laboratory  [address] [place] [id number]     Name: VERN GONZALES  MRN: [id number]  : 1940  Study Date: 2022 12:52 PM  Age: 82 yrs  Gender: Male  Patient Location: [facility]  Reason For Study: Aortic Valve Disorder  Ordering Physician: MEG SANCHEZ  Referring Physician: Brown, Merlin Emery  Performed By: Chris Dailey RDCS     BSA: 2.2 m2  Height: 72 in  Weight: 209 lb  HR: 86  BP: 102/46 mmHg  ______________________________________________________________________________  Procedure  Limited Portable Echo Adult. Optison (NDC #[id number]) given intravenously.  ______________________________________________________________________________  Interpretation Summary     Left ventricular systolic function is severely reduced.  The visual ejection fraction is <20%.  Moderate to severe valvular aortic stenosis.  The mean AoV pressure gradient is 29mmHg.  Aortic stenosis likely unchanged when compared to priro study from 10/22. LVOT  diameter and aortic valve area was underestimated on prior study.     The study was technically difficult.  ______________________________________________________________________________  Left Ventricle  The left ventricle is mildly dilated. There is mild eccentric left ventricular  hypertrophy. Left ventricular systolic function is severely reduced. The  visual ejection fraction is <20%. There is severe global hypokinesia of the  left ventricle.     Mitral Valve  The mitral valve leaflets are moderately thickened.     Tricuspid Valve  No  tricuspid regurgitation. Right ventricular systolic pressure could not be  approximated due to inadequate tricuspid regurgitation.     Aortic Valve  The aortic valve is not well visualized. Moderate to severe valvular aortic  stenosis. The mean AoV pressure gradient is 29mmHg.     Pulmonic Valve  The pulmonic valve is not well visualized.     Rhythm  Sinus rhythm was noted.  ______________________________________________________________________________  MMode/2D Measurements & Calculations  IVSd: 0.96 cm  LVIDd: 6.2 cm  LVIDs: 5.6 cm  LVPWd: 0.99 cm  FS: 9.4 %  LV mass(C)d: 251.1 grams  LV mass(C)dI: 115.7 grams/m2  LVOT diam: 2.4 cm  LVOT area: 4.7 cm2  RWT: 0.32     Doppler Measurements & Calculations  Ao V2 max: 362.5 cm/sec  Ao max P.0 mmHg  Ao V2 mean: 253.9 cm/sec  Ao mean P.4 mmHg  Ao V2 VTI: 72.2 cm  MYRANDA(I,D): 1.1 cm2  MYRANDA(V,D): 1.2 cm2  LV V1 max PG: 3.5 mmHg  LV V1 max: 93.8 cm/sec  LV V1 VTI: 16.4 cm  SV(LVOT): 76.8 ml  SI(LVOT): 35.4 ml/m2  TR max bill: 291.8 cm/sec  TR max P.1 mmHg  AV Bill Ratio (DI): 0.26     MYRANDA Index (cm2/m2): 0.49     ______________________________________________________________________________  Report approved by: Moriah Reese 2022 02:01 PM         XR Chest Port 1 View    Narrative    XR PORTABLE CHEST ONE VIEW  2022 3:38 PM       INDICATION: Evaluate swan  COMPARISON: 2022       Impression    IMPRESSION: New left IJ [place] catheter with tip in the right PA. A  left internal jugular central venous catheter tip is in the right  atrium, similar to previous. New enteric tube courses below the  diaphragm. Skin folds overlie both upper lungs, similar to previous.  No definite pneumothorax. Bibasilar atelectasis, similar to previous.    JULIETH RUIZ MD         SYSTEM ID:  [id number]

## 2022-11-08 NOTE — CONSULTS
[facility]  Gastroenterology Consultation         Bryant Mckenzie  [address] [facility] [address]  82 year old male    Admission Date/Time: 11/4/2022  Primary Care Provider: Brown, Merlin Emery  Referring / Attending Physician:  Dr. Nicole    We were asked to see the patient in consultation by Dr. Nicole for evaluation of acute on chronic anemia.      CC: Anemia    HPI:  Bryant Mckenzie is a 82 year old male who was admitted with history of severe left ventricular dysfunction critical aortic stenosis hypotension on pressors cardiogenic shock renal and peripheral hypoperfusion lactic acidosis anemia significant electrolyte abnormalities who developed acute onset of abdominal pain with significant abdominal distention regarding CT scan showed distended stomach and small bowel without any NG tube was placed NG output is showing clear fluid with some bilious componentPatient's hemoglobin was 8.7 at 4 PM 9.8 yesterday 12.2-day before.  NG tube does not show any signs of bleeding patient does not have any overt signs of GI bleed patient is doing well with the NG decompression.  Patient is still somewhat tender in the abdomen patient is denying any other systemic complaint patient is still on pressors laying fairly comfortably.  No other significant new GI complaints at this point.  Patient's last endoscopy and colonoscopy were performed in 2015 and 16.    ROS: A comprehensive ten point review of systems was negative aside from those in mentioned in the HPI.      PAST MED HX:  I have reviewed this patient's medical history and updated it with pertinent information if needed.   Past Medical History:   Diagnosis Date     Aortic stenosis 9/23/2016    Echo 9/2016 Mild       Atrophic gastritis 9/8/2016     Benign non-nodular prostatic hyperplasia 09/08/2016     CAD (coronary artery disease)      Hyperlipidemia 09/08/2016     Ischemic cardiomyopathy 6/12/2019     Nonischemic cardiomyopathy  (H) 10/30/2019     Obstructive sleep apnea syndrome 2016     Type 2 diabetes mellitus 2016     Vitamin B12 deficiency (non anemic) 2016       MEDICATIONS:   Prior to Admission Medications   Prescriptions Last Dose Informant Patient Reported? Taking?   HYDROcodone-acetaminophen (NORCO) 5-325 MG tablet 2022 at AM Self Yes Yes   Sig: Take 1 tablet by mouth every 8 hours as needed for severe pain   Multiple Vitamins-Minerals (MULTIVITAL PO) 2022 at AM Self Yes Yes   Sig: Take 1 tablet by mouth daily   Vitamin D, Cholecalciferol, 25 MCG (1000 UT) CAPS 11/3/2022 at PM Self Yes Yes   Sig: Take 4,000 Units by mouth daily   aspirin (ASA) 81 MG EC tablet 11/3/2022 at PM Self Yes Yes   Sig: Take 81 mg by mouth daily   atorvastatin (LIPITOR) 40 MG tablet 11/3/2022 at PM Self No Yes   Sig: Take 1 tablet (40 mg) by mouth daily   carvedilol (COREG) 3.125 MG tablet 2022 at AM Self No Yes   Sig: Take 2 tablets (6.25 mg) by mouth 2 times daily (with meals)   celecoxib (CELEBREX) 100 MG capsule 2022 at AM Self Yes Yes   Sig: Take 100 mg by mouth daily   dulaglutide (TRULICITY) 0.75 MG/0.5ML pen 2022 Self Yes Yes   Sig: Inject 0.75 mg Subcutaneous every 7 days   empagliflozin (JARDIANCE) 25 MG TABS tablet 2022 at AM Self Yes Yes   Sig: Take 25 mg by mouth daily   furosemide (LASIX) 20 MG tablet 2022 at AM Self No Yes   Sig: Take 40 mg (2 tabs) in the morning. You can take an extra tablet (20 mg) once daily in the afternoon as needed if your weight is over 215 pounds or you have worsening swelling or shortness of breath.   insulin glargine (LANTUS) 100 UNIT/ML injection 2022 at AM Self Yes Yes   Sig: Inject 11 Units Subcutaneous every morning   insulin lispro (HUMALOG KWIKPEN) 100 UNIT/ML injection 2022 at AM Self No Yes   Si units with breakfast,15 units at noon, and 14 units before evening meal   Patient taking differently: Depending on glucose reading, ranges from 5-7  units with each meal   metFORMIN (GLUCOPHAGE) 500 MG tablet 11/4/2022 at AM Self No Yes   Sig: TAKE 2 TABLETS (1,000 MG) BY MOUTH TWICE A DAY WITH MEALS   sacubitril-valsartan (ENTRESTO) 24-26 MG per tablet 11/4/2022 at AM Self No Yes   Sig: Take 1 tablet by mouth 2 times daily   tamsulosin (FLOMAX) 0.4 MG capsule 11/4/2022 at AM Self Yes Yes   Sig: Take 0.4 mg by mouth daily   tiZANidine (ZANAFLEX) 4 MG tablet 11/4/2022 at AM Self Yes Yes   Sig: Take 4 mg by mouth 3 times daily as needed for muscle spasms   traMADol (ULTRAM) 50 MG tablet 11/3/2022 at PRN Self Yes Yes   Sig: Take 50 mg by mouth every 6 hours as needed for moderate pain   vitamin C (ASCORBIC ACID) 1000 MG TABS 11/3/2022 Self Yes Yes   Sig: Take 1,000 mg by mouth daily   zinc gluconate 50 MG tablet 11/3/2022 Self Yes Yes   Sig: Take 50 mg by mouth daily      Facility-Administered Medications: None       ALLERGIES:   Allergies   Allergen Reactions     Semaglutide Fatigue and GI Disturbance       SOCIAL HISTORY:  Social History     Tobacco Use     Smoking status: Never     Smokeless tobacco: Never   Vaping Use     Vaping Use: Never used   Substance Use Topics     Alcohol use: Not Currently     Alcohol/week: 0.0 standard drinks     Drug use: No       FAMILY HISTORY:  Family History   Problem Relation Age of Onset     Coronary Artery Disease Father 53     Cerebrovascular Disease Father      Diabetes Mother      Diabetes Brother        PHYSICAL EXAM:   General awake alert comfortable  Vital Signs with Ranges  Temp: (!) 100.9  F (38.3  C) Temp src: Pulmonary Artery BP: (!) 80/46 Pulse: 86   Resp: 26 SpO2: 94 % O2 Device: None (Room air)    I/O last 3 completed shifts:  In: 2171.82 [P.O.:120; I.V.:1801.82]  Out: 1733 [Urine:1733]    Constitutional: healthy, alert and no distress   Cardiovascular: negative, PMI normal. No lifts, heaves, or thrills. RRR. No murmurs, clicks gallops or rub  Respiratory: negative, Percussion normal. Good diaphragmatic excursion.  Lungs clear  Neck: Neck supple. No adenopathy. Thyroid symmetric, normal size,, Carotids without bruits.  Abdomen: Abdomen soft, non-tender. BS normal. No masses, organomegaly  SKIN: no suspicious lesions or rashes          ADDITIONAL COMMENTS:   I reviewed the patient's new clinical lab test results.   Recent Labs   Lab Test 11/07/22  1249 11/07/22  0404 11/06/22  1433 11/05/22  0553 11/04/22  1702 11/04/22  1051 06/15/22  0653 01/03/20  0034 10/24/19  1040   WBC  --  13.0* 12.7* 10.4  --    < > 5.6   < > 5.5   HGB 9.1* 8.7* 9.8* 12.2*  --    < > 13.2*   < > 13.3   MCV  --  82 84 85  --    < > 89   < > 87   PLT  --  172 180 198  --    < > 255   < > 200   INR  --   --   --   --  1.21*  --  1.17*  --  1.19*    < > = values in this interval not displayed.     Recent Labs   Lab Test 11/07/22  1249 11/07/22  0404 11/07/22  0001   POTASSIUM 4.4 5.3 6.2*   CHLORIDE 105 103 101   CO2 21 21 21   BUN 75* 77* 75*   ANIONGAP 10 10 9     Recent Labs   Lab Test 11/07/22  1249 11/06/22  1433 11/06/22  1232 01/03/20  0042   ALBUMIN 2.5* 2.8* 2.9*  --    BILITOTAL  --  4.0* 3.8*  --    ALT  --  21 24  --    AST  --  20 18  --    PROTEIN  --   --   --  10*   LIPASE  --   --  24*  --        I reviewed the patient's new imaging results.        CONSULTATION ASSESSMENT AND PLAN:    Principal Problem:    Hypotension, unspecified hypotension type  Very pleasant 82-year-old gentleman with complicated history who was admitted with cardiogenic shock hypotensive on pressors patient has some drop in his hemoglobin however patient's current hemoglobin is fairly stable NG tube does not show any signs of blood there is clear bilious secretion patient has not had any signs of lower GI bleed patient did develop abdominal pain and possible ileus patient is doing well with the NG tube placement.  I do suspect patient probably have systemic hypoxia and bowel injury due to hypotension and cardiogenic shock there is no signs of active bleeding I do not  see any significant advantage of doing endoscopic procedures patient will be at significantly high risk for complication continue on IV Protonix twice a day.  Given there is no signs of active GI bleeding I will recommend to continue on supportive care conservative management.  Patient however will be at significant high risk for any kind of endoscopic work-up and sedation.  GI will continue to follow along.  Thank you very much for letting me participate in his care.        Vernon Schmidt MD, FACP  [facility].  Office: [phone]  Cell : [phone]      [facility]  Ph: [phone] Fax: [phone]

## 2022-11-08 NOTE — ANESTHESIA CARE TRANSFER NOTE
Patient: Bryant Mckenzie    Procedure: Procedure(s):  REMOVAL OF SMALL BOWEL,       Diagnosis: Ischemia, bowel (H) [K55.9]  Diagnosis Additional Information: No value filed.    Anesthesia Type:   General     Note:    Oropharynx: endotracheal tube in place  Level of Consciousness: iatrogenic sedation      Independent Airway: airway patency not satisfactory and stable  Dentition: dentition unchanged  Vital Signs Stable: post-procedure vital signs reviewed and stable  Report to RN Given: handoff report given  Patient transferred to: ICU  Comments: Patient connected to SpO2, EKG, and arterial blood pressure transport monitors and accompanied by CRNA, anesthesiologist to ICU room. Patient ventilated by anesthesiologist with ambu via ETT with O2 at 15 liters per minute during transport.     On arrival to ICU, endotracheal tube position unchanged, equal, bilateral breath sounds auscultated in ICU room, patient placed on ICU ventilator by respiratory therapist, teeth and oral mucosa intact and unchanged at handoff of care. At anesthesia handoff of care, clinical monitors and alarms on and functioning, report on patient's clinical status given to ICU RN, report on patient's clinical status given to intensivist, ICU staff questions answered.  ICU Handoff: Call for PAUSE to initiate/utilize ICU HANDOFF, Identified Patient, Identified Responsible Provider, Reviewed the Pertinent Medical History, Discussed Surgical Course, Reviewed Intra-OP Anesthesia Management and Issues during Anesthesia, Set Expectations for Post Procedure Period and Allowed Opportunity for Questions and Acknowledgement of Understanding      Vitals:  Vitals Value Taken Time   BP     Temp     Pulse 109 11/08/22 1643   Resp 18 11/08/22 1643   SpO2 100 % 11/08/22 1642   Vitals shown include unvalidated device data.    Electronically Signed By: JENNIFER Moise CRNA  November 8, 2022  4:44 PM

## 2022-11-08 NOTE — PROGRESS NOTES
With worsening respiratory status and worsening abdominal pain while awaiting OR.  I was concerned that the patient was in impending respiratory failure and with discussion with the patient and family decision was made to intubate the patient by anesthesia.  Concern given the patient's underlying cardiac risk factors that he would not tolerate hypotensive post intubation the patient was started on norepinephrine under my direction.  Once the patient was started low-dose norepinephrine the patient underwent an uneventful intubation by anesthesia.  The patient was then sedated with Precedex and Versed.  Fentanyl would be used as needed for vent synchrony.  Overall the patient tolerated the procedure well was placed on full ventilatory support and sedated with Precedex and Versed.  Once the patient returns to the operating room we will reassess his condition for possible wean.  I informed the patient and the family that he may be intubated for a few days to up to a week, he may be difficult to wean from mechanical ventilation given his underlying cardiac condition and his planned surgery.  Family verbalized understanding and wished to proceed.  I spent an additional 20 minutes providing critical care medicine excluding any procedures at this time.  My total critical care time with the patient today was 75 minutes.    Rafiq Nagy MD  Critical Care Medicine

## 2022-11-08 NOTE — PROGRESS NOTES
Interval critical care note    Patient with CT findings concerning for ischemic bowel possible from closed loop vs. Complete small bowel obstruction. General surgery informed immediately and are reviewing scan with radiology. Patient made strict NPO. IV analgesia with fent for severe abdominal pain ordered. Started on IV zosyn. Family informed of findings. Awaiting general surgery recommendations but anticipating exploration with bowel resection. Patient is considered an extremely high surgical risk given concurrent HFrEF and critical aortic stenosis.     Brian Dallas MD  Surgical Critical Care Fellow    Additional time of 30 minutes reviewing scans, coordinating care with general surgery and discussing findings with the family.

## 2022-11-08 NOTE — ANESTHESIA PREPROCEDURE EVALUATION
Anesthesia Pre-Procedure Evaluation    Patient: Bryant Mckenzie   MRN: [id number] : 1940        Procedure : Procedure(s):  LAPAROSCOPY, DIAGNOSTIC, BY GENERAL SURGERY          Past Medical History:   Diagnosis Date     Aortic stenosis 2016    Echo 2016 Mild       Atrophic gastritis 2016     Benign non-nodular prostatic hyperplasia 2016     CAD (coronary artery disease)      Hyperlipidemia 2016     Ischemic cardiomyopathy 2019     Nonischemic cardiomyopathy (H) 10/30/2019     Obstructive sleep apnea syndrome 2016     Type 2 diabetes mellitus 2016     Vitamin B12 deficiency (non anemic) 2016      Past Surgical History:   Procedure Laterality Date     CV CORONARY ANGIOGRAM N/A 10/24/2019    Procedure: Coronary Angiogram;  Surgeon: Lupe Posada MD;  Location: [facility] CARDIAC CATH LAB     CV CORONARY ANGIOGRAM N/A 6/15/2022    Procedure: Coronary Angiogram;  Surgeon: Murali Devi MD;  Location: [facility] CARDIAC CATH LAB     CV HEART CATHETERIZATION WITH POSSIBLE INTERVENTION N/A 2019    Procedure: Heart Catheterization with Possible Intervention;  Surgeon: Alex Bazan MD;  Location: [facility] CARDIAC CATH LAB     CV INSTANTANEOUS WAVE-FREE RATIO N/A 6/15/2022    Procedure: Instantaneous Wave-Free Ratio;  Surgeon: Murali Deiv MD;  Location: [facility] CARDIAC CATH LAB     CV LEFT VENTRICULOGRAM N/A 6/15/2022    Procedure: Left Ventriculogram;  Surgeon: Murali Devi MD;  Location: [facility] CARDIAC CATH LAB     CV PCI CHRONIC TOTAL OCCLUSION N/A 10/24/2019    Procedure: Coronary Total Occlusion;  Surgeon: Lupe Posada MD;  Location: [facility] CARDIAC CATH LAB     CV PCI STENT DRUG ELUTING N/A 10/24/2019    Procedure: PCI Stent Drug Eluting;  Surgeon: Lupe Posada MD;  Location: [facility] CARDIAC CATH LAB     CV RIGHT HEART CATH MEASUREMENTS RECORDED N/A 6/15/2022    Procedure: Right Heart Catheterization;   Surgeon: Murali Devi MD;  Location:  HEART CARDIAC CATH LAB     CV RIGHT HEART CATH MEASUREMENTS RECORDED N/A 11/7/2022    Procedure: Right Heart Cath;  Surgeon: Tyrone Youngblood MD;  Location:  HEART CARDIAC CATH LAB     DAVINCI HERNIORRHAPHY INGUINAL Bilateral 2/2/2022    Procedure: ROBOT-ASSISTED BILATERAL INGUINAL HERNIA REPAIR WITH MESH;  Surgeon: Rene Murray MD;  Location:  OR     ESOPHAGOSCOPY, GASTROSCOPY, DUODENOSCOPY (EGD), COMBINED N/A 1/20/2015    Procedure: COMBINED ESOPHAGOSCOPY, GASTROSCOPY, DUODENOSCOPY (EGD), BIOPSY SINGLE OR MULTIPLE;  Surgeon: Allan Marroquin MD;  Location:  GI     EXCISE LESION TRUNK N/A 4/17/2019    Procedure: EXCISE CYST UPPER BACK;  Surgeon: Rene Murray MD;  Location:  OR     HERNIA REPAIR       HERNIORRHAPHY UMBILICAL N/A 2/2/2022    Procedure: OPEN UMBILICAL HERNIA REPAIR;  Surgeon: Rene Murray MD;  Location:  OR     HYDROCELECTOMY SCROTAL       JOINT REPLACEMENT (aka KNEE) NOS Bilateral       Allergies   Allergen Reactions     Semaglutide Fatigue and GI Disturbance      Social History     Tobacco Use     Smoking status: Never     Smokeless tobacco: Never   Substance Use Topics     Alcohol use: Not Currently     Alcohol/week: 0.0 standard drinks      Wt Readings from Last 1 Encounters:   11/08/22 96.3 kg (212 lb 4.9 oz)        Anesthesia Evaluation   Pt has had prior anesthetic.     No history of anesthetic complications       ROS/MED HX  ENT/Pulmonary: Comment: Respiratory failure, intubated    (+) sleep apnea (patient denies), doesn't use CPAP,  (-) recent URI   Neurologic: Comment: sedated   (-) no seizures, no CVA and migraines   Cardiovascular: Comment: Cardiogenic and septic shock    Hypotensive, SBP 70    On levophed, VASOPRESSIN, neosynephrine gtt    Intolerant to pressors    Symptomatic V tach and A fib 's    (+) Dyslipidemia --CAD --stent-2019. Drug Eluting Stent. CHF Last EF: <20% orthopnea/PND,  dysrhythmias, a-fib and Other, valvular problems/murmurs type: AS and MR     METS/Exercise Tolerance:     Hematologic:     (+) anemia,     Musculoskeletal: Comment: Back pain  (+) arthritis,     GI/Hepatic: Comment: Ischemic bowel   (-) GERD   Renal/Genitourinary:     (+) renal disease, type: ARF, BPH,     Endo:     (+) type II DM, Using insulin,  (-) thyroid disease   Psychiatric/Substance Use:  - neg psychiatric ROS     Infectious Disease:       Malignancy:       Other:               OUTSIDE LABS:  CBC:   Lab Results   Component Value Date    WBC 11.4 (H) 11/08/2022    WBC 11.1 (H) 11/08/2022    HGB 9.3 (L) 11/08/2022    HGB 9.0 (L) 11/08/2022    HCT 28.7 (L) 11/08/2022    HCT 27.3 (L) 11/08/2022     11/08/2022     11/08/2022     BMP:   Lab Results   Component Value Date     11/08/2022     11/08/2022    POTASSIUM 4.3 11/08/2022    POTASSIUM 4.5 11/08/2022    CHLORIDE 113 (H) 11/08/2022    CHLORIDE 111 (H) 11/08/2022    CO2 20 11/08/2022    CO2 20 11/08/2022    BUN 63 (H) 11/08/2022    BUN 69 (H) 11/08/2022    CR 1.32 (H) 11/08/2022    CR 1.43 (H) 11/08/2022     (H) 11/08/2022     (H) 11/08/2022     COAGS:   Lab Results   Component Value Date    PTT 43 (H) 06/15/2022    INR 1.21 (H) 11/04/2022     POC:   Lab Results   Component Value Date     (H) 01/03/2020     HEPATIC:   Lab Results   Component Value Date    ALBUMIN 2.5 (L) 11/07/2022    PROTTOTAL 5.7 (L) 11/06/2022    ALT 21 11/06/2022    AST 20 11/06/2022    ALKPHOS 59 11/06/2022    BILITOTAL 4.0 (H) 11/06/2022     OTHER:   Lab Results   Component Value Date    PH 7.49 (H) 11/07/2022    LACT 1.5 11/08/2022    A1C 6.8 (H) 11/04/2022    FATMATA 8.9 11/08/2022    PHOS 3.0 11/08/2022    MAG 2.6 (H) 11/08/2022    LIPASE 24 (L) 11/06/2022    TSH 3.25 12/05/2016       Anesthesia Plan    ASA Status:  5, emergent    NPO Status:  NPO Appropriate    Anesthesia Type: General.     - Airway: ETT      Maintenance: Balanced.    Techniques and Equipment:     - Lines/Monitors: 2nd IV, Arterial Line, Central Line     Consents    Anesthesia Plan(s) and associated risks, benefits, and realistic alternatives discussed. Questions answered and patient/representative(s) expressed understanding.    - Discussed:     - Discussed with:  Patient      - Extended Intubation/Ventilatory Support Discussed: Yes.    Use of blood products discussed: Yes.     - Discussed with: Healthcare Power of .            Reason for refusal: other.     Postoperative Care    Pain management: IV analgesics.        Comments:    Other Comments: Discussed with family the high risk nature of the procedure and the anesthesia with Dr. Og.  The patient's family agreed to all means accept the use of chest compressions.              Cordell Su MD

## 2022-11-08 NOTE — PROGRESS NOTES
Pt intubated with 8.0 ETT secured at 24cm at the teeth.  Positive ETCO2 and bilateral LS present.  Pt placed on CMV 18 500 50% p5.  Will continue to follow  Armin Parikh, RT  11/8/2022

## 2022-11-08 NOTE — PLAN OF CARE
Neuro: A&O x 4, PERRL, Fallow commands.  CV: SR to SB, Dobutamine,  Scott and vasopressin continuous. BP soft, MAP 63-67.  At 0429  -160, AFIB RVR, Dobutamine stopped , MD notified, 150 bolus Amiodarone given, ECG and Digoxin. Cardiology notified by intensivist.  Pulmonary: LS diminish, good sat's on RA additional @ L O 2 via nasal canula.  GI/: NPO, Landeros in place with adequate UOP. No Bm this shift.  Lines/ Gtt : L CVC x 3, L PIV, R PA catheter, Insulin gtt continous.  MD talked to wife Leatha over the phone this morning .

## 2022-11-09 NOTE — PROGRESS NOTES
[facility] ICU RESPIRATORY NOTE        Date of Admission: 11/4/2022    Date of Intubation (most recent): 11/8/2022    Reason for Mechanical Ventilation: Airway protection.    Number of Days on Mechanical Ventilation: 1    Met Criteria for Spontaneous Breathing Trial: No    Reason for No Spontaneous Breathing Trial: Per MD.    Significant Events Today: None.     ABG Results:   Recent Labs   Lab 11/08/22  1544 11/08/22  1310 11/07/22  1528 11/07/22  1435 11/07/22  1110   PH 7.29* 7.42 7.49*  --  7.46*   PCO2 42 28* 30*  --  32*   PO2 353* 109* 63*  --  69*   HCO3 20* 18* 23  --  23   O2PER  --  40 21 21 21       Current Vent Settings: Vent Mode: CMV/AC  (Continuous Mandatory Ventilation/ Assist Control)  FiO2 (%): 30 %  Resp Rate (Set): 18 breaths/min  Tidal Volume (Set, mL): 500 mL  PEEP (cm H2O): 5 cmH2O  Resp: 22      Skin Assessment: Clean and intact.     Plan: Continue full ventilatory support and wean as tolerated.     Noel Mortensen, RT on 11/9/2022 at 4:40 AM

## 2022-11-09 NOTE — PROGRESS NOTES
[facility]    ICU Progress Note       Date of Admission:  11/4/2022    Assessment: Critical Care   Bryant Mckenzie is a 82 year old male admitted on 11/4/2022 with severe critical aortic stenosis, cardiogenic and septic shock, acute renal failure and abdominal pain.     Patient was found to have a complete SBO with bowel ischemia. He has undergone urgent surgical exploration with resection of small bowel. He is currently in intestinally discontinuity with an open abdomen, intubated and sedated. He has developed a-fib with intermittent RVR likely due to sepsis physiology and cardiac stress.     Initial attempts at source control have been performed. He is being treated for ongoing septic shock with vasopressor support and broad spectrum antibiotics, judicial fluid resuscitation. His a-fib is being managed medically and we are supporting his cardiac function during his septic episodes. Patient will require return to the OR in the next 24 hours for reexploration and evaluation for ongoing evidence of ischemia.     He remains in critical condition with guarded prognosis given co-morbid HFrEF and critical aortic stenosis.        Plan: Critical Care   Neuro/ pain/ sedation:  #encephalopathy 2/2 to medication, sepsis  - Monitor neurological status. Notify provider for any acute changes in exam  - sedation with dex, versed (weaning as able)  - pain control with fent gtt  - delirium precuations    Pulmonary:  #Dyspnea 2/2 to critical aortic stenosis, CHF  # acute respiratory failure 2.2 to sepsis. Currently intubated on mechanical ventilation  - continue intubation and mechanical ventilation  - not appropriate for extubation at this time  - supplemental O2 PRN    Cardiovascular:  # Severe Critical aortic stenosis  # HFREF, severe LV dysfunction 2/2 to ischemic and non ischemic cardiomyopathy (Ef <20%)  # Multifactorial shock, peripheral hypoperfusion, hypotension from cardiogenic and septic  etiology. On triple vasopressor therapy.   # Non sustained NSVT (while on inotropic therapy) - resolved  #Persistent A-fib with intermittent RVR,  Treating medically.   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 Developed worsening dyspnea, near syncope and hypotension prompting evaluation in the ED. Admitted for urgent TAVR. Cardiac issues complicated by worsening peripheral hypotension, abdominal pain and acute renal failure requiring admission to the ICU. Now with A-fib with VR on amiodarone therapy. TAVR currently on hold due to concern for intraabdominal pathology and need for further workup. Found to have ischemic bowel and now in septic shock. Patient on triple vasopressor support. Treating a-fib with medical management. Discussed with cardiology and not recommending cardioversion or increasing amiodarone gtt.     - Monitor hemodynamic status  - Continue vasopressor support with phenylephrine, norepinephrine and vasso. Avoid epinephrine given cardiac pathologies  - Ensure adequate preload- serial bedside POCUS and cheryl trac to be set up to help guide fluid assessment  - judicious fluid administration   - right heart cath for TAVR workup with CI ~ 2  - TAVR on hold during septic epsiode  - cardiology and CT surgery consulted  - holding ASA and statin therapy  - holding PTA BB  - avoiding diuretics    GI/Nutrition:  # Complete SBO with strangulated bowel now s/p ex-lap, SBR. Currently in intestinal discontinuity with open abdomen  # atrophic gastris and associate B12 deficiency  # parenteral nutrition    Diffuse severe abdominal pain onset day of admission. Patient reports pain is not present unless coughing or during examination. Denies associated nausea or emesis but has not passed flatus or stool since day of admission. CT scan  obtained overnight 11/7/22 shows intraperitoneal free fluid, mesenteric inflammation and dilated loops of small bowel concerning for partial mechanical small bowel obstruction from suspected adhesive disease. CT findings may also be suggestive of decreased visceral perfusion. Persistent and worsening abdominal pain prompted IV contrasted CT scan on 11/8 which identified pneumatosis, mesenteric air and hypoperfused bowel consistent with severe ischemia. Taken urgently to OR for ex-lap with SBR. Now in intestinal discontinuity with open abdomen.     - Diet: NPO for Medical/Clinical Reasons Except for: No Exceptions  parenteral nutrition - ADULT compounded formula    - NG to LIS  - strict NPO  - H2 blocker for PUD ppx   - monitor serial abdominal examination  - General surgery consultation, appreciate assistance with evaluation. Plan to return to OR in next 24 hours.   - holding B12 supplementation  - start TPN for parenteral nutrition    Renal/ Fluid Balance:   # Cardiorenal MISTI. Stable .   #hyperkalemia. 6.1 on admission, improved and now resolved as UOP improved.     - Will monitor intake and output  - judicious IVF given cardiac issues  - cook for strict I/O  - monitor UOP  - nephrology consulted  - ICU electrolyte replacement protocol     Endocrine:  #DM, HGA1c 6.8. Acute worsening hyperglycemia present  - continue POC glucose and insulin therapy    ID:  #Intraabdominal sepsis.    Intraabdominal sepsis 2/2 to strangulated bowel from SBO. Source control obtained 11/8 but continues to be febrile. Concern for ongoing ischemia. Discussed with general surgery. Continuing to monitor closely with plan for reevaluation within next 24 hours or sooner as indicated. Remains on broad spectrum antibiotics.   - continue zosyn- plan for 7-14 day course from source control pending ongoing clinical progress    Hematology:  # DVT ppx  - continue subcutaneous heparin    MSK:   - PT and OT consult on hold due to inability to  participate. Appreciate recommendations when able to evaluate. .       Lines/ tubes/ drains:  Landeros Catheter: PRESENT, indication: Strict 1-2 Hour I&O  Central Lines: PRESENT  CVC Right Internal jugular-Site Assessment: WDL    Prophylaxis:  - DVT Prophylaxis: Heparin SQ and Pneumatic Compression Devices  - PUD Prophylaxis: H2    Code Status: Full Code      Disposition:  - ICU,     The patient's care was discussed with the Attending Physician, Dr. Nagy, Bedside Nurse and Patient's Family.    Critical care time exclusive of procedures: 45 minutes.  Brian Dallas MD  [facility]  Securely message with the Vocera Web Console (learn more here)  Text page via [employer] Paging/Directory         Clinically Significant Risk Factors Present on Admission                       ______________________________________________________________________    Interval History   - CT with bowel ischemia. Taken to OR with GS for ex-lap, SBR. Now in intestinal discontinuity.   - started on broad spectrum antibiotics  - remains on 3 pressors and persistently febrile concerning for ongoing ischemia, discussed with general surgery  - persistent a-fib with intermittent RVR, on amio. Cards aware, no changes to medication at this time.       Physical Exam   Vital Signs: Temp: (!) 102.7  F (39.3  C) Temp src: Esophageal BP: 90/64 Pulse: (!) 131   Resp: 22 SpO2: 100 % O2 Device: Mechanical Ventilator    Weight: 205 lbs 7.5 oz       Intake/Output Summary (Last 24 hours) at 11/9/2022 1230  Last data filed at 11/9/2022 1200  Gross per 24 hour   Intake 4906.15 ml   Output 2795 ml   Net 2111.15 ml       General: elderly male, ill appearing  HEENT: AT, NC, PERRL, EOMI, right internal jugular CVC in place with sterile dressing,  trachea midline, no significant JVD appreciated. ET tube in place. NG with dark bilious fluid.   CV: warm, non mottled appearance. Loud pan systolic cardiac murmur present, sinus rhythm on tele. A-fib with  intermittent RVR on tele.   Pulm: on mechanical ventilation  Abdomen: open abdomen with abthera in place, thin Sanguinous to serosanguinous output in the canisters. Non distended.   Extremities: thin extremities with evidence of muscle wasting  Neuro: 3T on sedation  Psych: LU    Data   I reviewed all medications, new labs and imaging results over the last 24 hours.  Arterial Blood Gases   Recent Labs   Lab 11/08/22  1544 11/08/22  1310 11/07/22  1528 11/07/22  1110   PH 7.29* 7.42 7.49* 7.46*   PCO2 42 28* 30* 32*   PO2 353* 109* 63* 69*   HCO3 20* 18* 23 23       Complete Blood Count   Recent Labs   Lab 11/09/22  0437 11/08/22  2146 11/08/22  1544 11/08/22  0834 11/08/22  0342   WBC 7.4 6.8  --  11.4* 11.1*   HGB 10.3* 10.4* 8.8* 9.3* 9.0*    181  --  192 191       Basic Metabolic Panel  Recent Labs   Lab 11/09/22  0957 11/09/22  0641 11/09/22  0437 11/09/22  0200 11/08/22  2154 11/08/22  2146 11/08/22  1740 11/08/22  1544 11/08/22  1140 11/08/22  0834 11/08/22  0344 11/08/22  0342   NA  --   --  145*  --   --  142  --  142  --  141  --  141   POTASSIUM  --   --  4.8  --   --  4.8  --  4.8  --  4.3  --  4.5   CHLORIDE  --   --  118*  --   --  115*  --   --   --  113*  --  111*   CO2  --   --  19*  --   --  19*  --   --   --  20  --  20   BUN  --   --  68*  --   --  69*  --   --   --  63*  --  69*   CR  --   --  1.43*  --   --  1.39*  --   --   --  1.32*  --  1.43*   * 161* 167* 158*   < > 174*   < >  --    < > 126*   < > 136*    < > = values in this interval not displayed.       Liver Function Tests  Recent Labs   Lab 11/09/22  0437 11/08/22  2146 11/07/22  1249 11/06/22  1433 11/06/22  1232 11/06/22  1232 11/04/22  1702   AST  --   --   --  20  --  18  --    ALT  --   --   --  21  --  24  --    ALKPHOS  --   --   --  59  --  59  --    BILITOTAL  --   --   --  4.0*  --  3.8*  --    ALBUMIN 2.3* 2.4* 2.5* 2.8*   < > 2.9*  --    INR  --   --   --   --   --   --  1.21*    < > = values in this interval  not displayed.       Pancreatic Enzymes  Recent Labs   Lab 11/06/22  1232   LIPASE 24*       Coagulation Profile  Recent Labs   Lab 11/04/22  1702   INR 1.21*       IMAGING:  Recent Results (from the past 24 hour(s))   XR Chest Port 1 View    Narrative    CHEST PORTABLE ONE VIEW November 8, 2022 1:16 PM       INDICATION: Assess endotracheal tube.    COMPARISON: 11/7/2022.       Impression    IMPRESSION: Endotracheal tube in good position above the victor hugo. Right  IJ CVC in SVC. Left IJ [place]-Jean catheter tip in the main PA. Mild  bibasilar atelectasis. Lungs otherwise clear. No pneumothorax.    JULIETH RUIZ MD         SYSTEM ID:  [id number]   XR Chest Port 1 View    Narrative    EXAM: XR CHEST PORT 1 VIEW  LOCATION: [facility]  DATE/TIME: 11/8/2022 11:20 PM    INDICATION: Endotracheal tube positioning  COMPARISON: 11/08/2022      Impression    IMPRESSION: Endotracheal tube terminates 5.6 cm above the victor hugo. Right IJ central venous catheter terminates in the right atrium. Interval removal of Ha[place]hill-Jean catheter. Distal portion of the enteric tube is excluded from the field-of-view. Bilateral   pleural effusions and bibasilar consolidations are seen. Enlargement of the cardiomediastinal silhouette. No definite pneumothorax.

## 2022-11-09 NOTE — ANESTHESIA POSTPROCEDURE EVALUATION
Patient: Bryant Mckenzie    Procedure: Procedure(s):  REMOVAL OF SMALL BOWEL,       Anesthesia Type:  General    Note:  Disposition: ICU            ICU Sign Out: Anesthesiologist/ICU physician sign out WAS performed   Postop Pain Control: Uneventful            Sign Out: Well controlled pain   PONV: No   Neuro/Psych: Uneventful            Sign Out: Acceptable/Baseline neuro status   Airway/Respiratory: Uneventful            Sign Out: AIRWAY IN SITU/Resp. Support               Airway in situ/Resp. Support: ETT                 Reason: Planned Pre-op   CV/Hemodynamics: Uneventful            Sign Out: Acceptable CV status; No obvious hypovolemia; No obvious fluid overload   Other NRE: NONE   DID A NON-ROUTINE EVENT OCCUR? No           Last vitals:  Vitals:    11/08/22 1727 11/08/22 1730 11/08/22 1800   BP:  (!) 86/48    Pulse: 115 (!) 125    Resp: 20 21 22   Temp: 36.9  C (98.5  F)     SpO2: 100% 100%        Electronically Signed By: Sandra Mariscal MD, MD  November 8, 2022  6:07 PM

## 2022-11-09 NOTE — PROGRESS NOTES
Discussed with family poor prognosis and futility of chest compression given aortic stenosis. Outcome would be poor if patient experience cardiac arrest.  Family made patient DNR.    Rafiq Nagy MD  Critical Care Medicine

## 2022-11-09 NOTE — PLAN OF CARE
Goal Outcome Evaluation:      Plan of Care Reviewed With: spouse, family    Overall Patient Progress: improvingOverall Patient Progress: improving    Outcome Evaluation: visited [facility] pt's family regarding nutrition POC and plan to start TPN. ordered custom, concentrated TPN to start    Eunice Cancino RD, LD

## 2022-11-09 NOTE — PLAN OF CARE
Neuro: Patient is intubated and seated,  PERRL, not fallow commands.  CV: SR to SB,  Scott, Levo, Amiodarone, and vasopressin continuous. BP soft, MAP 63-67. Temperature up to 102.6 F, cooling blanket on, temperature down to 101,4.  Pulmonary: LS diminish, Tolerate vent well, scant oral and inline secretion.  GI/: NPO, Landeros in place with adequate UOP. No BM this shift.  Lines/ Gtt : L CVC x 3, L PIV,   Skin: Wound vac to abdominal incision, C/D/I.  Family at bedside till 2030.Son update over the phone this morning.

## 2022-11-09 NOTE — CONSULTS
"CLINICAL NUTRITION SERVICES  -  ASSESSMENT NOTE      Recommendations Ordered by Registered Dietitian (RD):   - ordered TPN  --Use dosing weight 93.2 kg  --Begin TPN, goal volume 1320 ml/day with initial 205 g Dex daily (697 kcal, GIR 1.5), 115 g AA daily (460 kcal), and 250 ml 20% IV lipids daily.  1. Initiate custom TPN @ 55 mL/hr = 1320 mL/day, 115 g AA/day and 205 g Dex/day (GIR: 1.5 mg/kg/min). Start 250 mL lipids 20% @ 21 mL/hr x12 hours daily  --ONLY once pt receives ~100% of initial continuous PN volume with K+/Mg++/Phos WNL, if labs are acceptable, advance TPN:  2. Increase dextrose to 305 g (GIR: 2.27 mg/kg/min)  3. Increase dextrose to goal of 405 g (GIR: 3.02 mg/kg/min)  = Total provisions of 2337 kcals (25 kcal/kg/day), 1.2 g PRO/kg/day, GIR 3.02 with 21% kcals from Fat.  - ordered baseline mag and TG labs  - ordered maintenance labs and days 1-3   Malnutrition:   % Weight Loss:  Weight loss does not meet criteria for malnutrition - 3% loss over past 9 months  % Intake:  </= 50% for >/= 5 days (severe malnutrition)  Subcutaneous Fat Loss:  Orbital region mild depletion  Muscle Loss:  Temporal region mild depletion and Clavicle bone region mild depletion - suspect some is age-related  Fluid Retention:  None noted    Malnutrition Diagnosis: Moderate malnutrition in the context of -  Acute illness or injury       REASON FOR ASSESSMENT  Bryant Mckenzie is a 82 year old male seen by Registered Dietitian for Pharmacy/Nutrition to Start and Manage PN      NUTRITION HISTORY  - Information obtained from chart review and discussion with pt's family  - PMH of severe aortic stenosis, coronary artery disease, cardiomyopathy, hyperlipidemia, diabetes mellitus type 2, sleep apnea, and BPH   - per ED note, Patient takes a daily weight and is typically in the 210-215lbs range; today he is at 212lbs.  - visited with pt's family this AM. Prior to admit, for the past few weeks, pt had a \"healthy appetite.\" For breakfast " "he always had cherrios with milk and a banana, for lunch he had 1/2 sandwich with fruits and vegetables and cottage cheese, for dinner he had meat and vegetables/potaotes with fruit and cottage cheese. For a few weeks prior to the return of his \"healthy appetite,\" pt had a decreased appetite which wife suspects was d/t his chronic back pain. Pt's wt shifts d/t fluid but goal dry wt is 212-220#.       CURRENT NUTRITION ORDERS  Diet Order: NPO    Current Intake/Tolerance:  - discussed nutrition POC with pt's family this AM. Discussed plan for TPN start and slow advancement per protocol. Discussed how TPN will provide adequate kcal and protein for pt to maintain wt and preservation of muscle mass. Family happy for pt to start receiving nutrition. Per wife, pt's last meal was Friday (day of admit). family had no further questions but assured family that RD service will continue to follow and family able to reach out if they have any questions before next check-in.  - pt NPO since 11/8  - per health touch, pt received a few meals on 11/4 and 11/6  - per nursing flow sheet, mostly 0% intakes       NUTRITION FOCUSED PHYSICAL ASSESSMENT FOR DIAGNOSING MALNUTRITION)  Yes         Observed:    Muscle wasting (refer to documentation in Malnutrition section) and Subcutaneous fat loss (refer to documentation in Malnutrition section)      ANTHROPOMETRICS  Height: 6' 0\"  Weight: 205 lbs 7.5 oz  Body mass index is 27.87 kg/m .  Weight Status:  Overweight BMI 25-29.9  IBW: 80.9 kg  % IBW: 115%  Weight History: generally stable wt over past year, wt loss of -3.2 kg since 2/2022 (3%)  11/08/22 : 96.3 kg (212 lb 4.9 oz)   11/01/22 : 100.1 kg (220 lb 9.6 oz)   10/13/22 : 97.1 kg (214 lb)   07/22/22 : 99.1 kg (218 lb 6.4 oz)   06/17/22 : 101.2 kg (223 lb)   06/15/22 : 101.6 kg (224 lb)   06/09/22 : 104 kg (229 lb 4.8 oz)   02/02/22 : 99.5 kg (219 lb 6.4 oz)   12/23/21 : 102.5 kg (226 lb)   12/17/21 : 102.8 kg (226 lb 11.2 " oz)    LABS  Labs reviewed: Na 145 (H), BUN 68 (H), creatinine 1.43 (H), -174  - Hemoglobin A1c 6.8 on 11/4/2022.    MEDICATIONS  Medications reviewed: insulin aspart (HSSI), protonix, amiodarone @ 16.67 mL/hr, precedex @ 7.2 mL/hr, fentanyl @ 2 mL/hr, versed @ 1 mL/hr, norepi @ 19.9 mL/hr, phenylephrine @ 42.7 mL/hr, IVF @ 50 mL/hr, vasopressin @ 12 mL/hr    PER CHART REVIEW:  General:  - pt remains with guarded prognosis  - 11/7, per MD note: Nephrology has been consulted and is considering initiation of CRRT.   - 10/14: LVEF of <20%    GI/Nutrition:  - 400 mL NG output yesterday  - 3x BM yesterday  - 11/9, per MD: Patient's cardiac issues and ongoing pressor needs unfortunately make him very high risk for additional bowel ischemia  - 11/8: REMOVAL OF BOWEL, POSSIBLE COLOSTOMY d/t bowel ischemia --> operative findings: Dead bowel. I left 240cm of viable proximal bowel and about 20cm of distal viable bowel  - 11/7: NG placed for decompression  - 11/7, per MD note: CT scan showed evidence of mesenteric inflammation, peritoneal free fluid and concern for possible bowel obstruction.    Skin:  - 11/8: wound vac placed for abdominal incision post-op    Resp.:  - 11/8, per MD: informed the patient and the family that he may be intubated for a few days to up to a week, he may be difficult to wean from mechanical ventilation given his underlying cardiac condition and his planned surgery.   - 11/8: intubated   - vented @ 30%      ASSESSED NUTRITION NEEDS PER APPROVED PRACTICE GUIDELINES:  Dosing Weight: 93.2 kg (actual, lowest doc wt this admit on 11/9)  Estimated Energy Needs: 0494-4146 kcals (25-30 Kcal/Kg)  Justification: per critical care practice guidelines  Estimated Protein Needs: 112-140 grams protein (1.2-1.5 g pro/Kg)  Justification: per critical care practice guidelines  Estimated Fluid Needs: 1 mL/Kcal  Justification: maintenance OR per provider pending fluid status    MALNUTRITION:  % Weight Loss:   Weight loss does not meet criteria for malnutrition - 3% loss over past 9 months  % Intake:  </= 50% for >/= 5 days (severe malnutrition)  Subcutaneous Fat Loss:  Orbital region mild depletion  Muscle Loss:  Temporal region mild depletion and Clavicle bone region mild depletion - suspect some is age-related  Fluid Retention:  None noted    Malnutrition Diagnosis: Moderate malnutrition in the context of -  Acute illness or injury    NUTRITION DIAGNOSIS:  Inadequate oral intake related to NPO diet order secondary to pt being mechanically ventilated as evidenced by plan to start TPN today to meet estimated nutrition needs      NUTRITION INTERVENTIONS  Recommendations / Nutrition Prescription  - ordered TPN  - ordered baseline mag and TG labs  - ordered maintenance labs and days 1-3  - Nutrition education: Provided education on RD role in care with family. Discussed TPN start and nutrition POC with family.       Implementation  Nutrition education   PN Composition, PN Schedule   Lab order      Nutrition Goals  TPN to start and advance to goal within 2-3 days  TPN to meet % estimated needs over the next 3-5 days.  Maintain wt >93 kg over the next 3-5 days.      MONITORING AND EVALUATION:  Progress towards goals will be monitored and evaluated per protocol and Practice Guidelines      Eunice Cancino, RD, LD

## 2022-11-09 NOTE — PROGRESS NOTES
Quite critically ill. On 3 pressors. Wound dressing fine. I do not think we should open his abdomen today. If he survives, I will re-explore him tomorrow. May make an ileostomy if it looks readily feasible.   Normal WBC and lactate, but relevance is questionable.   Discussed plan with family. All questions answered.

## 2022-11-09 NOTE — PLAN OF CARE
Goal Outcome Evaluation:   Pt remains in [place] with -150's on amiodarone gtt. Levo, vaso and ana gtt's to maintain MAP >60. Pt required increase in levo this afternoon. Temp max 39.4. Rectal tylenol and cooling measures given. Family updated throughout the day. Pt now DNR.

## 2022-11-09 NOTE — PROGRESS NOTES
[facility]    Nephrology Progress Note    Assessment & Plan     .  <MISTI    ~Continues to maintain renal function and is making urine adequately at this point. No indication for dialysis.    - Continue monitoring renal function and electrolytes    -No indication for acute dialysis.     Donavan De Paz MD  [facility], Nephrology  Cell:[phone]  Pager:[phone]  On [employer]    Securely message with the [employer] Web Console (learn more here)  Text page via [employer] Paging/Directory     =========================  <><><><><><><><><><><>    Interval History     Events of the last day reveiwed. Went to OR. Extensive ischemic bowel resected and closed with plan to go back as no stoma or anastamosis.    Remains on pressors and febrile. Intubated and sedated.     Urine output maintained yesterday. Since midnight has been non oliguric.       Temp: (!) 102.7  F (39.3  C) Temp src: Esophageal BP: 90/64 Pulse: 116   Resp: 21 SpO2: 100 % O2 Device: Mechanical Ventilator Oxygen Delivery: 5 LPM    Vitals:    11/07/22 0000 11/08/22 0730 11/09/22 0430   Weight: 94.9 kg (209 lb 3.5 oz) 96.3 kg (212 lb 4.9 oz) 93.2 kg (205 lb 7.5 oz)       Vital Signs with Ranges    Temp:  [98.2  F (36.8  C)-102.9  F (39.4  C)] 102.7  F (39.3  C)  Pulse:  [101-147] 116  Resp:  [18-36] 21  BP: ()/(31-82) 90/64  MAP:  [55 mmHg-131 mmHg] 72 mmHg  Arterial Line BP: ()/() 97/54  FiO2 (%):  [30 %] 30 %  SpO2:  [95 %-100 %] 100 %    I/O last 3 completed shifts:  In: 4577.82 [I.V.:3492.82]  Out: 2845 [Urine:1695; Emesis/NG output:500; Drains:650]    Physical Exam    BP Readings from Last 5 Encounters:   11/09/22 90/64   11/01/22 92/58   10/13/22 (!) 84/56   07/22/22 92/52   06/17/22 92/62        Wt Readings from Last 10 Encounters:   11/09/22 93.2 kg (205 lb 7.5 oz)   11/01/22 100.1 kg (220 lb 9.6 oz)   10/13/22 97.1 kg (214 lb)   07/22/22 99.1 kg (218 lb 6.4 oz)   06/17/22 101.2 kg (223 lb)   06/15/22  101.6 kg (224 lb)   06/09/22 104 kg (229 lb 4.8 oz)   02/02/22 99.5 kg (219 lb 6.4 oz)   12/23/21 102.5 kg (226 lb)   12/17/21 102.8 kg (226 lb 11.2 oz)       GENERAL:      [] Alert, in no apparent distress.     [x] Intubated, sedated  [] Speech fluent, attentive.  HEENT:    [x]  Normocephalic. No gross abnormalities.    []The mouth moist.    [] Icteric  NECK:     The jugular venous pressure is [] Normal  [] Elevated [x] Not visible    CV:      [x] RRR [] IRR S1 S2 No murmur or rub detected.    RESP:   Ventilated breath sounds.   []  Decreased breath sounds bilaterally with no audible crackle.   []  Breathing normal, unlabored.    GI:  Abdominal incision dressed.   []  Abdomen soft/non-tender/non-distended.   []  No masses, organomegaly    MUSCULOSKELETAL:     [x]  Extremities normal - no gross deformities noted.     Edema: [x] None []  1+ [] 2+ []  3+ [] 4+ edema. [] Anasarca    ACCESS:     SKIN:     []  No new lesions or rashes, dry to touch.   []  Pale   []  Jaundiced    NEURO:    Sedated.   []  No overt deficit. Speech and attention normal.    PSYCH:     [] mood good, affect appropriate.   [x]  Unable to assess, but not agitated.    Medications      amiodarone 0.5 mg/min (11/09/22 0847)    dexmedetomidine 0.3 mcg/kg/hr (11/09/22 0800)    dextrose      fentaNYL 100 mcg/hr (11/09/22 0842)    midazolam 1 mg/hr (11/09/22 0843)    norepinephrine 0.22 mcg/kg/min (11/09/22 0800)    phenylephrine 6 mcg/kg/min (11/09/22 1027)    sodium chloride 50 mL/hr at 11/09/22 0800    vasopressin 2.4 Units/hr (11/09/22 0800)        acetaminophen  650 mg Rectal Q6H    chlorhexidine  15 mL Mouth/Throat Q12H    heparin ANTICOAGULANT  5,000 Units Subcutaneous Q8H    insulin aspart  1-12 Units Subcutaneous Q4H    lidocaine  1-3 patch Transdermal Q24H    lidocaine   Transdermal Q8H JASON    pantoprazole  40 mg Intravenous QAM AC    piperacillin-tazobactam  3.375 g Intravenous Q6H    sodium chloride 0.9 %  72 mL Intravenous Once    sodium  chloride (PF)  3 mL Intracatheter Q8H       Data     UA RESULTS:  Recent Labs   Lab Test 11/08/22 0924   COLOR Yellow   APPEARANCE Slightly Cloudy*   URINEGLC >=1000*   URINEBILI Negative   URINEKETONE Negative   SG 1.023   UBLD Moderate*   URINEPH 5.0   PROTEIN 50*   NITRITE Negative   LEUKEST Negative   RBCU 9*   WBCU 5      BMP  Recent Labs   Lab 11/09/22  0957 11/09/22  0641 11/09/22  0437 11/09/22  0200 11/08/22  2154 11/08/22 2146 11/08/22  1740 11/08/22  1544 11/08/22  1140 11/08/22  0834 11/08/22  0344 11/08/22 0342   NA  --   --  145*  --   --  142  --  142  --  141  --  141   POTASSIUM  --   --  4.8  --   --  4.8  --  4.8  --  4.3  --  4.5   CHLORIDE  --   --  118*  --   --  115*  --   --   --  113*  --  111*   FATMATA  --   --  8.3*  --   --  8.1*  --   --   --  8.9  --  8.6   CO2  --   --  19*  --   --  19*  --   --   --  20  --  20   BUN  --   --  68*  --   --  69*  --   --   --  63*  --  69*   CR  --   --  1.43*  --   --  1.39*  --   --   --  1.32*  --  1.43*   * 161* 167* 158*   < > 174*   < >  --    < > 126*   < > 136*    < > = values in this interval not displayed.     Phos@[email or web address](phos:4)  CBC)  Recent Labs   Lab 11/09/22  0437 11/08/22 2146 11/08/22  1544 11/08/22  0834 11/08/22  0342   WBC 7.4 6.8  --  11.4* 11.1*   HGB 10.3* 10.4* 8.8* 9.3* 9.0*   HCT 31.4* 32.7* 26* 28.7* 27.3*   MCV 84 86  --  83 83    181  --  192 191     Lab Results   Component Value Date    ALBUMIN 2.3 11/09/2022    ALBUMIN 2.4 11/08/2022    ALBUMIN 2.5 11/07/2022        Recent Labs   Lab 11/09/22  0437   HGB 10.3*   HCT 31.4*   MCV 84       Recent Labs   Lab 11/06/22  1433   AST 20   ALT 21   ALKPHOS 59   BILITOTAL 4.0*     Recent Labs   Lab 11/04/22  1702   INR 1.21*     No results found for: D2VIT, D3VIT, DTOT  No results for input(s): PTHI in the last 168 hours.    Attestation:   I have reviewed today's relevant vital signs, notes, medications, labs and imaging.

## 2022-11-09 NOTE — PROGRESS NOTES
General Surgery Progress Note    Admission Date: 11/4/2022  Today's Date: 11/9/2022         Assessment:      Bryant Mckenzie is a 82 year old male with severe aortic stenosis admitted with hypotension and plans for TAVR. Developed some abdominal distention and pain. CT early morning 11/7 showed dilated loops of bowel. Patient had significantly worsening distention and pain on exam yesterday, repeat CT with ischemic bowel, free air and free fluid. He was taken emergently to the OR for exploratory laparotomy, resection of large amount of ischemic/necrotic small bowel, abdominal washout and temporary abdominal closure. Bowel is in discontinuity.         Plan:   - Continue ICU cares, prognosis guarded  - Patient's cardiac issues and ongoing pressor needs unfortunately make him very high risk for additional bowel ischemia  - We will tentatively plan to return to the OR in 1-2 days for second look pending his clinical course. Monitor fevers and output from abdominal VAC closely  - Zosyn q6 hours, IV Protonix daily. OK to continue heparin  - Strict NPO / nothing per NG tube. Keep NG to LIS  - Tylenol suppository available for fevers    Above discussed with bedside nurse, ICU fellow, and the patient's son at bedside        Interval History:   Febrile, Tmax 102.6. Has not received any acetaminophen. Cooling blanket applied. NG with 500cc out yesterday. 650cc out from wound VAC since OR. Also had some BMs. Continues on multiple pressors, heart rate jumping up into 150s at times.          Physical Exam:   BP 90/64   Pulse (!) 124   Temp (!) 101.8  F (38.8  C)   Resp 22   Ht 1.829 m (6')   Wt 93.2 kg (205 lb 7.5 oz)   SpO2 100%   BMI 27.87 kg/m    I/O last 3 completed shifts:  In: 4577.82 [I.V.:3492.82]  Out: 2845 [Urine:1695; Emesis/NG output:500; Drains:650]  General: intubated, sedated, mechanically ventilated  Abdomen: soft, not particularly distended. AbThera in place with good seal, dark sanguinous fluid in  tubing and canister    LABS:  Recent Labs   Lab Test 11/09/22 0437 11/08/22 2146 11/08/22  1544 11/08/22  0834   WBC 7.4 6.8  --  11.4*   HGB 10.3* 10.4* 8.8* 9.3*   MCV 84 86  --  83    181  --  192      Recent Labs   Lab Test 11/09/22 0437 11/08/22 2146 11/08/22  1544 11/08/22  0834   POTASSIUM 4.8 4.8 4.8 4.3   CHLORIDE 118* 115*  --  113*   CO2 19* 19*  --  20   BUN 68* 69*  --  63*   CR 1.43* 1.39*  --  1.32*   ANIONGAP 8 8  --  8      Recent Labs   Lab Test 11/09/22 0437 11/08/22 2146 11/07/22  1249 11/06/22  1433 11/06/22  1232   ALBUMIN 2.3* 2.4* 2.5* 2.8* 2.9*   BILITOTAL  --   --   --  4.0* 3.8*   ALT  --   --   --  21 24   AST  --   --   --  20 18   ALKPHOS  --   --   --  59 59            -------------------------------    Theresa Acosta PA-C  [facility]  [phone]

## 2022-11-09 NOTE — PROGRESS NOTES
[facility]    Cardiology Progress Note    Date of Service (when I saw the patient): 11/09/2022            Assessment and Plan:   1.  Severe septic shock in the setting of ischemic bowel:  On vasopressin, epinephrine and levophed.  Maintaining MAP >65 on high pressor support  2.  Severe aortic stenosis with nonischemic cardiomyopathy:  EF <20%.  Low filling pressures with adequate cardiac output by RHC on Monday. On phenylephrine,vasopressin, levophed  Dobutamine stopped due to afib with RVR  2.  Paroxysmal atrial fibrillation with RVR: in the setting of ongoing septic shock, fevers, ischemic bowel  3.  MISTI; improved with fluid administration, adequate urine output  4.  Anemia       PLAN/RECOMMENDATIONS:  -Continue current hemodynamic support  -Afib with rapid ventricular rate is a reflection of his critical illness.  Continue amiodarone gtt, however, do not expect to achieve rate control in this setting.  He would very unlikely maintain sinus rhythm if cardioversion was pursued.  -No plans to consider TAVR in the foreseeable future  -Overall, prognosis guarded.  Surgery tentatively considering return to the OR In 1-2 days.      Yasmine Moya MD [facility]    I personally examined and evaluated the patient today.  Bryant Mckenzie was in (or remains in) critical condition today due to ongoing shock, afib with RVR.  I spent a total of 60 minutes providing critical care services at the bedside, evaluating the patient, directing care and reviewing laboratory values and radiologic reports for Mr. Mckenzie.     Yasmine Moya MD                    Interval History:     To OR yesterday for ischemic bowel; open abdomen.  Remains on high pressor support, afib with RVR.  Persistently febrile,adequte urine output.         Medications:       acetaminophen  650 mg Rectal Q6H     chlorhexidine  15 mL Mouth/Throat Q12H     heparin ANTICOAGULANT  5,000 Units Subcutaneous Q8H      insulin aspart  1-12 Units Subcutaneous Q4H     lidocaine  1-3 patch Transdermal Q24H     lidocaine   Transdermal Q8H JASON     lipids  250 mL Intravenous Q24H     pantoprazole  40 mg Intravenous QAM AC     piperacillin-tazobactam  3.375 g Intravenous Q6H     sodium chloride 0.9 %  72 mL Intravenous Once     sodium chloride (PF)  3 mL Intracatheter Q8H            Physical Exam:   Blood pressure 90/64, pulse (!) 138, temperature (!) 102.7  F (39.3  C), resp. rate 21, height 1.829 m (6'), weight 93.2 kg (205 lb 7.5 oz), SpO2 100 %.  Vitals:    22 0106 22 0347 22 0000 22 0730   Weight: 96.6 kg (213 lb) 93.9 kg (207 lb) 94.9 kg (209 lb 3.5 oz) 96.3 kg (212 lb 4.9 oz)    22 0430   Weight: 93.2 kg (205 lb 7.5 oz)       Intake/Output Summary (Last 24 hours) at 2022 1352  Last data filed at 2022 1200  Gross per 24 hour   Intake 4656.15 ml   Output 2795 ml   Net 1861.15 ml           Vital Sign Ranges  Temperature Temp  Av.8  F (38.8  C)  Min: 98.2  F (36.8  C)  Max: 102.9  F (39.4  C)   Blood pressure Systolic (24hrs), Av , Min:84 , Max:110        Diastolic (24hrs), Av, Min:31, Max:82      Pulse Pulse  Av.6  Min: 101  Max: 147   Respirations Resp  Av.3  Min: 18  Max: 31   Pulse oximetry SpO2  Av.4 %  Min: 98 %  Max: 100 %         EXAM:    Constitutional:    Intubated and sedated   Skin:    normal    Head:    Normocephalic, atramatic    Eyes:    pupils equal, round and reactive to light, extra ocular muscles intact, sclera clear, conjunctiva normal    Neck:    JVP    Lungs:    CTAB   Cardiovascular:    S1, S2 Irregularly irregular, II/VI mid systolic murmur   Abdomen:    normal bowel sounds    Extremities and Back:    no cyanosis or clubbing and No edema, warm   Neurological:    No gross or focal neurologic abnormalities               Data:     Recent Labs   Lab Test 22  0437 22  2146 22  0553 22  1702 22  1051 06/15/22  0653    WBC 7.4 6.8   < >  --    < > 5.6   HGB 10.3* 10.4*   < >  --    < > 13.2*   MCV 84 86   < >  --    < > 89    181   < >  --    < > 255   INR  --   --   --  1.21*  --  1.17*    < > = values in this interval not displayed.      Recent Labs   Lab Test 11/09/22  0437 11/08/22  2146   * 142   POTASSIUM 4.8 4.8   CHLORIDE 118* 115*   BUN 68* 69*   CR 1.43* 1.39*     Recent Labs   Lab 11/09/22  0957 11/09/22  0641 11/09/22  0437 11/09/22  0200   * 161* 167* 158*     Recent Labs   Lab Test 11/06/22  1433 11/06/22  1232   ALT 21 24   AST 20 18     Troponin I ES   Date Value Ref Range Status   01/03/2020 0.019 0.000 - 0.045 ug/L Final     Comment:     The 99th percentile for upper reference range is 0.045 ug/L.  Troponin values   in the range of 0.045 - 0.120 ug/L may be associated with risks of adverse   clinical events.           Recent Labs   Lab Test 11/09/22  0437 11/08/22  0342 11/07/22  2334   MAG 2.9* 2.6* 2.6*       Lab Results   Component Value Date    CHOL 123 12/05/2016     Lab Results   Component Value Date    HDL 50 12/05/2016     Lab Results   Component Value Date    LDL 51 12/05/2016     Lab Results   Component Value Date    TRIG 65 11/09/2022    TRIG 108 12/05/2016     No results found for: CHOLHDLRATIO       TSH   Date Value Ref Range Status   12/05/2016 3.25 0.40 - 4.00 mU/L Final       Yasmine Moya MD, FACC  Cardiology

## 2022-11-09 NOTE — PLAN OF CARE
Goal Outcome Evaluation:  Pt given amio bolus and gtt this am for CONCEPCI[place]N - HR up to 150's. Chest/Abdomen CT done this am. Plan for pt to go to OR for bowel surgery. Pt developed respiratory distress prior to OR and was then intubated. Levophed gtt started after intubation. Pt also on vasopressin and ana gtt's to keep MAP >60. Versed and dex gtt's for sedation. Fentanyl gtt for pain. Pt to OR this afternoon - small bowel removed and wound vac in place. Urine output low tonight - MD notified. Family here - updated throughout the day.

## 2022-11-10 NOTE — PLAN OF CARE
Shift: [id number]      Neuro: Pupils are equal and sluggish, pt sedated and unresponsive     CV: Heart rhtyhm is A. Fib RVR, rates fluctuating 120's-150's; amio drip infusing; pressors adjusted for MAP>65    Resp: Lungs are clear bilaterally; vent settings unchanged; minimal secretions in-line and orally     GI: Absent bowel sounds; NG to LIS with no output; ileostomy site is pink, minimal bloody drainage in the bag      : Landeros in place, pt responded to IV lasix with 180uo; continue to monitor strict I&O    Skin: See skin assessment in flowsheet     Mobility/Activity: Turning and repositioning in the bed     Pain: Fentanyl drip for pain     Family Updated: Pt's family at the bedside throughout the shift, questions answered by MD and RN    250mL albumin bolus given per nephrology @1720

## 2022-11-10 NOTE — OP NOTE
General Surgery Operative Note    PREOPERATIVE DIAGNOSIS:  Ischemia, bowel (H) [K55.9]    POSTOPERATIVE DIAGNOSIS:  Same     Procedure(s): Second look laparotomy   CREATION, ILEOSTOMY    ANESTHESIA:  General.      SURGEON:  Allan Og MD    ASSISTANT:  Abraham Snell MD. The physician  was medically necessary for their expertise in , suctioning, and retraction    INDICATIONS:  The patient is critically ill. He had a bowel resection for ischemia two days ago. He was left in discontinuity. We chose to look again to identify any more dead bowel and perhaps create a stoma or anastomosis. The risks and options were reviewed with the family in detail. They appeared to understand and wished for us to proceed.     PROCEDURE:  The patient was taken to the operating suite.  The operative area was prepped and draped in a sterile fashion.  Surgeon initiated timeout was acknowledged.  The abdominal closure device was removed.  We carefully mobilized the remaining bowel.  All of the small bowel that remained appeared viable.  We ran the entire remaining small bowel the ends had reasonable vascular supply.  The terminal ileum was slightly less pink than the jejunum.  I do not feel it was a reasonable time to do an anastomosis in the face of multiple pressors and less than perfect bowel.  I irrigated all the corners of the abdomen until clear with multiple liters of saline.  We irrigated the pelvis.  We laid the bowel in normal pattern.  I selected a spot for an ileostomy in the right lateral abdomen.  We made a small Campo dilated a fascial incision up to 2 fingers and brought out the end of the ileum through this ileostomy.  We closed the fascia with running 0 PDS and 0 Monocryl from both ends.  The ileostomy was matured with multiple sutures of interrupted 3-0 Vicryl.  It had a good rosebud and good vascular supply following this.  The wound was irrigated and closed with staples.    The patient was  awakened and taken to the ICU in  stable condition.  At the conclusion of the case, all counts were correct.        INTRAOPERATIVE FINDINGS:  No more ischemic bowel. Viable ileostomy    Allan Og MD

## 2022-11-10 NOTE — PROGRESS NOTES
Interval critical care note    Patient examined post operatively. ON RTOR, no further evidence of ischemic bowel. End ileostomy and abdominal closure performed. Over the course of the morning the patient has had progressive decrease in UOP. Repeat BMP this afternoon showed hyperkalemia with progressively worsening renal function. Discussed with DR. Bruner from nephrology. Recommended administration of 50 meq bicarb, 80 mg of IV lasix. Additionally giving 1 gram calcium gluconate for myocardial stabilization. Patient has had worsening hyperglycemia and is being started on insulin gtt therefore no additional insulin/D50 administered. TPN (which had K in it) has also been held. Patient's vasopressor requirements have also increased throughout the day and therefore giving 250 ml of albumin as fluid challenge given tenuous intravascular volume status. Family updated about the worsening renal failure. No acute indication for dialysis at this time. Will continue to trend UOP and potassium on serial labs.    Brian Dallas MD  Surgical Critical Care Fellow    Additional 20 minutes of critical care time

## 2022-11-10 NOTE — PROGRESS NOTES
[facility] ICU RESPIRATORY NOTE           Date of Admission: 11/04/2022     Date of Intubation (most recent): 11/08/2022    Reason for Mechanical Ventilation: AW protection     Number of Days on Mechanical Ventilation: 2     Met Criteria for Spontaneous Breathing Trial: No     Significant event today :none     ABG Results:   Recent Labs   Lab 11/09/22  1424 11/08/22  1544 11/08/22  1310 11/07/22  1528 11/07/22  1435   PH 7.31* 7.29* 7.42 7.49*  --    PCO2 38 42 28* 30*  --    PO2 101 353* 109* 63*  --    HCO3 19* 20* 18* 23  --    O2PER 30  --  40 21 21       Current Vent Settings:  Vent Mode: CMV/AC  (Continuous Mandatory Ventilation/ Assist Control)  FiO2 (%): 30 %  Resp Rate (Set): 18 breaths/min  Tidal Volume (Set, mL): 500 mL  PEEP (cm H2O): 5 cmH2O  Resp: 19     Skin Assessment :Intact. ETAD fell off from face. ETAD changed. Used adhesive removal and skin barrier. optifoam under above upper lip.      Plan: Continue full ventilatory support and wean as tolerated.

## 2022-11-10 NOTE — PLAN OF CARE
Goal Outcome Evaluation:    Neuro: Sedated with versed, dex, and fentanyl. Pupils sluggish.  CV/VS: Remains in [place] 110-150, amio at 0.5. Febrile 37.9-39.3, rectal tylenol per order. Remains on ana, levo, and vaso to maintain MAP >65, not titrated d/t decreased MAP during frequent ectopy.   Respiratory: Full vent support, 30%/18/500/5+, very scant secretions.   GI/: Good UOP, no BM, absent bowel sounds. NG to LIS, 25mL output. ABD wound vac with 500mL output.   Skin: Cool, pale. Blanchable redness to buttocks and sacrum, mepilex in place.   Pain: Fentanyl infusion and scheduled tylenol.  Labs: No electrolyte replacement needed, WBC up to 11.1, hgb 9.3.  Activity: Bedrest, repositioned Q2H.  Diet: Strict NPO, TPN and lipid infusion initiated at HS.  Plan/Changes: BC+, vanco started. 250mL NS bolus for low SV/SVI alarm via Flotrac.

## 2022-11-10 NOTE — PROGRESS NOTES
RT called to bedside this morning due to pt having large air leak around ETT. Air was added to the cuff. The air leak got a little better, but was still present. MD notified. Chest x-ray order was placed per MD to check tube placement. Tube was found to be 11 cm above victor hugo. Bedside bronch was done per MD to verify tube placement. FiO2 increased to 100% for the procedure. ETT was advanced to 29 cm at the lip during bronch per MD. Pt tolerated the procedure well. Plan for repeat chest x-ray to verify placement.  11/10/2022  Cheryl Lorenzo, RT

## 2022-11-10 NOTE — PROCEDURES
Bronchoscopy    Indications: Large air leak from ETT, malpositioned ETT.     Consent: written consent obtained.     Procedure:  Informed by respiratory therapy that ETT had large air leak, concerned for malpositioned ETT. CXR confirmed that the ETT was outside the thoracic inlet. Bronchoscopy assisted repositioning indicated. Indications, risks and benefits of the procedure were discussed with the wife and written consent obtained. A time out was performed. A disposable bronchoscopy was inserted into the ET tube and verified that the ETT was at the top of the trachea. Direct visualization was performed to positioned the ETT a few cm above the victor hugo and the tube was resecured. The patient tolerated the procedure well without any complications. CXR obtained post procedure to verify positioning.     Brian Dallas MD  Surgical critical care fellow    Dr. Nagy was present for the entire duration of the procedure.

## 2022-11-10 NOTE — BRIEF OP NOTE
[facility]    Brief Operative Note    Pre-operative diagnosis: Ischemia, bowel (H) [K55.9]  Post-operative diagnosis Same as pre-operative diagnosis    Procedure: Procedure(s):  OPEN CREATION, ILEOSTOMY  Surgeon: Surgeon(s) and Role:     * Allan Og MD - Primary     * Theresa Acosta PA-C - Assisting     * Abraham Snell MD - Resident - Assisting  Anesthesia: General   Estimated Blood Loss: 2 ml    Drains: None  Specimens: * No specimens in log *  Findings: Grossly well-perfused bowel without obvious necrosis. Terminal ileum appeared mildly dusky but adequately perfused. Keisha end ileostomy brought up in right upper quadrant. Fascia closed.    Complications: None.  Implants: * No implants in log *    Abraham Snell MD  [facility]  p: [phone]  Office: [phone]

## 2022-11-10 NOTE — PROGRESS NOTES
ETT was pulled back 1 cm to 28 cm at the lip per MD order after repeat chest x-ray.  11/10/2022  Cheryl Lorenzo, RT

## 2022-11-10 NOTE — PROGRESS NOTES
[facility]    ICU Progress Note       Date of Admission:  11/4/2022    Assessment: Critical Care   Bryant Mckenzie is a 82 year old male admitted on 11/4/2022 with severe critical aortic stenosis, cardiogenic and septic shock, acute renal failure and abdominal pain.     Patient was found to have a complete SBO with bowel ischemia. He has undergone urgent surgical exploration with resection of small bowel. He is currently in intestinally discontinuity with an open abdomen, intubated and sedated. He has developed a-fib with intermittent RVR likely due to sepsis physiology and cardiac stress.     Initial attempts at source control have been performed. He is being treated for ongoing septic shock with vasopressor support and broad spectrum antibiotics, judicial fluid resuscitation. His a-fib is being managed medically and we are supporting his cardiac function during his septic episodes. Plan to return to the OR today for second look laparotomy, possible bowel resection and creation of ostomy.     He remains in critical condition with guarded prognosis given co-morbid HFrEF and critical aortic stenosis.        Plan: Critical Care   Neuro/ pain/ sedation:  #encephalopathy 2/2 to medication, sepsis  - Monitor neurological status. Notify provider for any acute changes in exam  - sedation with dex  - weaning off versed  - pain control with fent gtt  - delirium precuations    Pulmonary:  #Dyspnea 2/2 to critical aortic stenosis, CHF  # acute respiratory failure 2.2 to sepsis. 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 On triple vasopressor therapy.   # Non sustained NSVT (while on inotropic therapy) - resolved  #Persistent A-fib with intermittent RVR,  Treating medically.   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 Developed worsening dyspnea, near syncope and hypotension prompting evaluation in the ED. Admitted for urgent TAVR. Cardiac issues complicated by worsening peripheral hypotension, abdominal pain and acute renal failure requiring admission to the ICU. Now with A-fib with VR on amiodarone therapy. TAVR currently on hold due to concern for intraabdominal pathology and need for further workup. Found to have ischemic bowel and now in septic shock. Patient on triple vasopressor support. Treating a-fib with medical management. Discussed with cardiology and not recommending cardioversion or increasing amiodarone gtt.     - Monitor hemodynamic status  - Continue vasopressor support with phenylephrine, norepinephrine and vasso. Avoid epinephrine given cardiac pathologies  - Ensure adequate preload- serial bedside POCUS and cheryl trac to be set up to help guide fluid assessment  - judicious fluid administration   - right heart cath for TAVR workup with CI ~ 2  - TAVR on hold during septic epsiode  - cardiology consulted, appreciate recommendations and co-management  - holding ASA and statin therapy  - holding PTA BB  - avoiding diuretics    GI/Nutrition:  # Complete SBO with strangulated bowel now s/p ex-lap, SBR. Currently in intestinal discontinuity with open abdomen  # atrophic gastris and associate B12 deficiency  # parenteral nutrition    Diffuse severe abdominal pain onset day of admission. Patient reports pain is not present unless coughing or during examination. Denies associated nausea or emesis  but has not passed flatus or stool since day of admission. CT scan obtained overnight 11/7/22 shows intraperitoneal free fluid, mesenteric inflammation and dilated loops of small bowel concerning for partial mechanical small bowel obstruction from suspected adhesive disease. CT findings may also be suggestive of decreased visceral perfusion. Persistent and worsening abdominal pain prompted IV contrasted CT scan on 11/8 which identified pneumatosis, mesenteric air and hypoperfused bowel consistent with severe ischemia. Taken urgently to OR for ex-lap with SBR. Now in intestinal discontinuity with open abdomen. Planned RTOR today for second look laparotomy and ileostomy with general surgery.     - Diet: parenteral nutrition - ADULT compounded formula  NPO per Anesthesia Guidelines for Procedure/Surgery Except for: Meds    - NG to LIS  - strict NPO  - H2 blocker for PUD ppx   - monitor serial abdominal examination  - General surgery consultation, appreciate assistance with evaluation. Plan to return to OR today. Appreciate co-managment  - holding B12 supplementation  - start TPN for parenteral nutrition    Renal/ Fluid Balance:   # Cardiorenal MISTI. Worsening renal function with electrolyte abnormalities  #hyperkalemia 5.2 2/2 to MISTI    - Will monitor intake and output  - judicious IVF given cardiac issues  - joey for strict I/O  - monitor UOP  - nephrology consulted, appreciate co-management  - ICU electrolyte replacement protocol   - awareness of mild hyperkalemia. Will trend K post operatively. No acute indication for intervention at this time. On continuous cardiac monitor.     Endocrine:  #DM, HGA1c 6.8. Acute worsening hyperglycemia present  - continue POC glucose and insulin therapy    ID:  #Intraabdominal sepsis.    Intraabdominal sepsis 2/2 to strangulated bowel from SBO. Source control obtained 11/8 but continues to be febrile. Concern for ongoing ischemia. Discussed with general surgery. Continuing to  monitor closely with plan for reevaluation within next 24 hours or sooner as indicated. Remains on broad spectrum antibiotics.   - continue zosyn- plan for 7-14 day course from source control pending ongoing clinical progress    Hematology:  # DVT ppx  - continue subcutaneous heparin    MSK:   - PT and OT consult on hold due to inability to participate. Appreciate recommendations when able to evaluate. .       Lines/ tubes/ drains:  Landeros Catheter: PRESENT, indication: Strict 1-2 Hour I&O  Central Lines: PRESENT  CVC Triple Lumen Right Internal jugular-Site Assessment: WDL    Prophylaxis:  - DVT Prophylaxis: Heparin SQ and Pneumatic Compression Devices  - PUD Prophylaxis: H2    Code Status: No CPR- Do NOT Intubate      Disposition:  - ICU,     The patient's care was discussed with the Attending Physician, Dr. Nagy, Bedside Nurse and Patient's Family.    Critical care time exclusive of procedures: 45 minutes.  Brian Dallas MD  [facility]  Securely message with the Vocera Web Console (learn more here)  Text page via [employer] Paging/Directory         Clinically Significant Risk Factors Present on Admission                 Code Status: No CPR- Do NOT Intubate       ______________________________________________________________________    Interval History   - remains stable but critically ill overnight. No significant changes in triple vasopressor therapy requirements.   - Received 250 ml bolus of NS for high SVV this am, no change in hemodynamics. Suggestive that additional fluids would not be beneficial  - worsening renal function on labs but continues to make UOP  - weaned off versed  - plan for RTOR today    Physical Exam   Vital Signs: Temp: 99.9  F (37.7  C) Temp src: Bladder BP: 90/55 Pulse: 107   Resp: 18 SpO2: 99 % O2 Device: Mechanical Ventilator    Weight: 206 lbs 5.61 oz       Intake/Output Summary (Last 24 hours) at 11/10/2022 0939  Last data filed at 11/10/2022 0800  Gross per 24  hour   Intake 5234.34 ml   Output 2430 ml   Net 2804.34 ml       General: elderly male, ill appearing  HEENT: AT, NC, PERRL, EOMI, right internal jugular CVC in place with sterile dressing,  trachea midline, no significant JVD appreciated. ET tube in place. NG with dark bilious fluid.   CV: warm, non mottled appearance. Loud pan systolic cardiac murmur present, sinus rhythm on tele. A-fib with intermittent RVR on tele.   Pulm: on mechanical ventilation, over breathing vent.   Abdomen: open abdomen with abthera in place, thin Sanguinous to serosanguinous output in the canisters. Non distended.   Extremities: thin extremities with evidence of muscle wasting  Neuro: 3T on sedation  Psych: LU    Data   I reviewed all medications, new labs and imaging results over the last 24 hours.  Arterial Blood Gases   Recent Labs   Lab 11/09/22  1424 11/08/22  1544 11/08/22  1310 11/07/22  1528   PH 7.31* 7.29* 7.42 7.49*   PCO2 38 42 28* 30*   PO2 101 353* 109* 63*   HCO3 19* 20* 18* 23       Complete Blood Count   Recent Labs   Lab 11/10/22  0349 11/09/22  0437 11/08/22  2146 11/08/22  1544 11/08/22  0834   WBC 11.1* 7.4 6.8  --  11.4*   HGB 9.3* 10.3* 10.4* 8.8* 9.3*    171 181  --  192       Basic Metabolic Panel  Recent Labs   Lab 11/10/22  0555 11/10/22  0349 11/10/22  0210 11/09/22  2203 11/09/22  0641 11/09/22  0437 11/08/22  2154 11/08/22  2146 11/08/22  1740 11/08/22  1544 11/08/22  1140 11/08/22  0834   NA  --  147*  --   --   --  145*  --  142  --  142  --  141   POTASSIUM  --  5.2  5.1  --   --   --  4.8  --  4.8  --  4.8  --  4.3   CHLORIDE  --  121*  --   --   --  118*  --  115*  --   --   --  113*   CO2  --  18*  --   --   --  19*  --  19*  --   --   --  20   BUN  --  75*  --   --   --  68*  --  69*  --   --   --  63*   CR  --  1.65*  --   --   --  1.43*  --  1.39*  --   --   --  1.32*   * 310* 268* 206*   < > 167*   < > 174*   < >  --    < > 126*    < > = values in this interval not displayed.        Liver Function Tests  Recent Labs   Lab 11/09/22  0437 11/08/22  2146 11/07/22  1249 11/06/22  1433 11/06/22  1232 11/06/22  1232 11/04/22  1702   AST  --   --   --  20  --  18  --    ALT  --   --   --  21  --  24  --    ALKPHOS  --   --   --  59  --  59  --    BILITOTAL  --   --   --  4.0*  --  3.8*  --    ALBUMIN 2.3* 2.4* 2.5* 2.8*   < > 2.9*  --    INR  --   --   --   --   --   --  1.21*    < > = values in this interval not displayed.       Pancreatic Enzymes  Recent Labs   Lab 11/06/22  1232   LIPASE 24*       Coagulation Profile  Recent Labs   Lab 11/04/22  1702   INR 1.21*       IMAGING:  Recent Results (from the past 24 hour(s))   XR Chest Port 1 View    Narrative    CHEST PORTABLE ONE VIEW November 10, 2022 8:54 AM       INDICATION: ETT placement verification.    COMPARISON: 11/8/2022.       Impression    IMPRESSION: Endotracheal tube at the thoracic inlet, 11 cm above the  victor hugo. NG tube courses below the diaphragm. Right IJ central venous  catheter tip is near the superior atriocaval junction. Opacities in  both lung bases likely represent small pleural effusions and  underlying consolidation or atelectasis in the lower lobes, similar to  previous. No pneumothorax.

## 2022-11-10 NOTE — ANESTHESIA POSTPROCEDURE EVALUATION
Patient: Bryant Mckenzie    Procedure: Procedure(s):  EXPLORATORY LAPAROTOMY, OPEN CREATION, ILEOSTOMY       Anesthesia Type:  General    Note:  Disposition: ICU            ICU Sign Out: Anesthesiologist/ICU physician sign out WAS performed   Postop Pain Control: Uneventful            Sign Out: Well controlled pain   PONV: No   Neuro/Psych: Uneventful            Sign Out: PLANNED postop sedation   Airway/Respiratory: Uneventful            Sign Out: AIRWAY IN SITU/Resp. Support               Airway in situ/Resp. Support: ETT   CV/Hemodynamics: Uneventful            Sign Out: Detailed CV status               Blood Pressure: Pressors               Rate/Rhythm: Tachycardia   Other NRE: NONE   DID A NON-ROUTINE EVENT OCCUR? No           Last vitals:  Vitals:    11/10/22 0900 11/10/22 0915 11/10/22 0930   BP:      Pulse: 103 118 109   Resp:      Temp: 37.6  C (99.7  F) 37.6  C (99.7  F) 37.7  C (99.9  F)   SpO2: 99% 99% 100%       Electronically Signed By: Andrae Richard MD  November 10, 2022  12:38 PM

## 2022-11-10 NOTE — ANESTHESIA CARE TRANSFER NOTE
Patient: Bryant Mckenzie    Procedure: Procedure(s):  OPEN CREATION, ILEOSTOMY       Diagnosis: Ischemia, bowel (H) [K55.9]  Diagnosis Additional Information: No value filed.    Anesthesia Type:   General     Note:    Oropharynx: endotracheal tube in place, nasal gatric tube in place and ventilatory support  Level of Consciousness: iatrogenic sedation  Patient oxygen source: Ambu-bag.  Level of Supplemental Oxygen (L/min / FiO2): 10  Independent Airway: airway patency not satisfactory and stable  Dentition: dentition unchanged  Vital Signs Stable: post-procedure vital signs reviewed and stable  Report to RN Given: handoff report given  Patient transferred to: ICU    ICU Handoff: Call for PAUSE to initiate/utilize ICU HANDOFF, Identified Patient, Identified Responsible Provider, Reviewed the Pertinent Medical History, Discussed Surgical Course, Reviewed Intra-OP Anesthesia Management and Issues during Anesthesia, Set Expectations for Post Procedure Period and Allowed Opportunity for Questions and Acknowledgement of Understanding      Vitals:  Vitals Value Taken Time   BP     Temp     Pulse     Resp     SpO2         Electronically Signed By: JENNIFER Cabrera CRNA  November 10, 2022  12:20 PM

## 2022-11-10 NOTE — PROGRESS NOTES
Fairly stable overnight. Plan second look with possible ileostomy creation. Discussed with family. They appear to understand and wish to proceed.

## 2022-11-10 NOTE — BRIEF OP NOTE
[facility]    Brief Operative Note    Pre-operative diagnosis: Ischemia, bowel (H) [K55.9]  Post-operative diagnosis: Same    Procedure:   SECOND LOOK LAPAROTOMY  CREATION OF END ILEOSTOMY  ABDOMINAL CLOSURE    Surgeon:       * Allan Og MD - Primary     * Abraham Snell MD - Resident - Assisting     Anesthesia: General     Estimated Blood Loss: Minimal, see op note      Findings: Remaining bowel viable, no evidence of additional ischemia. End ileostomy created. Fascia closed. No immediate complications. See operative report for full details.      Theresa Acosta PA-C  [facility]  [phone]

## 2022-11-10 NOTE — PROGRESS NOTES
Renal Medicine Progress Note    SHORTHAND KEY FOR MY NOTES:  c = with, s = without, p = after, a = before, x = except, asx = asymptomatic, tx = transplant or treatment, sx = symptoms or symptomatic, cx = canceled or culture, rxn = reaction, yday = yesterday, nl = normal, abx = antibiotics, fxn = function, dx = diagnosis, dz = disease, m/h = melena/hematochezia, c/d/l/ha = cramping/dizziness/lightheadedness/headache, d/c = discharge or diarrhea/constipation, f/c/n/v = fevers/chills/nausea/vomiting, cp/sob = chest pain/shortness of breath, tbv = total body volume, rxn = reaction, tdc = tunneled dialysis catheter, pta = prior to admission, hd = hemodialysis, pd = peritoneal dialysis, hhd = home hemodialysis, edw = estimated dry wt         Assessment/Plan:     1.  MISTI c hyperkalemia.  Pt's cr is up to 1.7 today and his uo is declining today.  His K post-op is high and he is also acidotic.  Explained to the family that he is in a very precarious position and dialysis may not be successful and may not be offered if it is not felt that we can initiate it safely.  They understand the situation.    A.  Follow labs, uo closely.  B.  Will give 1 amp bicarb + IV furos to help manage the high k.  C.  Daily assessment for dialysis needs.  If he starts, it will def be CRRT.    2.  Sepsis syndrome.  Pt is on multiple pressors (norepi, phenyl, vaso) at high or max doses.  His fingers, toes are slightly dusky.  He is on broad abx.  He has some room on the vent so we can give a little bit of colloid to help BP.  A.  Continue abx at proper renal doses.  B.  Give alb 5% 250 ml x 1 and see if we can decrease pressors a bit.    3.  SBO s/p resection.  Pt had surgery #2 and was reanastomosed today.    A.  Follow clinically.    4.  Severe aortic stenosis.  The TAVR is on hold.  A.  Follow.    5.  Resp failure.  Pt is intubated and sedated.  Oxygenating ok c FIO2 30%.  A.  Full vent support per ICU team.    6.  Anemia.  Pt's hb is  stable.  A.  Follow hb, clinically.    7.  FEN.  Electrolytes are ok x high K and high Na.  The Na corrects to ~150 or so.  He is getting 200 ml/h of NS c all of his drips.  A.  Stop TPN given that it's giving him K.  B.  Check Na later and adjust fluids if it's going up.  Will see if pharmacy can adjust to hypotonic fluids.    8.  Code Status.  Pt is DNR.        Interval History:     Unable.  Discussed c family at bedside p surgery today.          Medications and Allergies:       acetaminophen  650 mg Rectal Q6H     calcium gluconate  1 g Intravenous Once     chlorhexidine  15 mL Mouth/Throat Q12H     [Held by provider] heparin ANTICOAGULANT  5,000 Units Subcutaneous Q8H     lidocaine  1-3 patch Transdermal Q24H     lidocaine   Transdermal Q8H JASON     lipids  250 mL Intravenous Q24H     pantoprazole  40 mg Intravenous QAM AC     piperacillin-tazobactam  3.375 g Intravenous Q6H     sodium chloride 0.9 %  72 mL Intravenous Once     sodium chloride (PF)  3 mL Intracatheter Q8H      Allergies   Allergen Reactions     Semaglutide Fatigue and GI Disturbance          Physical Exam:     Vitals were reviewed   , Blood pressure 90/55, pulse (!) 124, temperature 99.7  F (37.6  C), resp. rate 17, height 1.829 m (6'), weight 93.6 kg (206 lb 5.6 oz), SpO2 100 %.  Wt Readings from Last 3 Encounters:   11/10/22 93.6 kg (206 lb 5.6 oz)   11/01/22 100.1 kg (220 lb 9.6 oz)   10/13/22 97.1 kg (214 lb)     Intake/Output Summary (Last 24 hours) at 11/10/2022 1409  Last data filed at 11/10/2022 1300  Gross per 24 hour   Intake 5220.4 ml   Output 1982 ml   Net 3238.4 ml     GENERAL APPEARANCE: intubated, sedated  HEENT:  eyes/ears/nose/neck grossly nl x + NG  RESP: + vent sounds  CV: irreg, tachy, nl S1/S2   ABDOMEN: distended, incisions  EXTREMITIES/SKIN: no significant ble edema  NEURO:  sedated  LINES:  + cook c yellow urine; L axilla art line, + RIJ 3-lumen         Data:     CBC RESULTS:     Recent Labs   Lab 11/10/22  1352  11/10/22  0349 11/09/22  0437 11/08/22  2146 11/08/22  1544 11/08/22  0834 11/08/22  0342   WBC 9.4 11.1* 7.4 6.8  --  11.4* 11.1*   RBC 3.48* 3.35* 3.73* 3.82*  --  3.45* 3.28*   HGB 9.5* 9.3* 10.3* 10.4* 8.8* 9.3* 9.0*   HCT 31.0* 29.6* 31.4* 32.7* 26* 28.7* 27.3*    166 171 181  --  192 191     Basic Metabolic Panel:  Recent Labs   Lab 11/10/22  1315 11/10/22  1242 11/10/22  0555 11/10/22  0349 11/10/22  0210 11/09/22  2203 11/09/22  0641 11/09/22  0437 11/08/22 2154 11/08/22  2146 11/08/22  1740 11/08/22  1544 11/08/22  1140 11/08/22  0834 11/08/22  0344 11/08/22  0342   *  --   --  147*  --   --   --  145*  --  142  --  142  --  141  --  141   POTASSIUM 5.8*  --   --  5.2  5.1  --   --   --  4.8  --  4.8  --  4.8  --  4.3  --  4.5   CHLORIDE 121*  --   --  121*  --   --   --  118*  --  115*  --   --   --  113*  --  111*   CO2 18*  --   --  18*  --   --   --  19*  --  19*  --   --   --  20  --  20   BUN 78*  --   --  75*  --   --   --  68*  --  69*  --   --   --  63*  --  69*   CR 1.73*  --   --  1.65*  --   --   --  1.43*  --  1.39*  --   --   --  1.32*  --  1.43*   * 345* 268* 310* 268* 206*   < > 167*   < > 174*   < >  --    < > 126*   < > 136*   FATMATA 7.8*  --   --  7.7*  --   --   --  8.3*  --  8.1*  --   --   --  8.9  --  8.6    < > = values in this interval not displayed.     INR  Recent Labs   Lab 11/04/22  1702   INR 1.21*      Attestation:   I have reviewed today's relevant vital signs, notes, medications, labs and imaging.    Ismael Bruner MD  [facility] - Nephrology  [phone]

## 2022-11-10 NOTE — PROGRESS NOTES
Major Events 7032-6123  ETT repositioning via bronchoscopy  OR for abd closure and ileostomy  Albumin bolus post-op as well as increase in pressor needs  K 5.8 - given lasix, bicarb, and calcium gluconate    Remains intubated and sedated, unresponsive.   Tele CONCEPCIÓN, 100-150, increasing this PM. Amiodarone gtt remains at set rate 0.5.   Sctot at max rate, vaso 2.4, levo gtt increased this PM (see MAR for details) to maintain MAP goal > 65.   LS clear. Minimal secretions oral/trachial. ETT now 28 @ lip after repositioning.   NPO. TPN stopped this PM after high K finding. Ileostomy pink, protruding, no output.     Family at bedside throughout shift. Interacting with patient, asking appropriate questions. Updated by RN and MD teams.     Plan to continue infusion management to achieve MAP goals, trend BMPs, and closely monitor UOP.

## 2022-11-10 NOTE — PROGRESS NOTES
General Surgery Progress Note    Admission Date: 11/4/2022  Today's Date: 11/10/2022         Assessment:      Bryant Mckenzie is a 82 year old male with severe aortic stenosis admitted with hypotension and plans for TAVR. Developed some abdominal distention and pain. CT early morning 11/7 showed dilated loops of bowel. Patient had significantly worsening distention and pain on exam yesterday, repeat CT with ischemic bowel, free air and free fluid. He was taken emergently to the OR for exploratory laparotomy, resection of large amount of ischemic/necrotic small bowel, abdominal washout and temporary abdominal closure. Bowel is in discontinuity.         Plan:   - To OR today for second look, possible ileostomy, possible abdominal closure  - Patient's cardiac issues and ongoing pressor needs unfortunately make him high risk for additional bowel ischemia  - Zosyn q6 hours, IV Protonix daily. AM heparin dose on hold  - Strict NPO / nothing per NG tube. Keep NG to LIS  - Tylenol suppository for fevers. Temp coming down some overnight  - Continue ICU cares. Prognosis guarded        Interval History:   Temp has drifted down slightly overnight, receiving rectal tylenol. Remains on 3 pressors, unable to titrate. TPN initiated last night.           Physical Exam:   BP 90/55 (BP Location: Right arm)   Pulse (!) 123   Temp 100  F (37.8  C)   Resp 19   Ht 1.829 m (6')   Wt 93.6 kg (206 lb 5.6 oz)   SpO2 98%   BMI 27.99 kg/m    I/O last 3 completed shifts:  In: 5408.45 [I.V.:4466.49; IV Piggyback:250]  Out: 2355 [Urine:1580; Emesis/NG output:75; Drains:700]  General: intubated, sedated, mechanically ventilated  Abdomen: soft, not particularly distended. AbThera in place with good seal, dark sanguinous fluid in tubing and canister    LABS:  Recent Labs   Lab Test 11/10/22  0349 11/09/22  0437 11/08/22  2146   WBC 11.1* 7.4 6.8   HGB 9.3* 10.3* 10.4*   MCV 88 84 86    171 181      Recent Labs   Lab Test  11/10/22  0349 11/09/22  0437 11/08/22  2146 11/08/22  1544 11/08/22  0834   POTASSIUM 5.1 4.8 4.8   < > 4.3   CHLORIDE  --  118* 115*  --  113*   CO2  --  19* 19*  --  20   BUN  --  68* 69*  --  63*   CR  --  1.43* 1.39*  --  1.32*   ANIONGAP  --  8 8  --  8    < > = values in this interval not displayed.               -------------------------------    Theresa Acosta PA-C  [facility]  [phone]

## 2022-11-10 NOTE — ANESTHESIA PREPROCEDURE EVALUATION
Anesthesia Pre-Procedure Evaluation    Patient: Bryant Mckenzie   MRN: [id number] : 1940        Procedure : Procedure(s):  OPEN CREATION, ILEOSTOMY          Past Medical History:   Diagnosis Date     Aortic stenosis 2016    Echo 2016 Mild       Atrophic gastritis 2016     Benign non-nodular prostatic hyperplasia 2016     CAD (coronary artery disease)      Hyperlipidemia 2016     Ischemic cardiomyopathy 2019     Nonischemic cardiomyopathy (H) 10/30/2019     Obstructive sleep apnea syndrome 2016     Type 2 diabetes mellitus 2016     Vitamin B12 deficiency (non anemic) 2016      Past Surgical History:   Procedure Laterality Date     CV CORONARY ANGIOGRAM N/A 10/24/2019    Procedure: Coronary Angiogram;  Surgeon: Lupe Posada MD;  Location: [facility] CARDIAC CATH LAB     CV CORONARY ANGIOGRAM N/A 6/15/2022    Procedure: Coronary Angiogram;  Surgeon: Murali Devi MD;  Location: [facility] CARDIAC CATH LAB     CV HEART CATHETERIZATION WITH POSSIBLE INTERVENTION N/A 2019    Procedure: Heart Catheterization with Possible Intervention;  Surgeon: Alex Bazan MD;  Location: [facility] CARDIAC CATH LAB     CV INSTANTANEOUS WAVE-FREE RATIO N/A 6/15/2022    Procedure: Instantaneous Wave-Free Ratio;  Surgeon: Murali Devi MD;  Location: [facility] CARDIAC CATH LAB     CV LEFT VENTRICULOGRAM N/A 6/15/2022    Procedure: Left Ventriculogram;  Surgeon: Murali Devi MD;  Location: [facility] CARDIAC CATH LAB     CV PCI CHRONIC TOTAL OCCLUSION N/A 10/24/2019    Procedure: Coronary Total Occlusion;  Surgeon: Lupe Posada MD;  Location: [facility] CARDIAC CATH LAB     CV PCI STENT DRUG ELUTING N/A 10/24/2019    Procedure: PCI Stent Drug Eluting;  Surgeon: Lupe Posada MD;  Location: [facility] CARDIAC CATH LAB     CV RIGHT HEART CATH MEASUREMENTS RECORDED N/A 6/15/2022    Procedure: Right Heart Catheterization;  Surgeon: Murali Devi  MD DRE;  Location:  HEART CARDIAC CATH LAB     CV RIGHT HEART CATH MEASUREMENTS RECORDED N/A 11/7/2022    Procedure: Right Heart Cath;  Surgeon: Tyrone Youngblood MD;  Location:  HEART CARDIAC CATH LAB     DAVINCI HERNIORRHAPHY INGUINAL Bilateral 2/2/2022    Procedure: ROBOT-ASSISTED BILATERAL INGUINAL HERNIA REPAIR WITH MESH;  Surgeon: Rene Murray MD;  Location:  OR     ESOPHAGOSCOPY, GASTROSCOPY, DUODENOSCOPY (EGD), COMBINED N/A 1/20/2015    Procedure: COMBINED ESOPHAGOSCOPY, GASTROSCOPY, DUODENOSCOPY (EGD), BIOPSY SINGLE OR MULTIPLE;  Surgeon: Allan Marroquin MD;  Location:  GI     EXCISE LESION TRUNK N/A 4/17/2019    Procedure: EXCISE CYST UPPER BACK;  Surgeon: Rene Murray MD;  Location:  OR     HERNIA REPAIR       HERNIORRHAPHY UMBILICAL N/A 2/2/2022    Procedure: OPEN UMBILICAL HERNIA REPAIR;  Surgeon: Rene Murray MD;  Location:  OR     HYDROCELECTOMY SCROTAL       JOINT REPLACEMENT (aka KNEE) NOS Bilateral      RESECT SMALL BOWEL WITHOUT OSTOMY N/A 11/8/2022    Procedure: REMOVAL OF SMALL BOWEL,;  Surgeon: Allan Og MD;  Location:  OR      Allergies   Allergen Reactions     Semaglutide Fatigue and GI Disturbance      Social History     Tobacco Use     Smoking status: Never     Smokeless tobacco: Never   Substance Use Topics     Alcohol use: Not Currently     Alcohol/week: 0.0 standard drinks      Wt Readings from Last 1 Encounters:   11/10/22 93.6 kg (206 lb 5.6 oz)        Anesthesia Evaluation   Pt has had prior anesthetic.     No history of anesthetic complications       ROS/MED HX  ENT/Pulmonary: Comment: Respiratory failure, intubated    (+) sleep apnea (patient denies), doesn't use CPAP,  (-) recent URI   Neurologic: Comment: sedated   (-) no seizures, no CVA and migraines   Cardiovascular: Comment: Cardiogenic and septic shock    Hypotensive, SBP 70    On levophed, VASOPRESSIN, neosynephrine gtt    Intolerant to pressors    Symptomatic V tach  and A fib -120's    (+) Dyslipidemia --CAD --stent-2019. Drug Eluting Stent. CHF Last EF: <20% orthopnea/PND, dysrhythmias, a-fib and Other, valvular problems/murmurs type: AS and MR     METS/Exercise Tolerance:     Hematologic:     (+) anemia,     Musculoskeletal: Comment: Back pain  (+) arthritis,     GI/Hepatic: Comment: Ischemic bowel   (-) GERD   Renal/Genitourinary:     (+) renal disease, type: ARF, BPH,     Endo:     (+) type II DM, Using insulin,  (-) thyroid disease   Psychiatric/Substance Use:  - neg psychiatric ROS     Infectious Disease:       Malignancy:       Other:            Physical Exam    Airway      Comment: ETT           Respiratory Devices and Support    ETT:      Dental           Cardiovascular          Rhythm and rate: tachycardia     Pulmonary                   OUTSIDE LABS:  CBC:   Lab Results   Component Value Date    WBC 11.1 (H) 11/10/2022    WBC 7.4 11/09/2022    HGB 9.3 (L) 11/10/2022    HGB 10.3 (L) 11/09/2022    HCT 29.6 (L) 11/10/2022    HCT 31.4 (L) 11/09/2022     11/10/2022     11/09/2022     BMP:   Lab Results   Component Value Date     (H) 11/10/2022     (H) 11/09/2022    POTASSIUM 5.1 11/10/2022    POTASSIUM 5.2 11/10/2022    CHLORIDE 121 (H) 11/10/2022    CHLORIDE 118 (H) 11/09/2022    CO2 18 (L) 11/10/2022    CO2 19 (L) 11/09/2022    BUN 75 (H) 11/10/2022    BUN 68 (H) 11/09/2022    CR 1.65 (H) 11/10/2022    CR 1.43 (H) 11/09/2022     (H) 11/10/2022     (H) 11/10/2022     COAGS:   Lab Results   Component Value Date    PTT 43 (H) 06/15/2022    INR 1.21 (H) 11/04/2022     POC:   Lab Results   Component Value Date     (H) 01/03/2020     HEPATIC:   Lab Results   Component Value Date    ALBUMIN 2.3 (L) 11/09/2022    PROTTOTAL 5.7 (L) 11/06/2022    ALT 21 11/06/2022    AST 20 11/06/2022    ALKPHOS 59 11/06/2022    BILITOTAL 4.0 (H) 11/06/2022     OTHER:   Lab Results   Component Value Date    PH 7.31 (L) 11/09/2022    LACT 1.4  11/09/2022    A1C 6.8 (H) 11/04/2022    FATMATA 7.7 (L) 11/10/2022    PHOS 3.6 11/10/2022    MAG 3.1 (H) 11/10/2022    LIPASE 24 (L) 11/06/2022    TSH 3.25 12/05/2016       Anesthesia Plan    ASA Status:  5, emergent    NPO Status:  NPO Appropriate    Anesthesia Type: General.     - Airway: ETT      Maintenance: Balanced.   Techniques and Equipment:     - Lines/Monitors: 2nd IV, Arterial Line, Central Line     Consents    Anesthesia Plan(s) and associated risks, benefits, and realistic alternatives discussed. Questions answered and patient/representative(s) expressed understanding.    - Discussed:     - Discussed with:  Patient      - Extended Intubation/Ventilatory Support Discussed: Yes.      - Patient is DNR/DNI Status: Yes             Suspend during perioperative period? No.         Postoperative Care    Pain management: IV analgesics.        Comments:    Other Comments: Discussed with family the high risk nature of the procedure, they expressed good understanding of the critical nature of the patient's disease. All questions answered.            Andrae Richard MD

## 2022-11-10 NOTE — PROVIDER NOTIFICATION
0200 notified intensivist of +BC from R hand drawn on 11/8 at 1213, G+ cocci in clusters    0630 notified intensivist of low SV on flotrac 38-42, 250mL NS bolus ordered

## 2022-11-10 NOTE — PROGRESS NOTES
RESPIRATORY CARE NOTE   Patient is on mechanical ventilation, setting of CMV/AC RR18  PEEP 5 & 30%, Pt ETT placement 28 cm @ lip. BS are clear. RT will continue to monitor.     Hyun Mckeon, RT

## 2022-11-10 NOTE — PROGRESS NOTES
Brief Tele-ICU note  With positive blood culture for + gm cocci, will add antony.    brandin polanco  November 10, 2022    Addendum:  mls IV Given for low CI; physiologically unchanged. kerwin

## 2022-11-10 NOTE — PROGRESS NOTES
[facility]    Cardiology Progress Note    Date of Service (when I saw the patient): 11/10/2022            Assessment and Plan:   1.  Severe septic shock in the setting of ischemic bowel:  On vasopressin, epinephrine and levophed with escalation in doses post-op today to maintainiMAP >65  2.  Severe aortic stenosis with nonischemic cardiomyopathy:  EF <20%.  Low filling pressures with adequate cardiac output by [facility] on Monday. On phenylephrine,vasopressin, levophed  Dobutamine stopped due to afib with RVR  2.  Paroxysmal atrial fibrillation with RVR: in the setting of ongoing septic shock, fevers, ischemic bowel  3.  MISTI:  With diminishing urine output; nephrology gave lasix and albumin today  4.  Anemia        PLAN/RECOMMENDATIONS:  -Continue current hemodynamic support  -Continue amiodarone gtt for atrial fibrillation with RVR  -No plans to consider TAVR in the foreseeable future.  He is not a candidate for balloon valvuloplasty in the setting of acute critical illness and sepsis    Yasmine Moya MD [facility]    I personally examined and evaluated the patient today.  Bryant Mckenzie was in (or remains in) critical condition today due to septic shock, severe aortic stenosis   I spent a total of 30 minutes providing critical care services at the bedside, evaluating the patient, directing care and reviewing laboratory values and radiologic reports for Mr. Mckenzie.    Yasmine Moya MD      Interval History:     Went to OR today for ileostomy and abdominal closure.  Increasing pressor requirements post op.  Worsening renal function.  Gram + Cocci in blood.           Medications:       acetaminophen  650 mg Rectal Q6H     calcium gluconate  1 g Intravenous Once     chlorhexidine  15 mL Mouth/Throat Q12H     heparin ANTICOAGULANT  5,000 Units Subcutaneous Q8H     lidocaine  1-3 patch Transdermal Q24H     lidocaine   Transdermal Q8H JASON     lipids  250 mL Intravenous  Q24H     pantoprazole  40 mg Intravenous QAM AC     piperacillin-tazobactam  3.375 g Intravenous Q6H     sodium chloride 0.9 %  72 mL Intravenous Once     sodium chloride (PF)  3 mL Intracatheter Q8H              Physical Exam:   Blood pressure 90/55, pulse (!) 124, temperature 99.5  F (37.5  C), resp. rate 17, height 1.829 m (6'), weight 93.6 kg (206 lb 5.6 oz), SpO2 100 %.  Vitals:    22 0347 22 0000 22 0730 22 0430   Weight: 93.9 kg (207 lb) 94.9 kg (209 lb 3.5 oz) 96.3 kg (212 lb 4.9 oz) 93.2 kg (205 lb 7.5 oz)    11/10/22 0200   Weight: 93.6 kg (206 lb 5.6 oz)       Intake/Output Summary (Last 24 hours) at 11/10/2022 1518  Last data filed at 11/10/2022 1500  Gross per 24 hour   Intake 5457.55 ml   Output 2162 ml   Net 3295.55 ml           Vital Sign Ranges  Temperature Temp  Av.1  F (38.4  C)  Min: 99.5  F (37.5  C)  Max: 102.9  F (39.4  C)   Blood pressure Systolic (24hrs), Av , Min:90 , Max:90        Diastolic (24hrs), Av, Min:55, Max:55      Pulse Pulse  Av.2  Min: 103  Max: 147   Respirations Resp  Av.5  Min: 14  Max: 21   Pulse oximetry SpO2  Av.2 %  Min: 96 %  Max: 100 %         EXAM:    Constitutional:    Intubated and sedated   Skin:    normal    Head:    Normocephalic, atramatic    Eyes:    pupils equal, round and reactive to light, extra ocular muscles intact, sclera clear, conjunctiva normal    Neck:    JVP    Lungs:    CTAB   Cardiovascular:    S1, S2 II/VI mid systolic murmur   Abdomen:    normal bowel sounds    Extremities and Back:    no cyanosis or clubbing and No edema   Neurological:    No gross or focal neurologic abnormalities               Data:     Recent Labs   Lab Test 11/10/22  1352 11/10/22  0349 22  0553 22  1702 22  1051 06/15/22  0653   WBC 9.4 11.1*   < >  --    < > 5.6   HGB 9.5* 9.3*   < >  --    < > 13.2*   MCV 89 88   < >  --    < > 89    166   < >  --    < > 255   INR  --   --   --  1.21*  --  1.17*     < > = values in this interval not displayed.      Recent Labs   Lab Test 11/10/22  1315 11/10/22  0349   * 147*   POTASSIUM 5.8* 5.2  5.1   CHLORIDE 121* 121*   BUN 78* 75*   CR 1.73* 1.65*     Recent Labs   Lab 11/10/22  1501 11/10/22  1415 11/10/22  1315 11/10/22  1242   * 350* 371* 345*     Recent Labs   Lab Test 11/06/22  1433 11/06/22  1232   ALT 21 24   AST 20 18     Troponin I ES   Date Value Ref Range Status   01/03/2020 0.019 0.000 - 0.045 ug/L Final     Comment:     The 99th percentile for upper reference range is 0.045 ug/L.  Troponin values   in the range of 0.045 - 0.120 ug/L may be associated with risks of adverse   clinical events.           Recent Labs   Lab Test 11/10/22  0349 11/09/22  0437 11/08/22  0342   MAG 3.1* 2.9* 2.6*       Lab Results   Component Value Date    CHOL 123 12/05/2016     Lab Results   Component Value Date    HDL 50 12/05/2016     Lab Results   Component Value Date    LDL 51 12/05/2016     Lab Results   Component Value Date    TRIG 65 11/09/2022    TRIG 108 12/05/2016     No results found for: CHOLHDLRATIO       TSH   Date Value Ref Range Status   12/05/2016 3.25 0.40 - 4.00 mU/L Final         Yasmine Moya MD, FACC  Cardiology

## 2022-11-11 PROBLEM — A41.9 SEPTIC SHOCK (H): Status: ACTIVE | Noted: 2022-01-01

## 2022-11-11 PROBLEM — K55.9 SMALL BOWEL ISCHEMIA (H): Status: ACTIVE | Noted: 2022-01-01

## 2022-11-11 PROBLEM — R65.21 SEPTIC SHOCK (H): Status: ACTIVE | Noted: 2022-01-01

## 2022-11-11 PROBLEM — K56.609 SMALL BOWEL OBSTRUCTION (H): Status: ACTIVE | Noted: 2022-01-01

## 2022-11-11 NOTE — PROGRESS NOTES
[facility] ICU RESPIRATORY NOTE           Date of Admission: 11/04/2022     Date of Intubation (most recent): 11/08/2022    Reason for Mechanical Ventilation: AW protection     Number of Days on Mechanical Ventilation: 3     Met Criteria for Spontaneous Breathing Trial: No     Significant event today :None     ABG Results:   Recent Labs   Lab 11/11/22  0216 11/09/22  1424 11/08/22  1544 11/08/22  1310 11/07/22  1528   PH 7.37 7.31* 7.29* 7.42 7.49*   PCO2 36 38 42 28* 30*   PO2 173* 101 353* 109* 63*   HCO3 21 19* 20* 18* 23   O2PER 30 30  --  40 21       Current Vent Settings:  Vent Mode: CMV/AC  (Continuous Mandatory Ventilation/ Assist Control)  FiO2 (%): 30 %  Resp Rate (Set): 18 breaths/min  Tidal Volume (Set, mL): 500 mL  PEEP (cm H2O): 5 cmH2O  Resp: 12       Skin Assessment :Intact.opti foam under upper lip      Plan: Continue full ventilatory support and wean as tolerated.     Nic Arroyo, RT

## 2022-11-11 NOTE — PLAN OF CARE
Pt comfort cares at start shift. Time of death 0742. Family was present at time of death. Pt belongings sent with spouse. [place] [facility] AMS notified.

## 2022-11-11 NOTE — DEATH PRONOUNCEMENT
MD DEATH PRONOUNCEMENT    Called to pronounce Bryant Mckenzie dead.    Physical Exam: Unresponsive to noxious stimuli, Spontaneous respirations absent, Breath sounds absent, Carotid pulse absent, Heart sounds absent, Pupillary light reflex absent and Corneal blink reflex absent    Patient was pronounced dead at 07:42 AM, 2022.    Preliminary Cause of Death: Septic shock    Principal Problem:    Hypotension, unspecified hypotension type  Active Problems:    Pain in thoracic spine    Type 2 diabetes mellitus without complication (H)    Hyperlipidemia LDL goal <100    Atrophic gastritis    Vitamin B12 deficiency (non anemic)    Obstructive sleep apnea syndrome    SOB (shortness of breath)    Severe aortic stenosis    Symptomatic severe aortic stenosis with low ejection fraction    Cardiorenal syndrome    Cardiogenic shock (H)    Hyperkalemia    Hyponatremia    Atrial fibrillation with RVR (H)       Infectious disease present?: YES    Communicable disease present? (examples: HIV, chicken pox, TB, Ebola, CJD) :  NO    Multi-drug resistant organism present? (example: MRSA): NO    Please consider an autopsy if any of the following exist:  NO Unexpected or unexplained death during or following any dental, medical, or surgical diagnostic treatment procedures.   NO Death of mother at or up to seven days after delivery.     NO All  and pediatric deaths.     NO Death where the cause is sufficiently obscure to delay completion of the death certificate.   NO Deaths in which autopsy would confirm a suspected illness/condition that would affect surviving family members or recipients of transplanted organs.     The following deaths must be reported to the 's Office:  NO A death that may be due entirely or in part to any factors other than natural disease (recent surgery, recent trauma, suspected abuse/neglect).   NO A death that may be an accident, suicide, or homicide.     NO Any sudden, unexpected  death in which there is no prior history of significant heart disease or any other condition associated with sudden death.   NO A death under suspicious, unusual, or unexpected circumstances.    NO Any death which is apparently due to natural causes but in which the  does not have a personal physician familiar with the patient s medical history, social, or environmental situation or the circumstances of the terminal event.   NO Any death apparently due to Sudden Infant Death Syndrome.     NO Deaths that occur during, in association with, or as consequences of a diagnostic, therapeutic, or anesthetic procedure.   NO Any death in which a fracture of a major bone has occurred within the past (6) six months.   NO A death of persons note seen by their physician within 120 days of demise.     NO Any death in which the  was an inmate of a public institution or was in the custody of Law Enforcement personnel.   NO  All unexpected deaths of children   NO Solid organ donors   NO Unidentified bodies   NO Deaths of persons whose bodies are to be cremated or otherwise disposed of so that the bodies will later be unavailable for examination;   NO Deaths unattended by a physician outside of a licensed healthcare facility or licensed residential hospice program   NO Deaths occurring within 24 hours of arrival to a health care facility if death is unexpected.    NO Deaths associated with the decedent s employment.   NO Deaths attributed to acts of terrorism.   NO Any death in which there is uncertainty as to whether it is a medical examiner s care should be discussed with the medical investigator.        Body disposition: Body released to the morgue.    Brian Dallas MD  Surgical Critical Care Fellow

## 2022-11-11 NOTE — PROGRESS NOTES
RT called to bedside to turn off ventilator due to pt expiring. Pt was extubated at 0750 per family request.  11/11/2022  Cheryl Lorenzo, RT

## 2022-11-11 NOTE — DISCHARGE SUMMARY
[facility]    Death Summary  [facility]ensive [facility]    Date of Admission:  11/4/2022  Date of Death:         11/11/2022  Provider Completing Death Summary: Brian Dallas MD    Discharge Diagnoses   Principal Problem:    Septic shock (H)  Active Problems:    Pain in thoracic spine    Type 2 diabetes mellitus without complication (H)    Hyperlipidemia LDL goal <100    Atrophic gastritis    Vitamin B12 deficiency (non anemic)    Obstructive sleep apnea syndrome    SOB (shortness of breath)    Severe aortic stenosis    Symptomatic severe aortic stenosis with low ejection fraction    Cardiorenal syndrome    Cardiogenic shock (H)    Hyperkalemia    Hyponatremia    Atrial fibrillation with RVR (H)    Small bowel ischemia (H)    Small bowel obstruction (H)      History of Present Illness   Bryant Mckenzie is an 82 year old male who presented with hypotension and dizziness with acute abdominal pain in the setting of known severe aortic stenosis. He was admitted to the cardiology team for expedited TAVR workup and management of decompensated severe aortic stenosis and cardiogenic shock.     Hospital Course   Bryant Mckenzie was admitted on 11/4/2022. Initially admitted to the gregory for expedited TAVR workup but remained hypotensive and was started on peripheral vasopressor and inotropic support for cardiogenic shock and transferred to the ICU. On arrival to the ICU patient has worsening abdominal pain and CT scan was obtained to evaluate bowel obstruction. CT scan performed without contrast initially due to acute kidney injury showed free fluid, multiple dilated loops of small bowel and mesenteric inflammatory changes concerning for mechanical small bowel obstruction. He was made bowel rest, NG tube placed for decompression and general surgery was consulted for evaluation. Initially he was managed with conservative medical management but continued to have persistent abdominal pain with  focal peritonitis. He was emergently intubated due to concern for pending respiratory collapse. Repeat abdominal CT scan with contrast showed evidence of progressive ischemic bowel with mesenteric emphysema and he was taken urgently to the OR for exploration. Intraoperatively he was found to have necrotic distal small bowel and required significant bowel resection. 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 He was treated for septic shock with multiple vasopressor therapy, IV antibiotics. He developed a-fib with RVR during admission which was treated with amiodarone and cardiology was consulted for co-management. He also has evidence of an acute on chronic kidney injury with electrolyte issues and nephrology was consulted for co-management. Patient returned to the OR on POD2 for planned reexploration. Due to ongoing vasopressor therapy he was not a candidate for intestinal continuity and underwent creation of an end jejunostomy with delayed primary abdominal closure. Post operatively the patient continued to clinically deteriorate, was made DNR and family elected to transition to comfort care. He  22 at 07:42 am after withdrawal of life sustaining treatment.     Cause of death: Septic shock    Brian Dallas MD    Significant Results and Procedures    CT scan A/P 22  IMPRESSION:   1.  Asymmetric gas and fluid distended small bowel loops to the left lower quadrant with change in caliber, representing partial mechanical small bowel obstruction, likely attributable to adhesions. No free gas. Formed stool material within normal   caliber colon. Stomach is moderately distended with gas and fluid. Recommend surgical consultation.     2.  Small amount of abdominopelvic ascites has developed. Small bilateral pleural effusions have developed with associated passive atelectasis in the adjacent lower lobes. Mild diffuse body wall edema has developed.     3.  Cardiac enlargement. Dense  coronary artery and aortic valvular calcifications. No pericardial effusion. Hypodensity of the cardiac ventricles suggests anemia. Aortoiliac atherosclerotic changes without aneurysm.     4.  Prostatomegaly. Landeros catheter within the urinary bladder.     NOTE: ABNORMAL REPORT     THE DICTATION ABOVE DESCRIBES AN ABNORMALITY FOR WHICH FOLLOW-UP IS NEEDED.     CT scan A/P 11/4/22  IMPRESSION:  1.  Hypoenhancing small bowel, in the left abdomen with pneumatosis  and probable mesenteric venous air concerning for infarcted vs.  severely ischemic bowel.  2.  Small amount of intraperitoneal free air.  3.  Small amount of free fluid.     [Critical Result: Unexplained pneumoperitoneum]     Finding was identified on 11/8/2022 11:11 AM.      Dr. Og was with me at the time of interpretation at 11/8/2022 11:40  AM and verbalized understanding of the critical finding.      TUNG OWEN MD     Pending Results   NA    Primary Care Physician   Merlin Emery Brown    Consultations This Hospital Stay   CARDIOLOGY IP CONSULT  CARDIOVASCULAR SURGERY IP CONSULT  VASCULAR ACCESS ADULT IP CONSULT  PHARMACY IP CONSULT  NEPHROLOGY IP CONSULT  SURGERY GENERAL IP CONSULT  GASTROENTEROLOGY IP CONSULT  PHARMACY IP CONSULT  PHARMACY IP CONSULT  PHARMACY/NUTRITION TO START AND MANAGE TPN  PHARMACY IP CONSULT  PHARMACY TO DOSE VANCO  PHARMACY IP CONSULT  WOUND OSTOMY CONTINENCE NURSE  IP CONSULT  SMOKING CESSATION PROGRAM IP CONSULT    Time Spent on this Encounter   I, Brian Dallas MD, personally saw the patient today and spent greater than 30 minutes discharging this patient.    Data   Most Recent 3 CBC's:Recent Labs   Lab Test 11/11/22  0417 11/10/22  1352 11/10/22  0349   WBC 12.3* 9.4 11.1*   HGB 9.2* 9.5* 9.3*   MCV 85 89 88   * 157 166      Most Recent 3 BMP's:  Recent Labs   Lab Test 11/11/22  0417 11/11/22  0415 11/11/22  0222 11/11/22  0004 11/11/22  0000 11/10/22  1854 11/10/22  1801   *  --   --   --  153*  --  150*    POTASSIUM 5.0  --   --   --  4.9  --  4.4   CHLORIDE 124*  --   --   --  126*  --  123*   CO2 20  --   --   --  21  --  22   BUN 88*  --   --   --  85*  --  82*   CR 1.99*  --   --   --  1.91*  --  1.81*   ANIONGAP 6  --   --   --  6  --  5   FATMATA 7.7*  --   --   --  7.9*  --  7.8*   * 137* 119*   < > 98   < > 245*    < > = values in this interval not displayed.     Most Recent 2 LFT's:  Recent Labs   Lab Test 11/06/22  1433 11/06/22  1232   AST 20 18   ALT 21 24   ALKPHOS 59 59   BILITOTAL 4.0* 3.8*     Most Recent INR's and Anticoagulation Dosing History:  Anticoagulation Dose History     Recent Dosing and Labs Latest Ref Rng & Units 1/23/2006 8/21/2006 6/4/2019 10/24/2019 6/15/2022 11/4/2022    INR 0.85 - 1.15 0.98 0.98 1.15(H) 1.19(H) 1.17(H) 1.21(H)        Most Recent 3 Troponin's:  Recent Labs   Lab Test 01/03/20  0034   TROPI 0.019     Most Recent Cholesterol Panel:  Recent Labs   Lab Test 11/09/22  0437 12/05/16  1200   CHOL  --  123   LDL  --  51   HDL  --  50   TRIG 65 108     Most Recent 6 Bacteria Isolates From Any Culture (See EPIC Reports for Culture Details):No lab results found.  Most Recent TSH, T4 and A1c Labs:  Recent Labs   Lab Test 11/04/22  1051 03/13/17  1130 12/05/16  1200   TSH  --   --  3.25   A1C 6.8*   < > 8.0*    < > = values in this interval not displayed.

## 2022-11-11 NOTE — PROGRESS NOTES
See MAR for vasopressor administration at maximum rate.   LR bolus complete.     Family at bedside

## 2022-11-11 NOTE — PROGRESS NOTES
Approximately 0630, drips stopped per orders, comfort care protocol initiated per orders    Family remains at bedside

## 2022-11-12 LAB
BACTERIA BLD CULT: ABNORMAL
BACTERIA BLD CULT: ABNORMAL

## 2022-11-15 LAB
ATRIAL RATE - MUSE: 115 BPM
DIASTOLIC BLOOD PRESSURE - MUSE: NORMAL MMHG
INTERPRETATION ECG - MUSE: NORMAL
P AXIS - MUSE: NORMAL DEGREES
PR INTERVAL - MUSE: NORMAL MS
QRS DURATION - MUSE: 104 MS
QT - MUSE: 284 MS
QTC - MUSE: 411 MS
R AXIS - MUSE: -40 DEGREES
SYSTOLIC BLOOD PRESSURE - MUSE: NORMAL MMHG
T AXIS - MUSE: 90 DEGREES
VENTRICULAR RATE- MUSE: 126 BPM

## 2022-11-17 NOTE — DISCHARGE SUMMARY
Discharge Summary Addendum    Additional discharge diagnosis  - Acute mesenteric ischemia  - Moderate Protein-Calorie Malnutrition    Brian Dallas MD  Surgical Critical Care Fellow

## 2025-06-07 NOTE — LETTER
[facility]  [address]  [address]  [place] [address]  [phone]    2018    Re: Bryant Mckenzie   :   1940  MRN:  [id number]   REFERRING PHYSICIAN:   Merlin Emery Brown [facility]    Date of Initial Evaluation:  3/9/2018  Visits:  Rxs Used: 4  Reason for Referral:  Pain in thoracic spine    PROGRESS  REPORT    Progress reporting period is from 3-9-18 to 3-26-18.       SUBJECTIVE  Subjective changes noted by patient:  .  Subjective: Reports he has had a return of pain Thursday without provocation.  Tried exercises without relief; notes he has been able to sleep well last pm, pain returns upon movement/activity.    Current pain level is 6/10 Current Pain level: 6/10.     Previous pain level was  7/10 Initial Pain level: 7/10.   Changes in function:  None  Adverse reaction to treatment or activity: None  OBJECTIVE  Changes noted in objective findings:  The objective findings below are from DOS 3-26-18.  Objective: L Rot=min to mod loss pdm, Ext=min to mod loss, pdm, poor sitting posture.  Tenderness to palpation L side thoracic.    ASSESSMENT/PLAN  Initially Marshall responded to PT as he had previously (a year ago for same condition); however now his pain is unchanged with exercises and returns to moderate levels with activity; not responsive to mechanical treatment.   Updated problem list and treatment plan: Diagnosis 1:  Thoracic spine pain  Pain -  manual therapy, self management, education, directional preference exercise and home program  Decreased ROM/flexibility - manual therapy and therapeutic exercise  Decreased function - therapeutic activities  Impaired posture - neuro re-education  STG/LTGs have been met or progress has been made towards goals:  None  Assessment of Progress: The patient's condition is unchanged.  Self Management Plans:  Patient is independent in a home treatment program.  Patient is independent in self management of symptoms.  I have  re-evaluated this patient and find that the nature, scope, duration and intensity of the therapy is appropriate for the medical condition of the patient.  Bryant continues to require the following intervention to meet STG and LTG's:  PT intervention is no longer required to meet STG/LTG.    Recommendations:  This patient would benefit from further evaluation.    Thank you for your referral.    INQUIRIES  Therapist: Ifeoma Mast, PT, Cert, MDT  [facility]  [address] [address] [address]  Phone: [phone] / Fax: [phone]    No

## (undated) DEVICE — DAVINCI XI SEAL UNIVERSAL 5-8MM 470361

## (undated) DEVICE — TOTE ANGIO CORP PC15AT SAN32CC83O

## (undated) DEVICE — PREP CHLORAPREP 26ML TINTED ORANGE  260815

## (undated) DEVICE — GW SURG OMNIWIRE STRAIGHT TIP L185CM PRESSURE GU 89185

## (undated) DEVICE — NDL PERC ENTRY THINWALL 18GA 7.0" G00166

## (undated) DEVICE — BLADE CLIPPER SGL USE 9680

## (undated) DEVICE — SU SILK 3-0 SH CR 8X18" C013D

## (undated) DEVICE — SOL NACL 0.9% IRRIG 1000ML BOTTLE 2F7124

## (undated) DEVICE — DAVINCI XI DRAPE ARM 470015

## (undated) DEVICE — SOL WATER IRRIG 1000ML BOTTLE 2F7114

## (undated) DEVICE — SYR 10ML LL W/O NDL 302995

## (undated) DEVICE — DRSG GAUZE 4X4" 3033

## (undated) DEVICE — GLOVE PROTEXIS W/NEU-THERA 7.5  2D73TE75

## (undated) DEVICE — DAVINCI XI DRAPE COLUMN 470341

## (undated) DEVICE — PACK LAP CHOLE SLC15LCFSD

## (undated) DEVICE — DAVINCI HOT SHEARS TIP COVER  400180

## (undated) DEVICE — SPONGE LAP 18X18" X8435

## (undated) DEVICE — CATH ANGIO INFINITI 3DRC 4FRX100CM 538476

## (undated) DEVICE — DRAPE LAP W/ARMBOARD 29410

## (undated) DEVICE — LINEN TOWEL PACK X5 5464

## (undated) DEVICE — SYR ANGIOGRAPHY MULTIUSE KIT ACIST 014612

## (undated) DEVICE — INTRO SHEATH 4FRX10CM PINNACLE RSS402

## (undated) DEVICE — SU VICRYL 4-0 PS-2 18" UND J496H

## (undated) DEVICE — STPL SKIN 35W 6.9MM  PXW35

## (undated) DEVICE — CATH GUIDING  7F MACH1 CLS3.5

## (undated) DEVICE — Device

## (undated) DEVICE — INFL DVC KIT W/10CC NITRO IN4530

## (undated) DEVICE — GUIDEWIRE VASC 0.014INX180CM RUNTHROUGH 25-1011

## (undated) DEVICE — ESU ELEC BLADE 6" COATED E1450-6

## (undated) DEVICE — DAVINCI XI OBTURATOR BLADELESS 8MM 470359

## (undated) DEVICE — SU VICRYL 3-0 TIE 12X18" J904T

## (undated) DEVICE — PREP SKIN SCRUB TRAY 4461A

## (undated) DEVICE — GLOVE PROTEXIS BLUE W/NEU-THERA 7.5  2D73EB75

## (undated) DEVICE — DRSG ABTHERA ADVANCE OPEN ABDOMEN ABT1055

## (undated) DEVICE — PACK MINOR SBA15MIFSE

## (undated) DEVICE — EVAC SYSTEM CLEAR FLOW SC082500

## (undated) DEVICE — SU VICRYL 3-0 SH CR 8X18" J774

## (undated) DEVICE — MANIFOLD KIT ANGIO AUTOMATED 014613

## (undated) DEVICE — GLOVE GAMMEX DERMAPRENE ULTRA SZ 8.5 LF 8517

## (undated) DEVICE — ESU GROUND PAD UNIVERSAL W/O CORD

## (undated) DEVICE — 6FR X 100CM INFINITI TL DIAGNOSTIC CATHETER, AMPLATZ, AR MOD, FEMORAL SELECTIVE THRULUMEN, 0.038IN MAX GUIDEWIRE (EA/1)

## (undated) DEVICE — WIRE GUIDE HI-TRQ PILOT 50 JTIP 0.014"X190CM 1010480-HJ

## (undated) DEVICE — DEVICE SUTURE GRASPER TROCAR CLOSURE 14GA PMITCSG

## (undated) DEVICE — SU VICRYL 3-0 SH 27" J316H

## (undated) DEVICE — INTRO SHEATH 45CM  7FR PNCL DEST

## (undated) DEVICE — ENDO TROCAR FIRST ENTRY KII FIOS Z-THRD 05X100MM CTF03

## (undated) DEVICE — CATH BALLOON NC EMERGE 3.00X12MM H7493926712300

## (undated) DEVICE — BLADE CLIPPER 4406

## (undated) DEVICE — GLOVE BIOGEL PI MICRO INDICATOR UNDERGLOVE SZ 7.5 48975

## (undated) DEVICE — INTRODUCER CATH VASC 5FRX10CM  MPIS-501-NT-U-SST

## (undated) DEVICE — PACK MAJOR SBA15MAFSI

## (undated) DEVICE — SU PDS II 1 CT MONOFIL Z353H

## (undated) DEVICE — DEFIB PRO-PADZ LVP LQD GEL ADULT 8900-2105-01

## (undated) DEVICE — CATH TURNPIKE LP 135CM

## (undated) DEVICE — DAVINCI XI GRASPER ENDOWRIST PROGRASP 470093

## (undated) DEVICE — WIRE GUIDE 0.035"X150CM EMERALD STR 502542

## (undated) DEVICE — SU VICRYL 2-0 TIE 12X18" J905T

## (undated) DEVICE — INTRO SHEATH 6FRX10CM PINNACLE RSS602

## (undated) DEVICE — SYR 10ML FINGER CONTROL W/O NDL 309695

## (undated) DEVICE — ESU CORD MONOPOLAR 10'  E0510

## (undated) DEVICE — INTRODUCER SHEATH FAST-CATH 8FRX12CM 406114

## (undated) DEVICE — CATH BALLOON EMERGE 2.25X15MMH7493918915220

## (undated) DEVICE — LUBRICANT INST ELECTROLUBE EL101

## (undated) DEVICE — SLEEVE TR BAND RADIAL COMPRESSION DEVICE 24CM TRB24-REG

## (undated) DEVICE — VALVE HEMOSTASIS .096" COPILOT MECH 1003331

## (undated) DEVICE — ESU HOLDER LAP INST DISP PURPLE LONG 330MM H-PRO-330

## (undated) DEVICE — SUCTION CANISTER MEDIVAC LINER 3000ML W/LID 65651-530

## (undated) DEVICE — SU VICRYL 0 UR-6 27" J603H

## (undated) DEVICE — RAD CLOSURE ANGIOSEAL 8FR  610131

## (undated) DEVICE — SOL NACL 0.9% INJ 1000ML BAG 2B1324X

## (undated) DEVICE — DRSG STERI STRIP 1/2X4" R1547

## (undated) DEVICE — DAVINCI XI MONOPOLAR SCISSORS HOT SHEARS 8MM 470179

## (undated) DEVICE — CATH ANGIO INFINITI 3DRC 6FRX100CM 534676T

## (undated) DEVICE — SU MONOCRYL 4-0 PS-2 18" UND Y496G

## (undated) DEVICE — TUBING SUCTION 12"X1/4" N612

## (undated) DEVICE — LIGHT HANDLE X2

## (undated) DEVICE — GUIDEWIRE FIGHTER STRAIGHT 190

## (undated) DEVICE — CATH ANGIO INFINITI AL1 4FRX100CM 538445

## (undated) DEVICE — GOWN IMPERVIOUS SPECIALTY XLG/XLONG 32474

## (undated) DEVICE — WIRE GUIDE EXT 0.014-.018"X145CM 22260

## (undated) DEVICE — ENDO SCOPE WARMER LF TM500

## (undated) DEVICE — GLOVE PROTEXIS W/NEU-THERA 8.0  2D73TE80

## (undated) DEVICE — ESU LIGASURE ATLAS 20CM  LS1020

## (undated) DEVICE — ESU PENCIL W/HOLSTER E2350H

## (undated) DEVICE — KIT HAND CONTROL ANGIOTOUCH ACIST 65CM AT-P65

## (undated) DEVICE — DAVINCI XI FCP BIPOLAR FENESTRATED 470205

## (undated) DEVICE — 185CM VERRATA PLUS PRESSURE GUIDEWIRE

## (undated) DEVICE — DECANTER VIAL 2006S

## (undated) DEVICE — SU PDS II 0 CT 36" Z358T

## (undated) DEVICE — BLADE KNIFE SURG 15C 371716

## (undated) DEVICE — INTRO GLIDESHEATH SLENDER 6FR 10X45CM 60-1060

## (undated) DEVICE — WIRE GUIDE 0.035"X260CM SAFE-T-J EXCHANGE G00517

## (undated) DEVICE — SYSTEM CLEARIFY VISUALIZATION 21-345

## (undated) DEVICE — STPL RELOAD LINEAR CUT 75MM TCR75

## (undated) DEVICE — GUIDEWIRE ASAHI FIELDER XT 190

## (undated) DEVICE — DAVINCI XI NDL DRIVER MEGA SUTURE CUT 8MM 470309

## (undated) DEVICE — NDL INSUFFLATION 13GA 120MM C2201

## (undated) DEVICE — DRAPE SHEET REV FOLD 3/4 9349

## (undated) DEVICE — PREP CHLORAPREP 26ML TINTED HI-LITE ORANGE 930815

## (undated) DEVICE — CATH GUIDELINER 6FR 5571

## (undated) DEVICE — SU VICRYL 0 TIE 12X18" J906G

## (undated) DEVICE — CATH DIAG 4FR JL 4.5 538417

## (undated) DEVICE — CATH BALLOON EMERGE 3.0X20MM H7493918920300

## (undated) DEVICE — SUCTION IRR STRYKERFLOW II W/TIP 250-070-520

## (undated) DEVICE — STPL LINEAR CUT 75MM TLC75

## (undated) DEVICE — DRSG BANDAID 1X3" FABRIC LATEX FREE

## (undated) DEVICE — SU STRATAFIX PDS PLUS 2-0 SPIRAL SH 23CM SXPP1B433

## (undated) DEVICE — CANISTER WOUND VAC W/GEL 1000ML M8275093/5

## (undated) DEVICE — CATH BALLOON EMERGE 2.5X15MM H7493918915250

## (undated) DEVICE — NDL 19GA 1.5"

## (undated) DEVICE — KIT HAND CONTROL ACIST 016795

## (undated) DEVICE — CATH DIAG 4FR ANG PIG 538453S

## (undated) RX ORDER — OXYCODONE HYDROCHLORIDE 5 MG/1
TABLET ORAL
Status: DISPENSED
Start: 2022-01-01

## (undated) RX ORDER — LIDOCAINE HYDROCHLORIDE 20 MG/ML
INJECTION, SOLUTION EPIDURAL; INFILTRATION; INTRACAUDAL; PERINEURAL
Status: DISPENSED
Start: 2022-01-01

## (undated) RX ORDER — ONDANSETRON 2 MG/ML
INJECTION INTRAMUSCULAR; INTRAVENOUS
Status: DISPENSED
Start: 2022-01-01

## (undated) RX ORDER — FENTANYL CITRATE 50 UG/ML
INJECTION, SOLUTION INTRAMUSCULAR; INTRAVENOUS
Status: DISPENSED
Start: 2019-10-24

## (undated) RX ORDER — LIDOCAINE HYDROCHLORIDE 10 MG/ML
INJECTION, SOLUTION EPIDURAL; INFILTRATION; INTRACAUDAL; PERINEURAL
Status: DISPENSED
Start: 2019-06-04

## (undated) RX ORDER — FENTANYL CITRATE 50 UG/ML
INJECTION, SOLUTION INTRAMUSCULAR; INTRAVENOUS
Status: DISPENSED
Start: 2022-01-01

## (undated) RX ORDER — NEOSTIGMINE METHYLSULFATE 1 MG/ML
VIAL (ML) INJECTION
Status: DISPENSED
Start: 2022-01-01

## (undated) RX ORDER — PROPOFOL 10 MG/ML
INJECTION, EMULSION INTRAVENOUS
Status: DISPENSED
Start: 2019-04-17

## (undated) RX ORDER — PROPOFOL 10 MG/ML
INJECTION, EMULSION INTRAVENOUS
Status: DISPENSED
Start: 2022-01-01

## (undated) RX ORDER — ASPIRIN 81 MG/1
TABLET, CHEWABLE ORAL
Status: DISPENSED
Start: 2019-06-04

## (undated) RX ORDER — HEPARIN SODIUM 200 [USP'U]/100ML
INJECTION, SOLUTION INTRAVENOUS
Status: DISPENSED
Start: 2022-01-01

## (undated) RX ORDER — NITROGLYCERIN 0.4 MG/1
TABLET SUBLINGUAL
Status: DISPENSED
Start: 2019-05-29

## (undated) RX ORDER — LIDOCAINE HYDROCHLORIDE 20 MG/ML
INJECTION, SOLUTION EPIDURAL; INFILTRATION; INTRACAUDAL; PERINEURAL
Status: DISPENSED
Start: 2019-04-17

## (undated) RX ORDER — BUPIVACAINE HYDROCHLORIDE 2.5 MG/ML
INJECTION, SOLUTION EPIDURAL; INFILTRATION; INTRACAUDAL
Status: DISPENSED
Start: 2022-01-01

## (undated) RX ORDER — NITROGLYCERIN 5 MG/ML
VIAL (ML) INTRAVENOUS
Status: DISPENSED
Start: 2019-10-24

## (undated) RX ORDER — BUPIVACAINE HYDROCHLORIDE 5 MG/ML
INJECTION, SOLUTION EPIDURAL; INTRACAUDAL
Status: DISPENSED
Start: 2022-01-01

## (undated) RX ORDER — HEPARIN SODIUM 200 [USP'U]/100ML
INJECTION, SOLUTION INTRAVENOUS
Status: DISPENSED
Start: 2019-10-24

## (undated) RX ORDER — METOPROLOL TARTRATE 50 MG
TABLET ORAL
Status: DISPENSED
Start: 2019-05-29

## (undated) RX ORDER — ACETAMINOPHEN 500 MG
TABLET ORAL
Status: DISPENSED
Start: 2022-01-01

## (undated) RX ORDER — HEPARIN SODIUM 1000 [USP'U]/ML
INJECTION, SOLUTION INTRAVENOUS; SUBCUTANEOUS
Status: DISPENSED
Start: 2019-10-24

## (undated) RX ORDER — FENTANYL CITRATE 50 UG/ML
INJECTION, SOLUTION INTRAMUSCULAR; INTRAVENOUS
Status: DISPENSED
Start: 2019-04-17

## (undated) RX ORDER — METOPROLOL TARTRATE 1 MG/ML
INJECTION, SOLUTION INTRAVENOUS
Status: DISPENSED
Start: 2019-05-29

## (undated) RX ORDER — HEPARIN SODIUM 1000 [USP'U]/ML
INJECTION, SOLUTION INTRAVENOUS; SUBCUTANEOUS
Status: DISPENSED
Start: 2019-06-04

## (undated) RX ORDER — DEXAMETHASONE SODIUM PHOSPHATE 4 MG/ML
INJECTION, SOLUTION INTRA-ARTICULAR; INTRALESIONAL; INTRAMUSCULAR; INTRAVENOUS; SOFT TISSUE
Status: DISPENSED
Start: 2022-01-01

## (undated) RX ORDER — LIDOCAINE HYDROCHLORIDE 10 MG/ML
INJECTION, SOLUTION EPIDURAL; INFILTRATION; INTRACAUDAL; PERINEURAL
Status: DISPENSED
Start: 2022-01-01

## (undated) RX ORDER — FENTANYL CITRATE 50 UG/ML
INJECTION, SOLUTION INTRAMUSCULAR; INTRAVENOUS
Status: DISPENSED
Start: 2019-06-04

## (undated) RX ORDER — LIDOCAINE HYDROCHLORIDE 10 MG/ML
INJECTION, SOLUTION EPIDURAL; INFILTRATION; INTRACAUDAL; PERINEURAL
Status: DISPENSED
Start: 2019-10-24

## (undated) RX ORDER — HEPARIN SODIUM 1000 [USP'U]/ML
INJECTION, SOLUTION INTRAVENOUS; SUBCUTANEOUS
Status: DISPENSED
Start: 2022-01-01

## (undated) RX ORDER — GLYCOPYRROLATE 0.2 MG/ML
INJECTION, SOLUTION INTRAMUSCULAR; INTRAVENOUS
Status: DISPENSED
Start: 2022-01-01

## (undated) RX ORDER — CEFAZOLIN SODIUM/WATER 2 G/20 ML
SYRINGE (ML) INTRAVENOUS
Status: DISPENSED
Start: 2022-01-01

## (undated) RX ORDER — EPINEPHRINE 1 MG/ML
INJECTION, SOLUTION INTRAMUSCULAR; SUBCUTANEOUS
Status: DISPENSED
Start: 2022-01-01

## (undated) RX ORDER — CEFAZOLIN SODIUM 2 G/100ML
INJECTION, SOLUTION INTRAVENOUS
Status: DISPENSED
Start: 2019-04-17